# Patient Record
Sex: MALE | Race: WHITE | NOT HISPANIC OR LATINO | Employment: OTHER | URBAN - METROPOLITAN AREA
[De-identification: names, ages, dates, MRNs, and addresses within clinical notes are randomized per-mention and may not be internally consistent; named-entity substitution may affect disease eponyms.]

---

## 2017-02-13 ENCOUNTER — RECORDS - HEALTHEAST (OUTPATIENT)
Dept: GENERAL RADIOLOGY | Facility: CLINIC | Age: 77
End: 2017-02-13

## 2017-02-13 ENCOUNTER — OFFICE VISIT - HEALTHEAST (OUTPATIENT)
Dept: FAMILY MEDICINE | Facility: CLINIC | Age: 77
End: 2017-02-13

## 2017-02-13 DIAGNOSIS — R07.89 OTHER CHEST PAIN: ICD-10-CM

## 2017-02-13 DIAGNOSIS — R07.89 LEFT-SIDED CHEST WALL PAIN: ICD-10-CM

## 2017-02-13 DIAGNOSIS — R07.89 CHEST TIGHTNESS OR PRESSURE: ICD-10-CM

## 2017-02-13 DIAGNOSIS — E87.5 HYPERKALEMIA: ICD-10-CM

## 2017-02-13 DIAGNOSIS — R05.9 COUGH: ICD-10-CM

## 2017-02-13 LAB
ATRIAL RATE - MUSE: 108 BPM
DIASTOLIC BLOOD PRESSURE - MUSE: NORMAL MMHG
INTERPRETATION ECG - MUSE: NORMAL
P AXIS - MUSE: NORMAL DEGREES
PR INTERVAL - MUSE: NORMAL MS
QRS DURATION - MUSE: 142 MS
QT - MUSE: 412 MS
QTC - MUSE: 444 MS
R AXIS - MUSE: 78 DEGREES
SYSTOLIC BLOOD PRESSURE - MUSE: NORMAL MMHG
T AXIS - MUSE: 9 DEGREES
VENTRICULAR RATE- MUSE: 70 BPM

## 2017-02-16 ENCOUNTER — OFFICE VISIT - HEALTHEAST (OUTPATIENT)
Dept: FAMILY MEDICINE | Facility: CLINIC | Age: 77
End: 2017-02-16

## 2017-02-16 ENCOUNTER — COMMUNICATION - HEALTHEAST (OUTPATIENT)
Dept: FAMILY MEDICINE | Facility: CLINIC | Age: 77
End: 2017-02-16

## 2017-02-16 DIAGNOSIS — N18.3 CKD (CHRONIC KIDNEY DISEASE), STAGE 3 (MODERATE): ICD-10-CM

## 2017-02-16 DIAGNOSIS — E87.5 HYPERKALEMIA: ICD-10-CM

## 2017-02-16 DIAGNOSIS — R53.83 FATIGUE: ICD-10-CM

## 2017-02-16 DIAGNOSIS — R05.9 COUGH: ICD-10-CM

## 2017-04-25 ENCOUNTER — COMMUNICATION - HEALTHEAST (OUTPATIENT)
Dept: FAMILY MEDICINE | Facility: CLINIC | Age: 77
End: 2017-04-25

## 2017-04-25 ENCOUNTER — OFFICE VISIT - HEALTHEAST (OUTPATIENT)
Dept: FAMILY MEDICINE | Facility: CLINIC | Age: 77
End: 2017-04-25

## 2017-04-25 DIAGNOSIS — Z86.79 HISTORY OF RHEUMATIC FEVER: ICD-10-CM

## 2017-04-25 DIAGNOSIS — E66.3 OVERWEIGHT (BMI 25.0-29.9): ICD-10-CM

## 2017-04-25 DIAGNOSIS — I10 ESSENTIAL HYPERTENSION WITH GOAL BLOOD PRESSURE LESS THAN 140/90: ICD-10-CM

## 2017-04-25 DIAGNOSIS — E55.9 VITAMIN D DEFICIENCY DISEASE: ICD-10-CM

## 2017-04-25 DIAGNOSIS — I48.19 PERSISTENT ATRIAL FIBRILLATION (H): ICD-10-CM

## 2017-04-25 DIAGNOSIS — I10 HTN (HYPERTENSION): ICD-10-CM

## 2017-04-25 DIAGNOSIS — N18.3 CKD (CHRONIC KIDNEY DISEASE), STAGE 3 (MODERATE): ICD-10-CM

## 2017-04-25 DIAGNOSIS — G47.33 OBSTRUCTIVE SLEEP APNEA: ICD-10-CM

## 2017-04-25 DIAGNOSIS — Z00.01 ENCOUNTER FOR GENERAL ADULT MEDICAL EXAMINATION WITH ABNORMAL FINDINGS: ICD-10-CM

## 2017-04-25 DIAGNOSIS — E87.5 HYPERKALEMIA: ICD-10-CM

## 2017-04-25 DIAGNOSIS — H91.90 HEARING LOSS: ICD-10-CM

## 2017-04-25 LAB
CHOLEST SERPL-MCNC: 184 MG/DL
FASTING STATUS PATIENT QL REPORTED: YES
HDLC SERPL-MCNC: 36 MG/DL
LDLC SERPL CALC-MCNC: 119 MG/DL
TRIGL SERPL-MCNC: 145 MG/DL

## 2017-04-25 ASSESSMENT — MIFFLIN-ST. JEOR: SCORE: 1665.15

## 2017-10-27 ENCOUNTER — OFFICE VISIT - HEALTHEAST (OUTPATIENT)
Dept: FAMILY MEDICINE | Facility: CLINIC | Age: 77
End: 2017-10-27

## 2017-10-27 ENCOUNTER — AMBULATORY - HEALTHEAST (OUTPATIENT)
Dept: FAMILY MEDICINE | Facility: CLINIC | Age: 77
End: 2017-10-27

## 2017-10-27 ENCOUNTER — COMMUNICATION - HEALTHEAST (OUTPATIENT)
Dept: FAMILY MEDICINE | Facility: CLINIC | Age: 77
End: 2017-10-27

## 2017-10-27 DIAGNOSIS — B35.1 ONYCHOMYCOSIS: ICD-10-CM

## 2017-10-27 DIAGNOSIS — N18.30 CHRONIC KIDNEY DISEASE (CKD), STAGE III (MODERATE) (H): ICD-10-CM

## 2017-10-27 DIAGNOSIS — I48.19 PERSISTENT ATRIAL FIBRILLATION (H): ICD-10-CM

## 2017-10-27 DIAGNOSIS — Z23 NEED FOR IMMUNIZATION AGAINST INFLUENZA: ICD-10-CM

## 2017-10-27 DIAGNOSIS — I10 ESSENTIAL HYPERTENSION WITH GOAL BLOOD PRESSURE LESS THAN 140/90: ICD-10-CM

## 2017-10-27 DIAGNOSIS — E55.9 VITAMIN D DEFICIENCY DISEASE: ICD-10-CM

## 2017-10-27 DIAGNOSIS — E87.5 HYPERKALEMIA: ICD-10-CM

## 2017-10-27 ASSESSMENT — MIFFLIN-ST. JEOR: SCORE: 1671.95

## 2017-10-30 ENCOUNTER — COMMUNICATION - HEALTHEAST (OUTPATIENT)
Dept: FAMILY MEDICINE | Facility: CLINIC | Age: 77
End: 2017-10-30

## 2017-11-01 ENCOUNTER — AMBULATORY - HEALTHEAST (OUTPATIENT)
Dept: LAB | Facility: CLINIC | Age: 77
End: 2017-11-01

## 2017-11-01 DIAGNOSIS — E87.5 HYPERKALEMIA: ICD-10-CM

## 2017-11-15 ENCOUNTER — OFFICE VISIT - HEALTHEAST (OUTPATIENT)
Dept: PODIATRY | Age: 77
End: 2017-11-15

## 2017-11-15 DIAGNOSIS — M79.673 PAIN OF FOOT, UNSPECIFIED LATERALITY: ICD-10-CM

## 2017-11-15 DIAGNOSIS — L60.2 ONYCHAUXIS: ICD-10-CM

## 2017-11-15 DIAGNOSIS — N18.30 STAGE 3 CHRONIC KIDNEY DISEASE (H): ICD-10-CM

## 2017-11-15 ASSESSMENT — MIFFLIN-ST. JEOR: SCORE: 1669.68

## 2018-04-06 ENCOUNTER — OFFICE VISIT - HEALTHEAST (OUTPATIENT)
Dept: FAMILY MEDICINE | Facility: CLINIC | Age: 78
End: 2018-04-06

## 2018-04-06 DIAGNOSIS — E55.9 VITAMIN D DEFICIENCY DISEASE: ICD-10-CM

## 2018-04-06 DIAGNOSIS — I48.19 PERSISTENT ATRIAL FIBRILLATION (H): ICD-10-CM

## 2018-04-06 DIAGNOSIS — D75.89 MACROCYTOSIS: ICD-10-CM

## 2018-04-06 DIAGNOSIS — I10 ESSENTIAL HYPERTENSION WITH GOAL BLOOD PRESSURE LESS THAN 140/90: ICD-10-CM

## 2018-04-06 DIAGNOSIS — Z86.79 HISTORY OF RHEUMATIC FEVER: ICD-10-CM

## 2018-04-06 DIAGNOSIS — N18.30 CHRONIC KIDNEY DISEASE (CKD), STAGE III (MODERATE) (H): ICD-10-CM

## 2018-04-06 LAB
ANION GAP SERPL CALCULATED.3IONS-SCNC: 11 MMOL/L (ref 5–18)
BUN SERPL-MCNC: 22 MG/DL (ref 8–28)
CALCIUM SERPL-MCNC: 10.2 MG/DL (ref 8.5–10.5)
CHLORIDE BLD-SCNC: 104 MMOL/L (ref 98–107)
CO2 SERPL-SCNC: 27 MMOL/L (ref 22–31)
CREAT SERPL-MCNC: 1.67 MG/DL (ref 0.7–1.3)
ERYTHROCYTE [DISTWIDTH] IN BLOOD BY AUTOMATED COUNT: 12 % (ref 11–14.5)
FOLATE SERPL-MCNC: 9.9 NG/ML
GFR SERPL CREATININE-BSD FRML MDRD: 40 ML/MIN/1.73M2
GLUCOSE BLD-MCNC: 94 MG/DL (ref 70–125)
HCT VFR BLD AUTO: 42.1 % (ref 40–54)
HGB BLD-MCNC: 14.2 G/DL (ref 14–18)
MCH RBC QN AUTO: 33.6 PG (ref 27–34)
MCHC RBC AUTO-ENTMCNC: 33.6 G/DL (ref 32–36)
MCV RBC AUTO: 100 FL (ref 80–100)
PLATELET # BLD AUTO: 178 THOU/UL (ref 140–440)
PMV BLD AUTO: 7.4 FL (ref 7–10)
POTASSIUM BLD-SCNC: 4.7 MMOL/L (ref 3.5–5)
RBC # BLD AUTO: 4.21 MILL/UL (ref 4.4–6.2)
SODIUM SERPL-SCNC: 142 MMOL/L (ref 136–145)
VIT B12 SERPL-MCNC: 420 PG/ML (ref 213–816)
WBC: 5.6 THOU/UL (ref 4–11)

## 2018-04-08 ENCOUNTER — COMMUNICATION - HEALTHEAST (OUTPATIENT)
Dept: FAMILY MEDICINE | Facility: CLINIC | Age: 78
End: 2018-04-08

## 2018-04-09 LAB — 25(OH)D3 SERPL-MCNC: 46.4 NG/ML (ref 30–80)

## 2018-04-18 ENCOUNTER — COMMUNICATION - HEALTHEAST (OUTPATIENT)
Dept: FAMILY MEDICINE | Facility: CLINIC | Age: 78
End: 2018-04-18

## 2018-04-18 DIAGNOSIS — I10 ESSENTIAL HYPERTENSION WITH GOAL BLOOD PRESSURE LESS THAN 140/90: ICD-10-CM

## 2018-06-14 ENCOUNTER — COMMUNICATION - HEALTHEAST (OUTPATIENT)
Dept: FAMILY MEDICINE | Facility: CLINIC | Age: 78
End: 2018-06-14

## 2018-06-14 DIAGNOSIS — Z86.79 HISTORY OF RHEUMATIC FEVER: ICD-10-CM

## 2018-07-06 ENCOUNTER — OFFICE VISIT - HEALTHEAST (OUTPATIENT)
Dept: FAMILY MEDICINE | Facility: CLINIC | Age: 78
End: 2018-07-06

## 2018-07-06 DIAGNOSIS — R01.1 HEART MURMUR: ICD-10-CM

## 2018-07-06 DIAGNOSIS — I48.19 PERSISTENT ATRIAL FIBRILLATION (H): ICD-10-CM

## 2018-07-06 DIAGNOSIS — Z00.01 ENCOUNTER FOR GENERAL ADULT MEDICAL EXAMINATION WITH ABNORMAL FINDINGS: ICD-10-CM

## 2018-07-06 DIAGNOSIS — I10 ESSENTIAL HYPERTENSION WITH GOAL BLOOD PRESSURE LESS THAN 140/90: ICD-10-CM

## 2018-07-06 DIAGNOSIS — I42.9 CARDIOMYOPATHY (H): ICD-10-CM

## 2018-07-06 DIAGNOSIS — H91.90 HEARING LOSS: ICD-10-CM

## 2018-07-06 DIAGNOSIS — E66.3 OVERWEIGHT (BMI 25.0-29.9): ICD-10-CM

## 2018-07-06 DIAGNOSIS — D75.89 MACROCYTOSIS: ICD-10-CM

## 2018-07-06 DIAGNOSIS — E55.9 VITAMIN D DEFICIENCY DISEASE: ICD-10-CM

## 2018-07-06 DIAGNOSIS — G47.33 OBSTRUCTIVE SLEEP APNEA: ICD-10-CM

## 2018-07-06 DIAGNOSIS — R53.83 FATIGUE: ICD-10-CM

## 2018-07-06 DIAGNOSIS — N18.30 CHRONIC KIDNEY DISEASE (CKD), STAGE III (MODERATE) (H): ICD-10-CM

## 2018-07-06 LAB
ALBUMIN SERPL-MCNC: 4.1 G/DL (ref 3.5–5)
ALP SERPL-CCNC: 95 U/L (ref 45–120)
ALT SERPL W P-5'-P-CCNC: 18 U/L (ref 0–45)
ANION GAP SERPL CALCULATED.3IONS-SCNC: 6 MMOL/L (ref 5–18)
AST SERPL W P-5'-P-CCNC: 18 U/L (ref 0–40)
BILIRUB SERPL-MCNC: 0.8 MG/DL (ref 0–1)
BNP SERPL-MCNC: 205 PG/ML (ref 0–86)
BUN SERPL-MCNC: 23 MG/DL (ref 8–28)
CALCIUM SERPL-MCNC: 10.2 MG/DL (ref 8.5–10.5)
CHLORIDE BLD-SCNC: 109 MMOL/L (ref 98–107)
CHOLEST SERPL-MCNC: 159 MG/DL
CO2 SERPL-SCNC: 29 MMOL/L (ref 22–31)
CREAT SERPL-MCNC: 1.72 MG/DL (ref 0.7–1.3)
ERYTHROCYTE [DISTWIDTH] IN BLOOD BY AUTOMATED COUNT: 13.3 % (ref 11–14.5)
FASTING STATUS PATIENT QL REPORTED: YES
GFR SERPL CREATININE-BSD FRML MDRD: 38 ML/MIN/1.73M2
GLUCOSE BLD-MCNC: 99 MG/DL (ref 70–125)
HCT VFR BLD AUTO: 42.3 % (ref 40–54)
HDLC SERPL-MCNC: 37 MG/DL
HGB BLD-MCNC: 14 G/DL (ref 14–18)
LDLC SERPL CALC-MCNC: 98 MG/DL
MCH RBC QN AUTO: 32.8 PG (ref 27–34)
MCHC RBC AUTO-ENTMCNC: 33.2 G/DL (ref 32–36)
MCV RBC AUTO: 99 FL (ref 80–100)
PLATELET # BLD AUTO: 185 THOU/UL (ref 140–440)
PMV BLD AUTO: 7.6 FL (ref 7–10)
POTASSIUM BLD-SCNC: 6 MMOL/L (ref 3.5–5)
PROT SERPL-MCNC: 7.3 G/DL (ref 6–8)
RBC # BLD AUTO: 4.28 MILL/UL (ref 4.4–6.2)
SODIUM SERPL-SCNC: 144 MMOL/L (ref 136–145)
TRIGL SERPL-MCNC: 119 MG/DL
TSH SERPL DL<=0.005 MIU/L-ACNC: 2.17 UIU/ML (ref 0.3–5)
WBC: 5.5 THOU/UL (ref 4–11)

## 2018-07-06 ASSESSMENT — MIFFLIN-ST. JEOR: SCORE: 1652.11

## 2018-07-09 ENCOUNTER — AMBULATORY - HEALTHEAST (OUTPATIENT)
Dept: FAMILY MEDICINE | Facility: CLINIC | Age: 78
End: 2018-07-09

## 2018-07-09 ENCOUNTER — COMMUNICATION - HEALTHEAST (OUTPATIENT)
Dept: FAMILY MEDICINE | Facility: CLINIC | Age: 78
End: 2018-07-09

## 2018-07-09 DIAGNOSIS — N18.30 CKD (CHRONIC KIDNEY DISEASE) STAGE 3, GFR 30-59 ML/MIN (H): ICD-10-CM

## 2018-07-09 DIAGNOSIS — I42.9 CARDIOMYOPATHY (H): ICD-10-CM

## 2018-07-09 DIAGNOSIS — I13.0 HYPERTENSIVE HEART AND CHRONIC KIDNEY DISEASE WITH HEART FAILURE AND STAGE 1 THROUGH STAGE 4 CHRONIC KIDNEY DISEASE, OR CHRONIC KIDNEY DISEASE (H): ICD-10-CM

## 2018-07-09 DIAGNOSIS — E87.5 HYPERKALEMIA: ICD-10-CM

## 2018-07-11 ENCOUNTER — AMBULATORY - HEALTHEAST (OUTPATIENT)
Dept: LAB | Facility: CLINIC | Age: 78
End: 2018-07-11

## 2018-07-11 DIAGNOSIS — I42.9 CARDIOMYOPATHY (H): ICD-10-CM

## 2018-07-11 DIAGNOSIS — I13.0 HYPERTENSIVE HEART AND CHRONIC KIDNEY DISEASE WITH HEART FAILURE AND STAGE 1 THROUGH STAGE 4 CHRONIC KIDNEY DISEASE, OR CHRONIC KIDNEY DISEASE (H): ICD-10-CM

## 2018-07-11 DIAGNOSIS — E87.5 HYPERKALEMIA: ICD-10-CM

## 2018-07-11 DIAGNOSIS — N18.30 CKD (CHRONIC KIDNEY DISEASE) STAGE 3, GFR 30-59 ML/MIN (H): ICD-10-CM

## 2018-07-11 LAB
ANION GAP SERPL CALCULATED.3IONS-SCNC: 10 MMOL/L (ref 5–18)
BNP SERPL-MCNC: 187 PG/ML (ref 0–86)
BUN SERPL-MCNC: 19 MG/DL (ref 8–28)
CALCIUM SERPL-MCNC: 10.1 MG/DL (ref 8.5–10.5)
CHLORIDE BLD-SCNC: 106 MMOL/L (ref 98–107)
CO2 SERPL-SCNC: 27 MMOL/L (ref 22–31)
CREAT SERPL-MCNC: 1.76 MG/DL (ref 0.7–1.3)
GFR SERPL CREATININE-BSD FRML MDRD: 37 ML/MIN/1.73M2
GLUCOSE BLD-MCNC: 115 MG/DL (ref 70–125)
POTASSIUM BLD-SCNC: 5.4 MMOL/L (ref 3.5–5)
SODIUM SERPL-SCNC: 143 MMOL/L (ref 136–145)

## 2018-07-12 ENCOUNTER — COMMUNICATION - HEALTHEAST (OUTPATIENT)
Dept: FAMILY MEDICINE | Facility: CLINIC | Age: 78
End: 2018-07-12

## 2018-10-20 ENCOUNTER — COMMUNICATION - HEALTHEAST (OUTPATIENT)
Dept: FAMILY MEDICINE | Facility: CLINIC | Age: 78
End: 2018-10-20

## 2018-10-20 DIAGNOSIS — I10 ESSENTIAL HYPERTENSION WITH GOAL BLOOD PRESSURE LESS THAN 140/90: ICD-10-CM

## 2019-01-11 ENCOUNTER — OFFICE VISIT - HEALTHEAST (OUTPATIENT)
Dept: FAMILY MEDICINE | Facility: CLINIC | Age: 79
End: 2019-01-11

## 2019-01-11 DIAGNOSIS — R01.1 HEART MURMUR: ICD-10-CM

## 2019-01-11 DIAGNOSIS — E55.9 VITAMIN D DEFICIENCY DISEASE: ICD-10-CM

## 2019-01-11 DIAGNOSIS — E66.3 OVERWEIGHT (BMI 25.0-29.9): ICD-10-CM

## 2019-01-11 DIAGNOSIS — I48.19 PERSISTENT ATRIAL FIBRILLATION (H): ICD-10-CM

## 2019-01-11 DIAGNOSIS — R04.0 EPISTAXIS: ICD-10-CM

## 2019-01-11 DIAGNOSIS — Z86.79 HISTORY OF RHEUMATIC FEVER: ICD-10-CM

## 2019-01-11 DIAGNOSIS — I10 ESSENTIAL HYPERTENSION WITH GOAL BLOOD PRESSURE LESS THAN 140/90: ICD-10-CM

## 2019-01-11 DIAGNOSIS — N18.30 CHRONIC KIDNEY DISEASE (CKD), STAGE III (MODERATE) (H): ICD-10-CM

## 2019-01-11 DIAGNOSIS — M25.511 ACUTE PAIN OF RIGHT SHOULDER: ICD-10-CM

## 2019-01-11 DIAGNOSIS — E87.5 HYPERKALEMIA: ICD-10-CM

## 2019-01-11 DIAGNOSIS — I42.9 CARDIOMYOPATHY, UNSPECIFIED TYPE (H): ICD-10-CM

## 2019-01-11 LAB
ANION GAP SERPL CALCULATED.3IONS-SCNC: 10 MMOL/L (ref 5–18)
BNP SERPL-MCNC: 194 PG/ML (ref 0–86)
BUN SERPL-MCNC: 25 MG/DL (ref 8–28)
CALCIUM SERPL-MCNC: 10.4 MG/DL (ref 8.5–10.5)
CHLORIDE BLD-SCNC: 108 MMOL/L (ref 98–107)
CO2 SERPL-SCNC: 25 MMOL/L (ref 22–31)
CREAT SERPL-MCNC: 1.81 MG/DL (ref 0.7–1.3)
GFR SERPL CREATININE-BSD FRML MDRD: 36 ML/MIN/1.73M2
GLUCOSE BLD-MCNC: 93 MG/DL (ref 70–125)
POTASSIUM BLD-SCNC: 5.5 MMOL/L (ref 3.5–5)
SODIUM SERPL-SCNC: 143 MMOL/L (ref 136–145)

## 2019-01-14 ENCOUNTER — COMMUNICATION - HEALTHEAST (OUTPATIENT)
Dept: FAMILY MEDICINE | Facility: CLINIC | Age: 79
End: 2019-01-14

## 2019-01-14 LAB — 25(OH)D3 SERPL-MCNC: 42.1 NG/ML (ref 30–80)

## 2019-01-18 ENCOUNTER — HOSPITAL ENCOUNTER (OUTPATIENT)
Dept: CARDIOLOGY | Facility: HOSPITAL | Age: 79
Discharge: HOME | End: 2019-01-18
Attending: FAMILY MEDICINE

## 2019-01-18 ENCOUNTER — COMMUNICATION - HEALTHEAST (OUTPATIENT)
Dept: FAMILY MEDICINE | Facility: CLINIC | Age: 79
End: 2019-01-18

## 2019-01-18 DIAGNOSIS — I42.9 CARDIOMYOPATHY, UNSPECIFIED TYPE (H): ICD-10-CM

## 2019-01-18 DIAGNOSIS — R01.1 HEART MURMUR: ICD-10-CM

## 2019-01-18 LAB
AORTIC ROOT: 3.8 CM
BSA FOR ECHO PROCEDURE: 2.15 M2
CV BLOOD PRESSURE: ABNORMAL MMHG
CV ECHO HEIGHT: 70.8 IN
CV ECHO WEIGHT: 205 LBS
DOP CALC LVOT AREA: 3.46 CM2
DOP CALC LVOT DIAMETER: 2.1 CM
DOP CALC LVOT STROKE VOLUME: 62 CM3
DOP CALC MV VTI: 28.4 CM
DOP CALCLVOT PEAK VEL VTI: 17.9 CM
EJECTION FRACTION: 65 % (ref 55–75)
FRACTIONAL SHORTENING: 34.9 % (ref 28–44)
INTERVENTRICULAR SEPTUM IN END DIASTOLE: 1.4 CM (ref 0.6–1)
IVS/PW RATIO: 0.9
LA AREA 1: 29.2 CM2
LA AREA 2: 26.8 CM2
LEFT ATRIUM LENGTH: 6.88 CM
LEFT ATRIUM SIZE: 4.3 CM
LEFT ATRIUM TO AORTIC ROOT RATIO: 1.13 NO UNITS
LEFT ATRIUM VOLUME INDEX: 45 ML/M2
LEFT ATRIUM VOLUME: 96.7 ML
LEFT VENTRICLE CARDIAC INDEX: 2.1 L/MIN/M2
LEFT VENTRICLE CARDIAC OUTPUT: 4.6 L/MIN
LEFT VENTRICLE DIASTOLIC VOLUME INDEX: 47 CM3/M2 (ref 34–74)
LEFT VENTRICLE DIASTOLIC VOLUME: 101 CM3 (ref 62–150)
LEFT VENTRICLE HEART RATE: 74 BPM
LEFT VENTRICLE MASS INDEX: 120.1 G/M2
LEFT VENTRICLE SYSTOLIC VOLUME INDEX: 16.3 CM3/M2 (ref 11–31)
LEFT VENTRICLE SYSTOLIC VOLUME: 35 CM3 (ref 21–61)
LEFT VENTRICULAR INTERNAL DIMENSION IN DIASTOLE: 4.3 CM (ref 4.2–5.8)
LEFT VENTRICULAR INTERNAL DIMENSION IN SYSTOLE: 2.8 CM (ref 2.5–4)
LEFT VENTRICULAR MASS: 258.1 G
LEFT VENTRICULAR OUTFLOW TRACT MEAN GRADIENT: 1 MMHG
LEFT VENTRICULAR OUTFLOW TRACT MEAN VELOCITY: 54.9 CM/S
LEFT VENTRICULAR POSTERIOR WALL IN END DIASTOLE: 1.6 CM (ref 0.6–1)
LV STROKE VOLUME INDEX: 28.8 ML/M2
MITRAL REGURGITANT VELOCITY TIME INTEGRAL: 160 CM
MITRAL VALVE MEAN INFLOW VELOCITY: 42 CM/S
MITRAL VALVE PEAK VELOCITY: 102 CM/S
MR MEAN GRADIENT: 60 MMHG
MR MEAN VELOCITY: 365 CM/S
MR PEAK GRADIENT: 88.7 MMHG
MV AREA VTI: 2.18 CM2
MV AVERAGE E/E' RATIO: 6.4 CM/S
MV DECELERATION TIME: 165 MS
MV E'TISSUE VEL-LAT: 13.2 CM/S
MV E'TISSUE VEL-MED: 10.9 CM/S
MV LATERAL E/E' RATIO: 5.9
MV MEAN GRADIENT: 1 MMHG
MV MEDIAL E/E' RATIO: 7.1
MV PEAK E VELOCITY: 77.5 CM/S
MV PEAK GRADIENT: 4.2 MMHG
MV VALVE AREA BY CONTINUITY EQUATION: 2.2 CM2
NUC REST DIASTOLIC VOLUME INDEX: 3280 LBS
NUC REST SYSTOLIC VOLUME INDEX: 70.75 IN
PISA MR PEAK VEL: 471 CM/S
PR MAX PG: 2 MMHG
PR PEAK VELOCITY: 73.3 CM/S
TRICUSPID REGURGITATION PEAK PRESSURE GRADIENT: 10.8 MMHG
TRICUSPID VALVE ANULAR PLANE SYSTOLIC EXCURSION: 1.7 CM
TRICUSPID VALVE PEAK REGURGITANT VELOCITY: 164 CM/S

## 2019-01-18 ASSESSMENT — MIFFLIN-ST. JEOR: SCORE: 1643.03

## 2019-07-14 ENCOUNTER — COMMUNICATION - HEALTHEAST (OUTPATIENT)
Dept: FAMILY MEDICINE | Facility: CLINIC | Age: 79
End: 2019-07-14

## 2019-07-14 DIAGNOSIS — I10 ESSENTIAL HYPERTENSION WITH GOAL BLOOD PRESSURE LESS THAN 140/90: ICD-10-CM

## 2019-07-16 ENCOUNTER — COMMUNICATION - HEALTHEAST (OUTPATIENT)
Dept: FAMILY MEDICINE | Facility: CLINIC | Age: 79
End: 2019-07-16

## 2019-07-16 ENCOUNTER — OFFICE VISIT - HEALTHEAST (OUTPATIENT)
Dept: FAMILY MEDICINE | Facility: CLINIC | Age: 79
End: 2019-07-16

## 2019-07-16 DIAGNOSIS — I48.19 PERSISTENT ATRIAL FIBRILLATION (H): ICD-10-CM

## 2019-07-16 DIAGNOSIS — N18.30 CHRONIC KIDNEY DISEASE (CKD), STAGE III (MODERATE) (H): ICD-10-CM

## 2019-07-16 DIAGNOSIS — I13.0 HYPERTENSIVE HEART AND CHRONIC KIDNEY DISEASE WITH HEART FAILURE AND STAGE 1 THROUGH STAGE 4 CHRONIC KIDNEY DISEASE, OR CHRONIC KIDNEY DISEASE (H): ICD-10-CM

## 2019-07-16 DIAGNOSIS — R42 DIZZINESS: ICD-10-CM

## 2019-07-16 DIAGNOSIS — E66.3 OVERWEIGHT (BMI 25.0-29.9): ICD-10-CM

## 2019-07-16 DIAGNOSIS — Z00.01 ENCOUNTER FOR GENERAL ADULT MEDICAL EXAMINATION WITH ABNORMAL FINDINGS: ICD-10-CM

## 2019-07-16 DIAGNOSIS — I10 ESSENTIAL HYPERTENSION WITH GOAL BLOOD PRESSURE LESS THAN 140/90: ICD-10-CM

## 2019-07-16 DIAGNOSIS — I42.9 CARDIOMYOPATHY, UNSPECIFIED TYPE (H): ICD-10-CM

## 2019-07-16 DIAGNOSIS — E87.5 HYPERKALEMIA: ICD-10-CM

## 2019-07-16 LAB
ANION GAP SERPL CALCULATED.3IONS-SCNC: 8 MMOL/L (ref 5–18)
BNP SERPL-MCNC: 288 PG/ML (ref 0–88)
BUN SERPL-MCNC: 27 MG/DL (ref 8–28)
CALCIUM SERPL-MCNC: 10.5 MG/DL (ref 8.5–10.5)
CHLORIDE BLD-SCNC: 107 MMOL/L (ref 98–107)
CHOLEST SERPL-MCNC: 161 MG/DL
CO2 SERPL-SCNC: 27 MMOL/L (ref 22–31)
CREAT SERPL-MCNC: 1.96 MG/DL (ref 0.7–1.3)
ERYTHROCYTE [DISTWIDTH] IN BLOOD BY AUTOMATED COUNT: 12.8 % (ref 11–14.5)
FASTING STATUS PATIENT QL REPORTED: YES
GFR SERPL CREATININE-BSD FRML MDRD: 33 ML/MIN/1.73M2
GLUCOSE BLD-MCNC: 93 MG/DL (ref 70–125)
HCT VFR BLD AUTO: 40.3 % (ref 40–54)
HDLC SERPL-MCNC: 41 MG/DL
HGB BLD-MCNC: 13.4 G/DL (ref 14–18)
LDLC SERPL CALC-MCNC: 92 MG/DL
MCH RBC QN AUTO: 34.4 PG (ref 27–34)
MCHC RBC AUTO-ENTMCNC: 33.1 G/DL (ref 32–36)
MCV RBC AUTO: 104 FL (ref 80–100)
PLATELET # BLD AUTO: 154 THOU/UL (ref 140–440)
PMV BLD AUTO: 8.3 FL (ref 7–10)
POTASSIUM BLD-SCNC: 5.4 MMOL/L (ref 3.5–5)
RBC # BLD AUTO: 3.89 MILL/UL (ref 4.4–6.2)
SODIUM SERPL-SCNC: 142 MMOL/L (ref 136–145)
TRIGL SERPL-MCNC: 138 MG/DL
WBC: 5.9 THOU/UL (ref 4–11)

## 2019-07-16 ASSESSMENT — MIFFLIN-ST. JEOR: SCORE: 1665.71

## 2019-07-19 ENCOUNTER — HOSPITAL ENCOUNTER (OUTPATIENT)
Dept: CARDIOLOGY | Facility: CLINIC | Age: 79
Discharge: HOME | End: 2019-07-19
Attending: FAMILY MEDICINE

## 2019-07-19 DIAGNOSIS — R42 DIZZINESS: ICD-10-CM

## 2019-07-19 DIAGNOSIS — I48.19 PERSISTENT ATRIAL FIBRILLATION (H): ICD-10-CM

## 2019-07-22 ENCOUNTER — AMBULATORY - HEALTHEAST (OUTPATIENT)
Dept: CARDIOLOGY | Facility: CLINIC | Age: 79
End: 2019-07-22

## 2019-07-22 ENCOUNTER — AMBULATORY - HEALTHEAST (OUTPATIENT)
Dept: FAMILY MEDICINE | Facility: CLINIC | Age: 79
End: 2019-07-22

## 2019-07-22 ENCOUNTER — COMMUNICATION - HEALTHEAST (OUTPATIENT)
Dept: CARDIOLOGY | Facility: CLINIC | Age: 79
End: 2019-07-22

## 2019-07-22 ENCOUNTER — COMMUNICATION - HEALTHEAST (OUTPATIENT)
Dept: FAMILY MEDICINE | Facility: CLINIC | Age: 79
End: 2019-07-22

## 2019-07-22 DIAGNOSIS — R94.31 ABNORMAL HOLTER MONITOR FINDING: ICD-10-CM

## 2019-07-23 ENCOUNTER — OFFICE VISIT - HEALTHEAST (OUTPATIENT)
Dept: FAMILY MEDICINE | Facility: CLINIC | Age: 79
End: 2019-07-23

## 2019-07-23 ENCOUNTER — COMMUNICATION - HEALTHEAST (OUTPATIENT)
Dept: FAMILY MEDICINE | Facility: CLINIC | Age: 79
End: 2019-07-23

## 2019-07-23 DIAGNOSIS — R00.1 BRADYCARDIA: ICD-10-CM

## 2019-07-23 DIAGNOSIS — R42 DIZZINESS: ICD-10-CM

## 2019-07-23 DIAGNOSIS — I48.19 PERSISTENT ATRIAL FIBRILLATION (H): ICD-10-CM

## 2019-07-23 DIAGNOSIS — N18.30 CHRONIC KIDNEY DISEASE (CKD), STAGE III (MODERATE) (H): ICD-10-CM

## 2019-07-23 DIAGNOSIS — D75.89 MACROCYTOSIS: ICD-10-CM

## 2019-07-23 DIAGNOSIS — E87.5 HYPERKALEMIA: ICD-10-CM

## 2019-07-23 DIAGNOSIS — I10 ESSENTIAL HYPERTENSION WITH GOAL BLOOD PRESSURE LESS THAN 140/90: ICD-10-CM

## 2019-07-23 LAB
ANION GAP SERPL CALCULATED.3IONS-SCNC: 10 MMOL/L (ref 5–18)
BUN SERPL-MCNC: 28 MG/DL (ref 8–28)
CALCIUM SERPL-MCNC: 10.4 MG/DL (ref 8.5–10.5)
CHLORIDE BLD-SCNC: 109 MMOL/L (ref 98–107)
CO2 SERPL-SCNC: 26 MMOL/L (ref 22–31)
CREAT SERPL-MCNC: 1.87 MG/DL (ref 0.7–1.3)
ERYTHROCYTE [DISTWIDTH] IN BLOOD BY AUTOMATED COUNT: 13.1 % (ref 11–14.5)
FOLATE SERPL-MCNC: 11.6 NG/ML
GFR SERPL CREATININE-BSD FRML MDRD: 35 ML/MIN/1.73M2
GLUCOSE BLD-MCNC: 89 MG/DL (ref 70–125)
HCT VFR BLD AUTO: 41.6 % (ref 40–54)
HGB BLD-MCNC: 14 G/DL (ref 14–18)
MAGNESIUM SERPL-MCNC: 2.2 MG/DL (ref 1.8–2.6)
MCH RBC QN AUTO: 34.6 PG (ref 27–34)
MCHC RBC AUTO-ENTMCNC: 33.6 G/DL (ref 32–36)
MCV RBC AUTO: 103 FL (ref 80–100)
PLATELET # BLD AUTO: 167 THOU/UL (ref 140–440)
PMV BLD AUTO: 8.2 FL (ref 7–10)
POTASSIUM BLD-SCNC: 5.3 MMOL/L (ref 3.5–5)
RBC # BLD AUTO: 4.04 MILL/UL (ref 4.4–6.2)
SODIUM SERPL-SCNC: 145 MMOL/L (ref 136–145)
TSH SERPL DL<=0.005 MIU/L-ACNC: 2.32 UIU/ML (ref 0.3–5)
VIT B12 SERPL-MCNC: 1022 PG/ML (ref 213–816)
WBC: 5.8 THOU/UL (ref 4–11)

## 2019-07-24 LAB
ATRIAL RATE - MUSE: 45 BPM
DIASTOLIC BLOOD PRESSURE - MUSE: NORMAL MMHG
INTERPRETATION ECG - MUSE: NORMAL
P AXIS - MUSE: NORMAL DEGREES
PR INTERVAL - MUSE: NORMAL MS
QRS DURATION - MUSE: 142 MS
QT - MUSE: 476 MS
QTC - MUSE: 402 MS
R AXIS - MUSE: 51 DEGREES
SYSTOLIC BLOOD PRESSURE - MUSE: NORMAL MMHG
T AXIS - MUSE: 8 DEGREES
VENTRICULAR RATE- MUSE: 43 BPM

## 2019-07-26 ENCOUNTER — COMMUNICATION - HEALTHEAST (OUTPATIENT)
Dept: CARDIOLOGY | Facility: CLINIC | Age: 79
End: 2019-07-26

## 2019-07-26 ENCOUNTER — AMBULATORY - HEALTHEAST (OUTPATIENT)
Dept: CARDIOLOGY | Facility: CLINIC | Age: 79
End: 2019-07-26

## 2019-07-26 ENCOUNTER — OFFICE VISIT - HEALTHEAST (OUTPATIENT)
Dept: CARDIOLOGY | Facility: CLINIC | Age: 79
End: 2019-07-26

## 2019-07-26 DIAGNOSIS — I45.10 RBBB (RIGHT BUNDLE BRANCH BLOCK): ICD-10-CM

## 2019-07-26 DIAGNOSIS — I48.21 PERMANENT ATRIAL FIBRILLATION (H): ICD-10-CM

## 2019-07-26 DIAGNOSIS — R00.1 BRADYCARDIA: ICD-10-CM

## 2019-07-26 DIAGNOSIS — I10 ESSENTIAL HYPERTENSION WITH GOAL BLOOD PRESSURE LESS THAN 140/90: ICD-10-CM

## 2019-07-26 ASSESSMENT — MIFFLIN-ST. JEOR: SCORE: 1665.71

## 2019-07-29 ENCOUNTER — AMBULATORY - HEALTHEAST (OUTPATIENT)
Dept: CARDIOLOGY | Facility: CLINIC | Age: 79
End: 2019-07-29

## 2019-07-29 ENCOUNTER — SURGERY - HEALTHEAST (OUTPATIENT)
Dept: CARDIOLOGY | Facility: CLINIC | Age: 79
End: 2019-07-29

## 2019-07-29 DIAGNOSIS — I49.5 SSS (SICK SINUS SYNDROME) (H): ICD-10-CM

## 2019-08-01 ENCOUNTER — SURGERY - HEALTHEAST (OUTPATIENT)
Dept: CARDIOLOGY | Facility: CLINIC | Age: 79
End: 2019-08-01

## 2019-08-01 ENCOUNTER — AMBULATORY - HEALTHEAST (OUTPATIENT)
Dept: CARDIOLOGY | Facility: CLINIC | Age: 79
End: 2019-08-01

## 2019-08-01 ASSESSMENT — MIFFLIN-ST. JEOR: SCORE: 1653.8

## 2019-08-02 ENCOUNTER — AMBULATORY - HEALTHEAST (OUTPATIENT)
Dept: CARDIOLOGY | Facility: CLINIC | Age: 79
End: 2019-08-02

## 2019-08-02 ENCOUNTER — RECORDS - HEALTHEAST (OUTPATIENT)
Dept: ADMINISTRATIVE | Facility: OTHER | Age: 79
End: 2019-08-02

## 2019-08-02 DIAGNOSIS — Z95.0 CARDIAC PACEMAKER IN SITU: ICD-10-CM

## 2019-08-08 ENCOUNTER — AMBULATORY - HEALTHEAST (OUTPATIENT)
Dept: CARDIOLOGY | Facility: CLINIC | Age: 79
End: 2019-08-08

## 2019-08-08 DIAGNOSIS — Z95.0 CARDIAC PACEMAKER IN SITU: ICD-10-CM

## 2019-08-08 LAB
HCC DEVICE COMMENTS: NORMAL
HCC DEVICE IMPLANTING PROVIDER: NORMAL
HCC DEVICE MANUFACTURE: NORMAL
HCC DEVICE MODEL: NORMAL
HCC DEVICE SERIAL NUMBER: NORMAL
HCC DEVICE TYPE: NORMAL

## 2019-08-21 ENCOUNTER — OFFICE VISIT - HEALTHEAST (OUTPATIENT)
Dept: FAMILY MEDICINE | Facility: CLINIC | Age: 79
End: 2019-08-21

## 2019-08-21 DIAGNOSIS — I10 ESSENTIAL HYPERTENSION WITH GOAL BLOOD PRESSURE LESS THAN 140/90: ICD-10-CM

## 2019-08-21 DIAGNOSIS — R42 DIZZINESS: ICD-10-CM

## 2019-08-21 DIAGNOSIS — R00.1 BRADYCARDIA: ICD-10-CM

## 2019-08-21 DIAGNOSIS — I48.19 PERSISTENT ATRIAL FIBRILLATION (H): ICD-10-CM

## 2019-08-21 DIAGNOSIS — R94.31 ABNORMAL HOLTER MONITOR FINDING: ICD-10-CM

## 2019-09-11 ENCOUNTER — AMBULATORY - HEALTHEAST (OUTPATIENT)
Dept: CARDIOLOGY | Facility: CLINIC | Age: 79
End: 2019-09-11

## 2019-09-11 DIAGNOSIS — Z95.0 CARDIAC PACEMAKER IN SITU: ICD-10-CM

## 2019-10-03 ENCOUNTER — OFFICE VISIT - HEALTHEAST (OUTPATIENT)
Dept: FAMILY MEDICINE | Facility: CLINIC | Age: 79
End: 2019-10-03

## 2019-10-03 DIAGNOSIS — R10.11 ABDOMINAL PAIN, RIGHT UPPER QUADRANT: ICD-10-CM

## 2019-10-15 ENCOUNTER — OFFICE VISIT - HEALTHEAST (OUTPATIENT)
Dept: FAMILY MEDICINE | Facility: CLINIC | Age: 79
End: 2019-10-15

## 2019-10-15 DIAGNOSIS — I48.19 PERSISTENT ATRIAL FIBRILLATION (H): ICD-10-CM

## 2019-10-15 DIAGNOSIS — K63.89 EPIPLOIC APPENDAGITIS: ICD-10-CM

## 2019-10-15 DIAGNOSIS — I71.40 ABDOMINAL AORTIC ANEURYSM (AAA) WITHOUT RUPTURE (H): ICD-10-CM

## 2019-10-15 DIAGNOSIS — D53.9 MACROCYTIC ANEMIA: ICD-10-CM

## 2019-10-15 DIAGNOSIS — I72.3 ANEURYSM OF COMMON ILIAC ARTERY (H): ICD-10-CM

## 2019-10-15 DIAGNOSIS — I10 ESSENTIAL HYPERTENSION WITH GOAL BLOOD PRESSURE LESS THAN 140/90: ICD-10-CM

## 2019-10-15 DIAGNOSIS — N18.30 CHRONIC KIDNEY DISEASE (CKD), STAGE III (MODERATE) (H): ICD-10-CM

## 2019-10-15 LAB
ANION GAP SERPL CALCULATED.3IONS-SCNC: 9 MMOL/L (ref 5–18)
BUN SERPL-MCNC: 23 MG/DL (ref 8–28)
CALCIUM SERPL-MCNC: 10.1 MG/DL (ref 8.5–10.5)
CHLORIDE BLD-SCNC: 108 MMOL/L (ref 98–107)
CO2 SERPL-SCNC: 26 MMOL/L (ref 22–31)
CREAT SERPL-MCNC: 1.76 MG/DL (ref 0.7–1.3)
ERYTHROCYTE [DISTWIDTH] IN BLOOD BY AUTOMATED COUNT: 12.7 % (ref 11–14.5)
GFR SERPL CREATININE-BSD FRML MDRD: 37 ML/MIN/1.73M2
GLUCOSE BLD-MCNC: 96 MG/DL (ref 70–125)
HCT VFR BLD AUTO: 38.6 % (ref 40–54)
HGB BLD-MCNC: 13.5 G/DL (ref 14–18)
MCH RBC QN AUTO: 35 PG (ref 27–34)
MCHC RBC AUTO-ENTMCNC: 35 G/DL (ref 32–36)
MCV RBC AUTO: 100 FL (ref 80–100)
PLATELET # BLD AUTO: 228 THOU/UL (ref 140–440)
PMV BLD AUTO: 7.2 FL (ref 7–10)
POTASSIUM BLD-SCNC: 5.7 MMOL/L (ref 3.5–5)
RBC # BLD AUTO: 3.86 MILL/UL (ref 4.4–6.2)
SODIUM SERPL-SCNC: 143 MMOL/L (ref 136–145)
WBC: 6.1 THOU/UL (ref 4–11)

## 2019-10-16 ENCOUNTER — COMMUNICATION - HEALTHEAST (OUTPATIENT)
Dept: FAMILY MEDICINE | Facility: CLINIC | Age: 79
End: 2019-10-16

## 2019-11-01 ENCOUNTER — AMBULATORY - HEALTHEAST (OUTPATIENT)
Dept: CARDIOLOGY | Facility: CLINIC | Age: 79
End: 2019-11-01

## 2019-11-01 DIAGNOSIS — I49.5 SSS (SICK SINUS SYNDROME) (H): ICD-10-CM

## 2019-11-01 DIAGNOSIS — Z95.0 CARDIAC PACEMAKER IN SITU: ICD-10-CM

## 2019-11-01 DIAGNOSIS — I45.10 RBBB: ICD-10-CM

## 2019-11-01 DIAGNOSIS — I48.91 A-FIB (H): ICD-10-CM

## 2019-11-01 ASSESSMENT — MIFFLIN-ST. JEOR: SCORE: 1683.29

## 2019-11-03 ENCOUNTER — AMBULATORY - HEALTHEAST (OUTPATIENT)
Dept: FAMILY MEDICINE | Facility: CLINIC | Age: 79
End: 2019-11-03

## 2019-11-03 DIAGNOSIS — E87.5 HYPERKALEMIA: ICD-10-CM

## 2019-11-08 ENCOUNTER — COMMUNICATION - HEALTHEAST (OUTPATIENT)
Dept: CARDIOLOGY | Facility: CLINIC | Age: 79
End: 2019-11-08

## 2019-11-08 DIAGNOSIS — I47.29 NSVT (NONSUSTAINED VENTRICULAR TACHYCARDIA) (H): ICD-10-CM

## 2019-11-11 ENCOUNTER — AMBULATORY - HEALTHEAST (OUTPATIENT)
Dept: LAB | Facility: CLINIC | Age: 79
End: 2019-11-11

## 2019-11-11 ENCOUNTER — COMMUNICATION - HEALTHEAST (OUTPATIENT)
Dept: FAMILY MEDICINE | Facility: CLINIC | Age: 79
End: 2019-11-11

## 2019-11-11 DIAGNOSIS — I47.29 NSVT (NONSUSTAINED VENTRICULAR TACHYCARDIA) (H): ICD-10-CM

## 2019-11-11 LAB
ANION GAP SERPL CALCULATED.3IONS-SCNC: 8 MMOL/L (ref 5–18)
BUN SERPL-MCNC: 26 MG/DL (ref 8–28)
CALCIUM SERPL-MCNC: 9.7 MG/DL (ref 8.5–10.5)
CHLORIDE BLD-SCNC: 104 MMOL/L (ref 98–107)
CO2 SERPL-SCNC: 30 MMOL/L (ref 22–31)
CREAT SERPL-MCNC: 1.78 MG/DL (ref 0.7–1.3)
GFR SERPL CREATININE-BSD FRML MDRD: 37 ML/MIN/1.73M2
GLUCOSE BLD-MCNC: 102 MG/DL (ref 70–125)
MAGNESIUM SERPL-MCNC: 2 MG/DL (ref 1.8–2.6)
POTASSIUM BLD-SCNC: 4.5 MMOL/L (ref 3.5–5)
SODIUM SERPL-SCNC: 142 MMOL/L (ref 136–145)

## 2019-11-13 ENCOUNTER — OFFICE VISIT - HEALTHEAST (OUTPATIENT)
Dept: FAMILY MEDICINE | Facility: CLINIC | Age: 79
End: 2019-11-13

## 2019-11-13 DIAGNOSIS — N18.30 CKD (CHRONIC KIDNEY DISEASE) STAGE 3, GFR 30-59 ML/MIN (H): ICD-10-CM

## 2019-11-13 DIAGNOSIS — I10 ESSENTIAL HYPERTENSION: ICD-10-CM

## 2019-11-13 DIAGNOSIS — Z95.0 CARDIAC PACEMAKER IN SITU: ICD-10-CM

## 2019-11-13 DIAGNOSIS — I48.21 PERMANENT ATRIAL FIBRILLATION (H): ICD-10-CM

## 2019-11-13 DIAGNOSIS — R42 DIZZINESS: ICD-10-CM

## 2019-11-14 LAB
ATRIAL RATE - MUSE: 71 BPM
DIASTOLIC BLOOD PRESSURE - MUSE: NORMAL
INTERPRETATION ECG - MUSE: NORMAL
P AXIS - MUSE: NORMAL
PR INTERVAL - MUSE: NORMAL
QRS DURATION - MUSE: 162 MS
QT - MUSE: 452 MS
QTC - MUSE: 480 MS
R AXIS - MUSE: 62 DEGREES
SYSTOLIC BLOOD PRESSURE - MUSE: NORMAL
T AXIS - MUSE: 259 DEGREES
VENTRICULAR RATE- MUSE: 68 BPM

## 2019-11-21 ENCOUNTER — COMMUNICATION - HEALTHEAST (OUTPATIENT)
Dept: CARDIOLOGY | Facility: CLINIC | Age: 79
End: 2019-11-21

## 2019-11-27 ENCOUNTER — OFFICE VISIT - HEALTHEAST (OUTPATIENT)
Dept: FAMILY MEDICINE | Facility: CLINIC | Age: 79
End: 2019-11-27

## 2019-11-27 DIAGNOSIS — Z95.0 CARDIAC PACEMAKER IN SITU: ICD-10-CM

## 2019-11-27 DIAGNOSIS — I10 ESSENTIAL HYPERTENSION: ICD-10-CM

## 2019-11-27 DIAGNOSIS — N18.30 CKD (CHRONIC KIDNEY DISEASE) STAGE 3, GFR 30-59 ML/MIN (H): ICD-10-CM

## 2019-11-27 DIAGNOSIS — R42 DIZZINESS: ICD-10-CM

## 2020-01-17 ENCOUNTER — OFFICE VISIT - HEALTHEAST (OUTPATIENT)
Dept: FAMILY MEDICINE | Facility: CLINIC | Age: 80
End: 2020-01-17

## 2020-01-17 DIAGNOSIS — E55.9 VITAMIN D DEFICIENCY DISEASE: ICD-10-CM

## 2020-01-17 DIAGNOSIS — R42 DIZZINESS: ICD-10-CM

## 2020-01-17 DIAGNOSIS — I48.21 PERMANENT ATRIAL FIBRILLATION (H): ICD-10-CM

## 2020-01-17 DIAGNOSIS — I10 ESSENTIAL HYPERTENSION: ICD-10-CM

## 2020-01-17 DIAGNOSIS — Z95.0 CARDIAC PACEMAKER IN SITU: ICD-10-CM

## 2020-01-17 DIAGNOSIS — N18.30 CKD (CHRONIC KIDNEY DISEASE) STAGE 3, GFR 30-59 ML/MIN (H): ICD-10-CM

## 2020-01-17 DIAGNOSIS — D53.9 MACROCYTIC ANEMIA: ICD-10-CM

## 2020-01-17 LAB
ANION GAP SERPL CALCULATED.3IONS-SCNC: 11 MMOL/L (ref 5–18)
BUN SERPL-MCNC: 23 MG/DL (ref 8–28)
CALCIUM SERPL-MCNC: 10.2 MG/DL (ref 8.5–10.5)
CHLORIDE BLD-SCNC: 105 MMOL/L (ref 98–107)
CO2 SERPL-SCNC: 27 MMOL/L (ref 22–31)
CREAT SERPL-MCNC: 1.88 MG/DL (ref 0.7–1.3)
ERYTHROCYTE [DISTWIDTH] IN BLOOD BY AUTOMATED COUNT: 12.5 % (ref 11–14.5)
GFR SERPL CREATININE-BSD FRML MDRD: 35 ML/MIN/1.73M2
GLUCOSE BLD-MCNC: 96 MG/DL (ref 70–125)
HCT VFR BLD AUTO: 43 % (ref 40–54)
HGB BLD-MCNC: 14.2 G/DL (ref 14–18)
MCH RBC QN AUTO: 32.9 PG (ref 27–34)
MCHC RBC AUTO-ENTMCNC: 33.1 G/DL (ref 32–36)
MCV RBC AUTO: 100 FL (ref 80–100)
PLATELET # BLD AUTO: 202 THOU/UL (ref 140–440)
PMV BLD AUTO: 7.4 FL (ref 7–10)
POTASSIUM BLD-SCNC: 5.7 MMOL/L (ref 3.5–5)
RBC # BLD AUTO: 4.31 MILL/UL (ref 4.4–6.2)
SODIUM SERPL-SCNC: 143 MMOL/L (ref 136–145)
WBC: 5.7 THOU/UL (ref 4–11)

## 2020-01-20 ENCOUNTER — COMMUNICATION - HEALTHEAST (OUTPATIENT)
Dept: FAMILY MEDICINE | Facility: CLINIC | Age: 80
End: 2020-01-20

## 2020-01-20 LAB — 25(OH)D3 SERPL-MCNC: 48.8 NG/ML (ref 30–80)

## 2020-02-04 ENCOUNTER — AMBULATORY - HEALTHEAST (OUTPATIENT)
Dept: CARDIOLOGY | Facility: CLINIC | Age: 80
End: 2020-02-04

## 2020-02-04 DIAGNOSIS — I45.10 RBBB (RIGHT BUNDLE BRANCH BLOCK): ICD-10-CM

## 2020-02-04 DIAGNOSIS — I48.91 A-FIB (H): ICD-10-CM

## 2020-02-04 DIAGNOSIS — I49.5 SSS (SICK SINUS SYNDROME) (H): ICD-10-CM

## 2020-02-04 DIAGNOSIS — Z95.0 CARDIAC PACEMAKER IN SITU: ICD-10-CM

## 2020-02-04 DIAGNOSIS — R00.1 BRADYCARDIA: ICD-10-CM

## 2020-02-21 ENCOUNTER — COMMUNICATION - HEALTHEAST (OUTPATIENT)
Dept: FAMILY MEDICINE | Facility: CLINIC | Age: 80
End: 2020-02-21

## 2020-02-21 DIAGNOSIS — Z79.2 PROPHYLACTIC ANTIBIOTIC: ICD-10-CM

## 2020-05-05 ENCOUNTER — AMBULATORY - HEALTHEAST (OUTPATIENT)
Dept: CARDIOLOGY | Facility: CLINIC | Age: 80
End: 2020-05-05

## 2020-05-05 DIAGNOSIS — R00.1 BRADYCARDIA: ICD-10-CM

## 2020-05-05 DIAGNOSIS — Z95.0 CARDIAC PACEMAKER IN SITU: ICD-10-CM

## 2020-06-30 ENCOUNTER — COMMUNICATION - HEALTHEAST (OUTPATIENT)
Dept: FAMILY MEDICINE | Facility: CLINIC | Age: 80
End: 2020-06-30

## 2020-06-30 DIAGNOSIS — I10 ESSENTIAL HYPERTENSION WITH GOAL BLOOD PRESSURE LESS THAN 140/90: ICD-10-CM

## 2020-07-17 ENCOUNTER — OFFICE VISIT - HEALTHEAST (OUTPATIENT)
Dept: FAMILY MEDICINE | Facility: CLINIC | Age: 80
End: 2020-07-17

## 2020-07-17 DIAGNOSIS — E87.5 HYPERKALEMIA: ICD-10-CM

## 2020-07-17 DIAGNOSIS — D53.9 MACROCYTIC ANEMIA: ICD-10-CM

## 2020-07-17 DIAGNOSIS — Z95.0 CARDIAC PACEMAKER IN SITU: ICD-10-CM

## 2020-07-17 DIAGNOSIS — I48.21 PERMANENT ATRIAL FIBRILLATION (H): ICD-10-CM

## 2020-07-17 DIAGNOSIS — Z00.01 ENCOUNTER FOR GENERAL ADULT MEDICAL EXAMINATION WITH ABNORMAL FINDINGS: ICD-10-CM

## 2020-07-17 DIAGNOSIS — E55.9 VITAMIN D DEFICIENCY DISEASE: ICD-10-CM

## 2020-07-17 DIAGNOSIS — N18.30 CKD (CHRONIC KIDNEY DISEASE) STAGE 3, GFR 30-59 ML/MIN (H): ICD-10-CM

## 2020-07-17 DIAGNOSIS — I10 ESSENTIAL HYPERTENSION: ICD-10-CM

## 2020-07-17 LAB
ANION GAP SERPL CALCULATED.3IONS-SCNC: 9 MMOL/L (ref 5–18)
BUN SERPL-MCNC: 21 MG/DL (ref 8–28)
CALCIUM SERPL-MCNC: 9.8 MG/DL (ref 8.5–10.5)
CHLORIDE BLD-SCNC: 104 MMOL/L (ref 98–107)
CHOLEST SERPL-MCNC: 193 MG/DL
CO2 SERPL-SCNC: 28 MMOL/L (ref 22–31)
CREAT SERPL-MCNC: 2 MG/DL (ref 0.7–1.3)
ERYTHROCYTE [DISTWIDTH] IN BLOOD BY AUTOMATED COUNT: 12 % (ref 11–14.5)
FASTING STATUS PATIENT QL REPORTED: YES
GFR SERPL CREATININE-BSD FRML MDRD: 32 ML/MIN/1.73M2
GLUCOSE BLD-MCNC: 93 MG/DL (ref 70–125)
HCT VFR BLD AUTO: 41.8 % (ref 40–54)
HDLC SERPL-MCNC: 40 MG/DL
HGB BLD-MCNC: 14.1 G/DL (ref 14–18)
LDLC SERPL CALC-MCNC: 125 MG/DL
MCH RBC QN AUTO: 33.9 PG (ref 27–34)
MCHC RBC AUTO-ENTMCNC: 33.7 G/DL (ref 32–36)
MCV RBC AUTO: 101 FL (ref 80–100)
PLATELET # BLD AUTO: 214 THOU/UL (ref 140–440)
PMV BLD AUTO: 8.2 FL (ref 7–10)
POTASSIUM BLD-SCNC: 4.7 MMOL/L (ref 3.5–5)
RBC # BLD AUTO: 4.15 MILL/UL (ref 4.4–6.2)
SODIUM SERPL-SCNC: 141 MMOL/L (ref 136–145)
TRIGL SERPL-MCNC: 140 MG/DL
WBC: 6.5 THOU/UL (ref 4–11)

## 2020-07-17 ASSESSMENT — ANXIETY QUESTIONNAIRES
2. NOT BEING ABLE TO STOP OR CONTROL WORRYING: NOT AT ALL
1. FEELING NERVOUS, ANXIOUS, OR ON EDGE: NOT AT ALL

## 2020-07-17 ASSESSMENT — MIFFLIN-ST. JEOR: SCORE: 1657.21

## 2020-07-20 ENCOUNTER — COMMUNICATION - HEALTHEAST (OUTPATIENT)
Dept: FAMILY MEDICINE | Facility: CLINIC | Age: 80
End: 2020-07-20

## 2020-08-06 ENCOUNTER — AMBULATORY - HEALTHEAST (OUTPATIENT)
Dept: CARDIOLOGY | Facility: CLINIC | Age: 80
End: 2020-08-06

## 2020-08-06 DIAGNOSIS — R00.1 BRADYCARDIA: ICD-10-CM

## 2020-08-06 DIAGNOSIS — Z95.0 CARDIAC PACEMAKER IN SITU: ICD-10-CM

## 2020-08-06 DIAGNOSIS — I49.5 SSS (SICK SINUS SYNDROME) (H): ICD-10-CM

## 2020-10-12 ENCOUNTER — OFFICE VISIT - HEALTHEAST (OUTPATIENT)
Dept: FAMILY MEDICINE | Facility: CLINIC | Age: 80
End: 2020-10-12

## 2020-10-12 ENCOUNTER — RECORDS - HEALTHEAST (OUTPATIENT)
Dept: ADMINISTRATIVE | Facility: OTHER | Age: 80
End: 2020-10-12

## 2020-10-12 DIAGNOSIS — R04.0 EPISTAXIS: ICD-10-CM

## 2020-10-21 ENCOUNTER — COMMUNICATION - HEALTHEAST (OUTPATIENT)
Dept: CARDIOLOGY | Facility: CLINIC | Age: 80
End: 2020-10-21

## 2020-10-27 ENCOUNTER — COMMUNICATION - HEALTHEAST (OUTPATIENT)
Dept: CARDIOLOGY | Facility: CLINIC | Age: 80
End: 2020-10-27

## 2020-10-29 ENCOUNTER — OFFICE VISIT - HEALTHEAST (OUTPATIENT)
Dept: CARDIOLOGY | Facility: CLINIC | Age: 80
End: 2020-10-29

## 2020-10-29 DIAGNOSIS — I48.21 PERMANENT ATRIAL FIBRILLATION (H): ICD-10-CM

## 2020-10-29 ASSESSMENT — MIFFLIN-ST. JEOR: SCORE: 1648.14

## 2020-11-06 ENCOUNTER — AMBULATORY - HEALTHEAST (OUTPATIENT)
Dept: CARDIOLOGY | Facility: CLINIC | Age: 80
End: 2020-11-06

## 2020-11-06 DIAGNOSIS — I49.5 SSS (SICK SINUS SYNDROME) (H): ICD-10-CM

## 2020-11-06 DIAGNOSIS — I48.21 PERMANENT ATRIAL FIBRILLATION (H): ICD-10-CM

## 2020-11-06 DIAGNOSIS — R00.1 BRADYCARDIA: ICD-10-CM

## 2020-11-06 DIAGNOSIS — I45.10 RBBB (RIGHT BUNDLE BRANCH BLOCK): ICD-10-CM

## 2020-11-06 DIAGNOSIS — I48.91 A-FIB (H): ICD-10-CM

## 2020-11-06 DIAGNOSIS — Z95.0 CARDIAC PACEMAKER IN SITU: ICD-10-CM

## 2020-11-14 ENCOUNTER — COMMUNICATION - HEALTHEAST (OUTPATIENT)
Dept: FAMILY MEDICINE | Facility: CLINIC | Age: 80
End: 2020-11-14

## 2020-11-14 DIAGNOSIS — I10 ESSENTIAL HYPERTENSION WITH GOAL BLOOD PRESSURE LESS THAN 140/90: ICD-10-CM

## 2020-11-16 RX ORDER — AMLODIPINE BESYLATE 2.5 MG/1
TABLET ORAL
Qty: 90 TABLET | Refills: 3 | Status: SHIPPED | OUTPATIENT
Start: 2020-11-16 | End: 2021-10-12

## 2021-01-12 ENCOUNTER — COMMUNICATION - HEALTHEAST (OUTPATIENT)
Dept: FAMILY MEDICINE | Facility: CLINIC | Age: 81
End: 2021-01-12

## 2021-01-12 DIAGNOSIS — I10 ESSENTIAL HYPERTENSION: ICD-10-CM

## 2021-01-12 DIAGNOSIS — I48.21 PERMANENT ATRIAL FIBRILLATION (H): ICD-10-CM

## 2021-01-12 RX ORDER — METOPROLOL SUCCINATE 50 MG/1
50 TABLET, EXTENDED RELEASE ORAL DAILY
Qty: 90 TABLET | Refills: 1 | Status: SHIPPED | OUTPATIENT
Start: 2021-01-12 | End: 2021-10-12

## 2021-01-19 ENCOUNTER — OFFICE VISIT - HEALTHEAST (OUTPATIENT)
Dept: FAMILY MEDICINE | Facility: CLINIC | Age: 81
End: 2021-01-19

## 2021-01-19 DIAGNOSIS — N18.32 STAGE 3B CHRONIC KIDNEY DISEASE (H): ICD-10-CM

## 2021-01-19 DIAGNOSIS — I10 ESSENTIAL HYPERTENSION: ICD-10-CM

## 2021-01-19 DIAGNOSIS — E55.9 VITAMIN D DEFICIENCY DISEASE: ICD-10-CM

## 2021-01-19 DIAGNOSIS — D75.89 MACROCYTOSIS: ICD-10-CM

## 2021-01-19 DIAGNOSIS — I48.21 PERMANENT ATRIAL FIBRILLATION (H): ICD-10-CM

## 2021-01-19 LAB
ANION GAP SERPL CALCULATED.3IONS-SCNC: 11 MMOL/L (ref 5–18)
BUN SERPL-MCNC: 27 MG/DL (ref 8–28)
CALCIUM SERPL-MCNC: 10.2 MG/DL (ref 8.5–10.5)
CHLORIDE BLD-SCNC: 106 MMOL/L (ref 98–107)
CO2 SERPL-SCNC: 27 MMOL/L (ref 22–31)
CREAT SERPL-MCNC: 2.02 MG/DL (ref 0.7–1.3)
ERYTHROCYTE [DISTWIDTH] IN BLOOD BY AUTOMATED COUNT: 12.9 % (ref 11–14.5)
GFR SERPL CREATININE-BSD FRML MDRD: 32 ML/MIN/1.73M2
GLUCOSE BLD-MCNC: 95 MG/DL (ref 70–125)
HCT VFR BLD AUTO: 42.6 % (ref 40–54)
HGB BLD-MCNC: 14.1 G/DL (ref 14–18)
MCH RBC QN AUTO: 33.2 PG (ref 27–34)
MCHC RBC AUTO-ENTMCNC: 33.1 G/DL (ref 32–36)
MCV RBC AUTO: 100 FL (ref 80–100)
PLATELET # BLD AUTO: 214 THOU/UL (ref 140–440)
PMV BLD AUTO: 7.7 FL (ref 7–10)
POTASSIUM BLD-SCNC: 5 MMOL/L (ref 3.5–5)
RBC # BLD AUTO: 4.25 MILL/UL (ref 4.4–6.2)
SODIUM SERPL-SCNC: 144 MMOL/L (ref 136–145)
WBC: 6 THOU/UL (ref 4–11)

## 2021-01-20 ENCOUNTER — COMMUNICATION - HEALTHEAST (OUTPATIENT)
Dept: FAMILY MEDICINE | Facility: CLINIC | Age: 81
End: 2021-01-20

## 2021-01-20 LAB — 25(OH)D3 SERPL-MCNC: 46.8 NG/ML (ref 30–80)

## 2021-02-08 ENCOUNTER — AMBULATORY - HEALTHEAST (OUTPATIENT)
Dept: CARDIOLOGY | Facility: CLINIC | Age: 81
End: 2021-02-08

## 2021-02-08 DIAGNOSIS — Z95.0 CARDIAC PACEMAKER IN SITU: ICD-10-CM

## 2021-02-08 DIAGNOSIS — I48.21 PERMANENT ATRIAL FIBRILLATION (H): ICD-10-CM

## 2021-02-09 ENCOUNTER — COMMUNICATION - HEALTHEAST (OUTPATIENT)
Dept: CARDIOLOGY | Facility: CLINIC | Age: 81
End: 2021-02-09

## 2021-02-10 ENCOUNTER — AMBULATORY - HEALTHEAST (OUTPATIENT)
Dept: CARDIOLOGY | Facility: CLINIC | Age: 81
End: 2021-02-10

## 2021-05-11 ENCOUNTER — AMBULATORY - HEALTHEAST (OUTPATIENT)
Dept: CARDIOLOGY | Facility: CLINIC | Age: 81
End: 2021-05-11

## 2021-05-11 DIAGNOSIS — Z95.0 CARDIAC PACEMAKER IN SITU: ICD-10-CM

## 2021-05-11 DIAGNOSIS — I48.21 PERMANENT ATRIAL FIBRILLATION (H): ICD-10-CM

## 2021-05-27 VITALS — HEART RATE: 80 BPM | SYSTOLIC BLOOD PRESSURE: 136 MMHG | DIASTOLIC BLOOD PRESSURE: 78 MMHG

## 2021-05-30 VITALS — HEIGHT: 71 IN | WEIGHT: 209 LBS | BODY MASS INDEX: 29.26 KG/M2

## 2021-05-30 VITALS — WEIGHT: 202 LBS | BODY MASS INDEX: 28.17 KG/M2

## 2021-05-30 VITALS — BODY MASS INDEX: 28.45 KG/M2 | WEIGHT: 204 LBS

## 2021-05-30 NOTE — PROGRESS NOTES
Pre-Intra-Post education and instructions as listed below were reviewed with Patient and Spouse via phone  7/30/2019 8:59 AM  Rema Mendieta RN     Unique Flap 1 Name: Myocutaneous Flap

## 2021-05-30 NOTE — PROGRESS NOTES
1940  Home:[phone] (home) Cell:There is no such number on file (mobile).  Emergency Contact: EarlShauna [phone]    +++Important patient information for [facility] staff : None+++    [facility] EP Cath Lab Procedure Order     Device Implant/Revision:  Procedure: New Implant  Device Type: Single lead Pacemaker  Device Company/Device Rep Needed for Procedure: [employer]    Diagnosis:  SSS  Anticipated Case Duration:  Standard  Scheduling Needs/Timeframe:  Spot on Thursday held with GAG  Anesthesia:  None  Research Protocol:  No    [facility] EP Cath Lab Prep   Ordering Provider: Dr Dean  Ordering Date: 7/28/2019  Orders Status: Intial order placed and Order set placed    H&P:  Compled by Dr. Moeller  on 7-26-19 if scheduled within 30 days, pt to schedule with PMD if procedure outside of this timeframe  PCP: Donald Machado MD, [phone]    Pre-op Labs: Ordered AM of procedure    Medical Records Pertinent for Procedure:  Holter 7-19-19, Echo 1-18-19 EF 65% and EKG 7-23-19 Afib @43    Patient Education:    Teach with Patient: Complete in the clinic on 7-26-19, and pt provided written pre-procedural instructions    Risks Reviewed:     Pacemaker Insertion    <1% for each of the following:  infection, bleeding, hematoma, pneumothorax, subclavian vein thrombosis, cardiac perforation, cardiac tamponade, arrhythmias, pectoral or diaphragmatic stimulation, air embolus, pocket erosion, device interactions.    <4% lead dislodgment, <1% lead fracture or generator  malfunction.    <0.5% CVA or MI.    <0.1% death.    If external defibrillation or CV is needed, 25% risk for superficial burn.    For patients on anticoagulation, the risk of bleeding, hematoma and tamponade are increased.      Consent: Will be obtained in [facility] [place] of [place]    Pre-Procedure Instructions that were Reviewed with Patient:  NPO after midnight, Remove all jewelry prior to coming in for procedure, Shower prior to arrival, Notified patient of  time and date of procedure by CV , Transportation arrangements needed s/p procedure, Post-procedure follow up process, Sedation plan/orders and Pre-procedure letter was sent to pt by CV     Pre-Procedure Medication Instructions:  Instructions given to pt regarding anticoagulants: Pt is not on an anticoagulant- N/A  Instructions given to pt regarding antiarrhythmic medication: None; N/A  Instructions for medication, other than anticoagulants/antiarrhythmics listed above, given to pt: to take all morning medications with small sips of water, with the exception of OTC supplements and MVI      No Known Allergies    Current Outpatient Medications:      amLODIPine (NORVASC) 2.5 MG tablet, Take 1 tablet (2.5 mg total) by mouth daily., Disp: 30 tablet, Rfl: 11     aspirin 81 MG EC tablet, Take 81 mg by mouth daily., Disp: , Rfl:      cholecalciferol, vitamin D3, (CHOLECALCIFEROL) 1,000 unit tablet, Take 2,000 Units by mouth daily., Disp: , Rfl:     Documentation Date:7/29/2019 3:18 PM  Rona Sanchez RN
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
## 2021-05-30 NOTE — PROGRESS NOTES
Abnormal Holter monitor  Atrial fibrillation with right bundle branch block  Many episodes of asystole greater than 4 seconds with the longest episode nearly 6 seconds associated with lightheadedness  He is on metoprolol 150 mg daily  Echocardiogram showed normal LV function  Plan  Should stop metoprolol  Reassess for bradycardia need for pacemaker  I tried  calling patient no answer  I tried calling HE clinic; told that it was an emergency; quit phone call after holding for > 5 minutes; [employer]  
(3) no apparent problem

## 2021-05-30 NOTE — PATIENT INSTRUCTIONS - HE
Dereck,    [facility] was a [facility].     Please do not drive, climb ladders, or operate dangerous machinery until you have received your pacemaker.    Please call us if you have any questions or concerns about your heart.      Eduardo Moeller M.D.
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
## 2021-05-30 NOTE — PROGRESS NOTES
Pt seen in clinic today after RAC apt with Dr. Moeller at the request of Dr. Moeller and Dr. Dean.  Discussed pacemaker implant, what to expect and risks of procedure, gave written information for patient to review at home.  Pt states understanding and is agreeable to proceeding.  Will verify device company and type of pacemaker needed with Dr. Dean.  Pt can able to be scheduled for Thursday 8-1-19.

## 2021-05-30 NOTE — TELEPHONE ENCOUNTER
Called and spoke with patient's wife, per patient's consent regarding his Holter monitor findings on 7/19/2019.  Findings indicate episodes of profound bradycardia and asystole.  I have to reach out to cardiologist, Dr. Dean for specific recommendations however was unable to reach today.  I advised patient to decrease metoprolol as stopping it abruptly could result in atrial fibrillation with RVR.  I also placed referral for cardiology rapid access clinic to be seen as soon as possible.  Patient has appointment scheduled with Dr. Machado tomorrow morning to check in on symptoms and the events that he is unable to get into see cardiology before then.  I stressed the importance of being evaluated immediately in the emergency department with any new or worsening symptoms.

## 2021-05-30 NOTE — TELEPHONE ENCOUNTER
Per Dr. Dean, phone call placed to [facility] to discuss holter results and recommendations.   Dr. Machado is out of the office today and spoke with an NP covering his patients today.  Reviewed chart, results and recommendations from Dr. Dean.  Pt to stop Metoprolol 150 mg daily.  Dr. Machado will call Dr. Dean tomorrow to discuss plan moving forward.  Contact information relayed.    Phone call to patient and spoke with the patients wife due to hard of hearing.  Explained holter monitor results, she states that he has intermittent dizzy spells but usually feels fine, he is very active.  He was golfing last week and felt fine and again this morning and had no symptoms.  He works out every day, has no syncope or presyncope.  She understands discontinuing Metoprolol, pt did have a dose today but will stop starting tomorrow.    Explained that Dr. Dean and Dr. Machado will discuss tomorrow and patient will be contacted with a plan moving forward.  She states understanding.

## 2021-05-30 NOTE — TELEPHONE ENCOUNTER
Refill Approved    Rx renewed per Medication Renewal Policy. Medication was last renewed on 10/21/18 .    Catrachita Retana, [facility] Connection Triage/Med Refill 7/14/2019     Requested Prescriptions   Pending Prescriptions Disp Refills     metoprolol succinate (TOPROL-XL) 100 MG 24 hr tablet [Pharmacy Med Name: METOPROLOL SUCC  MG TAB] 90 tablet 2     Sig: TAKE 1 TABLET BY MOUTH EVERY DAY       Beta-Blockers Refill Protocol Passed - 7/14/2019 10:34 AM        Passed - PCP or prescribing provider visit in past 12 months or next 3 months     Last office visit with prescriber/PCP: 1/11/2019 Donald Machado MD OR same dept: 1/11/2019 Donald Machado MD OR same specialty: 1/11/2019 Donald Machado MD  Last physical: 7/6/2018 Last MTM visit: Visit date not found   Next visit within 3 mo: Visit date not found  Next physical within 3 mo: Visit date not found  Prescriber OR PCP: Donald Machado MD  Last diagnosis associated with med order: 1. Essential hypertension with goal blood pressure less than 140/90  - metoprolol succinate (TOPROL-XL) 100 MG 24 hr tablet [Pharmacy Med Name: METOPROLOL SUCC  MG TAB]; TAKE 1 TABLET BY MOUTH EVERY DAY  Dispense: 90 tablet; Refill: 2    If protocol passes may refill for 12 months if within 3 months of last provider visit (or a total of 15 months).             Passed - Blood pressure filed in past 12 months     BP Readings from Last 1 Encounters:   01/18/19 (!) 170/94

## 2021-05-31 VITALS — HEIGHT: 71 IN | WEIGHT: 210.5 LBS | BODY MASS INDEX: 29.47 KG/M2

## 2021-05-31 VITALS — WEIGHT: 210 LBS | HEIGHT: 71 IN | BODY MASS INDEX: 29.4 KG/M2
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
## 2021-05-31 NOTE — PATIENT INSTRUCTIONS - HE
Pacemaker Post-operative Checklist      The Device Registered Nurse (RN) reviewed the pacemaker function.      The Device RN did a wound assessment and wound care teaching.    Please call the Device Nurses with any signs of infection or questions regarding wound healing. Device Nurse Line: [phone], Option #3      The Device RN demonstrated and displayed the specific remote monitoring system for your pacemaker.      The Device RN reviewed the Partnership Agreement Form.    Patient Instructions    Do not lift your left arm above the shoulder height, perform any vigorous arm movements such as swimming, golfing, washing windows, shoveling show, vacuuming or lifting greater than 10-15lbs with the affected arm for 4 weeks from the date of implant.      To reduce the risk of infection, try to avoid any dental procedures for the first 6 weeks after your pacemaker implant. If you have an emergent or urgent dental need during this time, contact the device clinic for a prescription for an antibiotic.      You will receive a device identification (ID) card in the mail from the device  within 6 weeks to replace the temporary ID card you were given in the hospital.      You may travel by any mode of transportation; just show your pacemaker ID card. You may be subject to a hand search or use of a handheld wand, but official should not keep the wand over the implant site for greater than 5-10 seconds.      For any surgery, let your doctor know you have a pacemaker.       Your pacemaker is  MRI safe       Most household appliances, including a microwave, will not interfere with your pacemaker function. If you suspect interference, simply move away from the source. Cell phones do not cause a problem. Please refer to the device booklet from the  or their website under the section on electromagnetic interference (JAZIEL) for further guidelines on things that may interfere with your pacemaker.       Device  Clinic Contact Information  Device Nurses: [phone], Option #3   Device Remote Specialists: [phone], Option #2. For questions about your Remote Transmission or Transmission Schedule  Device Schedulers: [phone], Option #1

## 2021-05-31 NOTE — PROGRESS NOTES
DEVICE CLINIC RN POST-OP NOTE    Reason for visit: 1 week PO pacemaker check and wound assessment     Device: Medtronic [id number] [place] XT SR MRI  Procedure date: 8/1/2019  Implant Diagnosis: Sick Sinus Syndrome, Chronic Atrial Fibrillation, Right Bundle Branch Block  Implanting Physician: Dr. Emeka Dean      Assessment  Subjective: Patient reports feeling well with mild tenderness at pacemaker site  Vitals:   Vitals:    08/08/19 1352   BP: 112/64   Pulse: 68   Resp: 16   Temp: 97.4  F (36.3  C)     Heart Sounds: normal  Lung Sounds: clear to auscultation bilaterally  Incision/device pocket: Steri-strips removed. Area around incision was cleaned with adhesive remover. Incision is clean, dry, and intact. Mid incision is not as approximated as the rest of the incision; new steri strips were repplied with the instruction to remove in 5 days.There is no redness or drainage noted. Incision was wiped with alcohol wipe x1. Mild swelling with moderate ecchymosis noted, hematoma is stable from last week.      Device Findings  Interrogation of device reveals normal sensing and capture thresholds  See the Paceart Report for a full summary of the device information.    Other: Patient's hearing aid amplifer that patient wears around his neck was turned on and did not appear to disrupt pacing or cause noise      Patient Education  Dereck Elliott was accompanied today by [facility] Partnership Agreement for Device Patients and Post-operative Checklist were presented and reviewed with patient at today's appointment.    Signs and symptoms of infection, poor wound healing, and device function were reviewed. Range of motion and weight restrictions for the left side are 4 weeks. He was issued a Vitriflex remote monitor and instructed on its set-up and use for remote monitoring by the Medtronic representative prior to hospital discharge. These instructions were reviewed with the patient at today s visit.        Plan  - 1 month remote check scheduled 9/11/19  - 3 month PO scheduled 11/1/2019

## 2021-05-31 NOTE — PROGRESS NOTES
Patient had pacemaker placed at [facility] on August 1 was outpatient and being sent home when he had a small sized hematoma  Pressure dressing applied  He should be seen in the [place] pacemaker clinic tomorrow for a wound check and removal of the pressure dressing

## 2021-06-01 VITALS — WEIGHT: 207 LBS | HEIGHT: 71 IN | BODY MASS INDEX: 28.98 KG/M2

## 2021-06-01 VITALS — BODY MASS INDEX: 29.15 KG/M2 | WEIGHT: 209 LBS

## 2021-06-02 VITALS — WEIGHT: 205 LBS | HEIGHT: 71 IN | BODY MASS INDEX: 28.7 KG/M2

## 2021-06-02 VITALS — BODY MASS INDEX: 28.79 KG/M2 | WEIGHT: 205 LBS

## 2021-06-02 NOTE — PROGRESS NOTES
[facility] Note                                                                                 Date of Visit: 10/3/2019     Chief Complaint   Dereck Elliott is a(n) 79 y.o. White or  male who presents to [facility], accompanied by his wife, with the following complaint(s):  Right Abdominal Pain (below the ribs, distended abdomen x last night pain scale is 10); Diarrhea; and hx Diverticulitis       Assessment and Plan   1. Abdominal pain, right upper quadrant    Patient with history of perforated diverticula status post sigmoid colectomy, chronic kidney disease stage 3, hypertension, history of chronic atrial fibrillation, sick sinus syndrome status post pacemaker placement 2 months ago, obstructive sleep apnea, and obesity, presenting with 1-day history of right-sided abdominal pain with distention and decreased appetite. Examination is concerning for peritonitis (distention, guarding, and CVA tenderness) though patient is afebrile and hemodynamically stable. Recommended that patient transfer to the ED for further evaluation / treatment in order to facilitate STAT labs, STAT imaging, administration of IV fluids, and administration of analgesics. Patient and his wife were agreeable to this, and patient was transferred by his wife via private vehicle. Case was discussed with Dr. Hood, receiving provider at the [facility] ED.      History of Present Illness   Primary symptom: Abdominal pain  Onset: Yesterday afternoon, developed abruptly while sitting and watching television  Progression: Improved slightly, now worsening again  Location: Right upper quadrant  Quality: Sharp  Radiation: Possibly down the side of the abdomen somewhat  Frequency: Constant  Relieving factors: Sitting still. Also seemed to improve somewhat after passing flatus.   Exacerbating factors: Bending over. Unsure if pain is worse after eating since he has not really eaten since yesterday around lunch time.   Appetite  change: Decreased  Reflux symptoms: No  Nausea: No  Vomiting: No  Diarrhea: Has had 2 brown diarrheal stools today after taking a laxative this morning.   Constipation: Intermittently due to irritable bowel syndrome.   Melena: No  Hematochezia: No  Urinary symptoms: None  Genital symptoms: None  Fevers: No  Chills: No  Additional symptoms: None  History of similar pain: No  History of abdominal surgery: Sigmoid colon resection due to perforated diverticula.   Prior endoscopy: Last colonoscopy approximately 5 years ago (not on file in Epic) with report of polyp removal.   Home treatments utilized: Took a laxative this morning. Took acetaminophen approximately 2 hours ago.   Tobacco user / exposure: Former smoker     Review of Systems   Review of Systems   All other systems reviewed and are negative.       Physical Exam   Vitals:    10/03/19 1622 10/03/19 1626   BP: 141/90 156/82   Patient Position:  Sitting   Pulse:  80   Resp:  17   Temp:  97  F (36.1  C)   TempSrc:  Oral   SpO2:  97%   Weight: 211 lb (95.7 kg) 211 lb (95.7 kg)     Physical Exam  Vitals signs and nursing note reviewed.   Constitutional:       General: He is not in acute distress.     Appearance: He is well-developed and overweight. He is not toxic-appearing.   HENT:      Mouth/Throat:      Mouth: Mucous membranes are moist. No oral lesions.   Eyes:      General: Lids are normal. No scleral icterus.     Conjunctiva/sclera: Conjunctivae normal.   Neck:      Musculoskeletal: Neck supple. No edema or erythema.   Cardiovascular:      Rate and Rhythm: Normal rate and regular rhythm.      Heart sounds: S1 normal and S2 normal. No murmur. No friction rub. No gallop.    Pulmonary:      Effort: Pulmonary effort is normal.      Breath sounds: Normal breath sounds. No stridor. No wheezing, rhonchi or rales.   Abdominal:      General: Bowel sounds are normal. There is distension.      Palpations: There is no hepatomegaly, splenomegaly or mass.      Tenderness:  There is tenderness (most pronounced in the RUQ) in the right upper quadrant, right lower quadrant, epigastric area, periumbilical area and suprapubic area. There is right CVA tenderness and guarding. There is no left CVA tenderness or rebound.   Lymphadenopathy:      Cervical: No cervical adenopathy.   Skin:     General: Skin is warm and dry.      Capillary Refill: Capillary refill takes less than 2 seconds.      Coloration: Skin is not jaundiced or pale.   Neurological:      General: No focal deficit present.      Mental Status: He is alert and oriented to person, place, and time.          Diagnostic Studies   Laboratory:  N/A  Radiology:  N/A  Electrocardiogram:  N/A     Procedure Note   N/A     Pertinent History   The following portions of the patient's history were reviewed and updated as appropriate: allergies, current medications, past family history, past medical history, past social history, past surgical history and problem list.    Patient has Chronic atrial fibrillation; Obstructive sleep apnea; Overweight (BMI 25.0-29.9); Chronic kidney disease (CKD), stage III (moderate) (H); Benign essential hypertension; Hyperkalemia; Permanent atrial fibrillation; RBBB; SSS (sick sinus syndrome) (H); and Cardiac pacemaker in situ on their problem list.    Patient has a past medical history of Cataract, Essential hypertension, History of rheumatic fever (4/25/2017), Onychomycosis (10/27/2017), Permanent atrial fibrillation, and Vitamin D deficiency disease (10/6/2015).    Patient has a past surgical history that includes Colon surgery; Cataract extraction (Left); Total knee arthroplasty (Left); and EP Pacemaker Insertion (N/A, 8/1/2019).    Patient's family history includes Colon cancer in his father; No Medical Problems in his daughter and daughter; Valvular heart disease in his mother.    Patient reports that he quit smoking about 3 years ago. His smoking use included cigars. He has never used smokeless tobacco. He  reports that he does not drink alcohol or use drugs.     Portions of this note have been dictated using voice recognition software. Any grammatical or context distortions are unintentional and inherent to the software.     Saad Mendes MD  [facility]
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
## 2021-06-03 VITALS
SYSTOLIC BLOOD PRESSURE: 120 MMHG | HEART RATE: 84 BPM | BODY MASS INDEX: 29.9 KG/M2 | WEIGHT: 214.4 LBS | DIASTOLIC BLOOD PRESSURE: 68 MMHG

## 2021-06-03 VITALS — BODY MASS INDEX: 29.4 KG/M2 | HEIGHT: 71 IN | WEIGHT: 210 LBS

## 2021-06-03 VITALS
HEART RATE: 83 BPM | OXYGEN SATURATION: 95 % | BODY MASS INDEX: 29.29 KG/M2 | SYSTOLIC BLOOD PRESSURE: 128 MMHG | DIASTOLIC BLOOD PRESSURE: 74 MMHG | WEIGHT: 210 LBS

## 2021-06-03 VITALS — WEIGHT: 209 LBS | BODY MASS INDEX: 29.99 KG/M2

## 2021-06-03 VITALS — BODY MASS INDEX: 29.22 KG/M2 | WEIGHT: 208 LBS

## 2021-06-03 VITALS — BODY MASS INDEX: 29.4 KG/M2 | WEIGHT: 210 LBS | HEIGHT: 71 IN

## 2021-06-03 VITALS
DIASTOLIC BLOOD PRESSURE: 82 MMHG | WEIGHT: 211 LBS | SYSTOLIC BLOOD PRESSURE: 156 MMHG | RESPIRATION RATE: 17 BRPM | HEART RATE: 80 BPM | OXYGEN SATURATION: 97 % | BODY MASS INDEX: 30.28 KG/M2 | TEMPERATURE: 97 F

## 2021-06-03 VITALS
RESPIRATION RATE: 16 BRPM | DIASTOLIC BLOOD PRESSURE: 80 MMHG | WEIGHT: 213 LBS | SYSTOLIC BLOOD PRESSURE: 140 MMHG | HEART RATE: 88 BPM | HEIGHT: 71 IN | BODY MASS INDEX: 29.82 KG/M2

## 2021-06-03 VITALS — BODY MASS INDEX: 30.06 KG/M2 | WEIGHT: 210 LBS | HEIGHT: 70 IN

## 2021-06-03 VITALS — BODY MASS INDEX: 29.99 KG/M2 | WEIGHT: 209 LBS

## 2021-06-03 VITALS — WEIGHT: 208 LBS | BODY MASS INDEX: 29.84 KG/M2

## 2021-06-03 NOTE — PROGRESS NOTES
Assessment/Plan:    1. Dizziness  We discussed differential diagnoses of dizziness including cardiac etiology, electrolyte imbalance, dehydration, vertigo.  He is neurologically intact.  His pacemaker appears to be working.  EKG without significant findings today.  Reviewed recent results of metabolic panel and magnesium from 2 days ago which were normal.  Symptoms appear to be most consistent with vertigo.  Patient prefers not to repeat labs today and I think this is reasonable.  I we discussed the possibility of obtaining a device check sooner than his next scheduled one in February for safe measures.  We discussed conservative management of vertigo symptoms and possible referral to occupational therapy if symptoms persist or worsen.  Patient is comfortable with this plan.  - Electrocardiogram Perform and Read    2. Cardiac pacemaker in situ  Cardiac pacemaker appears to be working today.  Device check 2 weeks ago was normal.  We discussed having an additional device check if patient feels most comfortable with this however, dizziness likely not related to his pacemaker.    3. Permanent atrial fibrillation  History of SHERI fib noted.    5. Essential hypertension  Blood pressure well controlled today.  Continues use of metoprolol and amlodipine.  Tolerating well.    6. CKD (chronic kidney disease) stage 3, GFR 30-59 ml/min (H)  History of CKD stage III.  Reviewed recent metabolic panel results which were stable.      Subjective:    Dereck Elliott is 79-year-old male seen today for evaluation of dizziness.  Patient has history of right bundle branch block, atrial fibrillation, hypertension, CKD, and had a pacemaker placed in August 2019 by Dr. Dean.  He has been followed by Dr. Moeller since then.  Patient describes having dizziness prior to his pacemaker being placed.  He had a Holter monitor indicating cardiac asystole for 6 seconds and therefore decided to have pacemaker placed.  Today, he reports that he  developed dizziness a couple of weeks ago.  This dizziness does not seem to be like the dizziness he experienced before his pacemaker was placed.  He describes a room spinning sensation for roughly 10 seconds when he changes positions, particularly with lying down and turning over.  He had a device check a few weeks ago which indicated normal functioning pacemaker.  He does recall having dizziness at that time.  He denies any associated symptoms with the dizziness.  No nausea.  No sensation of heart palpitations or shortness of breath.  No chest pain.  He continues use of metoprolol, amlodipine.  Denies starting any new medications recently.  Patient does describe an ear ache the other week.  He feels that dizziness could be related to his ears.  He states that discomfort in his ears has resolved at this point.  He denies any popping or other sensation in his ears.  He does wear hearing aids.  No history of vertigo that he is aware of.  Review of systems is as stated in HPI, and the remainder of the 10 system review is otherwise unremarkable.    Past Medical History, Family History, and Social History reviewed.    Past Surgical History:   Procedure Laterality Date     CATARACT EXTRACTION Left      COLON SURGERY       EP PACEMAKER INSERT N/A 8/1/2019    EP Pacemaker Insertion; Emeka Dean MD; [facility];  Service: Cardiology     TOTAL KNEE ARTHROPLASTY Left         Family History   Problem Relation Age of Onset     Valvular heart disease Mother         care at [place]     Colon cancer Father      No Medical Problems Daughter      No Medical Problems Daughter         Past Medical History:   Diagnosis Date     Cataract      Chronic kidney disease (CKD), stage III (moderate) (H)      Essential hypertension      History of rheumatic fever 4/25/2017     Onychomycosis 10/27/2017     Permanent atrial fibrillation      SSS (sick sinus syndrome) (H) 07/29/2019     Vitamin D deficiency disease 10/6/2015         Social History     Tobacco Use     Smoking status: Former Smoker     Packs/day: 0.00     Types: Cigars     Last attempt to quit: 1/1/2016     Years since quitting: 3.8     Smokeless tobacco: Never Used   Substance Use Topics     Alcohol use: Yes     Alcohol/week: 1.0 standard drinks     Types: 1 Shots of liquor per week     Comment: per week     Drug use: No        Current Outpatient Medications   Medication Sig Dispense Refill     acetaminophen (TYLENOL) 500 MG tablet Take 500-1,000 mg by mouth every 6 (six) hours as needed for pain.       amLODIPine (NORVASC) 2.5 MG tablet Take 1 tablet (2.5 mg total) by mouth daily. 90 tablet 3     aspirin 81 MG EC tablet Take 81 mg by mouth daily.       cholecalciferol, vitamin D3, (CHOLECALCIFEROL) 1,000 unit tablet Take 2,000 Units by mouth daily.       cyanocobalamin 100 MCG tablet Take 100 mcg by mouth daily.       cyanocobalamin 1000 MCG tablet Take 1,000 mcg by mouth daily.       lidocaine 4 % patch Place 1 patch on the skin daily. Remove and discard patch with 12 hours or as directed by MD. 5 patch 0     melatonin 3 mg Tab tablet Take 3 mg by mouth at bedtime as needed.       metoprolol succinate (TOPROL-XL) 50 MG 24 hr tablet Take 1 tablet (50 mg total) by mouth daily. 90 tablet 5     No current facility-administered medications for this visit.           Objective:    Vitals:    11/13/19 1453   BP: 120/68   Patient Site: Right Arm   Patient Position: Sitting   Cuff Size: Adult Regular   Pulse: 84   Weight: 214 lb 6.4 oz (97.3 kg)      Body mass index is 29.9 kg/m .      General Appearance:  Alert, cooperative, no distress, appears stated age   HEENT:   Tympanic membranes and ear canals appear normal bilaterally.  HEENT exam otherwise normal.   Lungs:   Clear to auscultation bilaterally, respirations unlabored.  No expiratory wheeze or inspiratory crackles noted.   Heart:  Regular rate and rhythm, S1, S2 normal, no murmur, rub or gallop   Abdomen:   Soft, non-tender,  positive bowel sounds, no masses, no organomegaly   Extremities: Extremities normal.  No cyanosis or edema   Skin: Warm, dry.  Skin color, texture, turgor normal, no rashes or lesions   Neurologic:  Grossly intact without focal deficits noted.         This note has been dictated using voice recognition software. Any grammatical or context distortions are unintentional and inherent to the use of this software.

## 2021-06-03 NOTE — TELEPHONE ENCOUNTER
Left detailed message with order request for labs to be checked asap.  Instructed patient on VM an order will be in his chart for labs and he can present to an outpatient hospital lab without an appointment, he can call his PCP for lab today if possible.     No other phone number or emergency number in patient's chart to contact.  Will re-call number listed today.    Order placed for BMP and Mg.
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
## 2021-06-03 NOTE — TELEPHONE ENCOUNTER
----- Message from Catie Lockett RN sent at 11/8/2019  2:32 PM CST -----  Regarding: FW: Episode of NSVT  Hi Riri,     Would you please take care of this?     Thank you,    Catie  ----- Message -----  From: Mervin Moeller MD  Sent: 11/1/2019   3:34 PM CST  To: Donald Machado MD, Catie Lockett RN  Subject: RE: Episode of NSVT                              Catie,    His K was 5.7 on Oct 15.     Please get him in for a BMP and a Mg level before we commit him to further cardiac imaging studies.    Marcelino and Rigoberto,    Any other thoughts? Were you aware of the high K? I only met this man once in [facility] before his pacer so am not familiar with his current health problems. He does not appear to be on any potassium retaining meds.   Eduardo Peters    ----- Message -----  From: Catie Lockett RN  Sent: 11/1/2019   3:20 PM CDT  To: Mervin Moeller MD  Subject: Episode of NSVT                                  Hi Dr. Moeller,    Mr. Elliott came in today to have his pacemaker interrogated. We found that he had a 15 beat run NSVT. Mr. Elliott admits that he could feel his heart racing during the event. Please let me know if you have any further questions or concerns.     Thank you ,    Catie

## 2021-06-03 NOTE — TELEPHONE ENCOUNTER
"Called and spoke to patient's wife, Shauna. She did get the message about his potasium being elevated and the need for a lab draw.  She called Dr. Machado's clinic and made an appointment for 8 am on Monday 11/11.  Encouraged her to bring him into a hospital outpatient lab and to urgent care over the weekend should he having palpitations, lightheadedness, dizziness or feelings of a \"racing heart\".  Shauna is not worried about the elevated potassium as he has a history of having a high potassium; however, she did agree to bring him in over the weekend should he have any of the symptoms we discussed.   Reviewed high potassium foods to avoid and she is well aware due to his history of kidney disease. She states he has been drinking juice and eating potatoes \"like crazy\". She will make sure he avoids high potassium foods going forward.   Informed her we will call on Monday with lab results. She verbalized understanding and has no questions at this time.    "

## 2021-06-03 NOTE — TELEPHONE ENCOUNTER
New Appointment Needed  What is the reason for the visit:    Same Date/Next Day Appt Request  What is the reason for your visit?:   Dizziness following pacemaker,  patient has an appointment scheduled on 11/27 but would like to know if he can been seen earlier.   Provider Preference: PCP only  How soon do you need to be seen?: asap  Waitlist offered?: No  Okay to leave a detailed message:  Yes

## 2021-06-04 VITALS
OXYGEN SATURATION: 92 % | HEART RATE: 93 BPM | WEIGHT: 209 LBS | SYSTOLIC BLOOD PRESSURE: 132 MMHG | BODY MASS INDEX: 29.26 KG/M2 | HEIGHT: 71 IN | DIASTOLIC BLOOD PRESSURE: 76 MMHG

## 2021-06-04 VITALS
BODY MASS INDEX: 28.87 KG/M2 | OXYGEN SATURATION: 97 % | SYSTOLIC BLOOD PRESSURE: 138 MMHG | WEIGHT: 207 LBS | DIASTOLIC BLOOD PRESSURE: 88 MMHG | HEART RATE: 87 BPM

## 2021-06-04 VITALS
BODY MASS INDEX: 28.95 KG/M2 | WEIGHT: 207.6 LBS | HEART RATE: 81 BPM | SYSTOLIC BLOOD PRESSURE: 136 MMHG | DIASTOLIC BLOOD PRESSURE: 76 MMHG

## 2021-06-05 VITALS
TEMPERATURE: 97.5 F | WEIGHT: 201 LBS | BODY MASS INDEX: 28.43 KG/M2 | HEART RATE: 88 BPM | DIASTOLIC BLOOD PRESSURE: 82 MMHG | SYSTOLIC BLOOD PRESSURE: 144 MMHG

## 2021-06-05 VITALS
HEIGHT: 71 IN | BODY MASS INDEX: 28.98 KG/M2 | OXYGEN SATURATION: 97 % | SYSTOLIC BLOOD PRESSURE: 132 MMHG | DIASTOLIC BLOOD PRESSURE: 80 MMHG | WEIGHT: 207 LBS | HEART RATE: 67 BPM | RESPIRATION RATE: 14 BRPM
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
## 2021-06-06 NOTE — TELEPHONE ENCOUNTER
Medication Request  Medication name: Amoxicillin  Requested Pharmacy: [facility] #[id number]  Reason for request: For dental appointment  When did you use medication last?:  n/a  Patient offered appointment:  n/a  Okay to leave a detailed message: yes  [phone]    FYI: Caller stated that the patient has an dental appointment 2/25/2020 and will need to take this medication prior to having dental work done.
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
## 2021-06-09 NOTE — TELEPHONE ENCOUNTER
Refill Approved    Rx renewed per Medication Renewal Policy. Medication was last renewed on 8/1/2019.    Dora Hayes, [facility] Triage/Med Refill 6/30/2020     Requested Prescriptions   Pending Prescriptions Disp Refills     metoprolol succinate (TOPROL-XL) 100 MG 24 hr tablet [Pharmacy Med Name: METOPROLOL SUCC  MG TAB] 135 tablet 1     Sig: TAKE 1.5 TABLETS (150 MG TOTAL) BY MOUTH DAILY.       Beta-Blockers Refill Protocol Passed - 6/30/2020 12:15 AM        Passed - PCP or prescribing provider visit in past 12 months or next 3 months     Last office visit with prescriber/PCP: 1/17/2020 Donald Machado MD OR same dept: 1/17/2020 Donald Machado MD OR same specialty: 1/17/2020 Donald Machado MD  Last physical: 7/16/2019 Last MTM visit: Visit date not found   Next visit within 3 mo: Visit date not found  Next physical within 3 mo: Visit date not found  Prescriber OR PCP: Donald Machado MD  Last diagnosis associated with med order: 1. Essential hypertension with goal blood pressure less than 140/90  - metoprolol succinate (TOPROL-XL) 100 MG 24 hr tablet [Pharmacy Med Name: METOPROLOL SUCC  MG TAB]; Take 1.5 tablets (150 mg total) by mouth daily.  Dispense: 135 tablet; Refill: 1    If protocol passes may refill for 12 months if within 3 months of last provider visit (or a total of 15 months).             Passed - Blood pressure filed in past 12 months     BP Readings from Last 1 Encounters:   01/17/20 138/88
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
## 2021-06-12 NOTE — TELEPHONE ENCOUNTER
Wellness Screening Tool  Symptom Screening:  Do you have one of the following NEW symptoms:    Fever (subjective or >100.0)?  No    A new cough?  No    Shortness of breath?  No     Chills? No     New loss of taste or smell? No     Generalized body aches? No     New persistent headache? No     New sore throat? No     Nausea, vomiting, or diarrhea?  No    Within the past 2 weeks, have you been exposed to someone with a known positive illness below:    COVID-19 (known or suspected)?  No    Chicken pox?  No    Mealses?  No    Pertussis?  No    Patient notified of visitor policy- They may have one person accompany them to their appointment, but they will need to wear a mask and will be screened upon arrival for symptoms: Yes  Pt informed to wear a mask: Yes  Pt notified if they develop any symptoms listed above, prior to their appointment, they are to call the clinic directly at [phone] for further instructions.  Yes  Patient's appointment status: Patient will be seen in clinic as scheduled on 10/22

## 2021-06-12 NOTE — PROGRESS NOTES
Patient ID: Dereck Elliott is a 80 y.o. male.  /78   Pulse 80     Assessment/Plan:                   Diagnoses and all orders for this visit:    Epistaxis          DISCUSSION  Given his age and comorbidities along with the steady trickle of blood where the source cannot be visualized I think he would benefit from a timely consultation with ENT to have more of an evaluation and could be undertaken here as well as a more definitive approach to packing as our resources here are somewhat limited in this regard.    We will to get him set up to be seen this afternoon by ENT.  They will had read over to the appointment.  Subjective:     HPI    Dereck Elliott is a 80 y.o. male who is here today with his wife concerned regarding nosebleed from the right nostril since this morning.    His medical history includes atrial fibrillation, sick sinus syndrome with a pacemaker in place, chronic kidney disease, obstructive sleep apnea, abdominal aortic aneurysm.    He is on aspirin 81 mg daily.    He has a history of a nosebleed that stopped on its own about 6 months ago but has not had ongoing concerns with nosebleeds.  His wife reports he has been blowing his nose frequently because of congestion.    This morning at 5 AM shortly after shaving or brushing his teeth patient developed bleeding from the right nostril.  It was a trickle.  It stayed persistent until about 11 AM when it seemed to slow down and nearly completely stopped.  It then began to start up again.  It is reported that he went to the [facility] station where he was evaluated and told to come to the clinic for further evaluation.  He walked into the clinic at about noon for evaluation.    He denies feeling dizzy or lightheaded.  He reports blood is only come from the right nostril.  It is occasionally going down the back of his throat.  It is still soaking gauze every few minutes.    Review of Systems  Complete review of systems is obtained.  Other than the  specific considerations noted above complete review of systems is negative.          Objective:   Medications:  Current Outpatient Medications   Medication Sig     acetaminophen (TYLENOL) 500 MG tablet Take 500-1,000 mg by mouth every 6 (six) hours as needed for pain.     amLODIPine (NORVASC) 2.5 MG tablet Take 1 tablet (2.5 mg total) by mouth daily.     aspirin 81 MG EC tablet Take 81 mg by mouth daily.     cholecalciferol, vitamin D3, (CHOLECALCIFEROL) 1,000 unit tablet Take 2,000 Units by mouth daily.     cyanocobalamin 100 MCG tablet Take 100 mcg by mouth daily.     lidocaine 4 % patch Place 1 patch on the skin daily. Remove and discard patch with 12 hours or as directed by MD.     melatonin 3 mg Tab tablet Take 3 mg by mouth at bedtime as needed.     metoprolol succinate (TOPROL-XL) 50 MG 24 hr tablet Take 1 tablet (50 mg total) by mouth daily.       Allergies:  No Known Allergies    Tobacco:   reports that he quit smoking about 4 years ago. His smoking use included cigars. He smoked 0.00 packs per day. He has never used smokeless tobacco.     Physical Exam          /78   Pulse 80       General: Patient noted no signs of distress he is extremely hard of hearing.    There is blood coming from the right nostril.  It is enough to soak gauze.  It is removed there some coagulated blood and it does not immediately drip out but begins to slowly trickle.  Using nasal speculum I am not able to see the source of bleeding which seems to be higher than I can possibly visualize with the nasal speculum and a light.  There is no other obvious abnormality.    On occasion does spit out some coagulated blood.  He has no difficulty breathing.  He is not in distress.      Vaseline gauze was packed into the right nostril.  He was observed for several minutes with this in place as we made an appointment for him to be seen in ENT.  There was only a small amount of blood emerging at the edge of the Vaseline gauze.  He was sent  to his ENT appointment with some additional dry gauze to continue to apply steady pressure on the outside.

## 2021-06-12 NOTE — TELEPHONE ENCOUNTER
Wellness Screening Tool  Symptom Screening:  Do you have one of the following NEW symptoms:    Fever (subjective or >100.0)?  No    A new cough?  No    Shortness of breath?  No     Chills? No     New loss of taste or smell? No     Generalized body aches? No     New persistent headache? No     New sore throat? No     Nausea, vomiting, or diarrhea?  No    Within the past 2 weeks, have you been exposed to someone with a known positive illness below:    COVID-19 (known or suspected)?  No    Chicken pox?  No    Mealses?  No    Pertussis?  No    Patient notified of visitor policy- They may have one person accompany them to their appointment, but they will need to wear a mask and will be screened upon arrival for symptoms: Yes  Pt informed to wear a mask: Yes  Pt notified if they develop any symptoms listed above, prior to their appointment, they are to call the clinic directly at [phone] for further instructions.  Yes  Patient's appointment status: Patient will be seen in clinic as scheduled on 10/29

## 2021-06-12 NOTE — PATIENT INSTRUCTIONS - HE
Dereck ,    It was a pleasure to see you today at [facility].    I recommend that you meet with our Atrial Fibrillation Clinic to discuss the Watchman device to lower your risk of stroke. Otherwise, you should be on warfarin long term rather than aspirin, as aspirin does not effectively lower your risk of stroke from atrial fibrillation.    You will see Dr. Moeller in one year.     Please call us if you have any questions or concerns about your heart.      Eduardo Moeller M.D.  [facility]
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
## 2021-06-13 NOTE — TELEPHONE ENCOUNTER
Refill Approved    Rx renewed per Medication Renewal Policy. Medication was last renewed on 8/21/19.    Nancie Martel, [facility] Triage/Med Refill 11/16/2020     Requested Prescriptions   Pending Prescriptions Disp Refills     amLODIPine (NORVASC) 2.5 MG tablet [Pharmacy Med Name: AMLODIPINE BESYLATE 2.5 MG TAB] 90 tablet 3     Sig: TAKE 1 TABLET BY MOUTH EVERY DAY       Calcium-Channel Blockers Protocol Passed - 11/14/2020  9:15 AM        Passed - PCP or prescribing provider visit in past 12 months or next 3 months     Last office visit with prescriber/PCP: 1/17/2020 Donald Machado MD OR same dept: 10/12/2020 Tico Reynolds MD OR same specialty: 10/12/2020 Tico Reynolds MD  Last physical: 7/17/2020 Last MTM visit: Visit date not found   Next visit within 3 mo: Visit date not found  Next physical within 3 mo: Visit date not found  Prescriber OR PCP: Donald Machado MD  Last diagnosis associated with med order: 1. Essential hypertension with goal blood pressure less than 140/90  - amLODIPine (NORVASC) 2.5 MG tablet [Pharmacy Med Name: AMLODIPINE BESYLATE 2.5 MG TAB]; TAKE 1 TABLET BY MOUTH EVERY DAY  Dispense: 90 tablet; Refill: 3    If protocol passes may refill for 12 months if within 3 months of last provider visit (or a total of 15 months).             Passed - Blood pressure filed in past 12 months     BP Readings from Last 1 Encounters:   10/29/20 132/80
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
## 2021-06-15 PROBLEM — I10 ESSENTIAL HYPERTENSION: Chronic | Status: ACTIVE | Noted: 2017-04-25

## 2021-06-15 NOTE — PROGRESS NOTES
Type: routine pacemaker remote transmission.   Presenting rhythm: ventricular pacing 60 bpm.  Battery/lead status: stable.  Arrhythmias: since 11/6/20; two ventricular high rate episodes, both appear to be NSVT 6-10 bts 180-200bpm.  Comments: normal pacemaker function. Routed to device RN for review. DEL        2 brief possible NSVTs noted (VVIR mode) as copied below. Last EF 65% per Echo 1/18/19. 88% . Takes Toprol XL 50 mg daily. Reviewed with patient/wife on line, denies any lightheadedness or rapid palpitations. Advised to call with any troublesome episodes so we can have him send a repeat transmission for review. Verbalized understanding and has our device clinic RN line.      Will review EGMs with Dr. Dean, Echo >1 yr.   Shala Tanner RN    Brief NSVT < 3 SECONDS  pLAN  CONTINUE ROUTINE SCHEDULED FOLLOW UP  ALREADY ON  BB  HAS NORMAL EF

## 2021-06-15 NOTE — PROGRESS NOTES
Recommendations noted and reviewed with patient's wife. Advised to call with any sudden lightheadedness/near-fainting, otherwise will continue routine monitoring. Wife verbalized understanding. Denies any other questions/concerns.   Shala Tanner RN

## 2021-06-16 PROBLEM — Z95.0 CARDIAC PACEMAKER IN SITU: Chronic | Status: ACTIVE | Noted: 2019-08-08

## 2021-06-16 PROBLEM — I72.3 ANEURYSM OF COMMON ILIAC ARTERY (H): Chronic | Status: ACTIVE | Noted: 2019-10-15

## 2021-06-16 PROBLEM — I45.10 RBBB (RIGHT BUNDLE BRANCH BLOCK): Chronic | Status: ACTIVE | Noted: 2019-07-26

## 2021-06-16 PROBLEM — I48.21 PERMANENT ATRIAL FIBRILLATION (H): Chronic | Status: ACTIVE | Noted: 2017-02-03
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
## 2021-06-17 NOTE — PATIENT INSTRUCTIONS - HE
Patient Instructions by Donald Machado MD at 7/16/2019  7:00 AM     Author: Donald Machado MD Service: -- Author Type: Physician    Filed: 7/16/2019  7:15 AM Encounter Date: 7/16/2019 Status: Signed    : Donald Machado MD (Physician)         Patient Education   Understanding USDA MyPlate  The USDA (US Department of Agriculture) has guidelines to help you make healthy food choices. These are called MyPlate. MyPlate shows the food groups that make up healthy meals using the image of a place setting. Before you eat, think about the healthiest choices for what to put onto your plate or into your cup or bowl. To learn more about building a healthy plate, visit www.choosemyplate.gov.       The Food Groups    Fruits: Any fruit or 100% fruit juice counts as part of the Fruit Group. Fruits may be fresh, canned, frozen, or dried, and may be whole, cut-up, or pureed. Make half your plate fruits and vegetables.    Vegetables: Any vegetable or 100% vegetable juice counts as a member of the Vegetable Group. Vegetables may be fresh, frozen, canned, or dried. They can be served raw or cooked and may be whole, cut-up, or mashed. Make half your plate fruits and vegetables.     Grains: All foods made from grains are part of the Grains Group. These include wheat, rice, oats, cornmeal, and barley such as bread, pasta, oatmeal, cereal, tortillas, and grits. Grains should be no more than a quarter of your plate. At least half of your grains should be whole grains.    Protein: This group includes meat, poultry, seafood, beans and peas, eggs, processed soy products (like tofu), nuts (including nut butters), and seeds. Make protein choices no more than a quarter of your plate. Meat and poultry choices should be lean or low fat.    Dairy: All fluid milk products and foods made from milk that contain calcium, like yogurt and cheese are part of the Dairy Group. (Foods that have little calcium, such as cream, butter, and cream  cheese, are not part of the group.) Most dairy choices should be low-fat or fat-free.    Oils: These are fats that are liquid at room temperature. They include canola, corn, olive, soybean, and sunflower oil. Foods that are mainly oil include mayonnaise, certain salad dressings, and soft margarines. You should have only 5 to 7 teaspoons of oils a day. You probably already get this much from the food you eat.  Use [employer] to Help Build Your Meals  The [employer] can help you plan and track your meals and activity. You can look up individual foods to see or compare their nutritional value. You can get guidelines for what and how much you should eat. You can compare your food choices. And you can assess personal physical activities and see ways you can improve. Go to www.[employer].gov/Cohealocker/.    0888-7314 [employer]. [address] [place] [address]. All rights reserved. This information is not intended as a substitute for professional medical care. Always follow your healthcare professional's instructions.           Patient Education   Signs of Hearing Loss  Hearing loss is a problem shared by many people. In fact, it is one of the most common health conditions, particularly as people age. Most people over age 65 have some hearing loss, and by age 80, almost everyone does. Because hearing loss usually occurs slowly over the years, you may not realize your hearing ability has gotten worse.       Have your hearing checked  Contact your [facility] care provider if you:    Have to strain to hear normal conversation.    Have to watch other peoples faces very carefully to follow what theyre saying.    Need to ask people to repeat what theyve said.    Often misunderstand what people are saying.    Turn the volume of the television or radio up so high that others complain.    Feel that people are mumbling when theyre talking to you.    Find that the effort to hear leaves you feeling tired  and irritated.    Notice, when using the phone, that you hear better with 1 ear than the other.    0982-3451 [employer]. [address] [place] [address], [place] [address]. All rights reserved. This information is not intended as a substitute for professional medical care. Always follow your healthcare professional's instructions.           Advance Directive  Patients advance directive was discussed and I am comfortable with the patients wishes.  Patient Education   Personalized Prevention Plan  You are due for the preventive services outlined below.  Your care team is available to assist you in scheduling these services.  If you have already completed any of these items, please share that information with your care team to update in your medical record.  Health Maintenance   Topic Date Due   ? ZOSTER VACCINES (1 of 2) 03/06/1988   ? INFLUENZA VACCINE RULE BASED (1) 08/01/2019   ? FALL RISK ASSESSMENT  07/16/2020   ? ADVANCE DIRECTIVES DISCUSSED WITH PATIENT  07/06/2023   ? TD 18+ HE  04/22/2026   ? PNEUMOCOCCAL POLYSACCHARIDE VACCINE AGE 65 AND OVER  Completed   ? PNEUMOCOCCAL CONJUGATE VACCINE FOR ADULTS (PCV13 OR PREVNAR)  Completed

## 2021-06-17 NOTE — TELEPHONE ENCOUNTER
Telephone Encounter by Shala Tanner RN at 2/9/2021  3:25 PM     Author: Shala Tanner RN Service: -- Author Type: Registered Nurse    Filed: 2/9/2021  3:39 PM Encounter Date: 2/9/2021 Status: Signed    : Shala Tanner RN (Registered Nurse)       Type: routine pacemaker remote transmission.   Presenting rhythm: ventricular pacing 60 bpm.  Battery/lead status: stable.  Arrhythmias: since 11/6/20; two ventricular high rate episodes, both appear to be NSVT 6-10 bts 180-200bpm.  Comments: normal pacemaker function. Routed to device RN for review. DEL       2 brief possible NSVTs noted (VVIR mode) as copied below. Last EF 65% per Echo 1/18/19. 88% . Takes Toprol XL 50 mg daily. Reviewed with patient/wife on line, denies any lightheadedness or rapid palpitations. Advised to call with any troublesome episodes so we can have him send a repeat transmission for review. Verbalized understanding and has our device clinic RN line.     Will review EGMs with Dr. Dean, Echo >1 yr.   Shala Tanner, RN

## 2021-06-17 NOTE — PATIENT INSTRUCTIONS - HE
Patient Instructions by rBie Ellis CNP at 11/13/2019  2:50 PM     Author: Brie Ellis CNP Service: -- Author Type: Nurse Practitioner    Filed: 11/13/2019  3:50 PM Encounter Date: 11/13/2019 Status: Addendum    : Brie Ellis CNP (Nurse Practitioner)    Related Notes: Original Note by Brie Ellis CNP (Nurse Practitioner) filed at 11/13/2019  3:41 PM       I suspect dizziness is likely related to vertigo.  I will provide information below.  EKG is without significant change.  Your recent labs from 11/11/2019 indicated normal potassium and magnesium level.  Your labs from a month prior to that were all stable.  Pacemaker appears to be working well today.  Given that dizziness was going on prior to your last pacemaker device check, I do not think it is urgent that you get back into have it checked urgently.  Vertigo symptoms are typically managed with conservative measures initially.  Symptoms may improve spontaneously over the next couple of days or weeks.  If you continue to have dizziness and vertigo symptoms, please reach out and we can consider referral to occupational therapy.  Please let us know if you have any new symptoms.    Patient Education     Benign Paroxysmal Positional Vertigo    Benign paroxysmal positional vertigo is a common condition. You feel as if the room is spinning after changing position, moving your head quickly, or even just rolling over in bed.  Vertigo is a false feeling of motion plus disorientation that makes it seem as though the room is spinning. A vertigo attack may cause sudden nausea, vomiting, and heavy sweating. Severe vertigo causes a loss of balance. You may even fall down.  Vertigo is caused by a problem with the inner ear. The inner ear is located behind the middle ear. It is a part of the balance center of the body. It contains small calcium particles within fluid-filled canals (semi-circular canals). These particles can move out of position. This may  happen as a result of aging, head injury, or disease of the inner ear. Once that happens, moving your head in certain ways may cause the particles to stimulate the inner ear. This creates the feeling of vertigo.  An episode of vertigo may last seconds, minutes, or hours. Once you are over the first episode of vertigo, it may never return. Sometimes symptoms return off and on for several weeks or longer.  Home care  Follow these guidelines when caring for yourself at home:    Rest quietly in bed if your symptoms are severe. Change position slowly. There is usually 1 position that will feel best. This might be lying on 1 side or lying on your back with your head slightly raised on pillows. Until you have no symptoms, you are at a higher risk of falling. Let someone help you when you get up. Get rid of home hazards such as loose electrical cords and throw rugs. Dont walk in unfamiliar areas that are not lighted. Use night lights in bathrooms and kitchen areas.    Do not drive or work with dangerous machinery for 1 week after symptoms go away. This is in case symptoms return suddenly.    Take medicine as prescribed to relieve your symptoms. Unless another medicine was prescribed for nausea, vomiting, and vertigo, you may use over-the-counter motion sickness medicine. Examples of this include meclizine and dimenhydrinate.  Follow-up care  Follow up with your healthcare provider, or as directed. Tell your provider about any ringing in your ear or hearing loss.  If you had a CT or MRI scan, a specialist will review it. You will be told of any new findings that may affect your care.  When to seek medical advice  Call your healthcare provider right away if any of these occur:    Vertigo gets worse even after taking prescribed medicine    Repeated vomiting even after taking prescribed medicine    Weakness that gets worse    Fainting    Severe headache or unusual drowsiness or confusion    Weakness of an arm or leg or 1 side  of the face    Trouble walking    Trouble with speech or vision    Seizure    Trouble hearing    Fever of 100.4 F (38 C) or higher, or as directed by your healthcare provider    Fast heart rate    Chest pain   Date Last Reviewed: 11/1/2017 2000-2017 The "[employer]. [address] [place] [address]. All rights reserved. This information is not intended as a substitute for professional medical care. Always follow your healthcare professional's instructions.

## 2021-06-18 NOTE — PATIENT INSTRUCTIONS - HE
Patient Instructions by Donald Machado MD at 7/17/2020  7:00 AM     Author: Donald Machado MD Service: -- Author Type: Physician    Filed: 7/17/2020  7:20 AM Encounter Date: 7/17/2020 Status: Signed    : Donald Machado MD (Physician)         Patient Education   Understanding USDA MyPlate  The USDA (US Department of Agriculture) has guidelines to help you make healthy food choices. These are called MyPlate. MyPlate shows the food groups that make up healthy meals using the image of a place setting. Before you eat, think about the healthiest choices for what to put onto your plate or into your cup or bowl. To learn more about building a healthy plate, visit www.choosemyplate.gov.       The Food Groups    Fruits: Any fruit or 100% fruit juice counts as part of the Fruit Group. Fruits may be fresh, canned, frozen, or dried, and may be whole, cut-up, or pureed. Make half your plate fruits and vegetables.    Vegetables: Any vegetable or 100% vegetable juice counts as a member of the Vegetable Group. Vegetables may be fresh, frozen, canned, or dried. They can be served raw or cooked and may be whole, cut-up, or mashed. Make half your plate fruits and vegetables.     Grains: All foods made from grains are part of the Grains Group. These include wheat, rice, oats, cornmeal, and barley such as bread, pasta, oatmeal, cereal, tortillas, and grits. Grains should be no more than a quarter of your plate. At least half of your grains should be whole grains.    Protein: This group includes meat, poultry, seafood, beans and peas, eggs, processed soy products (like tofu), nuts (including nut butters), and seeds. Make protein choices no more than a quarter of your plate. Meat and poultry choices should be lean or low fat.    Dairy: All fluid milk products and foods made from milk that contain calcium, like yogurt and cheese are part of the Dairy Group. (Foods that have little calcium, such as cream, butter, and cream  cheese, are not part of the group.) Most dairy choices should be low-fat or fat-free.    Oils: These are fats that are liquid at room temperature. They include canola, corn, olive, soybean, and sunflower oil. Foods that are mainly oil include mayonnaise, certain salad dressings, and soft margarines. You should have only 5 to 7 teaspoons of oils a day. You probably already get this much from the food you eat.  Use [employer] to Help Build Your Meals  The [employer] can help you plan and track your meals and activity. You can look up individual foods to see or compare their nutritional value. You can get guidelines for what and how much you should eat. You can compare your food choices. And you can assess personal physical activities and see ways you can improve. Go to www.[employer].gov/Tradegeckocker/.    7081-8576 The [employer]. [address] [place] [address]. All rights reserved. This information is not intended as a substitute for professional medical care. Always follow your healthcare professional's instructions.           Patient Education   Signs of Hearing Loss  Hearing loss is a problem shared by many people. In fact, it is one of the most common health conditions, particularly as people age. Most people over age 65 have some hearing loss, and by age 80, almost everyone does. Because hearing loss usually occurs slowly over the years, you may not realize your hearing ability has gotten worse.       Have your hearing checked  Contact your [facility] care provider if you:    Have to strain to hear normal conversation.    Have to watch other peoples faces very carefully to follow what theyre saying.    Need to ask people to repeat what theyve said.    Often misunderstand what people are saying.    Turn the volume of the television or radio up so high that others complain.    Feel that people are mumbling when theyre talking to you.    Find that the effort to hear leaves you feeling tired  and irritated.    Notice, when using the phone, that you hear better with 1 ear than the other.    4242-6173 The [employer]. [address] [place] [address], [place] [address]. All rights reserved. This information is not intended as a substitute for professional medical care. Always follow your healthcare professional's instructions.           Advance Directive  Patients advance directive was discussed and I am comfortable with the patients wishes.  Patient Education   Personalized Prevention Plan  You are due for the preventive services outlined below.  Your care team is available to assist you in scheduling these services.  If you have already completed any of these items, please share that information with your care team to update in your medical record.  Health Maintenance   Topic Date Due   ? ZOSTER VACCINES (1 of 2) 03/06/1988   ? MEDICARE ANNUAL WELLNESS VISIT  07/16/2020   ? INFLUENZA VACCINE RULE BASED (1) 08/01/2020   ? FALL RISK ASSESSMENT  11/13/2020   ? ADVANCE CARE PLANNING  07/16/2024   ? TD 18+ HE  04/22/2026   ? PNEUMOCOCCAL IMMUNIZATION 65+ HIGH/HIGHEST RISK  Completed   ? HEPATITIS B VACCINES  Aged Out

## 2021-06-18 NOTE — LETTER
"Letter by Donald Machado MD at      Author: Donald Machado MD Service: -- Author Type: --    Filed:  Encounter Date: 1/14/2019 Status: (Other)       Dereck Elliott  [address] [place] [id number]             January 14, 2019         Dear Mr. Elliott,    Below are the results from your recent visit:    Resulted Orders   BNP(B-type Natriuretic Peptide)   Result Value Ref Range     (H) 0 - 86 pg/mL   Basic Metabolic Panel   Result Value Ref Range    Sodium 143 136 - 145 mmol/L    Potassium 5.5 (H) 3.5 - 5.0 mmol/L    Chloride 108 (H) 98 - 107 mmol/L    CO2 25 22 - 31 mmol/L    Anion Gap, Calculation 10 5 - 18 mmol/L    Glucose 93 70 - 125 mg/dL    Calcium 10.4 8.5 - 10.5 mg/dL    BUN 25 8 - 28 mg/dL    Creatinine 1.81 (H) 0.70 - 1.30 mg/dL    GFR MDRD Af Amer 44 (L) >60 mL/min/1.73m2    GFR MDRD Non Af Amer 36 (L) >60 mL/min/1.73m2    Narrative    Fasting Glucose reference range is 70-99 mg/dL per  American Diabetes Association (ADA) guidelines.   Vitamin D, Total (25-Hydroxy)   Result Value Ref Range    Vitamin D, Total (25-Hydroxy) 42.1 30.0 - 80.0 ng/mL    Narrative    Deficiency <10.0 ng/mL  Insufficiency 10.0-29.9 ng/mL  Sufficiency 30.0-80.0 ng/mL  Toxicity (possible) >100.0 ng/mL       Your \"fluid level\" (i.e. BNP) remains mildly elevated.  It appears stable.  We will continue to follow closely.     Your potassium level remains mildly elevated.  Avoid foods high in potassium including tomatoes, potatoes, bananas, watermelon, etc.  Avoid \"salt substitutes\" which are high in potassium.  We will continue to follow closely.      Your kidney function remains reduced.  It appears stable.    Your vitamin D level is normal.  Continue your current management.     Please call with questions or contact us using [employer].    Sincerely,        Electronically signed by Donald Machado MD       "
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
## 2021-06-19 NOTE — LETTER
"Letter by Donald Machado MD at      Author: Donald Machado MD Service: -- Author Type: --    Filed:  Encounter Date: 7/16/2019 Status: (Other)         Dereck Elliott  [address] [place] [id number]             July 16, 2019         Dear Mr. Elliott,    Below are the results from your recent visit:    Resulted Orders   BNP(B-type Natriuretic Peptide)   Result Value Ref Range     (H) 0 - 88 pg/mL   Basic Metabolic Panel   Result Value Ref Range    Sodium 142 136 - 145 mmol/L    Potassium 5.4 (H) 3.5 - 5.0 mmol/L    Chloride 107 98 - 107 mmol/L    CO2 27 22 - 31 mmol/L    Anion Gap, Calculation 8 5 - 18 mmol/L    Glucose 93 70 - 125 mg/dL    Calcium 10.5 8.5 - 10.5 mg/dL    BUN 27 8 - 28 mg/dL    Creatinine 1.96 (H) 0.70 - 1.30 mg/dL    GFR MDRD Af Amer 40 (L) >60 mL/min/1.73m2    GFR MDRD Non Af Amer 33 (L) >60 mL/min/1.73m2    Narrative    Fasting Glucose reference range is 70-99 mg/dL per  American Diabetes Association (ADA) guidelines.   HM2(CBC w/o Differential)   Result Value Ref Range    WBC 5.9 4.0 - 11.0 thou/uL    RBC 3.89 (L) 4.40 - 6.20 mill/uL    Hemoglobin 13.4 (L) 14.0 - 18.0 g/dL    Hematocrit 40.3 40.0 - 54.0 %     (H) 80 - 100 fL    MCH 34.4 (H) 27.0 - 34.0 pg    MCHC 33.1 32.0 - 36.0 g/dL    RDW 12.8 11.0 - 14.5 %    Platelets 154 140 - 440 thou/uL    MPV 8.3 7.0 - 10.0 fL   Lipid Cascade   Result Value Ref Range    Cholesterol 161 <=199 mg/dL    Triglycerides 138 <=149 mg/dL    HDL Cholesterol 41 >=40 mg/dL    LDL Calculated 92 <=129 mg/dL    Patient Fasting > 8hrs? Yes        Your \"fluid level\" (i.e. BNP) remains mildly elevated.  We will continue to follow closely.     Your potassium level was mildly elevated.  No significant concern.  Avoid foods high in potassium including tomatoes, potatoes, bananas, watermelon, etc.  Avoid \"salt substitutes\" which are high in potassium.  We will continue to follow closely.      Your kidney function remains significantly reduced.  It " appears relatively stable.  We will continue to follow closely.    Your complete blood count results show mild anemia.  We will plan to recheck at your follow-up office visit in order to ensure that it resolves.    Your cholesterol results are at goal.     Please call with questions or contact us using [employer].    Sincerely,        Electronically signed by Donald Machado MD

## 2021-06-19 NOTE — LETTER
Letter by Mary Patterson at      Author: Mary Patterson Service: -- Author Type: --    Filed:  Encounter Date: 9/11/2019 Status: (Other)         Dereck Elliott  [address] [facility] [address]      September 11, 2019      Dear Mr. Elliott,    RE: Remote Results    We are writing to you regarding your recent Remote Pacemaker check from home. Your transmission was received successfully. Battery status is satisfactory at this time.     Your results are within normal limits.    Your next device appointment will be a clinic visit on November 1, 2019 at 2:50pm at our  [place] location, [address] [facility].    To schedule or reschedule, please call [phone] and press 1.    NOTE: If you would like to do an extra transmission, please call [phone] and press 3 to speak to a nurse BEFORE transmitting. This ensures that the Device Clinic staff is aware of the reason you are sending a transmission, and can follow-up with you after it has been reviewed.    We will be checking your implanted device from home (remotely) every three months unless otherwise instructed. We will need to see you in the clinic at least once a year. You may need to be seen in the clinic sooner depending on the results of your check.    Please be aware:    The follow-up schedule is like a Physician prescription.    Your remote monitor is paired to your specific implanted device.      Sincerely,    [facility]

## 2021-06-19 NOTE — LETTER
"Letter by Donald Machado MD at      Author: Donald Machado MD Service: -- Author Type: --    Filed:  Encounter Date: 7/23/2019 Status: (Other)         Dereck Elliott  [address] [place] [address]             July 23, 2019         Dear Mr. Elliott,    Below are the results from your recent visit:    Resulted Orders   Magnesium   Result Value Ref Range    Magnesium 2.2 1.8 - 2.6 mg/dL   Basic Metabolic Panel   Result Value Ref Range    Sodium 145 136 - 145 mmol/L    Potassium 5.3 (H) 3.5 - 5.0 mmol/L    Chloride 109 (H) 98 - 107 mmol/L    CO2 26 22 - 31 mmol/L    Anion Gap, Calculation 10 5 - 18 mmol/L    Glucose 89 70 - 125 mg/dL    Calcium 10.4 8.5 - 10.5 mg/dL    BUN 28 8 - 28 mg/dL    Creatinine 1.87 (H) 0.70 - 1.30 mg/dL    GFR MDRD Af Amer 42 (L) >60 mL/min/1.73m2    GFR MDRD Non Af Amer 35 (L) >60 mL/min/1.73m2    Narrative    Fasting Glucose reference range is 70-99 mg/dL per  American Diabetes Association (ADA) guidelines.   HM2(CBC w/o Differential)   Result Value Ref Range    WBC 5.8 4.0 - 11.0 thou/uL    RBC 4.04 (L) 4.40 - 6.20 mill/uL    Hemoglobin 14.0 14.0 - 18.0 g/dL    Hematocrit 41.6 40.0 - 54.0 %     (H) 80 - 100 fL    MCH 34.6 (H) 27.0 - 34.0 pg    MCHC 33.6 32.0 - 36.0 g/dL    RDW 13.1 11.0 - 14.5 %    Platelets 167 140 - 440 thou/uL    MPV 8.2 7.0 - 10.0 fL   Vitamin B12   Result Value Ref Range    Vitamin B-12 1,022 (H) 213 - 816 pg/mL   Folate, Serum   Result Value Ref Range    Folate 11.6 >=3.5 ng/mL   Thyroid Stimulating Hormone (TSH)   Result Value Ref Range    TSH 2.32 0.30 - 5.00 uIU/mL       Your magnesium level is normal.     Your potassium level remains mildly elevated.  No significant concern.  Avoid foods high in potassium including tomatoes, potatoes, bananas, watermelon, etc.  Avoid \"salt substitutes\" which are high in potassium.  We will continue to follow closely.      Your kidney function remains reduced.  It appears stable.  We will continue to follow closely. "     Your complete blood count results were normal.  No evidence for anemia, etc.     Your vitamin B12 level was normal.     Your folate level is normal.    Your thyroid screening test (i.e. TSH) was normal.     Please call with questions or contact us using [employer].    Sincerely,        Electronically signed by Donald Machado MD

## 2021-06-19 NOTE — LETTER
Letter by Rona Sanchez RN at      Author: Rona Sanchez RN Service: -- Author Type: --    Filed:  Encounter Date: 7/26/2019 Status: (Other)        Dear Dereck Elliott,    Important Information for Your Procedure  Your Procedure:    PACEMAKER IMPLANT    WE WILL CALL YOU TO SCHEDULE A TIME    Getting Ready for Your Procedure:    You will need to bring a current list of your medications with you for our admissions process the day of your procedure, please do not bring any of your own medications  with you to the hospital    The hospital cannot store your valuables, so please leave them at home    Please make arrange for transportation home, as you will not be able to drive following your procedure  The Night Prior to Your Procedure:    Please do not have anything to eat or drink after Midnight (12:00am) prior to your procedure    It is important to stay well hydrated, and consume balanced meals the day prior to your procedure  Arriving for Your Procedure:  Please arrive at [facility], located at: [address] [place] [address]    parking is available after 5:00am for your convenience, parking is also available in the parking ramp located off of [address] attached to the hospital  If you park in the ramp located on [facility] [address], take the elevator to level one. Enter the doors to the atrium/lobby area and check in at the main reception desk.   Please check in at the main reception desk in the lobby  You will be assisted to Cardiac Special [facility] on the third floor.  Going Home:    You will go home after the procedure.    The physician and/or nurse will review any restrictions, follow-up requirements, and medication instructions prior to going home from the hospital  Clinic Contact Information:    You can contact our main clinic line at any time with questions, concerns, or if you need further information: [facility] [phone]  If you have further questions in  regards to the scheduling of you procedure, please contact our scheduling team at [phone]  Special Considerations:    Please keep your incision site dry for 3 days after the procedure, you will not be able to shower for 3 days after the procedure.    You will not be able to drive for  3 days after the procedure.    You will have lifting/movement restrictions of your arm on the side of the Pacemaker implant for 4 weeks after the procedure, DO NOT:    Raise your arm above the height of your shoulder    Perform any vigorous arm movements    Swim, golf, wash windows, shovel snow or vacuum    Lift greater than 10-15 pounds    Further details will be given to you at your discharge.      Medication prior to procedure:    Please take your morning medications the day of your procedure with sips of water, except any multivitamins or supplements.    If you have any questions regarding your medication prior to your procedure please contact me at the number listed below.    Sincerely,    Rona Sanchez RN  [phone]

## 2021-06-19 NOTE — LETTER
"Letter by Donald Machado MD at      Author: Donald Machado MD Service: -- Author Type: --    Filed:  Encounter Date: 10/16/2019 Status: Signed         Dereck Elliott  [address] [place] [address]             October 16, 2019         Dear Mr. Elliott,    Below are the results from your recent visit:    Resulted Orders   HM2(CBC w/o Differential)   Result Value Ref Range    WBC 6.1 4.0 - 11.0 thou/uL    RBC 3.86 (L) 4.40 - 6.20 mill/uL    Hemoglobin 13.5 (L) 14.0 - 18.0 g/dL    Hematocrit 38.6 (L) 40.0 - 54.0 %     80 - 100 fL    MCH 35.0 (H) 27.0 - 34.0 pg    MCHC 35.0 32.0 - 36.0 g/dL    RDW 12.7 11.0 - 14.5 %    Platelets 228 140 - 440 thou/uL    MPV 7.2 7.0 - 10.0 fL    Narrative    Verified x 2 due to failed delta check PLTs   Basic Metabolic Panel   Result Value Ref Range    Sodium 143 136 - 145 mmol/L    Potassium 5.7 (H) 3.5 - 5.0 mmol/L    Chloride 108 (H) 98 - 107 mmol/L    CO2 26 22 - 31 mmol/L    Anion Gap, Calculation 9 5 - 18 mmol/L    Glucose 96 70 - 125 mg/dL    Calcium 10.1 8.5 - 10.5 mg/dL    BUN 23 8 - 28 mg/dL    Creatinine 1.76 (H) 0.70 - 1.30 mg/dL    GFR MDRD Af Amer 45 (L) >60 mL/min/1.73m2    GFR MDRD Non Af Amer 37 (L) >60 mL/min/1.73m2    Narrative    Fasting Glucose reference range is 70-99 mg/dL per  American Diabetes Association (ADA) guidelines.       Your complete blood count results show improvement in your low hemoglobin level (anemia).  It is mild.    Your potassium level remains elevated.  Avoid foods high in potassium including tomatoes, potatoes, bananas, watermelon, etc.  Avoid \"salt substitutes\" which are high in potassium.  We will continue to follow closely.      Your kidney function remains reduced.  It appears relatively stable.  Ensure adequate hydration.  We will continue to follow closely as well.     Please call with questions or contact us using [employer].    Sincerely,        Electronically signed by Donald Machado MD       "

## 2021-06-19 NOTE — LETTER
Letter by Riri Delaney RN at      Author: Riri Delaney RN Service: -- Author Type: --    Filed:  Encounter Date: 11/21/2019 Status: Signed         Dereck Elliott  [address] [address]         November 21, 2019       Dear Mr. Elliott,    Below are the results from your recent visit:  Resulted Orders   Basic Metabolic Panel   Result Value Ref Range    Sodium 142 136 - 145 mmol/L    Potassium 4.5 3.5 - 5.0 mmol/L    Chloride 104 98 - 107 mmol/L    CO2 30 22 - 31 mmol/L    Anion Gap, Calculation 8 5 - 18 mmol/L    Glucose 102 70 - 125 mg/dL    Calcium 9.7 8.5 - 10.5 mg/dL    BUN 26 8 - 28 mg/dL    Creatinine 1.78 (H) 0.70 - 1.30 mg/dL    GFR MDRD Af Amer 45 (L) >60 mL/min/1.73m2    GFR MDRD Non Af Amer 37 (L) >60 mL/min/1.73m2    Narrative    Fasting Glucose reference range is 70-99 mg/dL per  American Diabetes Association (ADA) guidelines.   Magnesium   Result Value Ref Range    Magnesium 2.0 1.8 - 2.6 mg/dL      No new orders.  K and Mg are fine. I do not have an explanation for the dizziness that has persisted  since your pacer was implanted by Dr. Dean. Given the lack of other cardiac problems, I suggest that  you see Dr. Machado about this and return to see me or Dr. Dean in July of 2020.   Mervin Moeller MD on 11/11/2019 at 4:29 PM CST    Please call with questions or contact us using [employer].    Sincerely,    Electronically signed by Riri CANNON RN

## 2021-06-20 NOTE — LETTER
Letter by Ester Ridley RN at      Author: Ester Ridley RN Service: -- Author Type: --    Filed:  Encounter Date: 5/5/2020 Status: (Other)         Dereck Elliott  [address] [address]      May 5, 2020      Dear Mr. Elliott,    RE: Remote Results    We are writing to you regarding your recent Remote Pacemaker check from home. Your transmission was received successfully. Battery status is satisfactory at this time.     Your results are within normal limits.    Your next device appointment will be a remote check on 8/6/2020; this will occur automatically.    To schedule or reschedule, please call [phone] and press 1.    NOTE: If you would like to do an extra transmission, please call [phone] and press 3 to speak to a nurse BEFORE transmitting. This ensures that the Device Clinic staff is aware of the reason you are sending a transmission, and can follow-up with you after it has been reviewed.    We will be checking your implanted device from home (remotely) every three months unless otherwise instructed. We will need to see you in the clinic at least once a year. You may need to be seen in the clinic sooner depending on the results of your check.    Please be aware:    The follow-up schedule is like a Physician prescription.    Your remote monitor is paired to your specific implanted device.      Sincerely,    [facility]

## 2021-06-20 NOTE — LETTER
Letter by Peyton Du RN at      Author: Peyton Du RN Service: -- Author Type: --    Filed:  Encounter Date: 8/6/2020 Status: (Other)         Dereck Elliott  [address] [facility] [address]      August 6, 2020      Dear  Earl,    RE: Remote Results    We are writing to you regarding your recent Remote Pacemaker check from home. Your transmission was received successfully. Battery status is satisfactory at this time.     Your results are showing no significant changes.    Your next device appointment will be a remote check on 11/6/2020; this will occur automatically.    To schedule or reschedule, please call [phone] and press 1.    NOTE: If you would like to do an extra transmission, please call [phone] and press 3 to speak to a nurse BEFORE transmitting. This ensures that the Device Clinic staff is aware of the reason you are sending a transmission, and can follow-up with you after it has been reviewed.    We will be checking your implanted device from home (remotely) every three months unless otherwise instructed. We will need to see you in the clinic at least once a year. You may need to be seen in the clinic sooner depending on the results of your check.    Please be aware:    The follow-up schedule is like a Physician prescription.    Your remote monitor is paired to your specific implanted device.      Sincerely,    [facility]

## 2021-06-20 NOTE — LETTER
Letter by Donald Machado MD at      Author: Donald Machado MD Service: -- Author Type: --    Filed:  Encounter Date: 7/20/2020 Status: (Other)         Dereck Elliott  [address] [place] [address]             July 20, 2020         Dear Mr. Elliott,    Below are the results from your recent visit:    Resulted Orders   HM2(CBC w/o Differential)   Result Value Ref Range    WBC 6.5 4.0 - 11.0 thou/uL    RBC 4.15 (L) 4.40 - 6.20 mill/uL    Hemoglobin 14.1 14.0 - 18.0 g/dL    Hematocrit 41.8 40.0 - 54.0 %     (H) 80 - 100 fL    MCH 33.9 27.0 - 34.0 pg    MCHC 33.7 32.0 - 36.0 g/dL    RDW 12.0 11.0 - 14.5 %    Platelets 214 140 - 440 thou/uL    MPV 8.2 7.0 - 10.0 fL   Basic Metabolic Panel   Result Value Ref Range    Sodium 141 136 - 145 mmol/L    Potassium 4.7 3.5 - 5.0 mmol/L    Chloride 104 98 - 107 mmol/L    CO2 28 22 - 31 mmol/L    Anion Gap, Calculation 9 5 - 18 mmol/L    Glucose 93 70 - 125 mg/dL    Calcium 9.8 8.5 - 10.5 mg/dL    BUN 21 8 - 28 mg/dL    Creatinine 2.00 (H) 0.70 - 1.30 mg/dL    GFR MDRD Af Amer 39 (L) >60 mL/min/1.73m2    GFR MDRD Non Af Amer 32 (L) >60 mL/min/1.73m2    Narrative    Fasting Glucose reference range is 70-99 mg/dL per  American Diabetes Association (ADA) guidelines.   Lipid Cascade   Result Value Ref Range    Cholesterol 193 <=199 mg/dL    Triglycerides 140 <=149 mg/dL    HDL Cholesterol 40 >=40 mg/dL    LDL Calculated 125 <=129 mg/dL    Patient Fasting > 8hrs? Yes        Your complete blood count results were fine.  No evidence for anemia, etc.     Your kidney function remains significantly reduced.  We will continue to follow closely.     Your cholesterol results were elevated.  Recommendation to use cholesterol-lowering medication in order to improve.  Please let me know if you change your mind and would be willing to have me send a prescription to your pharmacy.  Ensure regular exercise, healthy diet, and weight loss modifications in order to further improve.  Weight  goal < 200 pounds initially, < 195 pounds ideally.  We will plan to recheck your labs while fasting in the next 6-12 months to ensure desired improvement.       Please call with questions or contact us using [employer].    Sincerely,        Electronically signed by Donald Machado MD

## 2021-06-20 NOTE — LETTER
"Letter by Donald Machado MD at      Author: Donald Machado MD Service: -- Author Type: --    Filed:  Encounter Date: 1/20/2020 Status: Signed         Dereck Elliott  [address] [place] [address]             January 20, 2020         Dear Mr. Elliott,    Below are the results from your recent visit:    Resulted Orders   Vitamin D, Total (25-Hydroxy)   Result Value Ref Range    Vitamin D, Total (25-Hydroxy) 48.8 30.0 - 80.0 ng/mL    Narrative    Deficiency <10.0 ng/mL  Insufficiency 10.0-29.9 ng/mL  Sufficiency 30.0-80.0 ng/mL  Toxicity (possible) >100.0 ng/mL   Basic Metabolic Panel   Result Value Ref Range    Sodium 143 136 - 145 mmol/L    Potassium 5.7 (H) 3.5 - 5.0 mmol/L    Chloride 105 98 - 107 mmol/L    CO2 27 22 - 31 mmol/L    Anion Gap, Calculation 11 5 - 18 mmol/L    Glucose 96 70 - 125 mg/dL    Calcium 10.2 8.5 - 10.5 mg/dL    BUN 23 8 - 28 mg/dL    Creatinine 1.88 (H) 0.70 - 1.30 mg/dL    GFR MDRD Af Amer 42 (L) >60 mL/min/1.73m2    GFR MDRD Non Af Amer 35 (L) >60 mL/min/1.73m2    Narrative    Fasting Glucose reference range is 70-99 mg/dL per  American Diabetes Association (ADA) guidelines.   HM2(CBC w/o Differential)   Result Value Ref Range    WBC 5.7 4.0 - 11.0 thou/uL    RBC 4.31 (L) 4.40 - 6.20 mill/uL    Hemoglobin 14.2 14.0 - 18.0 g/dL    Hematocrit 43.0 40.0 - 54.0 %     80 - 100 fL    MCH 32.9 27.0 - 34.0 pg    MCHC 33.1 32.0 - 36.0 g/dL    RDW 12.5 11.0 - 14.5 %    Platelets 202 140 - 440 thou/uL    MPV 7.4 7.0 - 10.0 fL       Your vitamin D level is normal.  Continue your current management.     Your potassium level remains moderately elevated.  Continue to avoid foods high in potassium including tomatoes, potatoes, bananas, watermelon, etc.  Avoid \"salt substitutes\" which are high in potassium.  We will continue to follow closely.      Your kidney function remains reduced.  It appears stable.  We will continue to follow closely.     Your complete blood count results were fine.  " No evidence for anemia, etc.     Please call with questions or contact us using [employer].    Sincerely,        Electronically signed by Donald Machado MD

## 2021-06-20 NOTE — LETTER
Letter by Catie Lockett RN at      Author: Catie Lockett RN Service: -- Author Type: --    Filed:  Encounter Date: 2/4/2020 Status: (Other)         Dereck Elliott  [address] [facility] [address]      February 4, 2020      Dear Mr. Elliott,    RE: Remote Results    We are writing to you regarding your recent Remote Pacemaker check from home. Your transmission was received successfully. Battery status is satisfactory at this time.     Your results are within normal limits.    Your next device appointment will be a remote check on 5/5/2020; this will occur automatically.    To schedule or reschedule, please call [phone] and press 1.    NOTE: If you would like to do an extra transmission, please call [phone] and press 3 to speak to a nurse BEFORE transmitting. This ensures that the Device Clinic staff is aware of the reason you are sending a transmission, and can follow-up with you after it has been reviewed.    We will be checking your implanted device from home (remotely) every three months unless otherwise instructed. We will need to see you in the clinic at least once a year. You may need to be seen in the clinic sooner depending on the results of your check.    Please be aware:    The follow-up schedule is like a Physician prescription.    Your remote monitor is paired to your specific implanted device.      Sincerely,    [facility]

## 2021-06-21 NOTE — LETTER
Letter by Mary Patterson at      Author: Mary Patterson Service: -- Author Type: --    Filed:  Encounter Date: 5/11/2021 Status: (Other)         Dereck Elliott  [address] [facility] [address]      May 11, 2021      Dear Mr. Elliott,    RE: Remote Results    We are writing to you regarding your recent Remote Pacemaker check from home. Your transmission was received successfully. Battery status is satisfactory at this time.     Your results are being reviewed.  You will be contacted if further information is necessary.     Your next device appointment will be a remote check on August 19, 2021; this will occur automatically.    To schedule or reschedule, please call [phone] and press 1.    NOTE: If you would like to do an extra transmission, please call [phone] and press 3 to speak to a nurse BEFORE transmitting. This ensures that the Device Clinic staff is aware of the reason you are sending a transmission, and can follow-up with you after it has been reviewed.    We will be checking your implanted device from home (remotely) every three months unless otherwise instructed. We will need to see you in the clinic at least once a year. You may need to be seen in the clinic sooner depending on the results of your check.    Please be aware:    The follow-up schedule is like a Physician prescription.    Your remote monitor is paired to your specific implanted device.      Sincerely,    [facility]

## 2021-06-21 NOTE — LETTER
Letter by Mary Patterson at      Author: Mary Patterson Service: -- Author Type: --    Filed:  Encounter Date: 2/8/2021 Status: (Other)         Dereck Elliott  [address] [facility] [address]      February 9, 2021      Dear Mr. Elliott,    RE: Remote Results    We are writing to you regarding your recent Remote Pacemaker check from home. Your transmission was received successfully. Battery status is satisfactory at this time.     Your results are being reviewed.  You will be contacted if further information is necessary.     Your next device appointment will be a remote check on May 11, 2021; this will occur automatically.    To schedule or reschedule, please call [phone] and press 1.    NOTE: If you would like to do an extra transmission, please call [phone] and press 3 to speak to a nurse BEFORE transmitting. This ensures that the Device Clinic staff is aware of the reason you are sending a transmission, and can follow-up with you after it has been reviewed.    We will be checking your implanted device from home (remotely) every three months unless otherwise instructed. We will need to see you in the clinic at least once a year. You may need to be seen in the clinic sooner depending on the results of your check.    Please be aware:    The follow-up schedule is like a Physician prescription.    Your remote monitor is paired to your specific implanted device.      Sincerely,    [facility]

## 2021-06-21 NOTE — LETTER
Letter by Donald Machado MD at      Author: Donald Machado MD Service: -- Author Type: --    Filed:  Encounter Date: 1/20/2021 Status: (Other)         Dereck Elliott  [address] [place] [address]             January 20, 2021         Dear Mr. Elliott,    Below are the results from your recent visit:    Resulted Orders   HM2(CBC w/o Differential)   Result Value Ref Range    WBC 6.0 4.0 - 11.0 thou/uL    RBC 4.25 (L) 4.40 - 6.20 mill/uL    Hemoglobin 14.1 14.0 - 18.0 g/dL    Hematocrit 42.6 40.0 - 54.0 %     80 - 100 fL    MCH 33.2 27.0 - 34.0 pg    MCHC 33.1 32.0 - 36.0 g/dL    RDW 12.9 11.0 - 14.5 %    Platelets 214 140 - 440 thou/uL    MPV 7.7 7.0 - 10.0 fL   Basic Metabolic Panel   Result Value Ref Range    Sodium 144 136 - 145 mmol/L    Potassium 5.0 3.5 - 5.0 mmol/L    Chloride 106 98 - 107 mmol/L    CO2 27 22 - 31 mmol/L    Anion Gap, Calculation 11 5 - 18 mmol/L    Glucose 95 70 - 125 mg/dL    Calcium 10.2 8.5 - 10.5 mg/dL    BUN 27 8 - 28 mg/dL    Creatinine 2.02 (H) 0.70 - 1.30 mg/dL    GFR MDRD Af Amer 39 (L) >60 mL/min/1.73m2    GFR MDRD Non Af Amer 32 (L) >60 mL/min/1.73m2    Narrative    Fasting Glucose reference range is 70-99 mg/dL per  American Diabetes Association (ADA) guidelines.   Vitamin D, Total (25-Hydroxy)   Result Value Ref Range    Vitamin D, Total (25-Hydroxy) 46.8 30.0 - 80.0 ng/mL    Narrative    Deficiency <10.0 ng/mL  Insufficiency 10.0-29.9 ng/mL  Sufficiency 30.0-80.0 ng/mL  Toxicity (possible) >100.0 ng/mL       Your complete blood count results were normal.  No evidence for anemia, etc.     Your kidney function remains significantly reduced.  It appears stable.  We will continue to follow closely.     Your vitamin D level is normal.  Continue your current management.     Please call with questions or contact us using [employer].    Sincerely,        Electronically signed by Donald Machado MD

## 2021-06-23 NOTE — TELEPHONE ENCOUNTER
Called patient and spoke with his wife as she explained that he has difficulty hearing on that phone.   I shared that the message from Dr. Machado. No further questions or concerns from patient or his wife.   Cherelle Long RN

## 2021-06-23 NOTE — TELEPHONE ENCOUNTER
----- Message from Donald Machado MD sent at 1/18/2019  3:58 PM CST -----  Please notify patient that his echocardiogram result appears fine.  Improvement in heart function.  Otherwise, mild abnormalities only.  No significant concern.  We will review in more detail at his follow-up office visit.   “Patient's name, , procedure and correct site were confirmed during the [facility].”
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
## 2021-06-27 NOTE — PROGRESS NOTES
Progress Notes by Mervin Moeller MD at 7/26/2019  3:20 PM     Author: Mervin Moeller MD Service: -- Author Type: Physician    Filed: 7/26/2019  4:05 PM Encounter Date: 7/26/2019 Status: Signed    : Mervin Moeller MD (Physician)           Click to link to [facility] Consultation Note    Thank you, Dr. Donald Machado, for advising Dereck Elliott to meet with me in consultation [facility] to evaluate and treat bradycardia.     Assessment:    1. Bradycardia     2. RBBB (right bundle branch block)     3. Permanent atrial fibrillation (H)     4. Essential hypertension with goal blood pressure less than 140/90  amLODIPine (NORVASC) 2.5 MG tablet       Plan:    1. We will arrange implantation of a permanent cardiac pacemaker as soon as possible; Dr. Dean's nurse clinician is meeting with the patient and his wife at this time to finalize those arrangements.  I advised him not to drive a motor vehicle, climb ladders, or operate dangerous machinery until he has received a pacemaker implant.  2. I agree with the advice of Sumaya Dean and Carter to hold metoprolol; we will start amlodipine 2.5 mg orally daily in the interim to moderate his hypertension.  3. His CHADS2 VASC score is 3 for age and hypertension and he would merit switching from aspirin to warfarin or a direct oral anticoagulant after the pacemaker has been implanted.    An After Visit Summary was printed and given to the patient.    Current History:    I met with Dereck and his wife, Shauna, in urgent consultation this afternoon as requested by Dr. Donald Machado.  Dereck has had unexplained dizzy spells for about a month.  He wore a Holter monitor last week and was found to have episodes of cardiac asystole of up to 6 seconds; during 1 of these episodes he had his typical dizziness or lightheadedness.  His metoprolol was stopped after that and  now he is experiencing some nocturnal tachypalpitations.  He has not had syncope, chest discomfort, unusual shortness of breath, orthopnea, or lower extremity edema.    Dereck goes to the gym approximately 5 days a week and does light weightlifting and rides a stationary bicycle for up to 40 minutes.  He enjoys playing 9 holes of golf.  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    Patient Active Problem List   Diagnosis   ? Obstructive sleep apnea   ? Overweight (BMI 25.0-29.9)   ? Chronic kidney disease (CKD), stage III (moderate) (H)   ? Essential hypertension with goal blood pressure less than 140/90   ? Hyperkalemia   ? Permanent atrial fibrillation (H)   ? RBBB (right bundle branch block)       Past Medical History:  Past Medical History:   Diagnosis Date   ? Cataract    ? Essential hypertension    ? History of rheumatic fever 4/25/2017   ? Onychomycosis 10/27/2017   ? Permanent atrial fibrillation (H)    ? Vitamin D deficiency disease 10/6/2015       Past Surgical History:  Past Surgical History:   Procedure Laterality Date   ? CATARACT EXTRACTION Left    ? COLON SURGERY     ? TOTAL KNEE ARTHROPLASTY Left        Family History:  Family History   Problem Relation Age of Onset   ? Valvular heart disease Mother         care at [place]   ? Colon cancer Father    ? No Medical Problems Daughter    ? No Medical Problems Daughter        Social History:   reports that he quit smoking about 3 years ago. His smoking use included cigars. He has never used smokeless tobacco. He reports that he does not drink alcohol or use drugs.    Medications:  Outpatient Encounter Medications as of 7/26/2019   Medication Sig Dispense Refill   ? aspirin 81 MG EC tablet Take 81 mg by mouth daily.     ? cholecalciferol, vitamin D3, (CHOLECALCIFEROL) 1,000 unit tablet Take 2,000 Units by mouth daily.     ? amLODIPine (NORVASC) 2.5 MG tablet Take 1 tablet (2.5 mg total) by mouth daily. 30 tablet 11   ?  "[DISCONTINUED] amoxicillin (AMOXIL) 500 MG capsule take 4 capsules (2 grams) 60 minutes prior to dental procedure 4 capsule 5   ? [DISCONTINUED] guaiFENesin ER (MUCINEX) 600 mg 12 hr tablet Take 1,200 mg by mouth 2 (two) times a day. Pt takes a 1/2 tab once a day     ? [DISCONTINUED] metoprolol succinate (TOPROL-XL) 100 MG 24 hr tablet Take 100 mg by mouth daily. Pt taking 1 1/2 tabs       No facility-administered encounter medications on file as of 7/26/2019.        Allergies:  Patient has no known allergies.    Review of Systems:     General: WNL     Ears/Nose/Throat: Hearing Loss  Lungs: WNL  Heart: Shortness of Breath with activity, Irregular Heartbeat  Stomach: WNL  Bladder: WNL  Muscle/Joints: Joint Pain  Skin: WNL  Nervous System: Dizziness  Mental Health: WNL     Blood: Easy Bruising    Objective:    Wt Readings from Last 5 Encounters:   07/26/19 210 lb (95.3 kg)   07/23/19 208 lb (94.3 kg)   07/16/19 210 lb (95.3 kg)   01/18/19 205 lb (93 kg)   01/11/19 205 lb (93 kg)      5' 10.75\" (1.797 m)  Body mass index is 29.5 kg/m .  BP (!) 200/100 (Patient Site: Left Arm, Patient Position: Sitting, Cuff Size: Adult Regular)   Pulse 60   Resp 18   Ht 5' 10.75\" (1.797 m)   Wt 210 lb (95.3 kg)   BMI 29.50 kg/m       Physical Exam:    General Appearance: Alert and not in distress   HEENT: No scleral icterus; the tongue is midline and the mucous membranes are pink and moist   Neck: No cervical bruits, adenopathy, or thyromegaly; jugular venous pressure is less than 5 cm   Chest: The spine is straight and the chest is symmetric   Lungs: Respirations are unlabored; the lungs are clear to auscultation   Cardiovasular: Auscultation reveals and irregular rhythm with normal first and second heart sounds and a grade 2/6 holosystolic murmur at the apex; the carotid, radial, femoral, and posterior tibial pulses are intact   Abdomen: No organomegaly, masses, bruits, or tenderness; bowel sounds are present   Extremities: No " cyanosis, clubbing or edema   Skin: No xanthelasma   Neurologic: Mood and affect are appropriate       Cardiac testing:    EKG: Atrial fibrillation with a slow ventricular response of 43 bpm and right bundle branch block per my personal review.    HOLTER MONITOR     IMPRESSION:  1. A 24-hour Holter monitor was applied 07/19/2019 with date of interpretation  07/22/2019.  2. Patient is in atrial fibrillation with right bundle branch block.  3. Episodes of profound bradycardia are seen.  The average heart rate is 53 beats  per minute and we see pauses of nearly 6 seconds at 3:35 a.m. but during waking  hours, there are multiple pauses in excess of 4 to 5 seconds.     At 5:29 a.m. he had about a 6 second pause and felt lightheaded.  No other symptoms  were noted.     DISCUSSION:  1. Infrequent noncomplex ventricular ectopy.  2. Episodes of profound bradycardia and asystole.  The patient will be informed and  medications will be reviewed and the patient will be assessed for need for  pacemaker.     JAYME CARBAJAL MD    Echocardiogram:   Results for orders placed during the hospital encounter of 01/18/19   Echo Complete [ECH10] 01/18/2019    Narrative   Left ventricle ejection fraction is normal. The calculated left   ventricular ejection fraction is 65%.    Normal left ventricular size.    Left atrial volume is moderately increased.    Normal right ventricular size and systolic function.    Estimated central venous pressure equal to 13 mmHg.    The aortic valve is sclerotic without reduced excursion.    Mild to moderate mitral regurgitation.    No previous study for comparison.           Imaging:    No results found.    Lab Review:    Lab Results   Component Value Date     07/23/2019    K 5.3 (H) 07/23/2019     (H) 07/23/2019    CO2 26 07/23/2019    BUN 28 07/23/2019    CREATININE 1.87 (H) 07/23/2019    CALCIUM 10.4 07/23/2019     Lab Results   Component Value Date    WBC 5.8 07/23/2019    HGB 14.0  07/23/2019    HCT 41.6 07/23/2019     (H) 07/23/2019     07/23/2019     Lab Results   Component Value Date    CHOL 161 07/16/2019    TRIG 138 07/16/2019    HDL 41 07/16/2019     LDL Calculated (mg/dL)   Date Value   07/16/2019 92   07/06/2018 98   04/25/2017 119     BNP (pg/mL)   Date Value   07/16/2019 288 (H)   01/11/2019 194 (H)   07/11/2018 187 (H)           Much or all of the text in this note was generated through the use of the Dragon Dictate voice-to-text software. Errors in spelling or words which seem out of context are unintentional. Sound alike errors, in particular, may have escaped editing.

## 2021-06-27 NOTE — PROGRESS NOTES
Progress Notes by Emeka Dean MD at 7/26/2019  4:35 PM     Author: Emeka Dean MD Service: -- Author Type: Physician    Filed: 7/29/2019  3:20 PM Encounter Date: 7/26/2019 Status: Signed    : Emeka Dean MD (Physician)       Rona Sanchez, Emeka Daugherty MD Dr. Granrud,   PEDRO pt with Moeller today...   Can you let me know which device company you want and which kind of pacemaker?   Pt can go Thursday 8-1-19.   Thank you!!   Rona     Schedule for single chamber PM  [employer]   No His

## 2021-06-27 NOTE — PROGRESS NOTES
Progress Notes by Peyton Du RN at 8/2/2019 12:50 PM     Author: Peyton Du RN Service: -- Author Type: Registered Nurse    Filed: 8/2/2019  1:05 PM Encounter Date: 8/2/2019 Status: Signed    : Peyton Du RN (Registered Nurse)       Pt comes into clinic today for a wound check on a small hematoma post device implant. Wife removed the pressure dressing this am. The incision looks good, with mild swelling over the device site. There is no active bleeding noted. Discussed what to monitor for, wound care, and follow up plan. Advised when to contact the clinic. Pt and wife verbalized understanding.             Peyton Du RN BSN PHN  Device Nurse   [facility]

## 2021-06-29 NOTE — PROGRESS NOTES
Progress Notes by Mervin Moeller MD at 10/29/2020  4:30 PM     Author: Mervin Moeller MD Service: -- Author Type: Physician    Filed: 10/29/2020  5:20 PM Encounter Date: 10/29/2020 Status: Signed    : Mervin Moeller MD (Physician)             Assessment:    1. Permanent atrial fibrillation (H)         Plan:    1. We will arrange consultation in the atrial fibrillation clinic to discuss the left atrial appendage occlusion device.  In the meantime, I recommend that he discontinue aspirin and go on warfarin under the supervision of his personal physician so as to lower his risk of cardioembolism and stroke.    An After Visit Summary was printed and given to the patient.    Subjective:    Dereck Elliott returned for a planned follow up visit.    Today is the first time that Danielle's has met with me since I saw him in urgent consultation in July 2019 at the request of Dr. Machado which time I arranged for urgent implantation of a permanent cardiac pacemaker.  Since that time Dereck has been monitored closely in the device clinic.  Based on my review of his device checks today, he has permanent atrial fibrillation and apparently this diagnosis goes back to at least 2017.  In fact, Dr. Dean recommended last year that he go on an anticoagulant to lower his risk of stroke once his pocket hematoma resolved but Dereck was never was put on an anticoagulant; he is still taking 81 mg of aspirin daily.    Dereck does not recall any advice about taking anticoagulation.  He states he would like to be on as few medications as possible.  He has chronic kidney disease and his most recent creatinine of 2.0 would make it more problematic for him to be on a direct oral anticoagulant.  Therefore, I recommended that he consider being evaluated for the watchman left atrial appendage occlusion device and he agreed to meet with our atrial fibrillation team to learn more about this.    He states he feels well and is active  without undue shortness of breath, chest discomfort, syncope, orthopnea, or lower extremity edema.    Past Medical History:    Patient Active Problem List   Diagnosis   ? Obstructive sleep apnea   ? Overweight (BMI 25.0-29.9)   ? CKD (chronic kidney disease) stage 3, GFR 30-59 ml/min (H)   ? Essential hypertension   ? Permanent atrial fibrillation (H)   ? RBBB (right bundle branch block)   ? Cardiac pacemaker in situ   ? Abdominal aortic aneurysm (AAA) without rupture (H)   ? Aneurysm of common iliac artery (H)       Past Medical History:   Diagnosis Date   ? Cataract    ? Chronic kidney disease (CKD), stage III (moderate)    ? Essential hypertension    ? History of rheumatic fever 04/25/2017   ? Onychomycosis 10/27/2017   ? Permanent atrial fibrillation (H) 02/03/2017   ? SSS (sick sinus syndrome) (H) 07/29/2019   ? Vitamin D deficiency disease 10/06/2015       Past Surgical History:   Procedure Laterality Date   ? CATARACT EXTRACTION Left    ? COLON SURGERY     ? EP PACEMAKER INSERT N/A 8/1/2019    EP Pacemaker Insertion; Emeka Dean MD; [facility];  Service: Cardiology   ? TOTAL KNEE ARTHROPLASTY Left         reports that he quit smoking about 4 years ago. His smoking use included cigars. He smoked 0.00 packs per day. He has never used smokeless tobacco. He reports current alcohol use of about 1.0 standard drinks of alcohol per week. He reports that he does not use drugs.    Family History   Problem Relation Age of Onset   ? Valvular heart disease Mother         care at [place]   ? Colon cancer Father    ? No Medical Problems Daughter    ? No Medical Problems Daughter        Outpatient Encounter Medications as of 10/29/2020   Medication Sig Dispense Refill   ? acetaminophen (TYLENOL) 500 MG tablet Take 500-1,000 mg by mouth every 6 (six) hours as needed for pain.     ? amLODIPine (NORVASC) 2.5 MG tablet Take 1 tablet (2.5 mg total) by mouth daily. 90 tablet 3   ? aspirin 81 MG EC tablet Take 81 mg  "by mouth daily.     ? cholecalciferol, vitamin D3, (CHOLECALCIFEROL) 1,000 unit tablet Take 2,000 Units by mouth daily.     ? cyanocobalamin 100 MCG tablet Take 100 mcg by mouth daily.     ? lidocaine 4 % patch Place 1 patch on the skin daily. Remove and discard patch with 12 hours or as directed by MD. 5 patch 0   ? melatonin 3 mg Tab tablet Take 3 mg by mouth at bedtime as needed.     ? metoprolol succinate (TOPROL-XL) 50 MG 24 hr tablet Take 1 tablet (50 mg total) by mouth daily. 90 tablet 3     No facility-administered encounter medications on file as of 10/29/2020.        Review of Systems:     General: WNL  Eyes: WNL  Ears/Nose/Throat: Nosebleeds(Due to a bone spur in nasal passages.)  Lungs: Shortness of Breath  Heart: Irregular Heartbeat  Stomach: WNL  Bladder: WNL  Muscle/Joints: WNL  Skin: WNL  Nervous System: WNL  Mental Health: WNL     Blood: Easy Bruising    Objective:     /80 (Patient Site: Right Arm, Patient Position: Sitting, Cuff Size: Adult Regular)   Pulse 67   Resp 14   Ht 5' 10.5\" (1.791 m)   Wt 207 lb (93.9 kg) Comment: With shoes.  SpO2 97%   BMI 29.28 kg/m    Wt Readings from Last 5 Encounters:   10/29/20 207 lb (93.9 kg)   07/17/20 209 lb (94.8 kg)   01/17/20 207 lb (93.9 kg)   11/27/19 207 lb 9.6 oz (94.2 kg)   11/13/19 214 lb 6.4 oz (97.3 kg)        Physical Exam:    GENERAL APPEARANCE: alert, no apparent distress  EYES: no scleral icterus  NECK: no carotid bruits or adenopathy, jugular venous pressure is less than 5 cm  CHEST: symmetric, the lungs are clear to auscultation  CARDIOVASCULAR: regular rhythm without murmur or gallop  EXTREMITIES: no cyanosis, clubbing or edema  SKIN: no xanthelasma  NEUROLOGIC: normal gait and coordination  PSYCHIATRIC: mood and affect are normal    Cardiac Testing:    Echocardiogram:   Results for orders placed during the hospital encounter of 01/18/19   Echo Complete [ECH10] 01/18/2019    Narrative   Left ventricle ejection fraction is normal. " The calculated left   ventricular ejection fraction is 65%.    Normal left ventricular size.    Left atrial volume is moderately increased.    Normal right ventricular size and systolic function.    Estimated central venous pressure equal to 13 mmHg.    The aortic valve is sclerotic without reduced excursion.    Mild to moderate mitral regurgitation.    No previous study for comparison.          Imaging:    No results found.    Lab Results:    Lab Results   Component Value Date    CREATININE 2.00 (H) 07/17/2020    BUN 21 07/17/2020     07/17/2020    K 4.7 07/17/2020     07/17/2020    CO2 28 07/17/2020     Lab Results   Component Value Date    CHOL 193 07/17/2020    TRIG 140 07/17/2020    HDL 40 07/17/2020     BNP (pg/mL)   Date Value   07/16/2019 288 (H)   01/11/2019 194 (H)   07/11/2018 187 (H)     Creatinine (mg/dL)   Date Value   07/17/2020 2.00 (H)   01/17/2020 1.88 (H)   11/11/2019 1.78 (H)   10/15/2019 1.76 (H)     Magnesium (mg/dL)   Date Value   11/11/2019 2.0   07/23/2019 2.2   04/22/2016 2.6     LDL Calculated (mg/dL)   Date Value   07/17/2020 125   07/16/2019 92   07/06/2018 98     Lab Results   Component Value Date    WBC 6.5 07/17/2020    HGB 14.1 07/17/2020    HCT 41.8 07/17/2020     (H) 07/17/2020     07/17/2020           Much or all of the text in this note was generated through the use of the Dragon Dictate voice-to-text software. Errors in spelling or words which seem out of context are unintentional. Sound alike errors, in particular, may have escaped editing.

## 2021-07-03 NOTE — ADDENDUM NOTE
Addendum Note by Andres Jimenez MD at 2/14/2017  6:03 PM     Author: Andres Jimenez MD Service: -- Author Type: Physician    Filed: 2/14/2017  6:03 PM Encounter Date: 2/13/2017 Status: Signed    : Andres Jimenez MD (Physician)    Addended by: ANDRES JIMENEZ on: 2/14/2017 06:03 PM        Modules accepted: Orders

## 2021-07-14 PROBLEM — N18.30 STAGE 3 CHRONIC KIDNEY DISEASE (H): Status: RESOLVED | Noted: 2017-04-25 | Resolved: 2019-07-26

## 2021-07-14 PROBLEM — I49.5 SSS (SICK SINUS SYNDROME) (H): Status: RESOLVED | Noted: 2019-07-29 | Resolved: 2020-10-29

## 2021-08-19 ENCOUNTER — ANCILLARY PROCEDURE (OUTPATIENT)
Dept: CARDIOLOGY | Facility: CLINIC | Age: 81
End: 2021-08-19
Attending: INTERNAL MEDICINE
Payer: COMMERCIAL

## 2021-08-19 DIAGNOSIS — Z95.0 CARDIAC PACEMAKER IN SITU: ICD-10-CM

## 2021-08-20 LAB
MDC_IDC_EPISODE_DTM: NORMAL
MDC_IDC_EPISODE_DURATION: 1 S
MDC_IDC_EPISODE_ID: 13
MDC_IDC_EPISODE_TYPE: NORMAL
MDC_IDC_LEAD_IMPLANT_DT: NORMAL
MDC_IDC_LEAD_LOCATION: NORMAL
MDC_IDC_LEAD_LOCATION_DETAIL_1: NORMAL
MDC_IDC_LEAD_MFG: NORMAL
MDC_IDC_LEAD_MODEL: NORMAL
MDC_IDC_LEAD_POLARITY_TYPE: NORMAL
MDC_IDC_LEAD_SERIAL: NORMAL
MDC_IDC_LEAD_SPECIAL_FUNCTION: NORMAL
MDC_IDC_MSMT_BATTERY_DTM: NORMAL
MDC_IDC_MSMT_BATTERY_REMAINING_LONGEVITY: 152 MO
MDC_IDC_MSMT_BATTERY_RRT_TRIGGER: 2.62
MDC_IDC_MSMT_BATTERY_STATUS: NORMAL
MDC_IDC_MSMT_BATTERY_VOLTAGE: 3.02 V
MDC_IDC_MSMT_LEADCHNL_RV_IMPEDANCE_VALUE: 399 OHM
MDC_IDC_MSMT_LEADCHNL_RV_IMPEDANCE_VALUE: 494 OHM
MDC_IDC_MSMT_LEADCHNL_RV_PACING_THRESHOLD_AMPLITUDE: 0.5 V
MDC_IDC_MSMT_LEADCHNL_RV_PACING_THRESHOLD_PULSEWIDTH: 0.4 MS
MDC_IDC_MSMT_LEADCHNL_RV_SENSING_INTR_AMPL: 12.88 MV
MDC_IDC_MSMT_LEADCHNL_RV_SENSING_INTR_AMPL: 12.88 MV
MDC_IDC_PG_IMPLANT_DTM: NORMAL
MDC_IDC_PG_MFG: NORMAL
MDC_IDC_PG_MODEL: NORMAL
MDC_IDC_PG_SERIAL: NORMAL
MDC_IDC_PG_TYPE: NORMAL
MDC_IDC_SESS_CLINIC_NAME: NORMAL
MDC_IDC_SESS_DTM: NORMAL
MDC_IDC_SESS_TYPE: NORMAL
MDC_IDC_SET_BRADY_HYSTRATE: NORMAL
MDC_IDC_SET_BRADY_LOWRATE: 60 {BEATS}/MIN
MDC_IDC_SET_BRADY_MAX_SENSOR_RATE: 130 {BEATS}/MIN
MDC_IDC_SET_BRADY_MODE: NORMAL
MDC_IDC_SET_LEADCHNL_RV_PACING_AMPLITUDE: 1.5 V
MDC_IDC_SET_LEADCHNL_RV_PACING_ANODE_ELECTRODE_1: NORMAL
MDC_IDC_SET_LEADCHNL_RV_PACING_ANODE_LOCATION_1: NORMAL
MDC_IDC_SET_LEADCHNL_RV_PACING_CAPTURE_MODE: NORMAL
MDC_IDC_SET_LEADCHNL_RV_PACING_CATHODE_ELECTRODE_1: NORMAL
MDC_IDC_SET_LEADCHNL_RV_PACING_CATHODE_LOCATION_1: NORMAL
MDC_IDC_SET_LEADCHNL_RV_PACING_POLARITY: NORMAL
MDC_IDC_SET_LEADCHNL_RV_PACING_PULSEWIDTH: 0.4 MS
MDC_IDC_SET_LEADCHNL_RV_SENSING_ANODE_ELECTRODE_1: NORMAL
MDC_IDC_SET_LEADCHNL_RV_SENSING_ANODE_LOCATION_1: NORMAL
MDC_IDC_SET_LEADCHNL_RV_SENSING_CATHODE_ELECTRODE_1: NORMAL
MDC_IDC_SET_LEADCHNL_RV_SENSING_CATHODE_LOCATION_1: NORMAL
MDC_IDC_SET_LEADCHNL_RV_SENSING_POLARITY: NORMAL
MDC_IDC_SET_LEADCHNL_RV_SENSING_SENSITIVITY: 0.9 MV
MDC_IDC_SET_ZONE_DETECTION_INTERVAL: 360 MS
MDC_IDC_SET_ZONE_TYPE: NORMAL
MDC_IDC_STAT_BRADY_DTM_END: NORMAL
MDC_IDC_STAT_BRADY_DTM_START: NORMAL
MDC_IDC_STAT_BRADY_RV_PERCENT_PACED: 92.48 %
MDC_IDC_STAT_EPISODE_RECENT_COUNT: 0
MDC_IDC_STAT_EPISODE_RECENT_COUNT: 0
MDC_IDC_STAT_EPISODE_RECENT_COUNT: 1
MDC_IDC_STAT_EPISODE_RECENT_COUNT_DTM_END: NORMAL
MDC_IDC_STAT_EPISODE_RECENT_COUNT_DTM_START: NORMAL
MDC_IDC_STAT_EPISODE_TOTAL_COUNT: 0
MDC_IDC_STAT_EPISODE_TOTAL_COUNT: 0
MDC_IDC_STAT_EPISODE_TOTAL_COUNT: 13
MDC_IDC_STAT_EPISODE_TOTAL_COUNT_DTM_END: NORMAL
MDC_IDC_STAT_EPISODE_TOTAL_COUNT_DTM_START: NORMAL
MDC_IDC_STAT_EPISODE_TYPE: NORMAL

## 2021-08-24 ENCOUNTER — OFFICE VISIT (OUTPATIENT)
Dept: FAMILY MEDICINE | Facility: CLINIC | Age: 81
End: 2021-08-24
Payer: COMMERCIAL

## 2021-08-24 VITALS
DIASTOLIC BLOOD PRESSURE: 86 MMHG | WEIGHT: 197 LBS | HEIGHT: 71 IN | OXYGEN SATURATION: 98 % | HEART RATE: 86 BPM | BODY MASS INDEX: 27.58 KG/M2 | SYSTOLIC BLOOD PRESSURE: 142 MMHG

## 2021-08-24 DIAGNOSIS — Z00.01 ENCOUNTER FOR GENERAL ADULT MEDICAL EXAMINATION WITH ABNORMAL FINDINGS: Primary | ICD-10-CM

## 2021-08-24 DIAGNOSIS — H90.3 BILATERAL SENSORINEURAL HEARING LOSS: ICD-10-CM

## 2021-08-24 DIAGNOSIS — I71.40 ABDOMINAL AORTIC ANEURYSM (AAA) WITHOUT RUPTURE (H): ICD-10-CM

## 2021-08-24 DIAGNOSIS — I48.21 PERMANENT ATRIAL FIBRILLATION (H): ICD-10-CM

## 2021-08-24 DIAGNOSIS — Z95.0 CARDIAC PACEMAKER IN SITU: ICD-10-CM

## 2021-08-24 DIAGNOSIS — I10 ESSENTIAL HYPERTENSION: ICD-10-CM

## 2021-08-24 DIAGNOSIS — N18.32 STAGE 3B CHRONIC KIDNEY DISEASE (H): ICD-10-CM

## 2021-08-24 DIAGNOSIS — E55.9 VITAMIN D DEFICIENCY DISEASE: ICD-10-CM

## 2021-08-24 LAB
ANION GAP SERPL CALCULATED.3IONS-SCNC: 12 MMOL/L (ref 5–18)
BUN SERPL-MCNC: 24 MG/DL (ref 8–28)
CALCIUM SERPL-MCNC: 10.3 MG/DL (ref 8.5–10.5)
CHLORIDE BLD-SCNC: 105 MMOL/L (ref 98–107)
CHOLEST SERPL-MCNC: 166 MG/DL
CO2 SERPL-SCNC: 26 MMOL/L (ref 22–31)
CREAT SERPL-MCNC: 1.92 MG/DL (ref 0.7–1.3)
ERYTHROCYTE [DISTWIDTH] IN BLOOD BY AUTOMATED COUNT: 13.1 % (ref 10–15)
FASTING STATUS PATIENT QL REPORTED: YES
GFR SERPL CREATININE-BSD FRML MDRD: 31 ML/MIN/1.73M2
GLUCOSE BLD-MCNC: 93 MG/DL (ref 70–125)
HCT VFR BLD AUTO: 39.6 % (ref 40–53)
HDLC SERPL-MCNC: 38 MG/DL
HGB BLD-MCNC: 13 G/DL (ref 13.3–17.7)
LDLC SERPL CALC-MCNC: 104 MG/DL
MCH RBC QN AUTO: 33.6 PG (ref 26.5–33)
MCHC RBC AUTO-ENTMCNC: 32.8 G/DL (ref 31.5–36.5)
MCV RBC AUTO: 102 FL (ref 78–100)
PLATELET # BLD AUTO: 192 10E3/UL (ref 150–450)
POTASSIUM BLD-SCNC: 4.8 MMOL/L (ref 3.5–5)
RBC # BLD AUTO: 3.87 10E6/UL (ref 4.4–5.9)
SODIUM SERPL-SCNC: 143 MMOL/L (ref 136–145)
TRIGL SERPL-MCNC: 121 MG/DL
WBC # BLD AUTO: 6.5 10E3/UL (ref 4–11)

## 2021-08-24 PROCEDURE — 85027 COMPLETE CBC AUTOMATED: CPT | Performed by: FAMILY MEDICINE

## 2021-08-24 PROCEDURE — 80048 BASIC METABOLIC PNL TOTAL CA: CPT | Performed by: FAMILY MEDICINE

## 2021-08-24 PROCEDURE — 99214 OFFICE O/P EST MOD 30 MIN: CPT | Mod: 25 | Performed by: FAMILY MEDICINE

## 2021-08-24 PROCEDURE — 80061 LIPID PANEL: CPT | Performed by: FAMILY MEDICINE

## 2021-08-24 PROCEDURE — 36415 COLL VENOUS BLD VENIPUNCTURE: CPT | Performed by: FAMILY MEDICINE

## 2021-08-24 PROCEDURE — 99397 PER PM REEVAL EST PAT 65+ YR: CPT | Performed by: FAMILY MEDICINE

## 2021-08-24 ASSESSMENT — ACTIVITIES OF DAILY LIVING (ADL): CURRENT_FUNCTION: NO ASSISTANCE NEEDED

## 2021-08-24 ASSESSMENT — MIFFLIN-ST. JEOR: SCORE: 1602.78
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
## 2021-08-24 NOTE — PATIENT INSTRUCTIONS
Patient Education   Personalized Prevention Plan  You are due for the preventive services outlined below.  Your care team is available to assist you in scheduling these services.  If you have already completed any of these items, please share that information with your care team to update in your medical record.  Health Maintenance Due   Topic Date Due     ANNUAL REVIEW OF  ORDERS  Never done     Zoster (Shingles) Vaccine (1 of 2) Never done     FALL RISK ASSESSMENT  07/17/2021       Signs of Hearing Loss      Hearing much better with one ear can be a sign of hearing loss.   Hearing loss is a problem shared by many people. In fact, it is one of the most common health problems, particularly as people age. Most people age 65 and older have some hearing loss. By age 80, almost everyone does. Hearing loss often occurs slowly over the years. So you may not realize your hearing has gotten worse.  Have your hearing checked  Call your healthcare provider if you:    Have to strain to hear normal conversation    Have to watch other people s faces very carefully to follow what they re saying    Need to ask people to repeat what they ve said    Often misunderstand what people are saying    Turn the volume of the television or radio up so high that others complain    Feel that people are mumbling when they re talking to you    Find that the effort to hear leaves you feeling tired and irritated    Notice, when using the phone, that you hear better with one ear than the other  [employer] last reviewed this educational content on 1/1/2020 2000-2021 [employer]. All rights reserved. This information is not intended as a substitute for professional medical care. Always follow your healthcare professional's instructions.

## 2021-08-24 NOTE — LETTER
August 24, 2021      Dereck Elliott  [id number] [address]        Dear ,    We are writing to inform you of your test results.    Your complete blood count results show mild anemia.  It remains stable.  Likely associated with decreased kidney function as well.    Your kidney function remains reduced.  It appears stable.  We will continue to follow closely.     Your cholesterol results remain mildly abnormal.  Ensure regular exercise, healthy diet, and weight loss modifications in order to further improve.  Weight goal < 195 pounds initially, < 190 pounds ideally.  We will plan to recheck your labs while fasting in the next 6-12 months to ensure desired improvement.       Resulted Orders   CBC with platelets   Result Value Ref Range    WBC Count 6.5 4.0 - 11.0 10e3/uL    RBC Count 3.87 (L) 4.40 - 5.90 10e6/uL    Hemoglobin 13.0 (L) 13.3 - 17.7 g/dL    Hematocrit 39.6 (L) 40.0 - 53.0 %     (H) 78 - 100 fL    MCH 33.6 (H) 26.5 - 33.0 pg    MCHC 32.8 31.5 - 36.5 g/dL    RDW 13.1 10.0 - 15.0 %    Platelet Count 192 150 - 450 10e3/uL   Basic metabolic panel   Result Value Ref Range    Sodium 143 136 - 145 mmol/L    Potassium 4.8 3.5 - 5.0 mmol/L    Chloride 105 98 - 107 mmol/L    Carbon Dioxide (CO2) 26 22 - 31 mmol/L    Anion Gap 12 5 - 18 mmol/L    Urea Nitrogen 24 8 - 28 mg/dL    Creatinine 1.92 (H) 0.70 - 1.30 mg/dL    Calcium 10.3 8.5 - 10.5 mg/dL    Glucose 93 70 - 125 mg/dL    GFR Estimate 31 (L) >60 mL/min/1.73m2      Comment:      As of July 11, 2021, eGFR is calculated by the CKD-EPI creatinine equation, without race adjustment. eGFR can be influenced by muscle mass, exercise, and diet. The reported eGFR is an estimation only and is only applicable if the renal function is stable.   Lipid panel reflex to direct LDL Fasting   Result Value Ref Range    Cholesterol 166 <=199 mg/dL    Triglycerides 121 <=149 mg/dL    Direct Measure HDL 38 (L) >=40 mg/dL      Comment:      HDL  Cholesterol Reference Range:     0-2 years:   No reference ranges established for patients under 2 years old  at [facility] for lipid analytes.    2-8 years:  Greater than 45 mg/dL     18 years and older:   Female: Greater than or equal to 50 mg/dL   Male:   Greater than or equal to 40 mg/dL    LDL Cholesterol Calculated 104 <=129 mg/dL    Patient Fasting > 8hrs? Yes        If you have any questions or concerns, please call the clinic at the number listed above.       Sincerely,      Donald Machado MD

## 2021-08-27 ENCOUNTER — TELEPHONE (OUTPATIENT)
Dept: FAMILY MEDICINE | Facility: CLINIC | Age: 81
End: 2021-08-27

## 2021-08-27 ENCOUNTER — HOSPITAL ENCOUNTER (OUTPATIENT)
Dept: ULTRASOUND IMAGING | Facility: HOSPITAL | Age: 81
Discharge: HOME | End: 2021-08-27
Attending: FAMILY MEDICINE | Admitting: FAMILY MEDICINE
Payer: COMMERCIAL

## 2021-08-27 DIAGNOSIS — I71.40 ABDOMINAL AORTIC ANEURYSM (AAA) WITHOUT RUPTURE (H): ICD-10-CM

## 2021-08-27 PROCEDURE — 76775 US EXAM ABDO BACK WALL LIM: CPT

## 2021-08-27 NOTE — TELEPHONE ENCOUNTER
Reason for Call: [facility] would like to talk to providers. About patients test today    Detailed comments: please call back asap.    Phone Number Patient can be reached at: Other phone number:  [phone]    Best Time: asap    Can we leave a detailed message on this number? YES    Call taken on 8/27/2021 at 8:21 AM by Summer Murray

## 2021-09-28 ENCOUNTER — TELEPHONE (OUTPATIENT)
Dept: CARDIOLOGY | Facility: CLINIC | Age: 81
End: 2021-09-28

## 2021-09-28 ENCOUNTER — OFFICE VISIT (OUTPATIENT)
Dept: CARDIOLOGY | Facility: CLINIC | Age: 81
End: 2021-09-28
Payer: COMMERCIAL

## 2021-09-28 VITALS
WEIGHT: 201 LBS | SYSTOLIC BLOOD PRESSURE: 146 MMHG | BODY MASS INDEX: 28.14 KG/M2 | HEIGHT: 71 IN | HEART RATE: 83 BPM | RESPIRATION RATE: 16 BRPM | DIASTOLIC BLOOD PRESSURE: 94 MMHG

## 2021-09-28 DIAGNOSIS — I10 BENIGN ESSENTIAL HYPERTENSION: ICD-10-CM

## 2021-09-28 DIAGNOSIS — I49.5 SSS (SICK SINUS SYNDROME) (H): ICD-10-CM

## 2021-09-28 DIAGNOSIS — N18.32 STAGE 3B CHRONIC KIDNEY DISEASE (H): ICD-10-CM

## 2021-09-28 DIAGNOSIS — I47.29 NSVT (NONSUSTAINED VENTRICULAR TACHYCARDIA) (H): ICD-10-CM

## 2021-09-28 DIAGNOSIS — I71.40 AAA (ABDOMINAL AORTIC ANEURYSM) WITHOUT RUPTURE (H): ICD-10-CM

## 2021-09-28 DIAGNOSIS — I48.21 PERMANENT ATRIAL FIBRILLATION (H): Primary | ICD-10-CM

## 2021-09-28 DIAGNOSIS — Z95.0 CARDIAC PACEMAKER IN SITU: ICD-10-CM

## 2021-09-28 PROCEDURE — 99214 OFFICE O/P EST MOD 30 MIN: CPT | Performed by: INTERNAL MEDICINE

## 2021-09-28 RX ORDER — LOSARTAN POTASSIUM 25 MG/1
25 TABLET ORAL DAILY
Qty: 30 TABLET | Refills: 11 | Status: SHIPPED | OUTPATIENT
Start: 2021-09-28 | End: 2021-10-20

## 2021-09-28 RX ORDER — AMOXICILLIN 500 MG/1
4 CAPSULE ORAL
Status: ON HOLD | COMMUNITY
Start: 2021-02-10 | End: 2023-02-17

## 2021-09-28 ASSESSMENT — MIFFLIN-ST. JEOR: SCORE: 1628.86

## 2021-09-28 NOTE — PATIENT INSTRUCTIONS
It was a pleasure to meet with you today.      Below is a summary of your visit.   1. Start taking losartan 25 mg once daily  2. Continue other medications without changes  3. I have placed a referral to the [facility] team to discuss left atrial appendage closure. This will reduce your risk of stroke without needing long-term blood thinners.  4. I have placed a referral to vascular surgery to discuss treatment for your abdominal aortic aneurysm.  5. Follow up with me in 1 year or sooner if needed.     Please do not hesitate to call the [facility] clinic with any questions or concerns at [phone]. You can also reach my nurse, Maday, during normal business hours at [phone].    Sincerely,

## 2021-09-28 NOTE — PROGRESS NOTES
Thank you, Donald Shaffer, for asking the [facility] team to see Mr. Dereck Elliott to evaluate Follow Up        Assessment/Recommendations   Assessment:    1. Permanent atrial fibrillation - asymptomatic. He is not on anticoagulation at this time and we discussed risks of stroke in the setting of afib. Eliquis was too expensive and he wasn't confident he would be able to maintain INR within a steady range. We discussed Watchman DOREEN closure and he is interested in a referral.  2. Sick sinus syndrome s/p placement of a single chamber Medtronic MRI compatible pacemaker. Functioning normally.  3. NSVT - 6 beats noted incidentally on device interrogation - no symptoms of angina at this time.  4. Hypertension - not optimally controlled.  5. Chronic kidney disease, stage IIIb  6. AAA - measuring 5.7 x 5.1 cm on most recent assessment, significantly increased from 2 years ago.    Plan:  1. Start losartan 25 mg daily for AAA and hypertension. He should have his creatinine and potassium checked when he sees Dr. Parikh in 2 weeks.  2. Referred to vascular surgery for his AAA >5 cm  3. Referral placed to consider watchman DOREEN closure  4. Follow up with me in 1 year or sooner if needed.    Dereck Elliott Has documented nonvalvular atrial fibrillation (NVAF) and is currently on oral anticoagulant therapy aspirin alone   [id number] -VASc = 4 (age x2, hypertension, vascular disease)   HAS-BLED risk score: 2 (age, bleeding disposition  Indication(s) to consider non-oral anticoagulation therapy: unable to afford DOAC and unable to maintain INR in therapeutic range. Has not been on full OAC as a result. He also has a prior severe injury to his RLE which causes shuffling of his gait and risk of falls.  Pt has no contra-indication to come off oral anti-coagulant therapy if LAAC device is successfully placed.     I have personally reviewed the patients chart and discussed the following with the patient/family;  1) The choices available for reducing stroke risk from atrial fibrillation, 2) Treatment options available including respective risk/benefits, and 3) What factors are most important for the patient in making their decision.  The ACC shared decision making tool https://www.cardiosmart.org/SDM/Decision-Aids/Find-Decision-Aids/Atrial-Fibrillation  was used to guide this conversation.   The patient was counseled that their decision could be made at this time or in the future if more time was needed to consider their decision.       The patient is an appropriate candidate to proceed with left atrial appendage screening and implant.                History of Present Illness   Mr. Dereck Elliott is a 81 year old male who presents for follow-up.    Mr. Elliott has a longstanding history of atrial fibrillation and was noted in July 2019 to have severe bradycardia with frequent pauses.  He was referred for urgent pacemaker placement which occurred on 8/1/2019. He has done well from the standpoint of his afib and pacemaker, however he is not on full anticoagulation due to cost of DOACs and inability to maintain therapeutic INR with warfarin.    Mr. Elliott currently feels well and does not have any acute complaints.  He exercises 5 days a week at the gym with upper body exercises.  He does not walk very much due to prior injury to his right lower extremity that limits his ability to ambulate.  He recently had an abdominal aortic ultrasound that revealed progression of an abdominal aortic aneurysm which now measures greater than 5.5 cm in maximal diameter.      Other than noted above, Mr. Elliott denies any chest pain/pressure/tightness, shortness of breath at rest or with exertion, light headedness/dizziness, pre-syncope, syncope, lower extremity swelling, palpitations, paroxysmal nocturnal dyspnea (PND), or orthopnea.     Cardiac Problems and Cardiac Diagnostics     Most Recent Cardiac testing:  ECG dated 11/13/2019 (personaly  "reviewed and interpreted): atrial fibrillation with ventricular pacing.    ECHO (report reviewed):   TTE 1/18/19    Left ventricle ejection fraction is normal. The calculated left ventricular ejection fraction is 65%.    Normal left ventricular size.    Left atrial volume is moderately increased.    Normal right ventricular size and systolic function.    Estimated central venous pressure equal to 13 mmHg.    The aortic valve is sclerotic without reduced excursion.    Mild to moderate mitral regurgitation.    No previous study for comparison.           Medications  Allergies   Current Outpatient Medications   Medication Sig Dispense Refill     acetaminophen (TYLENOL) 500 MG tablet [ACETAMINOPHEN (TYLENOL) 500 MG TABLET] Take 500-1,000 mg by mouth every 6 (six) hours as needed for pain.       amLODIPine (NORVASC) 2.5 MG tablet [AMLODIPINE (NORVASC) 2.5 MG TABLET] TAKE 1 TABLET BY MOUTH EVERY DAY 90 tablet 3     amoxicillin (AMOXIL) 500 MG capsule Take 4 capsules by mouth once as needed       aspirin 81 MG EC tablet [ASPIRIN 81 MG EC TABLET] Take 81 mg by mouth daily.       cholecalciferol, vitamin D3, (CHOLECALCIFEROL) 1,000 unit tablet [CHOLECALCIFEROL, VITAMIN D3, (CHOLECALCIFEROL) 1,000 UNIT TABLET] Take 2,000 Units by mouth daily.       cyanocobalamin 100 MCG tablet [CYANOCOBALAMIN 100 MCG TABLET] Take 100 mcg by mouth daily.       melatonin 3 mg Tab tablet [MELATONIN 3 MG TAB TABLET] Take 3 mg by mouth at bedtime as needed.       metoprolol succinate (TOPROL-XL) 50 MG 24 hr tablet [METOPROLOL SUCCINATE (TOPROL-XL) 50 MG 24 HR TABLET] Take 1 tablet (50 mg total) by mouth daily. 90 tablet 1      No Known Allergies     Physical Examination Review of Systems   Vitals: BP (!) 146/94 (BP Location: Left arm, Patient Position: Sitting, Cuff Size: Adult Regular)   Pulse 83   Resp 16   Ht 1.803 m (5' 11\")   Wt 91.2 kg (201 lb)   BMI 28.03 kg/m    BMI= Body mass index is 28.03 kg/m .  Wt Readings from Last 3 " Encounters:   09/28/21 91.2 kg (201 lb)   08/24/21 89.4 kg (197 lb)   01/19/21 91.2 kg (201 lb)       General Appearance:   Pleasant male, appears stated age. no acute distress, normal body habitus   ENT/Mouth: membranes moist, no apparent gingival bleeding.      EYES:  no scleral icterus, normal conjunctivae   Neck: no carotid bruits. supple   Respiratory:   lungs are clear to auscultation, no rales or wheezing, equal chest wall expansion    Cardiovascular:   Regular rhythm, normal rate. Normal first and second heart sounds with no murmurs, rubs, or gallops; Jugular venous pressure normal, no edema bilaterally    Abdomen/GI:  Soft, non-tender   Extremities: no cyanosis or clubbing   Skin: no xanthelasma, warm.    Heme/lymph/ Immunology No apparent bleeding noted.   Neurologic: Alert and oriented. no tremors   Psychiatric: Pleasant, calm, appropriate affect.         Please refer above for cardiac ROS details.       Past History   Past Medical History:   Past Medical History:   Diagnosis Date     Chronic kidney disease (CKD), stage III (moderate)      Essential hypertension      History of rheumatic fever 04/25/2017     Onychomycosis 10/27/2017     Permanent atrial fibrillation (H) 02/03/2017     SSS (sick sinus syndrome) (H) 07/29/2019     Unspecified cataract      Vitamin D deficiency disease 10/06/2015        Past Surgical History:   Past Surgical History:   Procedure Laterality Date     CATARACT EXTRACTION Left      COLON SURGERY       EP PACEMAKER INSERT N/A 8/1/2019    EP Pacemaker Insertion; Emeka Dean MD; [facility];  Service: Cardiology     TOTAL KNEE ARTHROPLASTY Left         Family History:   Family History   Problem Relation Age of Onset     Valvular heart disease Mother         care at [place]     Colon Cancer Father      No Known Problems Daughter      No Known Problems Daughter         Social History:   Social History     Socioeconomic History     Marital status:      Spouse  name: Not on file     Number of children: 2     Years of education: Not on file     Highest education level: Not on file   Occupational History     Not on file   Tobacco Use     Smoking status: Former Smoker     Packs/day: 0.00     Types: Cigars, Cigars     Quit date: 2016     Years since quittin.7     Smokeless tobacco: Never Used   Substance and Sexual Activity     Alcohol use: Yes     Alcohol/week: 1.0 standard drinks     Comment: Alcoholic Drinks/day: per week     Drug use: No     Sexual activity: Not on file   Other Topics Concern     Not on file   Social History Narrative     Not on file     Social Determinants of Health     Financial Resource Strain:      Difficulty of Paying Living Expenses:    Food Insecurity:      Worried About Running Out of Food in the Last Year:      Ran Out of Food in the Last Year:    Transportation Needs:      Lack of Transportation (Medical):      Lack of Transportation (Non-Medical):    Physical Activity:      Days of Exercise per Week:      Minutes of Exercise per Session:    Stress:      Feeling of Stress :    Social Connections:      Frequency of Communication with Friends and Family:      Frequency of Social Gatherings with Friends and Family:      Attends [employer] Services:      Active Member of Clubs or Organizations:      Attends Club or Organization Meetings:      Marital Status:    Intimate Partner Violence:      Fear of Current or Ex-Partner:      Emotionally Abused:      Physically Abused:      Sexually Abused:             Lab Results    Chemistry/lipid CBC Cardiac Enzymes/BNP/TSH/INR   Lab Results   Component Value Date    CHOL 166 2021    HDL 38 (L) 2021    TRIG 121 2021    BUN 24 2021     2021    CO2 26 2021    Lab Results   Component Value Date    WBC 6.5 2021    HGB 13.0 (L) 2021    HCT 39.6 (L) 2021     (H) 2021     2021    Lab Results   Component Value Date      (H) 07/16/2019    TSH 2.32 07/23/2019

## 2021-09-28 NOTE — LETTER
9/28/2021    Donald Machado MD  [address]  [facility] [address]    RE: Dereck Elliott       Dear Colleague,    I had the pleasure of seeing Dereck Elliott in the [facility].        Thank you, Donald Shaffer, for asking the [facility] Care team to see Mr. Dereck Elliott to evaluate Follow Up        Assessment/Recommendations   Assessment:    1. Permanent atrial fibrillation - asymptomatic. He is not on anticoagulation at this time and we discussed risks of stroke in the setting of afib. Eliquis was too expensive and he wasn't confident he would be able to maintain INR within a steady range. We discussed Watchman DOREEN closure and he is interested in a referral.  2. Sick sinus syndrome s/p placement of a single chamber Medtronic MRI compatible pacemaker. Functioning normally.  3. NSVT - 6 beats noted incidentally on device interrogation - no symptoms of angina at this time.  4. Hypertension - not optimally controlled.  5. Chronic kidney disease, stage IIIb  6. AAA - measuring 5.7 x 5.1 cm on most recent assessment, significantly increased from 2 years ago.    Plan:  1. Start losartan 25 mg daily for AAA and hypertension. He should have his creatinine and potassium checked when he sees Dr. Parikh in 2 weeks.  2. Referred to vascular surgery for his AAA >5 cm  3. Referral placed to consider watchman DOREEN closure  4. Follow up with me in 1 year or sooner if needed.    Dereck Elliott Has documented nonvalvular atrial fibrillation (NVAF) and is currently on oral anticoagulant therapy aspirin alone   LIG4CT3 -VASc = 4 (age x2, hypertension, vascular disease)   HAS-BLED risk score: 2 (age, bleeding disposition  Indication(s) to consider non-oral anticoagulation therapy: unable to afford DOAC and unable to maintain INR in therapeutic range. Has not been on full OAC as a result. He also has a prior severe injury to his RLE which causes  shuffling of his gait and risk of falls.  Pt has no contra-indication to come off oral anti-coagulant therapy if LAAC device is successfully placed.     I have personally reviewed the patients chart and discussed the following with the patient/family; 1) The choices available for reducing stroke risk from atrial fibrillation, 2) Treatment options available including respective risk/benefits, and 3) What factors are most important for the patient in making their decision.  The ACC shared decision making tool https://www.cardiosmart.org/SDM/Decision-Aids/Find-Decision-Aids/Atrial-Fibrillation  was used to guide this conversation.   The patient was counseled that their decision could be made at this time or in the future if more time was needed to consider their decision.       The patient is an appropriate candidate to proceed with left atrial appendage screening and implant.                History of Present Illness   Mr. Dereck Elliott is a 81 year old male who presents for follow-up.    Mr. Elliott has a longstanding history of atrial fibrillation and was noted in July 2019 to have severe bradycardia with frequent pauses.  He was referred for urgent pacemaker placement which occurred on 8/1/2019. He has done well from the standpoint of his afib and pacemaker, however he is not on full anticoagulation due to cost of DOACs and inability to maintain therapeutic INR with warfarin.    Mr. Elliott currently feels well and does not have any acute complaints.  He exercises 5 days a week at the gym with upper body exercises.  He does not walk very much due to prior injury to his right lower extremity that limits his ability to ambulate.  He recently had an abdominal aortic ultrasound that revealed progression of an abdominal aortic aneurysm which now measures greater than 5.5 cm in maximal diameter.      Other than noted above, Mr. Elliott denies any chest pain/pressure/tightness, shortness of breath at rest or with exertion,  light headedness/dizziness, pre-syncope, syncope, lower extremity swelling, palpitations, paroxysmal nocturnal dyspnea (PND), or orthopnea.     Cardiac Problems and Cardiac Diagnostics     Most Recent Cardiac testing:  ECG dated 11/13/2019 (personaly reviewed and interpreted): atrial fibrillation with ventricular pacing.    ECHO (report reviewed):   TTE 1/18/19    Left ventricle ejection fraction is normal. The calculated left ventricular ejection fraction is 65%.    Normal left ventricular size.    Left atrial volume is moderately increased.    Normal right ventricular size and systolic function.    Estimated central venous pressure equal to 13 mmHg.    The aortic valve is sclerotic without reduced excursion.    Mild to moderate mitral regurgitation.    No previous study for comparison.           Medications  Allergies   Current Outpatient Medications   Medication Sig Dispense Refill     acetaminophen (TYLENOL) 500 MG tablet [ACETAMINOPHEN (TYLENOL) 500 MG TABLET] Take 500-1,000 mg by mouth every 6 (six) hours as needed for pain.       amLODIPine (NORVASC) 2.5 MG tablet [AMLODIPINE (NORVASC) 2.5 MG TABLET] TAKE 1 TABLET BY MOUTH EVERY DAY 90 tablet 3     amoxicillin (AMOXIL) 500 MG capsule Take 4 capsules by mouth once as needed       aspirin 81 MG EC tablet [ASPIRIN 81 MG EC TABLET] Take 81 mg by mouth daily.       cholecalciferol, vitamin D3, (CHOLECALCIFEROL) 1,000 unit tablet [CHOLECALCIFEROL, VITAMIN D3, (CHOLECALCIFEROL) 1,000 UNIT TABLET] Take 2,000 Units by mouth daily.       cyanocobalamin 100 MCG tablet [CYANOCOBALAMIN 100 MCG TABLET] Take 100 mcg by mouth daily.       melatonin 3 mg Tab tablet [MELATONIN 3 MG TAB TABLET] Take 3 mg by mouth at bedtime as needed.       metoprolol succinate (TOPROL-XL) 50 MG 24 hr tablet [METOPROLOL SUCCINATE (TOPROL-XL) 50 MG 24 HR TABLET] Take 1 tablet (50 mg total) by mouth daily. 90 tablet 1      No Known Allergies     Physical Examination Review of Systems   Vitals: BP  "(!) 146/94 (BP Location: Left arm, Patient Position: Sitting, Cuff Size: Adult Regular)   Pulse 83   Resp 16   Ht 1.803 m (5' 11\")   Wt 91.2 kg (201 lb)   BMI 28.03 kg/m    BMI= Body mass index is 28.03 kg/m .  Wt Readings from Last 3 Encounters:   09/28/21 91.2 kg (201 lb)   08/24/21 89.4 kg (197 lb)   01/19/21 91.2 kg (201 lb)       General Appearance:   Pleasant male, appears stated age. no acute distress, normal body habitus   ENT/Mouth: membranes moist, no apparent gingival bleeding.      EYES:  no scleral icterus, normal conjunctivae   Neck: no carotid bruits. supple   Respiratory:   lungs are clear to auscultation, no rales or wheezing, equal chest wall expansion    Cardiovascular:   Regular rhythm, normal rate. Normal first and second heart sounds with no murmurs, rubs, or gallops; Jugular venous pressure normal, no edema bilaterally    Abdomen/GI:  Soft, non-tender   Extremities: no cyanosis or clubbing   Skin: no xanthelasma, warm.    Heme/lymph/ Immunology No apparent bleeding noted.   Neurologic: Alert and oriented. no tremors   Psychiatric: Pleasant, calm, appropriate affect.         Please refer above for cardiac ROS details.       Past History   Past Medical History:   Past Medical History:   Diagnosis Date     Chronic kidney disease (CKD), stage III (moderate)      Essential hypertension      History of rheumatic fever 04/25/2017     Onychomycosis 10/27/2017     Permanent atrial fibrillation (H) 02/03/2017     SSS (sick sinus syndrome) (H) 07/29/2019     Unspecified cataract      Vitamin D deficiency disease 10/06/2015        Past Surgical History:   Past Surgical History:   Procedure Laterality Date     CATARACT EXTRACTION Left      COLON SURGERY       EP PACEMAKER INSERT N/A 8/1/2019    EP Pacemaker Insertion; Emeka Dean MD; [facility] Cath Lab;  Service: Cardiology     TOTAL KNEE ARTHROPLASTY Left         Family History:   Family History   Problem Relation Age of Onset     Valvular " heart disease Mother         care at [place]     Colon Cancer Father      No Known Problems Daughter      No Known Problems Daughter         Social History:   Social History     Socioeconomic History     Marital status:      Spouse name: Not on file     Number of children: 2     Years of education: Not on file     Highest education level: Not on file   Occupational History     Not on file   Tobacco Use     Smoking status: Former Smoker     Packs/day: 0.00     Types: Cigars, Cigars     Quit date: 2016     Years since quittin.7     Smokeless tobacco: Never Used   Substance and Sexual Activity     Alcohol use: Yes     Alcohol/week: 1.0 standard drinks     Comment: Alcoholic Drinks/day: per week     Drug use: No     Sexual activity: Not on file   Other Topics Concern     Not on file   Social History Narrative     Not on file     Social Determinants of Health     Financial Resource Strain:      Difficulty of Paying Living Expenses:    Food Insecurity:      Worried About Running Out of Food in the Last Year:      Ran Out of Food in the Last Year:    Transportation Needs:      Lack of Transportation (Medical):      Lack of Transportation (Non-Medical):    Physical Activity:      Days of Exercise per Week:      Minutes of Exercise per Session:    Stress:      Feeling of Stress :    Social Connections:      Frequency of Communication with Friends and Family:      Frequency of Social Gatherings with Friends and Family:      Attends [employer] Services:      Active Member of Clubs or Organizations:      Attends Club or Organization Meetings:      Marital Status:    Intimate Partner Violence:      Fear of Current or Ex-Partner:      Emotionally Abused:      Physically Abused:      Sexually Abused:             Lab Results    Chemistry/lipid CBC Cardiac Enzymes/BNP/TSH/INR   Lab Results   Component Value Date    CHOL 166 2021    HDL 38 (L) 2021    TRIG 121 2021    BUN 24 2021      08/24/2021    CO2 26 08/24/2021    Lab Results   Component Value Date    WBC 6.5 08/24/2021    HGB 13.0 (L) 08/24/2021    HCT 39.6 (L) 08/24/2021     (H) 08/24/2021     08/24/2021    Lab Results   Component Value Date     (H) 07/16/2019    TSH 2.32 07/23/2019                Thank you for allowing me to participate in the care of your patient.      Sincerely,     Johny Traore MD     [facility]  cc:   No referring provider defined for this encounter.

## 2021-09-28 NOTE — TELEPHONE ENCOUNTER
Martina,    Please call and schedule pt for LAAC clinic consult with Christel.    Thanks,  Lynnette

## 2021-09-29 DIAGNOSIS — I71.40 AAA (ABDOMINAL AORTIC ANEURYSM) (H): Primary | ICD-10-CM

## 2021-10-07 ENCOUNTER — TELEPHONE (OUTPATIENT)
Dept: VASCULAR SURGERY | Facility: CLINIC | Age: 81
End: 2021-10-07

## 2021-10-07 DIAGNOSIS — I71.40 AAA (ABDOMINAL AORTIC ANEURYSM) (H): Primary | ICD-10-CM

## 2021-10-07 NOTE — TELEPHONE ENCOUNTER
----- Message from Mee Spears RN sent at 10/7/2021 10:09 AM CDT -----  Regarding: RE: CTA ORDERS  Done.   ----- Message -----  From: Mary Brooke  Sent: 10/7/2021   8:10 AM CDT  To: Vascular Surgery Communication Pool  Subject: RE: CTA ORDERS                                   Pls enter orders   ----- Message -----  From: Courtney Gray RN  Sent: 10/5/2021  11:04 PM CDT  To: Mary Brooke  Subject: RE: CTA ORDERS                                   Do CTa without contrast.  ----- Message -----  From: Mary Brooke  Sent: 10/5/2021  10:28 AM CDT  To: Courtney Gray RN  Subject: RE: CTA ORDERS                                   Any follow up on this pt?   ----- Message -----  From: Courtney Gray RN  Sent: 9/29/2021   2:32 PM CDT  To: Mary Brooke  Subject: RE: CTA ORDERS                                   I need to have falguni let me know what he wants - I see CR high.  ----- Message -----  From: Mary Brooke  Sent: 9/29/2021   2:21 PM CDT  To: Vascular Surgery Communication Pool  Subject: CTA ORDERS                                       Pt scheduled w/ ET for AAA consult; please enter orders for CTA abd/pelvis. Thank you!

## 2021-10-08 ENCOUNTER — HOSPITAL ENCOUNTER (OUTPATIENT)
Dept: CT IMAGING | Facility: CLINIC | Age: 81
Discharge: HOME | End: 2021-10-08
Attending: SURGERY | Admitting: SURGERY
Payer: COMMERCIAL

## 2021-10-08 DIAGNOSIS — I71.40 AAA (ABDOMINAL AORTIC ANEURYSM) (H): ICD-10-CM

## 2021-10-08 PROCEDURE — 74176 CT ABD & PELVIS W/O CONTRAST: CPT

## 2021-10-12 ENCOUNTER — OFFICE VISIT (OUTPATIENT)
Dept: FAMILY MEDICINE | Facility: CLINIC | Age: 81
End: 2021-10-12
Payer: COMMERCIAL

## 2021-10-12 VITALS
SYSTOLIC BLOOD PRESSURE: 138 MMHG | OXYGEN SATURATION: 96 % | HEART RATE: 82 BPM | WEIGHT: 199 LBS | DIASTOLIC BLOOD PRESSURE: 80 MMHG | BODY MASS INDEX: 27.75 KG/M2

## 2021-10-12 DIAGNOSIS — D53.9 MACROCYTIC ANEMIA: ICD-10-CM

## 2021-10-12 DIAGNOSIS — I10 ESSENTIAL HYPERTENSION: ICD-10-CM

## 2021-10-12 DIAGNOSIS — I71.40 ABDOMINAL AORTIC ANEURYSM (AAA) WITHOUT RUPTURE (H): Primary | ICD-10-CM

## 2021-10-12 DIAGNOSIS — I48.21 PERMANENT ATRIAL FIBRILLATION (H): ICD-10-CM

## 2021-10-12 PROCEDURE — 99214 OFFICE O/P EST MOD 30 MIN: CPT | Performed by: FAMILY MEDICINE

## 2021-10-12 RX ORDER — METOPROLOL SUCCINATE 50 MG/1
50 TABLET, EXTENDED RELEASE ORAL DAILY
Qty: 90 TABLET | Refills: 3 | Status: SHIPPED | OUTPATIENT
Start: 2021-10-12 | End: 2022-03-09

## 2021-10-12 RX ORDER — AMLODIPINE BESYLATE 2.5 MG/1
TABLET ORAL
Qty: 90 TABLET | Refills: 3 | Status: SHIPPED | OUTPATIENT
Start: 2021-10-12 | End: 2022-02-01
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
## 2021-10-20 DIAGNOSIS — I71.40 AAA (ABDOMINAL AORTIC ANEURYSM) WITHOUT RUPTURE (H): ICD-10-CM

## 2021-10-20 DIAGNOSIS — I10 BENIGN ESSENTIAL HYPERTENSION: ICD-10-CM

## 2021-10-20 DIAGNOSIS — N18.32 STAGE 3B CHRONIC KIDNEY DISEASE (H): ICD-10-CM

## 2021-10-20 RX ORDER — LOSARTAN POTASSIUM 25 MG/1
25 TABLET ORAL DAILY
Qty: 90 TABLET | Refills: 1 | Status: SHIPPED | OUTPATIENT
Start: 2021-10-20 | End: 2022-02-01

## 2021-10-25 ENCOUNTER — OFFICE VISIT (OUTPATIENT)
Dept: VASCULAR SURGERY | Facility: CLINIC | Age: 81
End: 2021-10-25
Attending: INTERNAL MEDICINE
Payer: COMMERCIAL

## 2021-10-25 ENCOUNTER — TELEPHONE (OUTPATIENT)
Dept: ADMINISTRATIVE | Facility: CLINIC | Age: 81
End: 2021-10-25

## 2021-10-25 VITALS — DIASTOLIC BLOOD PRESSURE: 84 MMHG | SYSTOLIC BLOOD PRESSURE: 136 MMHG | TEMPERATURE: 98 F | HEART RATE: 80 BPM

## 2021-10-25 DIAGNOSIS — Z13.88 SCREENING EXAMINATION FOR LEAD POISONING: ICD-10-CM

## 2021-10-25 DIAGNOSIS — I72.4 ANEURYSM OF ARTERY OF LOWER EXTREMITY (H): ICD-10-CM

## 2021-10-25 DIAGNOSIS — I71.40 AAA (ABDOMINAL AORTIC ANEURYSM) WITHOUT RUPTURE (H): ICD-10-CM

## 2021-10-25 DIAGNOSIS — I72.4 POPLITEAL ANEURYSM (H): Primary | ICD-10-CM

## 2021-10-25 PROCEDURE — G0463 HOSPITAL OUTPT CLINIC VISIT: HCPCS

## 2021-10-25 PROCEDURE — 99204 OFFICE O/P NEW MOD 45 MIN: CPT | Performed by: SURGERY

## 2021-10-25 NOTE — TELEPHONE ENCOUNTER
Patient referred to [facility].  Unable to reach patient to schedule.  I left detailed messages with my contact information if he would like to schedule.  Lm 10/13 & 10/18 & 10/25

## 2021-10-25 NOTE — TELEPHONE ENCOUNTER
From: Martina Orellana  Sent: 10/25/2021  12:08 PM CDT  To: Lynnette Murguia RN    Patients spouse returned call. They are choosing not to pursue Watchman.

## 2021-10-25 NOTE — NURSING NOTE
[facility]        Patient is here for a consult to discuss Abdominal aortic aneurysm (AAA).    Pt is currently taking Aspirin.    /84   Pulse 80   Temp 98  F (36.7  C)     The provider has been notified that the patient has no concerns.     Questions patient would like addressed today are:  Wife asking how the stent stays in place.    Refills are needed: N/A    Has homecare services and agency name:  Ana María Carter RN

## 2021-10-25 NOTE — PROGRESS NOTES
VASCULAR SURGERY CLINIC CONSULTATION    VASCULAR SURGEON: Dr. Granger   LOCATION: [facility]       Dereck Elliott  Medical Record #:  [id number]  YOB: 1940  Age:  81 year old     Date of Service: 10/25/2021     PRIMARY CARE PROVIDER: Donald Machado      Reason for visit:  AAA    IMPRESSION: 81 year old male with PMH of CKD III, HTN, h/o ARF, sick sinu syndrome who was seen today in consultation for infrarenal AAA. CT non shows infrarenal AAA amenable to endovascular repair (size 5.7 cm ). No symptoms. Exam does not reveal popliteal aneurysm     RECOMMENDATION:      - CTA chest abdomen pelvis to assess for thoracic aortic aneurysms, amount of thrombus in AAA  - Obtain bilateral lower extremity duplex   - Followup after these imaging tests done   - Discussed danger signs of rupture     HPI:  Dereck Elliott is a 81 year old male with PMH of CKD III, HTN, h/o ARF, sick sinu syndrome who was seen today in consultation for infrarenal AAA.  Denies any history of peripheral aneurysms. No h/o claudication or rest pain    PHH:    Past Medical History:   Diagnosis Date     Chronic kidney disease (CKD), stage III (moderate) (H)      Essential hypertension      History of rheumatic fever 04/25/2017     Onychomycosis 10/27/2017     Permanent atrial fibrillation (H) 02/03/2017     SSS (sick sinus syndrome) (H) 07/29/2019     Unspecified cataract      Vitamin D deficiency disease 10/06/2015         Past Surgical History:   Procedure Laterality Date     CATARACT EXTRACTION Left      COLON SURGERY       EP PACEMAKER INSERT N/A 8/1/2019    EP Pacemaker Insertion; Emeka Dean MD; [facility] Cath Lab;  Service: Cardiology     TOTAL KNEE ARTHROPLASTY Left         ALLERGIES:   No Known Allergies     MEDS:    Current Outpatient Medications   Medication     acetaminophen (TYLENOL) 500 MG tablet     amLODIPine (NORVASC) 2.5 MG tablet     amoxicillin (AMOXIL) 500 MG capsule     aspirin 81 MG EC  tablet     cholecalciferol, vitamin D3, (CHOLECALCIFEROL) 1,000 unit tablet     cyanocobalamin 100 MCG tablet     losartan (COZAAR) 25 MG tablet     melatonin 3 mg Tab tablet     metoprolol succinate ER (TOPROL-XL) 50 MG 24 hr tablet     No current facility-administered medications for this visit.        SOCIAL HABITS:    History   Smoking Status     Former Smoker     Packs/day: 0.00     Types: Cigars, Cigars     Quit date: 1/1/2016   Smokeless Tobacco     Never Used        Alcohol Use:      Frequency of Alcohol Consumption:      Average Number of Drinks:      Frequency of Binge Drinking:         History   Drug Use No        FAMILY HISTORY:    Family History   Problem Relation Age of Onset     Valvular heart disease Mother         care at [place]     Colon Cancer Father      No Known Problems Daughter      No Known Problems Daughter        REVIEW OF SYSTEMS:    A 12 point ROS was reviewed and except for what is listed in the HPI above, all others are negative    PE:  There were no vitals taken for this visit.   Wt Readings from Last 1 Encounters:   10/12/21 90.3 kg (199 lb)     There is no height or weight on file to calculate BMI.    EXAM:  GENERAL: This is a well-developed 81 year old male who appears his stated age  EYES: Grossly normal.  MOUTH: Buccal mucosa normal   CARDIAC:  NS1 S2, No Murmur  CHEST/LUNG:  Clear lung fields bilaterally   GASTROINTESINAL (ABDOMEN): Soft, non-tender, B/S present, no pulsatile mass  MUSCULOSKELETAL: Grossly normal and both lower extremities are intact.  HEME/LYMPH: No lymphedema  NEUROLOGIC: Focally intact, Alert and oriented x 3.   PSYCH: appropriate affect  INTEGUMENT: No open lesions or ulcers  Pulse Exam:   Palpable femoral and popliteal pulses b/l, non palpabl pedal pulses, evidence of varicose veins    DIAGNOSTIC STUDIES:     Images:  No results found.    I personally reviewed the images and my interpretation is  Infrarenal AAA    Neck length is adequate   Neck diameter < 32  mm   Adequate iliac diameter  Adequate access vessels.    LABS:      Sodium   Date Value Ref Range Status   08/24/2021 143 136 - 145 mmol/L Final   01/19/2021 144 136 - 145 mmol/L Final   07/17/2020 141 136 - 145 mmol/L Final     Urea Nitrogen   Date Value Ref Range Status   08/24/2021 24 8 - 28 mg/dL Final   01/19/2021 27 8 - 28 mg/dL Final   07/17/2020 21 8 - 28 mg/dL Final     Hemoglobin   Date Value Ref Range Status   08/24/2021 13.0 (L) 13.3 - 17.7 g/dL Final   01/19/2021 14.1 14.0 - 18.0 g/dL Final   07/17/2020 14.1 14.0 - 18.0 g/dL Final     Platelet Count   Date Value Ref Range Status   08/24/2021 192 150 - 450 10e3/uL Final   01/19/2021 214 140 - 440 thou/uL Final   07/17/2020 214 140 - 440 thou/uL Final     BNP   Date Value Ref Range Status   07/16/2019 288 (H) 0 - 88 pg/mL Final   01/11/2019 194 (H) 0 - 86 pg/mL Final   07/11/2018 187 (H) 0 - 86 pg/mL Final       30 minutes  spent on the day of encounter doing chart review, history and exam, documentation, and further activities as noted.     Ravindra Jang MD, MD  [facility] V[facility] Surgery

## 2021-10-25 NOTE — PATIENT INSTRUCTIONS
Patient Education     Computed Tomography Angiography (CTA)  Computed tomography angiography (CTA) is an imaging test. It uses X-rays and computer technology to make detailed pictures of your arteries. Before the test, an X-ray dye (contrast medium) is injected into your vein. The dye makes it easier to see your blood vessels on the X-ray. Pictures are then taken with the CT scanner. A computer turns the images into 2-D and 3-D pictures.      CTA can make 3D images, such as the carotid arteries shown here.   Why CTA is done  CTA may be used to:    Check arteries in your belly, neck, lungs, pelvis, kidneys, or brain.    Look for a ballooning of the blood vessel wall (aneurysm) or a tear (dissection).    Check if a tube (stent) used to keep an artery open is working well.    Find damage to your arteries due to injuries.    Collect details on blood vessels that supply blood to tumors.  Getting ready for your test  Tell your healthcare provider if you:     Have diabetes    Have kidney disease    Are allergic to X-ray dye or other medicines    Are pregnant or think you may be pregnant    Are taking any medicines, herbs, or supplements, including prescription medicines, illegal drugs, and over-the-counter medicines such as aspirin or ibuprofen    Follow any directions you are given for not eating or drinking before the CTA. Follow any other instructions from your healthcare provider.   During your test    You will be asked to remove any hair clips, jewelry, false teeth, or other metal items that could show up on the X-ray.    You will lie down on the scanning table. An IV line will be put in a vein in your arm or hand.    The scanning table will be properly placed. The part of your body being checked will be inside the doughnut-shaped CT scanner.    One image may be taken first to be sure you are in the proper position for the test.    The IV will be hooked up to an automatic injection machine. This controls how often and  how fast the X-ray dye is injected. The injection may continue during part of the exam.    The dye will be put into your vein through the IV line. You may feel warmth through your body when the dye is injected.    You can t move while the X-rays are being taken. Pillows and foam pads may be used to help you stay still. You will be told to hold your breath for 10 to 25 seconds at a time.    A single scan may take several minutes. You may need more than one scan.    After your test    Drink plenty of fluids to help flush the X-ray dye from your body.    You may eat as soon as you want to.    Possible risks  All procedures have some risks. A CTA has some possible risks. These include:     Problems due to the X-ray dye, such as an allergic reaction or kidney damage    Skin damage from leaking X-ray dye near where the IV was put in  [employer] last reviewed this educational content on 4/1/2020 2000-2021 [employer]. All rights reserved. This information is not intended as a substitute for professional medical care. Always follow your healthcare professional's instructions.    Lower Extremity Arterial Ultrasound    Description  Ultrasound examinations are painless and easy for the patient. The vascular laboratory will contain a bed and just two or three pieces of equipment. You will be asked to remove pants or shorts and gowns will be provided. It usually takes about 30 minutes.  The technician will tuck a towel under your underpants in the groin. The gel is water-soluble and will not stain your skin or clothes.  Ultrasound gel, usually warmed for your comfort, will be placed on the inner side of your legs.    Through the gel, the technician will apply to your legs a small hand-held device that emits sound waves.  When the test is completed, the technician will remove excess gel from your legs.    Risks  There are typically no side effects or complications associated with a lower extremity arterial duplex  ultrasound.  How to Prepare  Eat and take medications as usual.  There is no preparation required for a lower extremity arterial duplex ultrasound.  What Can I Expect After the Test?  The technician will send the ultrasound images to your vascular surgeon for evaluation. Typically, a report is available in 2-3 days. If anything critical is found, it is standard practice to notify the vascular surgeon immediately.  Reference: https://vascular.org/patient-resources/vascular-tests    Patient Education     Surgery for Abdominal Aortic Aneurysm  During surgery for abdominal aortic aneurysm (AAA), the weakened aortic wall is replaced with a hollow man made tube (graft).  Reaching the aneurysm  The aorta can be reached through open surgery . Or a less invasive endovascular procedure may be done. Your surgeon will choose the best approach for you.  Open surgery  An incision is made in your abdomen. Once inside, your surgeon gently moves aside your organs to reach the damaged section of the aorta.  Endovascular procedure  Near your groin, the surgeon makes 2 small cuts (incisions). Then he or she threads a thin, flexible tube (catheter) into the artery at the incision. The surgeon places a graft inside the catheter and guides it toward the damaged part of the aorta.  Placing the graft    The goal is to safely route blood past the aneurysm.  During open surgery  Here is what to expect:    The aneurysm is opened and cleaned of any blood clots.    The graft is sewn to the aorta.    The wall of the aorta is wrapped around the graft to protect it. The wall is then sewn up.    The incision site is closed with stitches or staples.  During an endovascular procedure  Here is what to expect:    Watching the catheter on a video monitor, the surgeon places a catheter in the best position. This position is confirmed by a dye study (angiogram).     The surgeon guides the graft through the catheter and expands it so blood can flow through  it.    The blood is now re-routed through the graft and does not fill the aneurysm anymore.    The graft is attached inside the artery. It's held in place with metal springs (stents), hooks, or pins.    The catheter is removed. The incision sites are closed with stitches or staples.  Risks and possible complications  Here are potential problems to be aware of:     Infection    Blood clots in legs    Bleeding    Kidney failure    Respiratory failure    Injury to the colon s blood supply    Erectile dysfunction    Spinal cord injury    Heart attack, stroke, or death  [employer] last reviewed this educational content on 12/1/2019 2000-2021 [employer]. All rights reserved. This information is not intended as a substitute for professional medical care. Always follow your healthcare professional's instructions.

## 2021-10-28 ENCOUNTER — HOSPITAL ENCOUNTER (OUTPATIENT)
Dept: CT IMAGING | Facility: HOSPITAL | Age: 81
Discharge: HOME | End: 2021-10-28
Attending: SURGERY | Admitting: SURGERY
Payer: COMMERCIAL

## 2021-10-28 DIAGNOSIS — I71.40 AAA (ABDOMINAL AORTIC ANEURYSM) WITHOUT RUPTURE (H): ICD-10-CM

## 2021-10-28 LAB
CREAT BLD-MCNC: 2 MG/DL (ref 0.7–1.3)
GFR SERPL CREATININE-BSD FRML MDRD: 30 ML/MIN/1.73M2

## 2021-10-28 PROCEDURE — 71275 CT ANGIOGRAPHY CHEST: CPT

## 2021-10-28 PROCEDURE — 82565 ASSAY OF CREATININE: CPT

## 2021-10-28 PROCEDURE — 250N000011 HC RX IP 250 OP 636: Performed by: SURGERY

## 2021-10-28 RX ORDER — IOPAMIDOL 755 MG/ML
75 INJECTION, SOLUTION INTRAVASCULAR ONCE
Status: COMPLETED | OUTPATIENT
Start: 2021-10-28 | End: 2021-10-28

## 2021-10-28 RX ADMIN — IOPAMIDOL 75 ML: 755 INJECTION, SOLUTION INTRAVENOUS at 19:33

## 2021-11-01 ENCOUNTER — OFFICE VISIT (OUTPATIENT)
Dept: VASCULAR SURGERY | Facility: CLINIC | Age: 81
End: 2021-11-01
Attending: SURGERY
Payer: COMMERCIAL

## 2021-11-01 ENCOUNTER — ANCILLARY PROCEDURE (OUTPATIENT)
Dept: VASCULAR ULTRASOUND | Facility: CLINIC | Age: 81
End: 2021-11-01
Attending: SURGERY
Payer: COMMERCIAL

## 2021-11-01 VITALS — TEMPERATURE: 98.4 F | SYSTOLIC BLOOD PRESSURE: 122 MMHG | DIASTOLIC BLOOD PRESSURE: 68 MMHG | HEART RATE: 68 BPM

## 2021-11-01 DIAGNOSIS — I72.4 ANEURYSM OF ARTERY OF LOWER EXTREMITY (H): ICD-10-CM

## 2021-11-01 DIAGNOSIS — I71.40 AAA (ABDOMINAL AORTIC ANEURYSM) WITHOUT RUPTURE (H): ICD-10-CM

## 2021-11-01 DIAGNOSIS — I71.40 AAA (ABDOMINAL AORTIC ANEURYSM) WITHOUT RUPTURE (H): Primary | ICD-10-CM

## 2021-11-01 PROCEDURE — 93925 LOWER EXTREMITY STUDY: CPT | Mod: 26 | Performed by: SURGERY

## 2021-11-01 PROCEDURE — 99214 OFFICE O/P EST MOD 30 MIN: CPT | Performed by: SURGERY

## 2021-11-01 PROCEDURE — 93925 LOWER EXTREMITY STUDY: CPT

## 2021-11-01 NOTE — NURSING NOTE
[facility]        Patient is here for a 1 week follow up  to discuss Abdominal aortic aneurysm (AAA).    Pt is currently taking Aspirin.    /68   Pulse 68   Temp 98.4  F (36.9  C) (Oral)       The provider has been notified that the patient has no concerns.     Questions patient would like addressed today are: Asking if stent is a possibility for patient.    Refills are needed: No    Has homecare services and agency name:  Ana María Carter RN

## 2021-11-01 NOTE — PATIENT INSTRUCTIONS
Patient Education     Understanding Endovascular Repair of an Abdominal Aortic Aneurysm (AAA)  Endovascular repair is a type of treatment for an abdominal aortic aneurysm (AAA). An AAA is a bulge in the wall of the large artery below your heart. The large artery is called the aorta. The bulge is caused by a weak section in the artery wall. The bulge is at risk of tearing. During the procedure, the weak section of the aorta is treated to prevent it from tearing.  What is the abdominal aorta?  Your arteries are the blood vessels that bring oxygen-rich blood and nutrients to the tissues of your body. The aorta is a large artery that leads from your heart down through the inside of your chest and belly (abdomen). The section that goes through the abdomen is called the abdominal aorta.  The walls of your aorta are normally thick enough to withstand the force of the blood pressure from the heart. But some health problems can damage the walls of the aorta. This can cause a balloon-like bulge in the wall of the aorta called an aneurysm. In some cases, an aneurysm can start to split or even burst. This can often cause death. An aneurysm may also start to split along the inside of the aorta wall. This is known as aortic dissection. It can also often cause death.  Many factors can damage the walls of your aorta and cause an aortic aneurysm, such as:    High blood pressure    Smoking    Atherosclerosis    Injury    Certain infections, such as untreated syphilis    Certain genetic conditions, such as Marfan syndrome  Why endovascular repair of AAA is done  You may need this procedure if your AAA is bigger than 2 inches (5 cm) or getting larger. You may also need this procedure if your AAA is at risk of splitting or bursting. Endovascular repair uses a much smaller incision than open surgery. Because of this, it has a lower risk for complications. It may help older adults who have a higher risk for complications. It can also help  you recover faster.  How endovascular repair of AAA is done  A vascular surgeon or interventional radiologist and a team of specialized healthcare providers will do the surgery. Your healthcare provider will make a small cut into an artery in your groin. He or she will then put a thin, flexible tube (catheter) into the artery. He or she will gently guide the tube all the way to the aneurysm. The healthcare provider will send a stent graft along the catheter to the aneurysm. The stent graft is a tube made of a thin metal mesh (the stent), covered with a thin polyester fabric (the graft). When the stent graft reaches the aorta, the healthcare provider will open it up and fasten it in place. The stent graft will stay in place, so that blood flows through it. It protects that part of the aorta, and keeps the aneurysm from bursting.  Risks of endovascular repair of AAA  Every procedure has some risks. The risks of this procedure include:    Heavy bleeding at the insertion site    Blood that still flows through the aneurysm bulge    A graft that moves, bends, or comes loose    Infection    Heart attack    Reaction to anesthesia    Reduced blood flow to the legs, intestines, or kidneys    Pressure in the abdomen that can damage organs (abdominal compartment syndrome)    The need to change to open surgery during the procedure  Your risks may vary depending on your age, your overall health, the size of the aneurysm, and where it's located. Talk with your healthcare provider to learn which risks apply to you. Tell him or her about any questions or concerns you have.  Adebayo last reviewed this educational content on 12/1/2019 2000-2021 [employer]. All rights reserved. This information is not intended as a substitute for professional medical care. Always follow your healthcare professional's instructions.

## 2021-11-01 NOTE — PROGRESS NOTES
VASCULAR SURGERY PROGRESS NOTE    VASCULAR SURGEON: Melia Granger MD, RPVI     LOCATION:  [facility]     Dereck Elliott   Medical Record #:  [id number]  YOB: 1940  Age:  81 year old     Date of Service: 11/1/2021    PRIMARY CARE PROVIDER: Donald Machado      Reason for visit: Follow-up to review images    IMPRESSION: 81-year-old male with known history of infrarenal abdominal aortic aneurysm, now measured 6 cm.  There is also ascending aortic aneurysm, 4 cm.  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  Left iliac artery is ectatic.  Patient is asymptomatic.  No evidence of popliteal artery aneurysms.  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  The age of this dissection is unknown: Patient is asymptomatic.  The length of healthy aortic neck is measured approximately 6 to 7 mm.  I still think that patient is amendable for endovascular repair but would like to discuss this case on vascular surgery conference with my colleagues.  I will also contact patient's cardiologist for risk assessment.  Patient will be seen by his cardiologist towards the end of this months for pacemaker evaluation.    RECOMMENDATION/RISKS: Most likely will proceed with endovascular aneurysm repair however I would like to discuss this option with my colleagues given the fact of focal dissection presence in the aortic neck.  We will also proceed with preoperative evaluation.  HPI:  Dereck Elliott is a 81 year old male who was seen today to discuss CT results.  REVIEW OF SYSTEMS:    A 12 point ROS was reviewed and is negative .     PHH:    Past Medical History:   Diagnosis Date     Chronic kidney disease (CKD), stage III (moderate) (H)      Essential hypertension      History of rheumatic fever 04/25/2017     Onychomycosis 10/27/2017     Permanent atrial fibrillation (H) 02/03/2017     SSS (sick sinus syndrome) (H) 07/29/2019     Unspecified  cataract      Vitamin D deficiency disease 10/06/2015          Past Surgical History:   Procedure Laterality Date     CATARACT EXTRACTION Left      COLON SURGERY       EP PACEMAKER INSERT N/A 8/1/2019    EP Pacemaker Insertion; Emeka Dean MD; [facility];  Service: Cardiology     TOTAL KNEE ARTHROPLASTY Left        ALLERGIES:  Patient has no known allergies.    MEDS:    Current Outpatient Medications:      acetaminophen (TYLENOL) 500 MG tablet, [ACETAMINOPHEN (TYLENOL) 500 MG TABLET] Take 500-1,000 mg by mouth every 6 (six) hours as needed for pain., Disp: , Rfl:      amLODIPine (NORVASC) 2.5 MG tablet, [AMLODIPINE (NORVASC) 2.5 MG TABLET] TAKE 1 TABLET BY MOUTH EVERY DAY, Disp: 90 tablet, Rfl: 3     amoxicillin (AMOXIL) 500 MG capsule, Take 4 capsules by mouth once as needed, Disp: , Rfl:      aspirin 81 MG EC tablet, [ASPIRIN 81 MG EC TABLET] Take 81 mg by mouth daily., Disp: , Rfl:      cholecalciferol, vitamin D3, (CHOLECALCIFEROL) 1,000 unit tablet, [CHOLECALCIFEROL, VITAMIN D3, (CHOLECALCIFEROL) 1,000 UNIT TABLET] Take 2,000 Units by mouth daily., Disp: , Rfl:      cyanocobalamin 100 MCG tablet, [CYANOCOBALAMIN 100 MCG TABLET] Take 100 mcg by mouth daily., Disp: , Rfl:      losartan (COZAAR) 25 MG tablet, Take 1 tablet (25 mg) by mouth daily, Disp: 90 tablet, Rfl: 1     melatonin 3 mg Tab tablet, [MELATONIN 3 MG TAB TABLET] Take 3 mg by mouth at bedtime as needed., Disp: , Rfl:      metoprolol succinate ER (TOPROL-XL) 50 MG 24 hr tablet, Take 1 tablet (50 mg) by mouth daily, Disp: 90 tablet, Rfl: 3    SOCIAL HABITS:    History   Smoking Status     Former Smoker     Packs/day: 0.00     Types: Cigars, Cigars     Quit date: 1/1/2016   Smokeless Tobacco     Never Used     Social History    Substance and Sexual Activity      Alcohol use: Yes        Alcohol/week: 1.0 standard drinks        Comment: Alcoholic Drinks/day: per week      History   Drug Use No       FAMILY HISTORY:    Family History    Problem Relation Age of Onset     Valvular heart disease Mother         care at [facility]     Colon Cancer Father      No Known Problems Daughter      No Known Problems Daughter        PE:  /68   Pulse 68   Temp 98.4  F (36.9  C) (Oral)   Wt Readings from Last 1 Encounters:   10/12/21 90.3 kg (199 lb)     There is no height or weight on file to calculate BMI.    EXAM:  GENERAL: This is a well-developed 81 year old male who appears his stated age  EYES: Grossly normal.  MOUTH: Buccal mucosa normal   CARDIAC: Normal   CHEST/LUNG: Clear to auscultation bilaterally  GASTROINTESINAL soft nontender nondistended  MUSCULOSKELETAL: Grossly normal and both lower extremities are intact.  HEME/LYMPH: No lymphedema  NEUROLOGIC: Focally intact, Alert and oriented x 3.   PSYCH: appropriate affect  INTEGUMENT: No open lesions or ulcers            DIAGNOSTIC STUDIES:     Images:  CTA Chest Abdomen Pelvis w Contrast    Result Date: 10/28/2021  EXAM: CTA CHEST ABDOMEN PELVIS W CONTRAST LOCATION: [facility] DATE/TIME: 10/28/2021 6:55 PM INDICATION: Thoracic aortic aneurysm (TAA) suspected COMPARISON: None. TECHNIQUE: CT angiogram chest abdomen pelvis during arterial phase of injection of IV contrast. 2D and 3D MIP reconstructions were performed by the CT technologist. Dose reduction techniques were used. CONTRAST: isovue 370 75ml FINDINGS: CT ANGIOGRAM CHEST, ABDOMEN, AND PELVIS: Thoracic aorta: The thoracic aorta at this level of the sinuses of Valsalva has a maximum diameter of approximately 3.8 cm. The ascending thoracic aorta at the level of the main pulmonary artery has a maximum diameter of approximately 4.0 cm. The maximum  diameter at the level of the mid arch is approximately 3.3 cm. Maximum diameter of the distal arch is approximately 3.4 cm. Maximum diameters of the proximal, mid, and distal descending thoracic aorta are approximately 3.3, 2.8, and 2.4 cm respectively. There is scattered  irregular soft plaque at the level of the thoracic aortic arch extending into the descending thoracic aorta. There are multiple small ulcerations in the distal aortic arch. The great vessels arising from the thoracic aortic arch are patent without significant stenoses. Abdominal aorta: There is scattered irregular ulcerated soft plaque in the abdominal aorta. There is a fusiform aneurysm in the mid distal infrarenal abdominal aorta. This has a maximum anterior-posterior and transverse diameter of approximately 6.0 x 5.3 cm. The diameter of the neck of the aneurysm just below the takeoff of the lowest renal artery is approximately 21-22 mm. The diameter of the neck 10 mm below the takeoff of the lowest renal artery is approximately 22 to 23 mm. The diameter 15 mm below the takeoff of the lowest renal artery is approximately 24 mm. A penetrating ulcer in the  neck of the aneurysm contributes to a focal dissection along the anterior aspects of the aorta. There is a moderate stenosis of the proximal celiac trunk due to extrinsic compression by the median arcuate ligament. The superior mesenteric artery and inferior mesenteric arteries are patent without significant stenoses. There is a significant stenosis at the origin of the left renal artery. Ulcerating soft plaque at the origin of the right renal artery contributes to a mild to moderate stenosis. 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 This extends to the origins of the right internal and external iliac arteries. The left internal iliac artery measures approximately 1.5 cm in diameter. The external iliac arteries are patent without significant stenoses. The right and left external iliac arteries measure approximately 8.5 and 9 mm respectively. The mid distal right internal iliac artery is ectatic and has a diameter of approximately 13 mm. The proximal femoral arteries are patent without significant stenoses. LUNGS AND PLEURA: Mild areas of scarring  and/or atelectasis in the lungs. MEDIASTINUM/AXILLAE: No pathologic mediastinal adenopathy. CORONARY ARTERY CALCIFICATION: The heart is enlarged in size. Mild coronary artery calcifications. HEPATOBILIARY: Unremarkable PANCREAS: Unremarkable SPLEEN: Unremarkable ADRENAL GLANDS: Unremarkable KIDNEYS/BLADDER: The left kidney is atrophic relative to the right. BOWEL: There is a surgical anastomosis in the sigmoid colon at its junction with the ascending colon. There are scattered colonic diverticuli. The small bowel appears grossly unremarkable. The stomach and proximal duodenum are distended. The esophagus is  mildly dilated. LYMPH NODES: No enlarged abdominal or pelvic lymph nodes. PELVIC ORGANS: Unremarkable.     IMPRESSION: 1.  4.0 cm ascending thoracic aortic aneurysm. 2.   6.0 cm infrarenal abdominal aortic aneurysm. There is a focal dissection in the neck of this aneurysm. 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 These aneurysms would be amenable to endovascular repair. 3.  Significant stenosis at the origin of the left renal artery. The left kidney is atrophic relative to the right. 4.  Colonic diverticulosis.     US Lower Extremity Arterial Duplex Bilateral    Result Date: 11/1/2021  Arterial Duplex Ultrasound (Date: 11/01/21) Lower Extremity Artery Evaluation Indication: SURVEILLANCE BILATERAL POPLITEAL ARTERIES FOR ANEURYSMS. HX: ABDOMINAL AORTIC ANEURYSM  Previous: NONE. CTA ABD/ PELVIC 10/28/2021, US AORTA 8/27/2021 History: Previous Smoker Technique: Duplex imaging is performed utilizing gray-scale, two-dimensional images, and color-flow imaging. Doppler waveform analysis and spectral Doppler imaging is also performed. LOWER EXTREMITY ARTERIAL DUPLEX EXAM WITH WAVEFORMS Right Leg:(cm/s) Location: AP x Trans (cm)  Velocity (cm/sec)  Proximal Popliteal Artery    0.92 x 0.98  48 Mid Popliteal Artery  1.11 x 1.25   27 Distal Popliteal Artery   0.95 x 0.79  44 Waveforms: T=Triphasic, M=Monophasic,  B=Biphasic Left Leg:(cm/s) Location: AP x Trans (cm) Velocity (cm/sec) Proximal Popliteal Artery    0.88 x 0.94  55 Mid Popliteal Artery    1.02 x 1.17  47 Distal Popliteal Artery   1.02 x 0.91  52 Waveforms: T=Triphasic, M=Monophasic, B=Biphasic  Impression: Right Lower Extremity: Mildly ectatic midportion of the popliteal artery. No intramural thrombus present. Mild calcifications noted. Left Lower Extremity: Similar finding the left popliteal artery. Mildly ectatic midportion of the popliteal artery with mild calcification. No intramural thrombus present. Reference: Category Normal 1-19% 20-49% 50-99% Occluded PSV <160 cm/sec without spectral broadening <160 cm/sec with spectral broadening Increased Increased Absent Flow Ratio N/A N/A < 2.0 >2.0 N/A Post-Stenotic Turbulence No No No Yes N/A       I personally reviewed the images and my interpretation is 6 mm in the AAA with focal dissection in the aortic neck    LABS:      Sodium   Date Value Ref Range Status   08/24/2021 143 136 - 145 mmol/L Final   01/19/2021 144 136 - 145 mmol/L Final   07/17/2020 141 136 - 145 mmol/L Final     Urea Nitrogen   Date Value Ref Range Status   08/24/2021 24 8 - 28 mg/dL Final   01/19/2021 27 8 - 28 mg/dL Final   07/17/2020 21 8 - 28 mg/dL Final     Hemoglobin   Date Value Ref Range Status   08/24/2021 13.0 (L) 13.3 - 17.7 g/dL Final   01/19/2021 14.1 14.0 - 18.0 g/dL Final   07/17/2020 14.1 14.0 - 18.0 g/dL Final     Platelet Count   Date Value Ref Range Status   08/24/2021 192 150 - 450 10e3/uL Final   01/19/2021 214 140 - 440 thou/uL Final   07/17/2020 214 140 - 440 thou/uL Final     BNP   Date Value Ref Range Status   07/16/2019 288 (H) 0 - 88 pg/mL Final   01/11/2019 194 (H) 0 - 86 pg/mL Final   07/11/2018 187 (H) 0 - 86 pg/mL Final       Total time spent 45minutes face to face with patient with more than 50% time spent in counseling and coordination of care.    Melia Granger MD, MD, [facility]  VASCULAR SURGERY

## 2021-11-02 RX ORDER — CEFAZOLIN SODIUM 2 G/100ML
2 INJECTION, SOLUTION INTRAVENOUS SEE ADMIN INSTRUCTIONS
Status: CANCELLED | OUTPATIENT
Start: 2021-12-29

## 2021-11-02 RX ORDER — CEFAZOLIN SODIUM 2 G/100ML
2 INJECTION, SOLUTION INTRAVENOUS
Status: CANCELLED | OUTPATIENT
Start: 2021-12-29

## 2021-11-05 ENCOUNTER — TELEPHONE (OUTPATIENT)
Dept: CARDIOLOGY | Facility: CLINIC | Age: 81
End: 2021-11-05
Payer: COMMERCIAL

## 2021-11-05 DIAGNOSIS — Z01.810 PRE-OPERATIVE CARDIOVASCULAR EXAMINATION, HIGH RISK SURGERY: Primary | ICD-10-CM

## 2021-11-05 DIAGNOSIS — I71.40 AAA (ABDOMINAL AORTIC ANEURYSM) WITHOUT RUPTURE (H): ICD-10-CM

## 2021-11-05 DIAGNOSIS — Z91.89 CARDIOVASCULAR RISK FACTOR: ICD-10-CM

## 2021-11-05 DIAGNOSIS — Z95.0 CARDIAC PACEMAKER IN SITU: ICD-10-CM

## 2021-11-05 DIAGNOSIS — I47.29 NSVT (NONSUSTAINED VENTRICULAR TACHYCARDIA) (H): ICD-10-CM

## 2021-11-05 DIAGNOSIS — I48.21 PERMANENT ATRIAL FIBRILLATION (H): ICD-10-CM

## 2021-11-05 NOTE — TELEPHONE ENCOUNTER
----- Message from Johny Traore MD sent at 11/4/2021  2:33 PM CDT -----  Mark,    A lexiscan nuclear stress test or CT coronary angiogram with FFR would be better than echo to risk stratify prior to a vascular surgery. If this is low risk, then he would not need additional cardiac evaluation prior to surgery. I would do this, not because of the pacemaker, but rather the co-incidence of peripheral and coronary vascular disease.    Thank you,  Johny Nassar -   Could we arrange for a CT coronary angiogram with FFR  to assess for coronary disease prior to vascular surgery?    Thank you  ----- Message -----  From: Melia Granger MD  Sent: 11/1/2021  11:34 AM CDT  To: MD Dr Eugenie Ferrer,    This patient will benefit from endovascular aneurysm repair even though there is a concern of focal aortic dissection in the neck.  I will discuss the CT scan with my colleagues.  Regardless, I was wondering if you could cleared this patient for aneurysm repair.  Given history of pacemaker would you recommend an echo versus stress test?    Best regards,    Mark

## 2021-11-08 NOTE — TELEPHONE ENCOUNTER
PC and discussion with wife Amaya, patient is hard of hearing. (consent to communicate on file). Review of recommendation from ORAL. Discussion of proposed test, no further questions at this time. Verbal discussion and reason for test discussed with patient whom is present. Pt agreeable to further testing. Order placed and advised wife a  will call to arrange. Will do best to have done in a timely fashion to obtain surgical/procedural risk. Staff message sent to schedulers. TISH,Rn

## 2021-11-16 ENCOUNTER — HOSPITAL ENCOUNTER (OUTPATIENT)
Dept: CT IMAGING | Facility: CLINIC | Age: 81
Discharge: HOME | End: 2021-11-16
Attending: INTERNAL MEDICINE | Admitting: INTERNAL MEDICINE
Payer: COMMERCIAL

## 2021-11-16 VITALS — DIASTOLIC BLOOD PRESSURE: 71 MMHG | SYSTOLIC BLOOD PRESSURE: 122 MMHG

## 2021-11-16 DIAGNOSIS — Z01.810 PRE-OPERATIVE CARDIOVASCULAR EXAMINATION, HIGH RISK SURGERY: ICD-10-CM

## 2021-11-16 DIAGNOSIS — I71.40 AAA (ABDOMINAL AORTIC ANEURYSM) WITHOUT RUPTURE (H): ICD-10-CM

## 2021-11-16 DIAGNOSIS — I48.21 PERMANENT ATRIAL FIBRILLATION (H): ICD-10-CM

## 2021-11-16 LAB
BSA FOR ECHO PROCEDURE: 2.1 M2
CCTA ASCENDING AORTA: 3.9
CCTA SINUS: 4.1
CREAT BLD-MCNC: 2 MG/DL (ref 0.7–1.3)
GFR SERPL CREATININE-BSD FRML MDRD: 30 ML/MIN/1.73M2

## 2021-11-16 PROCEDURE — 250N000013 HC RX MED GY IP 250 OP 250 PS 637: Performed by: INTERNAL MEDICINE

## 2021-11-16 PROCEDURE — 75574 CT ANGIO HRT W/3D IMAGE: CPT | Mod: 26 | Performed by: GENERAL ACUTE CARE HOSPITAL

## 2021-11-16 PROCEDURE — 250N000011 HC RX IP 250 OP 636: Performed by: INTERNAL MEDICINE

## 2021-11-16 PROCEDURE — 75574 CT ANGIO HRT W/3D IMAGE: CPT

## 2021-11-16 PROCEDURE — 82565 ASSAY OF CREATININE: CPT

## 2021-11-16 RX ORDER — IOPAMIDOL 755 MG/ML
100 INJECTION, SOLUTION INTRAVASCULAR ONCE
Status: COMPLETED | OUTPATIENT
Start: 2021-11-16 | End: 2021-11-16

## 2021-11-16 RX ORDER — LIDOCAINE 40 MG/G
CREAM TOPICAL
Status: DISCONTINUED | OUTPATIENT
Start: 2021-11-16 | End: 2021-11-17 | Stop reason: HOSPADM

## 2021-11-16 RX ORDER — NITROGLYCERIN 0.4 MG/1
0.4 TABLET SUBLINGUAL ONCE
Status: COMPLETED | OUTPATIENT
Start: 2021-11-16 | End: 2021-11-16

## 2021-11-16 RX ORDER — DILTIAZEM HYDROCHLORIDE 5 MG/ML
10 INJECTION INTRAVENOUS
Status: DISCONTINUED | OUTPATIENT
Start: 2021-11-16 | End: 2021-11-17 | Stop reason: HOSPADM

## 2021-11-16 RX ORDER — METOPROLOL TARTRATE 1 MG/ML
5-20 INJECTION, SOLUTION INTRAVENOUS
Status: DISCONTINUED | OUTPATIENT
Start: 2021-11-16 | End: 2021-11-17 | Stop reason: HOSPADM

## 2021-11-16 RX ORDER — DILTIAZEM HYDROCHLORIDE 5 MG/ML
5-25 INJECTION INTRAVENOUS
Status: DISCONTINUED | OUTPATIENT
Start: 2021-11-16 | End: 2021-11-17 | Stop reason: HOSPADM

## 2021-11-16 RX ADMIN — NITROGLYCERIN 0.4 MG: 0.4 TABLET SUBLINGUAL at 07:00

## 2021-11-16 RX ADMIN — IOPAMIDOL 75 ML: 755 INJECTION, SOLUTION INTRAVENOUS at 07:26

## 2021-11-17 ENCOUNTER — TELEPHONE (OUTPATIENT)
Dept: VASCULAR SURGERY | Facility: CLINIC | Age: 81
End: 2021-11-17
Payer: COMMERCIAL

## 2021-11-17 NOTE — TELEPHONE ENCOUNTER
----- Message from Courtney Gray RN sent at 11/17/2021  1:54 PM CST -----  Regarding: RE: Hold on scheduling  Please follow up with pt if wants to proceed in December (due to surgery capacity) and when he would like to do it.       Notes from Cardiology angiogram.     Mild to moderate coronary disease is identified without severe obstruction.   Okay to proceed with vascular surgery from cardiac standpoint.   Follow up as previously planned.      ----- Message -----  From: Courtney Gray RN  Sent: 11/9/2021   4:42 PM CST  To: Vascular Surgery Communication Pool  Subject: RE: Hold on scheduling                           Cards is doing a coronary angio 11/16  ----- Message -----  From: Courtney Gray RN  Sent: 11/2/2021   2:21 PM CST  To: Courtney Gray RN, #  Subject: RE: Hold on scheduling                           Pt seeing card on the 18th.  ----- Message -----  From: Courtney Gray RN  Sent: 11/1/2021  11:08 AM CDT  To: Vascular Surgery Communication Pool  Subject: RE: Hold on scheduling                           Once hear from Cardiology with a plan will need  1. OR order  2. IR order  3. Update [employer] on Date  4. OK from IR on times.   ----- Message -----  From: Anna Cody RN  Sent: 11/1/2021  10:08 AM CDT  To: Vascular Surgery Communication Pool  Subject: Hold on scheduling                               Patient will be scheduled for EVAR beginning of December. Message sent to image resource and [employer] rep. Patient has a pacemaker and scheduled to see cardiology. Dr. Granger sent a msg to pt's cardiologist for clearance to see if a stress test or echo should be done.

## 2021-11-17 NOTE — TELEPHONE ENCOUNTER
Spoke with patient's wife due to patient having difficulties hearing on the phone. Patient and wife are aware that surgery has the potential to be canceled but [facility]. Told will call back with more specifics on surgery dates and go over instructions.

## 2021-11-18 ENCOUNTER — ANCILLARY PROCEDURE (OUTPATIENT)
Dept: CARDIOLOGY | Facility: CLINIC | Age: 81
End: 2021-11-18
Attending: INTERNAL MEDICINE
Payer: COMMERCIAL

## 2021-11-18 DIAGNOSIS — Z95.0 CARDIAC PACEMAKER: ICD-10-CM

## 2021-11-18 DIAGNOSIS — I48.20 CHRONIC ATRIAL FIBRILLATION (H): ICD-10-CM

## 2021-11-18 DIAGNOSIS — I49.5 SICK SINUS SYNDROME (H): Primary | ICD-10-CM

## 2021-11-18 LAB
MDC_IDC_LEAD_IMPLANT_DT: NORMAL
MDC_IDC_LEAD_LOCATION: NORMAL
MDC_IDC_LEAD_LOCATION_DETAIL_1: NORMAL
MDC_IDC_LEAD_MFG: NORMAL
MDC_IDC_LEAD_MODEL: NORMAL
MDC_IDC_LEAD_POLARITY_TYPE: NORMAL
MDC_IDC_LEAD_SERIAL: NORMAL
MDC_IDC_LEAD_SPECIAL_FUNCTION: NORMAL
MDC_IDC_MSMT_BATTERY_DTM: NORMAL
MDC_IDC_MSMT_BATTERY_REMAINING_LONGEVITY: 150 MO
MDC_IDC_MSMT_BATTERY_RRT_TRIGGER: 2.62
MDC_IDC_MSMT_BATTERY_STATUS: NORMAL
MDC_IDC_MSMT_BATTERY_VOLTAGE: 3.02 V
MDC_IDC_MSMT_LEADCHNL_RV_IMPEDANCE_VALUE: 437 OHM
MDC_IDC_MSMT_LEADCHNL_RV_IMPEDANCE_VALUE: 513 OHM
MDC_IDC_MSMT_LEADCHNL_RV_PACING_THRESHOLD_AMPLITUDE: 0.5 V
MDC_IDC_MSMT_LEADCHNL_RV_PACING_THRESHOLD_AMPLITUDE: 0.5 V
MDC_IDC_MSMT_LEADCHNL_RV_PACING_THRESHOLD_PULSEWIDTH: 0.4 MS
MDC_IDC_MSMT_LEADCHNL_RV_PACING_THRESHOLD_PULSEWIDTH: 0.4 MS
MDC_IDC_MSMT_LEADCHNL_RV_SENSING_INTR_AMPL: 10.12 MV
MDC_IDC_MSMT_LEADCHNL_RV_SENSING_INTR_AMPL: 10.38 MV
MDC_IDC_PG_IMPLANT_DTM: NORMAL
MDC_IDC_PG_MFG: NORMAL
MDC_IDC_PG_MODEL: NORMAL
MDC_IDC_PG_SERIAL: NORMAL
MDC_IDC_PG_TYPE: NORMAL
MDC_IDC_SESS_CLINIC_NAME: NORMAL
MDC_IDC_SESS_DTM: NORMAL
MDC_IDC_SESS_TYPE: NORMAL
MDC_IDC_SET_BRADY_HYSTRATE: NORMAL
MDC_IDC_SET_BRADY_LOWRATE: 60 {BEATS}/MIN
MDC_IDC_SET_BRADY_MAX_SENSOR_RATE: 130 {BEATS}/MIN
MDC_IDC_SET_BRADY_MODE: NORMAL
MDC_IDC_SET_LEADCHNL_RV_PACING_AMPLITUDE: NORMAL
MDC_IDC_SET_LEADCHNL_RV_PACING_ANODE_ELECTRODE_1: NORMAL
MDC_IDC_SET_LEADCHNL_RV_PACING_ANODE_LOCATION_1: NORMAL
MDC_IDC_SET_LEADCHNL_RV_PACING_CAPTURE_MODE: NORMAL
MDC_IDC_SET_LEADCHNL_RV_PACING_CATHODE_ELECTRODE_1: NORMAL
MDC_IDC_SET_LEADCHNL_RV_PACING_CATHODE_LOCATION_1: NORMAL
MDC_IDC_SET_LEADCHNL_RV_PACING_POLARITY: NORMAL
MDC_IDC_SET_LEADCHNL_RV_PACING_PULSEWIDTH: 0.4 MS
MDC_IDC_SET_LEADCHNL_RV_SENSING_ANODE_ELECTRODE_1: NORMAL
MDC_IDC_SET_LEADCHNL_RV_SENSING_ANODE_LOCATION_1: NORMAL
MDC_IDC_SET_LEADCHNL_RV_SENSING_CATHODE_ELECTRODE_1: NORMAL
MDC_IDC_SET_LEADCHNL_RV_SENSING_CATHODE_LOCATION_1: NORMAL
MDC_IDC_SET_LEADCHNL_RV_SENSING_POLARITY: NORMAL
MDC_IDC_SET_LEADCHNL_RV_SENSING_SENSITIVITY: 0.9 MV
MDC_IDC_SET_ZONE_DETECTION_INTERVAL: 360 MS
MDC_IDC_SET_ZONE_TYPE: NORMAL
MDC_IDC_STAT_BRADY_DTM_END: NORMAL
MDC_IDC_STAT_BRADY_DTM_START: NORMAL
MDC_IDC_STAT_BRADY_RV_PERCENT_PACED: 85.9 %
MDC_IDC_STAT_EPISODE_RECENT_COUNT: 0
MDC_IDC_STAT_EPISODE_RECENT_COUNT: 12
MDC_IDC_STAT_EPISODE_RECENT_COUNT_DTM_END: NORMAL
MDC_IDC_STAT_EPISODE_RECENT_COUNT_DTM_END: NORMAL
MDC_IDC_STAT_EPISODE_RECENT_COUNT_DTM_START: NORMAL
MDC_IDC_STAT_EPISODE_RECENT_COUNT_DTM_START: NORMAL
MDC_IDC_STAT_EPISODE_TOTAL_COUNT: 0
MDC_IDC_STAT_EPISODE_TOTAL_COUNT: 13
MDC_IDC_STAT_EPISODE_TOTAL_COUNT_DTM_END: NORMAL
MDC_IDC_STAT_EPISODE_TOTAL_COUNT_DTM_END: NORMAL
MDC_IDC_STAT_EPISODE_TOTAL_COUNT_DTM_START: NORMAL
MDC_IDC_STAT_EPISODE_TOTAL_COUNT_DTM_START: NORMAL
MDC_IDC_STAT_EPISODE_TYPE: NORMAL

## 2021-11-18 PROCEDURE — 93279 PRGRMG DEV EVAL PM/LDLS PM: CPT | Performed by: INTERNAL MEDICINE

## 2021-11-24 ENCOUNTER — DOCUMENTATION ONLY (OUTPATIENT)
Dept: VASCULAR SURGERY | Facility: CLINIC | Age: 81
End: 2021-11-24
Payer: COMMERCIAL

## 2021-11-24 DIAGNOSIS — I71.40 AAA (ABDOMINAL AORTIC ANEURYSM) WITHOUT RUPTURE (H): Primary | ICD-10-CM

## 2021-11-24 NOTE — PROGRESS NOTES
Message left for pt to call back to confirm this date will work for him.    Will send message to [employer] once pt confirms.     Surgery Scheduled      Surgery/Procedure: EVAR    Special Equipment: [employer]    Location: [facility]    Date: 12/31    Time: 9 am    Surgeon: Dr. Granger    OR Confirmed/ :  Yes with Jud on 11/24  Message sent to VALENTINO Gonzalez to confirm date and time    Orders In:  Yes    Entered on [employer] Calendar:  Yes    Post Op: 1/31    Covid Scheduled: 12/28 at 10    Reps Contacted: [employer]    Blood Thinners Addressed: N/A

## 2021-12-02 DIAGNOSIS — I71.40 AAA (ABDOMINAL AORTIC ANEURYSM) WITHOUT RUPTURE (H): Primary | ICD-10-CM

## 2021-12-03 DIAGNOSIS — Z11.59 ENCOUNTER FOR SCREENING FOR OTHER VIRAL DISEASES: ICD-10-CM

## 2021-12-21 ENCOUNTER — OFFICE VISIT (OUTPATIENT)
Dept: FAMILY MEDICINE | Facility: CLINIC | Age: 81
End: 2021-12-21
Payer: COMMERCIAL

## 2021-12-21 VITALS
BODY MASS INDEX: 28.03 KG/M2 | SYSTOLIC BLOOD PRESSURE: 144 MMHG | DIASTOLIC BLOOD PRESSURE: 84 MMHG | WEIGHT: 201 LBS | HEART RATE: 87 BPM | OXYGEN SATURATION: 96 %

## 2021-12-21 DIAGNOSIS — I10 ESSENTIAL HYPERTENSION: ICD-10-CM

## 2021-12-21 DIAGNOSIS — E55.9 VITAMIN D DEFICIENCY DISEASE: ICD-10-CM

## 2021-12-21 DIAGNOSIS — R79.1 ABNORMAL COAGULATION PROFILE: ICD-10-CM

## 2021-12-21 DIAGNOSIS — H90.3 BILATERAL SENSORINEURAL HEARING LOSS: ICD-10-CM

## 2021-12-21 DIAGNOSIS — D53.9 MACROCYTIC ANEMIA: ICD-10-CM

## 2021-12-21 DIAGNOSIS — Z01.818 PREOPERATIVE EXAMINATION: Primary | ICD-10-CM

## 2021-12-21 DIAGNOSIS — I48.21 PERMANENT ATRIAL FIBRILLATION (H): ICD-10-CM

## 2021-12-21 DIAGNOSIS — I71.40 ABDOMINAL AORTIC ANEURYSM (AAA) WITHOUT RUPTURE (H): ICD-10-CM

## 2021-12-21 DIAGNOSIS — Z95.0 CARDIAC PACEMAKER IN SITU: ICD-10-CM

## 2021-12-21 DIAGNOSIS — N18.32 STAGE 3B CHRONIC KIDNEY DISEASE (H): ICD-10-CM

## 2021-12-21 LAB
ABO/RH(D): NORMAL
ANTIBODY SCREEN: NEGATIVE
APTT PPP: 32 SECONDS (ref 22–38)
ERYTHROCYTE [DISTWIDTH] IN BLOOD BY AUTOMATED COUNT: 12.9 % (ref 10–15)
HCT VFR BLD AUTO: 39 % (ref 40–53)
HGB BLD-MCNC: 12.9 G/DL (ref 13.3–17.7)
INR PPP: 0.97 (ref 0.85–1.15)
MCH RBC QN AUTO: 33.8 PG (ref 26.5–33)
MCHC RBC AUTO-ENTMCNC: 33.1 G/DL (ref 31.5–36.5)
MCV RBC AUTO: 102 FL (ref 78–100)
PLATELET # BLD AUTO: 158 10E3/UL (ref 150–450)
RBC # BLD AUTO: 3.82 10E6/UL (ref 4.4–5.9)
SPECIMEN EXPIRATION DATE: NORMAL
WBC # BLD AUTO: 6.8 10E3/UL (ref 4–11)

## 2021-12-21 PROCEDURE — 86850 RBC ANTIBODY SCREEN: CPT | Performed by: FAMILY MEDICINE

## 2021-12-21 PROCEDURE — 86901 BLOOD TYPING SEROLOGIC RH(D): CPT | Performed by: FAMILY MEDICINE

## 2021-12-21 PROCEDURE — 85610 PROTHROMBIN TIME: CPT | Performed by: FAMILY MEDICINE

## 2021-12-21 PROCEDURE — 36415 COLL VENOUS BLD VENIPUNCTURE: CPT | Performed by: FAMILY MEDICINE

## 2021-12-21 PROCEDURE — 93005 ELECTROCARDIOGRAM TRACING: CPT | Performed by: FAMILY MEDICINE

## 2021-12-21 PROCEDURE — 99214 OFFICE O/P EST MOD 30 MIN: CPT | Performed by: FAMILY MEDICINE

## 2021-12-21 PROCEDURE — 80048 BASIC METABOLIC PNL TOTAL CA: CPT | Performed by: FAMILY MEDICINE

## 2021-12-21 PROCEDURE — 93010 ELECTROCARDIOGRAM REPORT: CPT | Performed by: INTERNAL MEDICINE

## 2021-12-21 PROCEDURE — 85027 COMPLETE CBC AUTOMATED: CPT | Performed by: FAMILY MEDICINE

## 2021-12-21 PROCEDURE — 82306 VITAMIN D 25 HYDROXY: CPT | Performed by: FAMILY MEDICINE

## 2021-12-21 PROCEDURE — 86900 BLOOD TYPING SEROLOGIC ABO: CPT | Performed by: FAMILY MEDICINE

## 2021-12-21 PROCEDURE — 85730 THROMBOPLASTIN TIME PARTIAL: CPT | Performed by: FAMILY MEDICINE
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
## 2021-12-21 NOTE — LETTER
December 22, 2021      Dereck Elliott  [address] [address]        Dear ,    We are writing to inform you of your test results.    Your vitamin D level is normal.  Continue your current management.     Your complete blood count results show stable mild anemia likely due to reduced kidney function as noted below.    Your kidney function remains reduced.  It appears stable.  Ensure adequate hydration.  We will continue to follow closely.     Resulted Orders   Vitamin D Deficiency   Result Value Ref Range    Vitamin D, Total (25-Hydroxy) 57 30 - 80 ug/L    Narrative    Deficiency <10.0 ug/L  Insufficiency 10.0-29.9 ug/L  Sufficiency 30.0-80.0 ug/L  Toxicity (possible) >100.0 ug/L    INR   Result Value Ref Range    INR 0.97 0.85 - 1.15   Partial thromboplastin time   Result Value Ref Range    aPTT 32 22 - 38 Seconds   CBC with platelets   Result Value Ref Range    WBC Count 6.8 4.0 - 11.0 10e3/uL    RBC Count 3.82 (L) 4.40 - 5.90 10e6/uL    Hemoglobin 12.9 (L) 13.3 - 17.7 g/dL    Hematocrit 39.0 (L) 40.0 - 53.0 %     (H) 78 - 100 fL    MCH 33.8 (H) 26.5 - 33.0 pg    MCHC 33.1 31.5 - 36.5 g/dL    RDW 12.9 10.0 - 15.0 %    Platelet Count 158 150 - 450 10e3/uL   Basic metabolic panel   Result Value Ref Range    Sodium 141 136 - 145 mmol/L    Potassium 4.9 3.5 - 5.0 mmol/L    Chloride 104 98 - 107 mmol/L    Carbon Dioxide (CO2) 27 22 - 31 mmol/L    Anion Gap 10 5 - 18 mmol/L    Urea Nitrogen 23 8 - 28 mg/dL    Creatinine 1.98 (H) 0.70 - 1.30 mg/dL    Calcium 10.0 8.5 - 10.5 mg/dL    Glucose 99 70 - 125 mg/dL    GFR Estimate 33 (L) >60 mL/min/1.73m2      Comment:      Effective December 21, 2021 eGFRcr in adults is calculated using the 2021 CKD-EPI creatinine equation which includes age and gender (Luis Fernando lopez al., NEJM, DOI: 10.1056/LSCSba5572210)   Adult Type and Screen   Result Value Ref Range    ABO/RH(D) A POS     Antibody Screen Negative Negative    SPECIMEN EXPIRATION DATE [id number]         If you have any questions or concerns, please call the clinic at the number listed above.       Sincerely,      Donald Machado MD

## 2021-12-21 NOTE — PATIENT INSTRUCTIONS
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
## 2021-12-22 DIAGNOSIS — Z11.59 ENCOUNTER FOR SCREENING FOR OTHER VIRAL DISEASES: ICD-10-CM

## 2021-12-22 LAB
ANION GAP SERPL CALCULATED.3IONS-SCNC: 10 MMOL/L (ref 5–18)
ATRIAL RATE - MUSE: 67 BPM
BUN SERPL-MCNC: 23 MG/DL (ref 8–28)
CALCIUM SERPL-MCNC: 10 MG/DL (ref 8.5–10.5)
CHLORIDE BLD-SCNC: 104 MMOL/L (ref 98–107)
CO2 SERPL-SCNC: 27 MMOL/L (ref 22–31)
CREAT SERPL-MCNC: 1.98 MG/DL (ref 0.7–1.3)
DEPRECATED CALCIDIOL+CALCIFEROL SERPL-MC: 57 UG/L (ref 30–80)
DIASTOLIC BLOOD PRESSURE - MUSE: NORMAL MMHG
GFR SERPL CREATININE-BSD FRML MDRD: 33 ML/MIN/1.73M2
GLUCOSE BLD-MCNC: 99 MG/DL (ref 70–125)
INTERPRETATION ECG - MUSE: NORMAL
P AXIS - MUSE: NORMAL DEGREES
POTASSIUM BLD-SCNC: 4.9 MMOL/L (ref 3.5–5)
PR INTERVAL - MUSE: NORMAL MS
QRS DURATION - MUSE: 160 MS
QT - MUSE: 456 MS
QTC - MUSE: 474 MS
R AXIS - MUSE: 53 DEGREES
SODIUM SERPL-SCNC: 141 MMOL/L (ref 136–145)
SYSTOLIC BLOOD PRESSURE - MUSE: NORMAL MMHG
T AXIS - MUSE: 230 DEGREES
VENTRICULAR RATE- MUSE: 65 BPM

## 2021-12-27 ENCOUNTER — LAB (OUTPATIENT)
Dept: LAB | Facility: CLINIC | Age: 81
End: 2021-12-27
Attending: SURGERY
Payer: COMMERCIAL

## 2021-12-27 DIAGNOSIS — Z11.59 ENCOUNTER FOR SCREENING FOR OTHER VIRAL DISEASES: ICD-10-CM

## 2021-12-27 LAB — SARS-COV-2 RNA RESP QL NAA+PROBE: NEGATIVE

## 2021-12-27 PROCEDURE — U0005 INFEC AGEN DETEC AMPLI PROBE: HCPCS

## 2021-12-27 PROCEDURE — U0003 INFECTIOUS AGENT DETECTION BY NUCLEIC ACID (DNA OR RNA); SEVERE ACUTE RESPIRATORY SYNDROME CORONAVIRUS 2 (SARS-COV-2) (CORONAVIRUS DISEASE [COVID-19]), AMPLIFIED PROBE TECHNIQUE, MAKING USE OF HIGH THROUGHPUT TECHNOLOGIES AS DESCRIBED BY CMS-2020-01-R: HCPCS

## 2021-12-28 ENCOUNTER — ANESTHESIA EVENT (OUTPATIENT)
Dept: SURGERY | Facility: HOSPITAL | Age: 81
DRG: 269 | End: 2021-12-28
Payer: COMMERCIAL

## 2021-12-29 ENCOUNTER — HOSPITAL ENCOUNTER (INPATIENT)
Facility: HOSPITAL | Age: 81
LOS: 1 days | Discharge: HOME | DRG: 269 | End: 2021-12-30
Attending: SURGERY | Admitting: SURGERY
Payer: COMMERCIAL

## 2021-12-29 ENCOUNTER — APPOINTMENT (OUTPATIENT)
Dept: RADIOLOGY | Facility: HOSPITAL | Age: 81
DRG: 269 | End: 2021-12-29
Attending: SURGERY
Payer: COMMERCIAL

## 2021-12-29 ENCOUNTER — DOCUMENTATION ONLY (OUTPATIENT)
Dept: OTHER | Facility: CLINIC | Age: 81
End: 2021-12-29

## 2021-12-29 ENCOUNTER — HOSPITAL ENCOUNTER (OUTPATIENT)
Dept: INTERVENTIONAL RADIOLOGY/VASCULAR | Facility: HOSPITAL | Age: 81
DRG: 269 | End: 2021-12-29
Attending: SURGERY | Admitting: SURGERY
Payer: COMMERCIAL

## 2021-12-29 ENCOUNTER — ANESTHESIA (OUTPATIENT)
Dept: SURGERY | Facility: HOSPITAL | Age: 81
DRG: 269 | End: 2021-12-29
Payer: COMMERCIAL

## 2021-12-29 DIAGNOSIS — I71.40 AAA (ABDOMINAL AORTIC ANEURYSM) WITHOUT RUPTURE (H): ICD-10-CM

## 2021-12-29 LAB
ABO/RH(D): NORMAL
ANION GAP SERPL CALCULATED.3IONS-SCNC: 9 MMOL/L (ref 5–18)
ANTIBODY SCREEN: NEGATIVE
APTT PPP: 27 SECONDS (ref 22–38)
BLD PROD TYP BPU: NORMAL
BLD PROD TYP BPU: NORMAL
BLOOD COMPONENT TYPE: NORMAL
BLOOD COMPONENT TYPE: NORMAL
BUN SERPL-MCNC: 26 MG/DL (ref 8–28)
CALCIUM SERPL-MCNC: 9.7 MG/DL (ref 8.5–10.5)
CHLORIDE BLD-SCNC: 106 MMOL/L (ref 98–107)
CO2 SERPL-SCNC: 25 MMOL/L (ref 22–31)
CODING SYSTEM: NORMAL
CODING SYSTEM: NORMAL
CREAT SERPL-MCNC: 1.92 MG/DL (ref 0.7–1.3)
CROSSMATCH: NORMAL
CROSSMATCH: NORMAL
ERYTHROCYTE [DISTWIDTH] IN BLOOD BY AUTOMATED COUNT: 13.2 % (ref 10–15)
GFR SERPL CREATININE-BSD FRML MDRD: 34 ML/MIN/1.73M2
GLUCOSE BLD-MCNC: 95 MG/DL (ref 70–125)
HCT VFR BLD AUTO: 38.1 % (ref 40–53)
HGB BLD-MCNC: 12.4 G/DL (ref 13.3–17.7)
INR PPP: 1.08 (ref 0.85–1.15)
MCH RBC QN AUTO: 33.5 PG (ref 26.5–33)
MCHC RBC AUTO-ENTMCNC: 32.5 G/DL (ref 31.5–36.5)
MCV RBC AUTO: 103 FL (ref 78–100)
PLATELET # BLD AUTO: 158 10E3/UL (ref 150–450)
PLATELET # BLD AUTO: 177 10E3/UL (ref 150–450)
POTASSIUM BLD-SCNC: 4.8 MMOL/L (ref 3.5–5)
RBC # BLD AUTO: 3.7 10E6/UL (ref 4.4–5.9)
SODIUM SERPL-SCNC: 140 MMOL/L (ref 136–145)
SPECIMEN EXPIRATION DATE: NORMAL
SPECIMEN EXPIRATION DATE: NORMAL
UNIT ABO/RH: NORMAL
UNIT ABO/RH: NORMAL
UNIT NUMBER: NORMAL
UNIT NUMBER: NORMAL
UNIT STATUS: NORMAL
UNIT STATUS: NORMAL
UNIT TYPE ISBT: 6200
UNIT TYPE ISBT: 6200
WBC # BLD AUTO: 7.6 10E3/UL (ref 4–11)

## 2021-12-29 PROCEDURE — 360N000084 HC SURGERY LEVEL 4 W/ FLUORO, PER MIN: Performed by: SURGERY

## 2021-12-29 PROCEDURE — 85610 PROTHROMBIN TIME: CPT | Performed by: SURGERY

## 2021-12-29 PROCEDURE — 86850 RBC ANTIBODY SCREEN: CPT | Performed by: NURSE ANESTHETIST, CERTIFIED REGISTERED

## 2021-12-29 PROCEDURE — 36415 COLL VENOUS BLD VENIPUNCTURE: CPT | Performed by: SURGERY

## 2021-12-29 PROCEDURE — 999N000083 HC STATISTIC IR STAFF TIME IN THE OR

## 2021-12-29 PROCEDURE — 86923 COMPATIBILITY TEST ELECTRIC: CPT | Performed by: NURSE ANESTHETIST, CERTIFIED REGISTERED

## 2021-12-29 PROCEDURE — 85730 THROMBOPLASTIN TIME PARTIAL: CPT | Performed by: SURGERY

## 2021-12-29 PROCEDURE — 250N000011 HC RX IP 250 OP 636: Performed by: SURGERY

## 2021-12-29 PROCEDURE — C1725 CATH, TRANSLUMIN NON-LASER: HCPCS | Performed by: SURGERY

## 2021-12-29 PROCEDURE — 250N000011 HC RX IP 250 OP 636: Performed by: NURSE ANESTHETIST, CERTIFIED REGISTERED

## 2021-12-29 PROCEDURE — 278N000051 HC OR IMPLANT GENERAL: Performed by: SURGERY

## 2021-12-29 PROCEDURE — 258N000003 HC RX IP 258 OP 636: Performed by: NURSE ANESTHETIST, CERTIFIED REGISTERED

## 2021-12-29 PROCEDURE — 34713 PERQ ACCESS & CLSR FEM ART: CPT | Mod: 59 | Performed by: SURGERY

## 2021-12-29 PROCEDURE — 120N000001 HC R&B MED SURG/OB

## 2021-12-29 PROCEDURE — 258N000003 HC RX IP 258 OP 636: Performed by: SURGERY

## 2021-12-29 PROCEDURE — 250N000009 HC RX 250: Performed by: NURSE ANESTHETIST, CERTIFIED REGISTERED

## 2021-12-29 PROCEDURE — 250N000013 HC RX MED GY IP 250 OP 250 PS 637: Performed by: INTERNAL MEDICINE

## 2021-12-29 PROCEDURE — 85027 COMPLETE CBC AUTOMATED: CPT | Performed by: SURGERY

## 2021-12-29 PROCEDURE — 250N000025 HC SEVOFLURANE, PER MIN: Performed by: SURGERY

## 2021-12-29 PROCEDURE — 85049 AUTOMATED PLATELET COUNT: CPT | Performed by: SURGERY

## 2021-12-29 PROCEDURE — 710N000010 HC RECOVERY PHASE 1, LEVEL 2, PER MIN: Performed by: SURGERY

## 2021-12-29 PROCEDURE — 99221 1ST HOSP IP/OBS SF/LOW 40: CPT | Performed by: INTERNAL MEDICINE

## 2021-12-29 PROCEDURE — 999N000141 HC STATISTIC PRE-PROCEDURE NURSING ASSESSMENT: Performed by: SURGERY

## 2021-12-29 PROCEDURE — 272N000001 HC OR GENERAL SUPPLY STERILE: Performed by: SURGERY

## 2021-12-29 PROCEDURE — 250N000011 HC RX IP 250 OP 636: Performed by: ANESTHESIOLOGY

## 2021-12-29 PROCEDURE — 370N000017 HC ANESTHESIA TECHNICAL FEE, PER MIN: Performed by: SURGERY

## 2021-12-29 PROCEDURE — C1769 GUIDE WIRE: HCPCS | Performed by: SURGERY

## 2021-12-29 PROCEDURE — 250N000013 HC RX MED GY IP 250 OP 250 PS 637: Performed by: SURGERY

## 2021-12-29 PROCEDURE — C1760 CLOSURE DEV, VASC: HCPCS | Performed by: SURGERY

## 2021-12-29 PROCEDURE — C1887 CATHETER, GUIDING: HCPCS | Performed by: SURGERY

## 2021-12-29 PROCEDURE — 255N000002 HC RX 255 OP 636: Performed by: SURGERY

## 2021-12-29 PROCEDURE — 04V03DZ RESTRICTION OF ABDOMINAL AORTA WITH INTRALUMINAL DEVICE, PERCUTANEOUS APPROACH: ICD-10-PCS | Performed by: SURGERY

## 2021-12-29 PROCEDURE — 258N000003 HC RX IP 258 OP 636: Performed by: ANESTHESIOLOGY

## 2021-12-29 PROCEDURE — 250N000013 HC RX MED GY IP 250 OP 250 PS 637: Performed by: ANESTHESIOLOGY

## 2021-12-29 PROCEDURE — C1894 INTRO/SHEATH, NON-LASER: HCPCS | Performed by: SURGERY

## 2021-12-29 PROCEDURE — 80048 BASIC METABOLIC PNL TOTAL CA: CPT | Performed by: SURGERY

## 2021-12-29 PROCEDURE — 999N000182 XR SURGERY CARM FLUORO GREATER THAN 5 MIN

## 2021-12-29 PROCEDURE — 250N000009 HC RX 250: Performed by: ANESTHESIOLOGY

## 2021-12-29 PROCEDURE — 86901 BLOOD TYPING SEROLOGIC RH(D): CPT | Performed by: SURGERY

## 2021-12-29 PROCEDURE — 34705 EVAC RPR A-BIILIAC NDGFT: CPT | Performed by: SURGERY

## 2021-12-29 DEVICE — STENT GRAFT ETLW1620C124E ENDUR II LIMB
Type: IMPLANTABLE DEVICE | Site: ILIAC/FEMORALS | Status: FUNCTIONAL
Brand: ENDURANT® II

## 2021-12-29 RX ORDER — HYDROMORPHONE HCL IN WATER/PF 6 MG/30 ML
0.4 PATIENT CONTROLLED ANALGESIA SYRINGE INTRAVENOUS EVERY 5 MIN PRN
Status: DISCONTINUED | OUTPATIENT
Start: 2021-12-29 | End: 2021-12-29 | Stop reason: HOSPADM

## 2021-12-29 RX ORDER — METOPROLOL SUCCINATE 25 MG/1
50 TABLET, EXTENDED RELEASE ORAL DAILY
Status: DISCONTINUED | OUTPATIENT
Start: 2021-12-30 | End: 2021-12-30 | Stop reason: HOSPADM

## 2021-12-29 RX ORDER — CEFAZOLIN SODIUM 2 G/100ML
2 INJECTION, SOLUTION INTRAVENOUS EVERY 8 HOURS
Status: COMPLETED | OUTPATIENT
Start: 2021-12-29 | End: 2021-12-30

## 2021-12-29 RX ORDER — HEPARIN SODIUM 1000 [USP'U]/ML
INJECTION, SOLUTION INTRAVENOUS; SUBCUTANEOUS PRN
Status: DISCONTINUED | OUTPATIENT
Start: 2021-12-29 | End: 2021-12-29

## 2021-12-29 RX ORDER — SODIUM CHLORIDE, SODIUM LACTATE, POTASSIUM CHLORIDE, CALCIUM CHLORIDE 600; 310; 30; 20 MG/100ML; MG/100ML; MG/100ML; MG/100ML
INJECTION, SOLUTION INTRAVENOUS CONTINUOUS
Status: DISCONTINUED | OUTPATIENT
Start: 2021-12-29 | End: 2021-12-29 | Stop reason: HOSPADM

## 2021-12-29 RX ORDER — DEXMEDETOMIDINE HYDROCHLORIDE 4 UG/ML
INJECTION, SOLUTION INTRAVENOUS
Status: DISCONTINUED
Start: 2021-12-29 | End: 2021-12-29 | Stop reason: HOSPADM

## 2021-12-29 RX ORDER — ACETAMINOPHEN 325 MG/1
650 TABLET ORAL EVERY 4 HOURS PRN
Status: DISCONTINUED | OUTPATIENT
Start: 2022-01-01 | End: 2021-12-30 | Stop reason: HOSPADM

## 2021-12-29 RX ORDER — CEFAZOLIN SODIUM 2 G/100ML
2 INJECTION, SOLUTION INTRAVENOUS SEE ADMIN INSTRUCTIONS
Status: DISCONTINUED | OUTPATIENT
Start: 2021-12-29 | End: 2021-12-29 | Stop reason: HOSPADM

## 2021-12-29 RX ORDER — METOPROLOL SUCCINATE 25 MG/1
50 TABLET, EXTENDED RELEASE ORAL DAILY
Status: DISCONTINUED | OUTPATIENT
Start: 2021-12-30 | End: 2021-12-29

## 2021-12-29 RX ORDER — PROTAMINE SULFATE 10 MG/ML
INJECTION, SOLUTION INTRAVENOUS PRN
Status: DISCONTINUED | OUTPATIENT
Start: 2021-12-29 | End: 2021-12-29

## 2021-12-29 RX ORDER — UBIDECARENONE 75 MG
100 CAPSULE ORAL DAILY
Status: DISCONTINUED | OUTPATIENT
Start: 2021-12-29 | End: 2021-12-30 | Stop reason: HOSPADM

## 2021-12-29 RX ORDER — LIDOCAINE HYDROCHLORIDE 10 MG/ML
INJECTION, SOLUTION INFILTRATION; PERINEURAL
Status: COMPLETED | OUTPATIENT
Start: 2021-12-29 | End: 2021-12-29

## 2021-12-29 RX ORDER — DEXAMETHASONE SODIUM PHOSPHATE 10 MG/ML
INJECTION, SOLUTION INTRAMUSCULAR; INTRAVENOUS PRN
Status: DISCONTINUED | OUTPATIENT
Start: 2021-12-29 | End: 2021-12-29

## 2021-12-29 RX ORDER — NALOXONE HYDROCHLORIDE 0.4 MG/ML
0.2 INJECTION, SOLUTION INTRAMUSCULAR; INTRAVENOUS; SUBCUTANEOUS
Status: DISCONTINUED | OUTPATIENT
Start: 2021-12-29 | End: 2021-12-29

## 2021-12-29 RX ORDER — NALOXONE HYDROCHLORIDE 0.4 MG/ML
0.4 INJECTION, SOLUTION INTRAMUSCULAR; INTRAVENOUS; SUBCUTANEOUS
Status: DISCONTINUED | OUTPATIENT
Start: 2021-12-29 | End: 2021-12-30 | Stop reason: HOSPADM

## 2021-12-29 RX ORDER — DEXMEDETOMIDINE HYDROCHLORIDE 4 UG/ML
.1-1.2 INJECTION, SOLUTION INTRAVENOUS CONTINUOUS
Status: DISCONTINUED | OUTPATIENT
Start: 2021-12-29 | End: 2021-12-29

## 2021-12-29 RX ORDER — LORAZEPAM 2 MG/ML
.5-1 INJECTION INTRAMUSCULAR
Status: DISCONTINUED | OUTPATIENT
Start: 2021-12-29 | End: 2021-12-29 | Stop reason: HOSPADM

## 2021-12-29 RX ORDER — AMLODIPINE BESYLATE 2.5 MG/1
2.5 TABLET ORAL DAILY
Status: DISCONTINUED | OUTPATIENT
Start: 2021-12-29 | End: 2021-12-30 | Stop reason: HOSPADM

## 2021-12-29 RX ORDER — FENTANYL CITRATE 50 UG/ML
INJECTION, SOLUTION INTRAMUSCULAR; INTRAVENOUS PRN
Status: DISCONTINUED | OUTPATIENT
Start: 2021-12-29 | End: 2021-12-29

## 2021-12-29 RX ORDER — SODIUM CHLORIDE 9 MG/ML
INJECTION, SOLUTION INTRAVENOUS CONTINUOUS PRN
Status: DISCONTINUED | OUTPATIENT
Start: 2021-12-29 | End: 2021-12-29

## 2021-12-29 RX ORDER — FENTANYL CITRATE 50 UG/ML
25 INJECTION, SOLUTION INTRAMUSCULAR; INTRAVENOUS EVERY 5 MIN PRN
Status: DISCONTINUED | OUTPATIENT
Start: 2021-12-29 | End: 2021-12-29 | Stop reason: HOSPADM

## 2021-12-29 RX ORDER — HEPARIN SODIUM 5000 [USP'U]/.5ML
5000 INJECTION, SOLUTION INTRAVENOUS; SUBCUTANEOUS EVERY 8 HOURS
Status: DISCONTINUED | OUTPATIENT
Start: 2021-12-30 | End: 2021-12-30 | Stop reason: HOSPADM

## 2021-12-29 RX ORDER — ACETAMINOPHEN 325 MG/1
975 TABLET ORAL ONCE
Status: COMPLETED | OUTPATIENT
Start: 2021-12-29 | End: 2021-12-29

## 2021-12-29 RX ORDER — ONDANSETRON 4 MG/1
4 TABLET, ORALLY DISINTEGRATING ORAL EVERY 30 MIN PRN
Status: DISCONTINUED | OUTPATIENT
Start: 2021-12-29 | End: 2021-12-29 | Stop reason: HOSPADM

## 2021-12-29 RX ORDER — PHENYLEPHRINE HCL IN 0.9% NACL 50MG/250ML
.1-6 PLASTIC BAG, INJECTION (ML) INTRAVENOUS CONTINUOUS
Status: DISCONTINUED | OUTPATIENT
Start: 2021-12-29 | End: 2021-12-29

## 2021-12-29 RX ORDER — HYDRALAZINE HYDROCHLORIDE 20 MG/ML
5 INJECTION INTRAMUSCULAR; INTRAVENOUS EVERY 6 HOURS PRN
Status: DISCONTINUED | OUTPATIENT
Start: 2021-12-29 | End: 2021-12-30 | Stop reason: HOSPADM

## 2021-12-29 RX ORDER — NALOXONE HYDROCHLORIDE 0.4 MG/ML
0.4 INJECTION, SOLUTION INTRAMUSCULAR; INTRAVENOUS; SUBCUTANEOUS
Status: DISCONTINUED | OUTPATIENT
Start: 2021-12-29 | End: 2021-12-29

## 2021-12-29 RX ORDER — FENTANYL CITRATE 50 UG/ML
50 INJECTION, SOLUTION INTRAMUSCULAR; INTRAVENOUS
Status: DISCONTINUED | OUTPATIENT
Start: 2021-12-29 | End: 2021-12-29 | Stop reason: HOSPADM

## 2021-12-29 RX ORDER — MAGNESIUM HYDROXIDE/ALUMINUM HYDROXICE/SIMETHICONE 120; 1200; 1200 MG/30ML; MG/30ML; MG/30ML
30 SUSPENSION ORAL EVERY 4 HOURS PRN
Status: DISCONTINUED | OUTPATIENT
Start: 2021-12-29 | End: 2021-12-30 | Stop reason: HOSPADM

## 2021-12-29 RX ORDER — AMLODIPINE BESYLATE 2.5 MG/1
2.5 TABLET ORAL DAILY
Status: DISCONTINUED | OUTPATIENT
Start: 2021-12-30 | End: 2021-12-29

## 2021-12-29 RX ORDER — ONDANSETRON 2 MG/ML
4 INJECTION INTRAMUSCULAR; INTRAVENOUS EVERY 6 HOURS PRN
Status: DISCONTINUED | OUTPATIENT
Start: 2021-12-29 | End: 2021-12-30 | Stop reason: HOSPADM

## 2021-12-29 RX ORDER — ACETAMINOPHEN 325 MG/1
975 TABLET ORAL EVERY 8 HOURS
Status: DISCONTINUED | OUTPATIENT
Start: 2021-12-29 | End: 2021-12-30 | Stop reason: HOSPADM

## 2021-12-29 RX ORDER — SUCCINYLCHOLINE/SOD CL,ISO/PF 100 MG/5ML
SYRINGE (ML) INTRAVENOUS PRN
Status: DISCONTINUED | OUTPATIENT
Start: 2021-12-29 | End: 2021-12-29

## 2021-12-29 RX ORDER — BISACODYL 10 MG
10 SUPPOSITORY, RECTAL RECTAL DAILY PRN
Status: DISCONTINUED | OUTPATIENT
Start: 2021-12-29 | End: 2021-12-30 | Stop reason: HOSPADM

## 2021-12-29 RX ORDER — LOSARTAN POTASSIUM 25 MG/1
25 TABLET ORAL DAILY
Status: DISCONTINUED | OUTPATIENT
Start: 2021-12-29 | End: 2021-12-30 | Stop reason: HOSPADM

## 2021-12-29 RX ORDER — MAGNESIUM SULFATE 4 G/50ML
4 INJECTION INTRAVENOUS ONCE
Status: DISCONTINUED | OUTPATIENT
Start: 2021-12-29 | End: 2021-12-29

## 2021-12-29 RX ORDER — GABAPENTIN 100 MG/1
100 CAPSULE ORAL AT BEDTIME
Status: DISCONTINUED | OUTPATIENT
Start: 2021-12-29 | End: 2021-12-30 | Stop reason: HOSPADM

## 2021-12-29 RX ORDER — OXYCODONE HYDROCHLORIDE 5 MG/1
5 TABLET ORAL EVERY 4 HOURS PRN
Status: DISCONTINUED | OUTPATIENT
Start: 2021-12-29 | End: 2021-12-29 | Stop reason: HOSPADM

## 2021-12-29 RX ORDER — KETAMINE HYDROCHLORIDE 50 MG/ML
INJECTION, SOLUTION INTRAMUSCULAR; INTRAVENOUS PRN
Status: DISCONTINUED | OUTPATIENT
Start: 2021-12-29 | End: 2021-12-29

## 2021-12-29 RX ORDER — HALOPERIDOL 5 MG/ML
1 INJECTION INTRAMUSCULAR
Status: DISCONTINUED | OUTPATIENT
Start: 2021-12-29 | End: 2021-12-29 | Stop reason: HOSPADM

## 2021-12-29 RX ORDER — LOSARTAN POTASSIUM 25 MG/1
25 TABLET ORAL DAILY
Status: DISCONTINUED | OUTPATIENT
Start: 2021-12-30 | End: 2021-12-29

## 2021-12-29 RX ORDER — SODIUM CHLORIDE, SODIUM LACTATE, POTASSIUM CHLORIDE, CALCIUM CHLORIDE 600; 310; 30; 20 MG/100ML; MG/100ML; MG/100ML; MG/100ML
INJECTION, SOLUTION INTRAVENOUS CONTINUOUS
Status: DISCONTINUED | OUTPATIENT
Start: 2021-12-29 | End: 2021-12-30 | Stop reason: HOSPADM

## 2021-12-29 RX ORDER — LIDOCAINE HYDROCHLORIDE 20 MG/ML
INJECTION, SOLUTION INFILTRATION; PERINEURAL PRN
Status: DISCONTINUED | OUTPATIENT
Start: 2021-12-29 | End: 2021-12-29

## 2021-12-29 RX ORDER — VITAMIN B COMPLEX
2000 TABLET ORAL DAILY
Status: DISCONTINUED | OUTPATIENT
Start: 2021-12-29 | End: 2021-12-30 | Stop reason: HOSPADM

## 2021-12-29 RX ORDER — CEFAZOLIN SODIUM 2 G/100ML
2 INJECTION, SOLUTION INTRAVENOUS
Status: COMPLETED | OUTPATIENT
Start: 2021-12-29 | End: 2021-12-29

## 2021-12-29 RX ORDER — ONDANSETRON 4 MG/1
4 TABLET, ORALLY DISINTEGRATING ORAL EVERY 6 HOURS PRN
Status: DISCONTINUED | OUTPATIENT
Start: 2021-12-29 | End: 2021-12-30 | Stop reason: HOSPADM

## 2021-12-29 RX ORDER — NALOXONE HYDROCHLORIDE 0.4 MG/ML
0.2 INJECTION, SOLUTION INTRAMUSCULAR; INTRAVENOUS; SUBCUTANEOUS
Status: DISCONTINUED | OUTPATIENT
Start: 2021-12-29 | End: 2021-12-30 | Stop reason: HOSPADM

## 2021-12-29 RX ORDER — LIDOCAINE 40 MG/G
CREAM TOPICAL
Status: DISCONTINUED | OUTPATIENT
Start: 2021-12-29 | End: 2021-12-29 | Stop reason: HOSPADM

## 2021-12-29 RX ORDER — HEPARIN SODIUM 200 [USP'U]/100ML
INJECTION, SOLUTION INTRAVENOUS PRN
Status: DISCONTINUED | OUTPATIENT
Start: 2021-12-29 | End: 2021-12-29 | Stop reason: HOSPADM

## 2021-12-29 RX ORDER — PROPOFOL 10 MG/ML
INJECTION, EMULSION INTRAVENOUS PRN
Status: DISCONTINUED | OUTPATIENT
Start: 2021-12-29 | End: 2021-12-29

## 2021-12-29 RX ORDER — POLYETHYLENE GLYCOL 3350 17 G/17G
17 POWDER, FOR SOLUTION ORAL DAILY
Status: DISCONTINUED | OUTPATIENT
Start: 2021-12-30 | End: 2021-12-30 | Stop reason: HOSPADM

## 2021-12-29 RX ORDER — AMOXICILLIN 250 MG
1 CAPSULE ORAL 2 TIMES DAILY
Status: DISCONTINUED | OUTPATIENT
Start: 2021-12-29 | End: 2021-12-30 | Stop reason: HOSPADM

## 2021-12-29 RX ORDER — LIDOCAINE 40 MG/G
CREAM TOPICAL
Status: DISCONTINUED | OUTPATIENT
Start: 2021-12-29 | End: 2021-12-30 | Stop reason: HOSPADM

## 2021-12-29 RX ORDER — LIDOCAINE 40 MG/G
CREAM TOPICAL
Status: DISCONTINUED | OUTPATIENT
Start: 2021-12-29 | End: 2021-12-29

## 2021-12-29 RX ORDER — ASPIRIN 81 MG/1
81 TABLET ORAL DAILY
Status: DISCONTINUED | OUTPATIENT
Start: 2021-12-30 | End: 2021-12-30 | Stop reason: HOSPADM

## 2021-12-29 RX ORDER — ONDANSETRON 2 MG/ML
INJECTION INTRAMUSCULAR; INTRAVENOUS PRN
Status: DISCONTINUED | OUTPATIENT
Start: 2021-12-29 | End: 2021-12-29

## 2021-12-29 RX ORDER — PROCHLORPERAZINE MALEATE 5 MG
5 TABLET ORAL EVERY 6 HOURS PRN
Status: DISCONTINUED | OUTPATIENT
Start: 2021-12-29 | End: 2021-12-30 | Stop reason: HOSPADM

## 2021-12-29 RX ORDER — ONDANSETRON 2 MG/ML
4 INJECTION INTRAMUSCULAR; INTRAVENOUS EVERY 30 MIN PRN
Status: DISCONTINUED | OUTPATIENT
Start: 2021-12-29 | End: 2021-12-29 | Stop reason: HOSPADM

## 2021-12-29 RX ADMIN — ROCURONIUM BROMIDE 50 MG: 50 INJECTION, SOLUTION INTRAVENOUS at 12:23

## 2021-12-29 RX ADMIN — LOSARTAN POTASSIUM 25 MG: 25 TABLET, FILM COATED ORAL at 22:10

## 2021-12-29 RX ADMIN — FENTANYL CITRATE 50 MCG: 50 INJECTION, SOLUTION INTRAMUSCULAR; INTRAVENOUS at 11:36

## 2021-12-29 RX ADMIN — PROPOFOL 150 MG: 10 INJECTION, EMULSION INTRAVENOUS at 11:36

## 2021-12-29 RX ADMIN — ACETAMINOPHEN 975 MG: 325 TABLET ORAL at 19:40

## 2021-12-29 RX ADMIN — DEXAMETHASONE SODIUM PHOSPHATE 10 MG: 10 INJECTION, SOLUTION INTRAMUSCULAR; INTRAVENOUS at 12:10

## 2021-12-29 RX ADMIN — HEPARIN SODIUM 2000 UNITS: 1000 INJECTION, SOLUTION INTRAVENOUS; SUBCUTANEOUS at 13:36

## 2021-12-29 RX ADMIN — SODIUM CHLORIDE, POTASSIUM CHLORIDE, SODIUM LACTATE AND CALCIUM CHLORIDE: 600; 310; 30; 20 INJECTION, SOLUTION INTRAVENOUS at 09:30

## 2021-12-29 RX ADMIN — DOCUSATE SODIUM 50 MG AND SENNOSIDES 8.6 MG 1 TABLET: 8.6; 5 TABLET, FILM COATED ORAL at 22:10

## 2021-12-29 RX ADMIN — SODIUM CHLORIDE, POTASSIUM CHLORIDE, SODIUM LACTATE AND CALCIUM CHLORIDE: 600; 310; 30; 20 INJECTION, SOLUTION INTRAVENOUS at 13:29

## 2021-12-29 RX ADMIN — PHENYLEPHRINE HYDROCHLORIDE 0.4 MCG/KG/MIN: 10 INJECTION INTRAVENOUS at 11:43

## 2021-12-29 RX ADMIN — ONDANSETRON 4 MG: 2 INJECTION INTRAMUSCULAR; INTRAVENOUS at 14:10

## 2021-12-29 RX ADMIN — LIDOCAINE HYDROCHLORIDE 60 MG: 20 INJECTION, SOLUTION INFILTRATION; PERINEURAL at 11:36

## 2021-12-29 RX ADMIN — HEPARIN SODIUM 10000 UNITS: 1000 INJECTION, SOLUTION INTRAVENOUS; SUBCUTANEOUS at 12:50

## 2021-12-29 RX ADMIN — PROPOFOL 50 MG: 10 INJECTION, EMULSION INTRAVENOUS at 11:51

## 2021-12-29 RX ADMIN — PROTAMINE SULFATE 50 MG: 10 INJECTION, SOLUTION INTRAVENOUS at 14:10

## 2021-12-29 RX ADMIN — IOHEXOL 40 ML: 350 INJECTION, SOLUTION INTRAVENOUS at 15:34

## 2021-12-29 RX ADMIN — ROCURONIUM BROMIDE 10 MG: 50 INJECTION, SOLUTION INTRAVENOUS at 13:15

## 2021-12-29 RX ADMIN — LIDOCAINE HYDROCHLORIDE 2 ML: 10 INJECTION, SOLUTION INFILTRATION; PERINEURAL at 10:07

## 2021-12-29 RX ADMIN — ROCURONIUM BROMIDE 20 MG: 50 INJECTION, SOLUTION INTRAVENOUS at 13:04

## 2021-12-29 RX ADMIN — SODIUM CHLORIDE: 9 INJECTION, SOLUTION INTRAVENOUS at 11:45

## 2021-12-29 RX ADMIN — KETAMINE HYDROCHLORIDE 25 MG: 50 INJECTION, SOLUTION INTRAMUSCULAR; INTRAVENOUS at 11:36

## 2021-12-29 RX ADMIN — Medication 2000 UNITS: at 22:10

## 2021-12-29 RX ADMIN — CEFAZOLIN SODIUM 2 G: 2 INJECTION, SOLUTION INTRAVENOUS at 22:04

## 2021-12-29 RX ADMIN — Medication 100 MCG: at 22:10

## 2021-12-29 RX ADMIN — SODIUM CHLORIDE, POTASSIUM CHLORIDE, SODIUM LACTATE AND CALCIUM CHLORIDE: 600; 310; 30; 20 INJECTION, SOLUTION INTRAVENOUS at 18:37

## 2021-12-29 RX ADMIN — PHENYLEPHRINE HYDROCHLORIDE 100 MCG: 10 INJECTION INTRAVENOUS at 11:43

## 2021-12-29 RX ADMIN — CEFAZOLIN SODIUM 2 G: 2 INJECTION, SOLUTION INTRAVENOUS at 12:10

## 2021-12-29 RX ADMIN — FENTANYL CITRATE 50 MCG: 50 INJECTION INTRAMUSCULAR; INTRAVENOUS at 10:05

## 2021-12-29 RX ADMIN — Medication 80 MG: at 11:36

## 2021-12-29 RX ADMIN — ROCURONIUM BROMIDE 10 MG: 50 INJECTION, SOLUTION INTRAVENOUS at 12:45

## 2021-12-29 RX ADMIN — MIDAZOLAM HYDROCHLORIDE 1 MG: 1 INJECTION, SOLUTION INTRAMUSCULAR; INTRAVENOUS at 10:05

## 2021-12-29 RX ADMIN — AMLODIPINE BESYLATE 2.5 MG: 2.5 TABLET ORAL at 22:10

## 2021-12-29 RX ADMIN — ACETAMINOPHEN 975 MG: 325 TABLET ORAL at 09:32

## 2021-12-29 RX ADMIN — GABAPENTIN 100 MG: 100 CAPSULE ORAL at 22:09

## 2021-12-29 RX ADMIN — DEXMEDETOMIDINE HYDROCHLORIDE 0.3 MCG/KG/HR: 4 INJECTION, SOLUTION INTRAVENOUS at 12:13

## 2021-12-29 RX ADMIN — SUGAMMADEX 200 MG: 100 INJECTION, SOLUTION INTRAVENOUS at 14:21

## 2021-12-29 ASSESSMENT — ACTIVITIES OF DAILY LIVING (ADL)
ADLS_ACUITY_SCORE: 5
ADLS_ACUITY_SCORE: 6
ADLS_ACUITY_SCORE: 5
ADLS_ACUITY_SCORE: 6
ADLS_ACUITY_SCORE: 6
ADLS_ACUITY_SCORE: 5

## 2021-12-29 ASSESSMENT — MIFFLIN-ST. JEOR: SCORE: 1622.06

## 2021-12-29 NOTE — INTERVAL H&P NOTE
I have reviewed the surgical (or preoperative) H&P that is linked to this encounter, and examined the patient. There are no significant changes  Will proceed with EVAR. Risks including MI, stroke, paraplegia, groin bleeding, and death were discussed, and patient agreed to proceed

## 2021-12-29 NOTE — ANESTHESIA PROCEDURE NOTES
Airway       Patient location during procedure: OR       Procedure Start/Stop Times: 12/29/2021 11:39 AM  Staff -        Anesthesiologist:  Parag Arce MD       CRNA: Anabell Ghosh APRN CRNA       Performed By: CRNA  Consent for Airway        Urgency: elective  Indications and Patient Condition       Indications for airway management: joselyn-procedural       Induction type:intravenous       Mask difficulty assessment: 1 - vent by mask    Final Airway Details       Final airway type: endotracheal airway       Successful airway: ETT - single  Endotracheal Airway Details        ETT size (mm): 8.0       Cuffed: yes       Successful intubation technique: direct laryngoscopy       DL Blade Type: [place] 2       Grade View of Cords: 1       Adjucts: stylet and tooth guard       Position: Right       Measured from: gums/teeth       Secured at (cm): 23       Bite block used: None    Post intubation assessment        Placement verified by: capnometry, equal breath sounds and chest rise        Number of attempts at approach: 1       Secured with: silk tape       Ease of procedure: easy       Dentition: Intact and Unchanged

## 2021-12-29 NOTE — ANESTHESIA POSTPROCEDURE EVALUATION
Patient: Derekc Elliott    Procedure: Procedure(s):  ENDOVASCULAR REPAIR OF ABDOMINAL AORTIC ANEURYSM  WITH ENDOGRAFT       Diagnosis:AAA (abdominal aortic aneurysm) without rupture (H) [I71.4]  Diagnosis Additional Information: No value filed.    Anesthesia Type:  General    Note:  Disposition: Inpatient   Postop Pain Control: Uneventful            Sign Out: Well controlled pain   PONV: No   Neuro/Psych: Uneventful            Sign Out: Acceptable/Baseline neuro status   Airway/Respiratory: Uneventful            Sign Out: Acceptable/Baseline resp. status   CV/Hemodynamics: Uneventful            Sign Out: Acceptable CV status; No obvious hypovolemia; No obvious fluid overload   Other NRE: NONE   DID A NON-ROUTINE EVENT OCCUR?            Last vitals:  Vitals Value Taken Time   /74 12/29/21 1531   Temp 37  C (98.6  F) 12/29/21 1440   Pulse 60 12/29/21 1536   Resp 13 12/29/21 1536   SpO2 96 % 12/29/21 1536   Vitals shown include unvalidated device data.    Electronically Signed By: Huy Maciel MD  December 29, 2021  3:38 PM

## 2021-12-29 NOTE — ANESTHESIA PREPROCEDURE EVALUATION
Anesthesia Pre-Procedure Evaluation    Patient: Dereck Elliott   MRN: [id number] : 1940        Preoperative Diagnosis: AAA (abdominal aortic aneurysm) without rupture (H) [I71.4]    Procedure : Procedure(s):  REPAIR, ABDOMINAL AORTIC ANEURYSM, WITH INTRAOPERATIVE BLOOD SALVAGE          Past Medical History:   Diagnosis Date     Chronic kidney disease (CKD), stage III (moderate) (H)      Essential hypertension      History of rheumatic fever 2017     Onychomycosis 10/27/2017     Permanent atrial fibrillation (H) 2017     SSS (sick sinus syndrome) (H) 2019     Unspecified cataract      Vitamin D deficiency disease 10/06/2015      Past Surgical History:   Procedure Laterality Date     CATARACT EXTRACTION Left      COLON SURGERY       EP PACEMAKER INSERT N/A 2019    EP Pacemaker Insertion; Emeka Dean MD; [facility] [place] [facility];  Service: Cardiology     TOTAL KNEE ARTHROPLASTY Left       No Known Allergies   Social History     Tobacco Use     Smoking status: Former Smoker     Packs/day: 0.00     Types: Cigars     Quit date: 2016     Years since quittin.9     Smokeless tobacco: Never Used   Substance Use Topics     Alcohol use: Yes     Alcohol/week: 1.0 standard drink     Comment: Alcoholic Drinks/day: per week 2      Wt Readings from Last 1 Encounters:   21 91.2 kg (201 lb)        Anesthesia Evaluation   Pt has had prior anesthetic. Type: Regional.    History of anesthetic complications  - PONV.      ROS/MED HX  ENT/Pulmonary:     (+) sleep apnea,     Neurologic:       Cardiovascular:     (+) hypertension-Peripheral Vascular Disease----Irregular Heartbeat/Palpitations, valvular problems/murmurs type: MR Previous cardiac testing     METS/Exercise Tolerance:     Hematologic:    (-) history of blood clots, anemia and history of blood transfusion   Musculoskeletal:       GI/Hepatic:       Renal/Genitourinary:     (+) renal disease,     Endo:     (+) Obesity,      Psychiatric/Substance Use:       Infectious Disease:       Malignancy:       Other:            Physical Exam    Airway  airway exam normal      Mallampati: II   TM distance: > 3 FB   Neck ROM: full     Respiratory Devices and Support         Dental       (+) upper dentures      Cardiovascular          Rhythm and rate: regular and normal   (+) murmur       Pulmonary   pulmonary exam normal                OUTSIDE LABS:  CBC:   Lab Results   Component Value Date    WBC 6.8 12/21/2021    WBC 6.5 08/24/2021    HGB 12.9 (L) 12/21/2021    HGB 13.0 (L) 08/24/2021    HCT 39.0 (L) 12/21/2021    HCT 39.6 (L) 08/24/2021     12/21/2021     08/24/2021     BMP:   Lab Results   Component Value Date     12/21/2021     08/24/2021    POTASSIUM 4.9 12/21/2021    POTASSIUM 4.8 08/24/2021    CHLORIDE 104 12/21/2021    CHLORIDE 105 08/24/2021    CO2 27 12/21/2021    CO2 26 08/24/2021    BUN 23 12/21/2021    BUN 24 08/24/2021    CR 1.98 (H) 12/21/2021    CR 2.0 (H) 11/16/2021    GLC 99 12/21/2021    GLC 93 08/24/2021     COAGS:   Lab Results   Component Value Date    PTT 32 12/21/2021    INR 0.97 12/21/2021     POC: No results found for: BGM, HCG, HCGS  HEPATIC:   Lab Results   Component Value Date    ALBUMIN 3.6 10/04/2019    PROTTOTAL 6.7 10/04/2019    ALT 12 10/04/2019    AST 15 10/04/2019    ALKPHOS 66 10/04/2019    BILITOTAL 0.6 10/04/2019     OTHER:   Lab Results   Component Value Date    LACT 0.8 10/03/2019    JAYANT 10.0 12/21/2021    MAG 2.0 11/11/2019    LIPASE 37 10/03/2019    TSH 2.32 07/23/2019       Anesthesia Plan    ASA Status:  3      Anesthesia Type: General.     - Airway: ETT   Induction: Intravenous, Propofol.   Maintenance: Balanced.   Techniques and Equipment:     - Lines/Monitors: 2nd IV, Arterial Line     - Blood: Blood in Room     Consents    Anesthesia Plan(s) and associated risks, benefits, and realistic alternatives discussed. Questions answered and patient/representative(s) expressed  understanding.    - Discussed:     - Discussed with:  Patient      - Extended Intubation/Ventilatory Support Discussed: No.      - Patient is DNR/DNI Status: No    Use of blood products discussed: Yes.     - Discussed with: Patient.     - Consented: consented to blood products            Reason for refusal: other.     Postoperative Care    Pain management: IV analgesics, Oral pain medications, Postop Epidural, Multi-modal analgesia.   PONV prophylaxis: Ondansetron (or other 5HT-3), Dexamethasone or Solumedrol     Comments:    Other Comments: Decadron, Zofran.  Precedex infusion.  Tylenol.  Ketamine (0.5 mg/kg).  Yaima, 2 PIV, consider CVC.  PE, Ephedrine, Nicardipine.            Parag Acre MD

## 2021-12-29 NOTE — ANESTHESIA PROCEDURE NOTES
Arterial Line Procedure Note    Pre-Procedure   Staff -        Anesthesiologist:  Parag Arce MD       Performed By: anesthesiologist       Location: OR       Procedure Start/Stop Times: 12/29/2021 10:07 AM and 12/29/2021 10:16 AM       Pre-Anesthestic Checklist: patient identified, IV checked, risks and benefits discussed, informed consent, monitors and equipment checked and pre-op evaluation  Timeout:       Correct Patient: Yes        Correct Procedure: Yes        Correct Site: Yes        Correct Position: Yes   Procedure   Procedure: arterial line       Laterality: left       Insertion Site: radial.  Sterile Prep        Standard elements of sterile barrier followed       Skin prep: Chloraprep  Insertion/Injection        Technique: Seldinger Technique        Catheter Type/Size: 20 G, 12 cm  Narrative         Secured by: suture       Tegaderm and Biopatch dressing used.       Complications: None apparent,      Arterial waveform: Yes

## 2021-12-29 NOTE — OP NOTE
VASCULAR SURGERY OPERATIVE REPORT        LOCATION:    [facility]    Dereck Elliott   Medical Record #:  [id number]  YOB: 1940  Age:  81 year old     Date of Service: December 29, 2021    PRIMARY CARE PROVIDER: Donald Machado    Preoperative diagnosis    Asymptomatic infrarenal abdominal aortic aneurysm    Postoperative diagnosis    Asymptomatic infrarenal abdominal aortic aneurysm      Surgeon: Melia Granger MD, RPVI     Assistant: Cherry Britt MD, vascular surgery fellow                         Name of the procedure    1. Bilateral femoral artery access with ultrasound  2. Advancement of the catheter into the aorta  3. Aortogram and pelvic angiogram  4. Supervision and interpretation of radiologic findings  5. Endovascular repair of infrarenal abdominal risk aneurysm with a bifurcated Endurant II prosthesis with bilateral iliac extension  6. Bilateral arteriotomy closure with ProGlide closure devices  7. Deployment of 4 Pro-glide closure device in order to facilitate placement of 12 Palestinian sheath on the right and 16 Palestinian sheath on the left    Anesthesia:    General    Indication for procedure:    80-year-old male who is presented with asymptomatic infrarenal abdominal aortic aneurysm, 6 m in size.  Patient was found to have a focal dissection in the aortic neck but after discussion with my partners we decided to proceed with endovascular pair.  Patient is indicated for open repair due to overall health as well as hostile abdomen.  Risk and benefits of this procedure including MI, stroke, groin complication, paraplegia, and death were explained, and patient agreed to proceed.    Description of procedure:    The patient was placed in the supine position on the operating table. The procedure was performed under general endotracheal anesthesia. A Ortiz catheter and arterial line were placed for monitoring. The abdomen and both groins were prepped and draped in the standard sterile  fashion.  Prophylactic intravenous antibiotics were given prior to skin incision.  Using ultrasound were able to access bilateral common femoral arteries.  Micropuncture sheath was inserted.  Glide advantage wire was advanced into the aorta.  5 Iranian sheath was placed on the left side first.  Then it was placed on the right.  Then we proceeded with the placement of tube ProGlide sutures per side.  8 Iranian sheath was placed on each side afterwards.  Then we proceeded with a placement of Omni flush marking pen catheter into the aorta and positioned just below bilateral renal arteries.  Aortogram was performed.  Single renal arteries were noted on both sides . The SMA was patent. The aneurysm starts approximately 20mm below the lowest renal artery which is the left, and ends at the bifurcation. The length of the aorta from the lower renal artery to the right common iliac artery bifurcation was measured.  Then replaced two long Lunderquist wires via glide catheters.  Then we proceeded with a placement of main body device through the left groin.  We started deploying the main body based on marks of the renal arteries.  Once the gate was deployed we opened the suprarenal fixation.  Then using glide advantage wire and glide catheter were able to cannulate the gate.  The wire was advanced.  Omni Flush catheter was advanced over the wire.  Multiple rotation of the Omni Flush catheter with endograft confirmed intragraft placement of the wire.  Then the wire was advanced more proximally.  Then we performed an angiogram via the sheath on the right side it did not find a location of the right hypogastric artery.  Then, we proceeded with the placement of an iliac extension on the right side.  It was deployed without difficulties.  12 Iranian sheath was placed after the delivery mechanism was removed over the wire.  Then we proceeded with completion of deployment of the main body.  Then the delivery mechanism was removed over the  wire after reduction of the tip of it.  16 Mexican sheath was placed.  Then in a similar fashion we were able to deploy contralateral iliac extension limb.  Then we proceeded with balloon angioplasty of bilateral iliac extensions.  We deliberately did not perform balloon angioplasty of the main body due to concern of dissection of the aortic neck.  Then the Omni Flush catheter was advanced over the wire.  Aortogram was performed showing excellent placement of the graft without type Ia/B endoleak.  There is a slow type II endoleak via mostly lumbar branches.  At that point we proceeded with a closure using ProGlide sutures.  There were deployed in a staged fashion.  Hemostasis was achieved.  50 mg of protamine was given.  Patient was awoken from anesthesia and transferred to PACU in stable condition.    Estimated blood loss: 50 cc    Specimens: none     Complications: None    FLUOROSCOPIC TIME: 25 minutes  RADIATION DOSE: 542 mGy  CONTRAST: 40 cc    VQI ENDOVASCULAR AAA REPAIR    Unfit for Open AAA Repair?:   No reason unfit:  Overall health and hostile abdomen  Maximum AAA Diameter 6 cm  Right Iliac Aneurysm:  Ectasia is present on the right side  Left Iliac Aneurysm:  No  Aortic Neck Length:  25 mm   Aortic Neck Diameter:   26 mm  Aorta-Neck Length:  25 mm  Neck-AAA Angle:  20 degrees     Procedure Information     Right-sided access:  Percutaneous  Right-sided iliac adjunct: none     Left-sided access:    Percutaneous  Left-sided iliac adjunct:  none     Aortic main device: Aortic extension     Devices  ______________________________________________________________     Main Aortic Device ESB [id number]  Device Diameter:  32 mm  Device Length:  103 mm  ______________________________________________________________     Number of Proximal Aortic Extensions:  0  ______________________________________________________________     Number of RIGHT Iliac Devices:   YULIANA LW  [id number]  ______________________________________________________________     Number of LEFT Iliac Devices:   ET LW [id number]  ______________________________________________________________     Intraoperative Complications/Endoleak:      Endoleak at completion?:   Yes, type II  Renal artery coverage?:  not covered  Iliac artery injury?: none  Iliac/Femoral Thrombectomy?: None  Distal Embolectomy?: no  Conversion to open repair?:  No  Other complications?: No    Melia Granger MD, MD, [facility]  VASCULAR SURGERY

## 2021-12-29 NOTE — BRIEF OP NOTE
[facility]    Brief Operative Note    Pre-operative diagnosis: AAA (abdominal aortic aneurysm) without rupture (H) [I71.4]  Post-operative diagnosis Same as pre-operative diagnosis    Procedure: Procedure(s):  ENDOVASCULAR REPAIR OF ABDOMINAL AORTIC ANEURYSM  WITH ENDOGRAFT  Surgeon: Surgeon(s) and Role:     * Melia Granger MD - Primary     * Cherry Britt MD - Fellow - Assisting  Anesthesia: General   Estimated Blood Loss: 40 mL from 12/29/2021 11:29 AM to 12/29/2021  2:32 PM      Drains: None  Specimens: * No specimens in log *  Findings:   type 2 endoleak on completion angiogram.  Complications: None.  Implants:   Implant Name Type Inv. Item Serial No.  Lot No. LRB No. Used Action   ENDURANT II STENT GRAFT  Stent Graft  [id number] [employer] NA N/A 1 Implanted   ENDURANT II STENT GRAFT SYSTEM Stent Graft  [id number] [employer] NA Right 1 Implanted   ENDURANT II STENT GRAFT SYSTEM ETL Stent Graft  [id number] [employer] NA Left 1 Implanted

## 2021-12-29 NOTE — PHARMACY-ADMISSION MEDICATION HISTORY
Pharmacy Note - Admission Medication History   ______________________________________________________________________    Prior To Admission (PTA) med list completed and updated in EMR.       PTA Med List   Medication Sig Last Dose     acetaminophen (TYLENOL) 500 MG tablet Take 500-1,000 mg by mouth every 6 hours as needed for pain  12/27/2021     amLODIPine (NORVASC) 2.5 MG tablet [AMLODIPINE (NORVASC) 2.5 MG TABLET] TAKE 1 TABLET BY MOUTH EVERY DAY (Patient taking differently: Take 2.5 mg by mouth daily ) 12/29/2021 at am     amoxicillin (AMOXIL) 500 MG capsule Take 4 capsules by mouth once as needed (prior to dental appointment)  Unknown at Unknown time     aspirin 81 MG EC tablet [ASPIRIN 81 MG EC TABLET] Take 81 mg by mouth daily. 12/29/2021 at am     cholecalciferol, vitamin D3, (CHOLECALCIFEROL) 1,000 unit tablet [CHOLECALCIFEROL, VITAMIN D3, (CHOLECALCIFEROL) 1,000 UNIT TABLET] Take 2,000 Units by mouth daily. 12/28/2021 at Unknown time     cyanocobalamin 100 MCG tablet [CYANOCOBALAMIN 100 MCG TABLET] Take 100 mcg by mouth daily. 12/28/2021 at am     losartan (COZAAR) 25 MG tablet Take 1 tablet (25 mg) by mouth daily 12/29/2021 at am     melatonin 3 mg Tab tablet [MELATONIN 3 MG TAB TABLET] Take 3 mg by mouth at bedtime as needed. Unknown at Unknown time     metoprolol succinate ER (TOPROL-XL) 50 MG 24 hr tablet Take 1 tablet (50 mg) by mouth daily 12/29/2021 at am       Information source(s): Family member, Clinic records and CareEverywhere/SureScripts    Method of interview communication: in-person    Patient was asked about OTC/herbal products specifically.  PTA med list reflects this.    Based on the pharmacist's assessment, the PTA med list information appears reliable    Allergies were reviewed, assessed, and updated with the patient.      Patient does not use any multi-dose medications prior to admission.     Thank you for the opportunity to participate in the care of this patient.      Yvonne  LINA Villaseñor     12/29/2021     9:22 AM

## 2021-12-29 NOTE — ANESTHESIA CARE TRANSFER NOTE
Patient: Dereck Elliott    Procedure: Procedure(s):  ENDOVASCULAR REPAIR OF ABDOMINAL AORTIC ANEURYSM  WITH ENDOGRAFT       Diagnosis: AAA (abdominal aortic aneurysm) without rupture (H) [I71.4]  Diagnosis Additional Information: No value filed.    Anesthesia Type:   General     Note:    Oropharynx: oropharynx clear of all foreign objects and spontaneously breathing  Level of Consciousness: awake and drowsy  Oxygen Supplementation: face mask  Level of Supplemental Oxygen (L/min / FiO2): 5  Independent Airway: airway patency satisfactory and stable  Dentition: dentition unchanged  Vital Signs Stable: post-procedure vital signs reviewed and stable  Report to RN Given: handoff report given  Patient transferred to: PACU    Handoff Report: Identifed the Patient, Identified the Reponsible Provider, Reviewed the pertinent medical history, Discussed the surgical course, Reviewed Intra-OP anesthesia mangement and issues during anesthesia, Set expectations for post-procedure period and Allowed opportunity for questions and acknowledgement of understanding      Vitals:  Vitals Value Taken Time   /60 12/29/21 1440   Temp 37  C (98.6  F) 12/29/21 1440   Pulse 60 12/29/21 1449   Resp 25 12/29/21 1449   SpO2 100 % 12/29/21 1449   Vitals shown include unvalidated device data.    Electronically Signed By: GUERRERO Weaver CRNA  December 29, 2021  2:50 PM

## 2021-12-30 VITALS
TEMPERATURE: 97.7 F | RESPIRATION RATE: 18 BRPM | OXYGEN SATURATION: 94 % | SYSTOLIC BLOOD PRESSURE: 142 MMHG | WEIGHT: 199.5 LBS | HEART RATE: 62 BPM | HEIGHT: 71 IN | BODY MASS INDEX: 27.93 KG/M2 | DIASTOLIC BLOOD PRESSURE: 67 MMHG

## 2021-12-30 LAB
ANION GAP SERPL CALCULATED.3IONS-SCNC: 9 MMOL/L (ref 5–18)
BASOPHILS # BLD AUTO: 0 10E3/UL (ref 0–0.2)
BASOPHILS NFR BLD AUTO: 0 %
BUN SERPL-MCNC: 30 MG/DL (ref 8–28)
CALCIUM SERPL-MCNC: 8.8 MG/DL (ref 8.5–10.5)
CHLORIDE BLD-SCNC: 105 MMOL/L (ref 98–107)
CO2 SERPL-SCNC: 24 MMOL/L (ref 22–31)
CREAT SERPL-MCNC: 2.06 MG/DL (ref 0.7–1.3)
EOSINOPHIL # BLD AUTO: 0 10E3/UL (ref 0–0.7)
EOSINOPHIL NFR BLD AUTO: 0 %
ERYTHROCYTE [DISTWIDTH] IN BLOOD BY AUTOMATED COUNT: 13.1 % (ref 10–15)
GFR SERPL CREATININE-BSD FRML MDRD: 31 ML/MIN/1.73M2
GLUCOSE BLD-MCNC: 132 MG/DL (ref 70–125)
GLUCOSE BLDC GLUCOMTR-MCNC: 104 MG/DL (ref 70–99)
GLUCOSE BLDC GLUCOMTR-MCNC: 105 MG/DL (ref 70–99)
HCT VFR BLD AUTO: 34.7 % (ref 40–53)
HGB BLD-MCNC: 11.3 G/DL (ref 13.3–17.7)
IMM GRANULOCYTES # BLD: 0.1 10E3/UL
IMM GRANULOCYTES NFR BLD: 1 %
LYMPHOCYTES # BLD AUTO: 0.6 10E3/UL (ref 0.8–5.3)
LYMPHOCYTES NFR BLD AUTO: 6 %
MCH RBC QN AUTO: 33.7 PG (ref 26.5–33)
MCHC RBC AUTO-ENTMCNC: 32.6 G/DL (ref 31.5–36.5)
MCV RBC AUTO: 104 FL (ref 78–100)
MONOCYTES # BLD AUTO: 0.9 10E3/UL (ref 0–1.3)
MONOCYTES NFR BLD AUTO: 9 %
NEUTROPHILS # BLD AUTO: 9 10E3/UL (ref 1.6–8.3)
NEUTROPHILS NFR BLD AUTO: 84 %
NRBC # BLD AUTO: 0 10E3/UL
NRBC BLD AUTO-RTO: 0 /100
PLATELET # BLD AUTO: 149 10E3/UL (ref 150–450)
POTASSIUM BLD-SCNC: 4.8 MMOL/L (ref 3.5–5)
RBC # BLD AUTO: 3.35 10E6/UL (ref 4.4–5.9)
SODIUM SERPL-SCNC: 138 MMOL/L (ref 136–145)
WBC # BLD AUTO: 10.6 10E3/UL (ref 4–11)

## 2021-12-30 PROCEDURE — 250N000013 HC RX MED GY IP 250 OP 250 PS 637: Performed by: SURGERY

## 2021-12-30 PROCEDURE — 250N000011 HC RX IP 250 OP 636: Performed by: INTERNAL MEDICINE

## 2021-12-30 PROCEDURE — 250N000013 HC RX MED GY IP 250 OP 250 PS 637: Performed by: INTERNAL MEDICINE

## 2021-12-30 PROCEDURE — 85025 COMPLETE CBC W/AUTO DIFF WBC: CPT | Performed by: SURGERY

## 2021-12-30 PROCEDURE — 36415 COLL VENOUS BLD VENIPUNCTURE: CPT | Performed by: SURGERY

## 2021-12-30 PROCEDURE — 99231 SBSQ HOSP IP/OBS SF/LOW 25: CPT | Performed by: HOSPITALIST

## 2021-12-30 PROCEDURE — 250N000011 HC RX IP 250 OP 636: Performed by: SURGERY

## 2021-12-30 PROCEDURE — 258N000003 HC RX IP 258 OP 636: Performed by: SURGERY

## 2021-12-30 PROCEDURE — 80048 BASIC METABOLIC PNL TOTAL CA: CPT | Performed by: SURGERY

## 2021-12-30 RX ADMIN — ACETAMINOPHEN 975 MG: 325 TABLET ORAL at 11:47

## 2021-12-30 RX ADMIN — HEPARIN SODIUM 5000 UNITS: 10000 INJECTION, SOLUTION INTRAVENOUS; SUBCUTANEOUS at 08:35

## 2021-12-30 RX ADMIN — DOCUSATE SODIUM 50 MG AND SENNOSIDES 8.6 MG 1 TABLET: 8.6; 5 TABLET, FILM COATED ORAL at 08:37

## 2021-12-30 RX ADMIN — SODIUM CHLORIDE, POTASSIUM CHLORIDE, SODIUM LACTATE AND CALCIUM CHLORIDE: 600; 310; 30; 20 INJECTION, SOLUTION INTRAVENOUS at 03:30

## 2021-12-30 RX ADMIN — Medication 2000 UNITS: at 08:37

## 2021-12-30 RX ADMIN — CEFAZOLIN SODIUM 2 G: 2 INJECTION, SOLUTION INTRAVENOUS at 05:23

## 2021-12-30 RX ADMIN — METOPROLOL SUCCINATE 50 MG: 25 TABLET, EXTENDED RELEASE ORAL at 08:56

## 2021-12-30 RX ADMIN — ASPIRIN 81 MG: 81 TABLET, COATED ORAL at 08:41

## 2021-12-30 RX ADMIN — HYDRALAZINE HYDROCHLORIDE 5 MG: 20 INJECTION INTRAMUSCULAR; INTRAVENOUS at 00:33

## 2021-12-30 RX ADMIN — ACETAMINOPHEN 975 MG: 325 TABLET ORAL at 03:17

## 2021-12-30 RX ADMIN — LOSARTAN POTASSIUM 25 MG: 25 TABLET, FILM COATED ORAL at 08:39

## 2021-12-30 RX ADMIN — AMLODIPINE BESYLATE 2.5 MG: 2.5 TABLET ORAL at 08:37

## 2021-12-30 RX ADMIN — Medication 100 MCG: at 08:37

## 2021-12-30 ASSESSMENT — ACTIVITIES OF DAILY LIVING (ADL)
ADLS_ACUITY_SCORE: 5
ADLS_ACUITY_SCORE: 7
ADLS_ACUITY_SCORE: 5
ADLS_ACUITY_SCORE: 5
ADLS_ACUITY_SCORE: 7
ADLS_ACUITY_SCORE: 7
ADLS_ACUITY_SCORE: 5
ADLS_ACUITY_SCORE: 7
ADLS_ACUITY_SCORE: 5
ADLS_ACUITY_SCORE: 7
ADLS_ACUITY_SCORE: 7
ADLS_ACUITY_SCORE: 5
ADLS_ACUITY_SCORE: 5

## 2021-12-30 NOTE — PROVIDER NOTIFICATION
Call placed to  who ordered Metoprolol 50mg ER to be given now at 0000. Per Pt, he had taken Metoprolol in the AM of 12/29. Per , do not given Metoprolol at this time, give PRN Hydralazine.

## 2021-12-30 NOTE — DISCHARGE SUMMARY
Vascular Surgery Service Discharge Summary:     NAME: Dereck Elliott   MRN: [id number]   : 1940   PCP: Donald Machado    DATE OF ADMISSION: 2021       PRE/POSTOPERATIVE DIAGNOSES:    Abdominal aortic aneurysm    PROCEDURES PERFORMED, 2021:   Procedure/Surgery Information   Procedure: Procedure(s):  ENDOVASCULAR REPAIR OF ABDOMINAL AORTIC ANEURYSM  WITH ENDOGRAFT   Surgeon(s): Surgeon(s) and Role:     * Melia Granger MD - Primary     * Cherry Britt MD - Fellow - Assisting   Specimens: * No specimens in log *           INTRAOPERATIVE FINDINGS: late type 2 endoleak    POSTOPERATIVE COMPLICATIONS: None     DATE OF DISCHARGE: 2021    ADMISSION HPI (from Dr. Granger's clinic note 2021):   81-year-old male with known history of infrarenal abdominal aortic aneurysm, now measured 6 cm.  There is also ascending aortic aneurysm, 4 cm.  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  Left iliac artery is ectatic.  Patient is asymptomatic.  No evidence of popliteal artery aneurysms.  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  The age of this dissection is unknown: Patient is asymptomatic.  The length of healthy aortic neck is measured approximately 6 to 7 mm.  I still think that patient is amendable for endovascular repair but would like to discuss this case on vascular surgery conference with my colleagues.  I will also contact patient's cardiologist for risk assessment.  Patient will be seen by his cardiologist towards the end of this months for pacemaker evaluation.    HOSPITAL COURSE: Dereck Elliott is a 81 year old male who was admitted on 2021 and underwent and EVAR for a 6cm AAA. He tolerated the procedure well and postoperatively was transferred to the general post-surgical unit. The remainder of his course was uncomplicated. Prior to discharge, his pain was controlled well without narcotics. He was able  to perform ADLs and ambulate independently without difficulty, and had return of bowel and bladder function. On 12/30/2021, he was discharged to home in stable condition.    Vascular Surgery Core Measures:   Continue Aspirin    Physical Exam:  Constitutional: healthy, alert, no acute distress and cooperative   Cardiovascular: pulses regular  Respiratory: breathing unlabored without secondary muscle use  Psychiatric: mentation appears normal and affect normal/bright  Neck: no asymmetry  GI/Abdomen: abdomen soft, non-tender. No palpable masses  MSK: able to move all extremities without weakness or ataxia  Extremities: no open lesions, extremities warm and well perfused, bilateral groins with some bruising  Hematology: no bruising on visible skin    PAST MEDICAL HISTORY:  Past Medical History:   Diagnosis Date     Chronic kidney disease (CKD), stage III (moderate) (H)      Essential hypertension      Hard of hearing      History of rheumatic fever 04/25/2017     Onychomycosis 10/27/2017     Permanent atrial fibrillation (H) 02/03/2017     SSS (sick sinus syndrome) (H) 07/29/2019     Unspecified cataract      Vitamin D deficiency disease 10/06/2015       PAST SURGICAL HISTORY:  Past Surgical History:   Procedure Laterality Date     AS FUSION OF WRIST JOINT Right      CATARACT EXTRACTION Left      COLON SURGERY       EP PACEMAKER INSERT N/A 8/1/2019    EP Pacemaker Insertion; Emeka Dean MD; [facility];  Service: Cardiology     ROTATOR CUFF REPAIR RT/LT Left      TOTAL KNEE ARTHROPLASTY Left          Labs:  Recent Labs   Lab Test 12/30/21  0612 12/29/21  1910 12/29/21  0914   WBC 10.6  --  7.6   RBC 3.35*  --  3.70*   HGB 11.3*  --  12.4*   HCT 34.7*  --  38.1*   *  --  103*   MCH 33.7*  --  33.5*   MCHC 32.6  --  32.5   * 158 177     Recent Labs   Lab Test 12/30/21  0612 12/29/21  0914 12/21/21  1259   POTASSIUM 4.8 4.8 4.9   CHLORIDE 105 106 104   BUN 30* 26 23       DISCHARGE  INSTRUCTIONS:  Discharge Orders      Reason for your hospital stay    Recovery after endovascular abdominal aortic aneurysm repair     Follow-up and recommended labs and tests     Follow up with Dr. Granger within 1 month with a preclinic CTA     Activity    Your activity upon discharge: activity as tolerated and no lifting, driving, or strenuous exercise for 2 weeks     Diet    Follow this diet upon discharge: Orders Placed This Encounter      Regular Diet Adult     Discharge Medications   Current Discharge Medication List      CONTINUE these medications which have NOT CHANGED    Details   acetaminophen (TYLENOL) 500 MG tablet Take 500-1,000 mg by mouth every 6 hours as needed for pain       amLODIPine (NORVASC) 2.5 MG tablet [AMLODIPINE (NORVASC) 2.5 MG TABLET] TAKE 1 TABLET BY MOUTH EVERY DAY  Qty: 90 tablet, Refills: 3    Associated Diagnoses: Essential hypertension      amoxicillin (AMOXIL) 500 MG capsule Take 4 capsules by mouth once as needed (prior to dental appointment)       aspirin 81 MG EC tablet [ASPIRIN 81 MG EC TABLET] Take 81 mg by mouth daily.      cholecalciferol, vitamin D3, (CHOLECALCIFEROL) 1,000 unit tablet [CHOLECALCIFEROL, VITAMIN D3, (CHOLECALCIFEROL) 1,000 UNIT TABLET] Take 2,000 Units by mouth daily.      cyanocobalamin 100 MCG tablet [CYANOCOBALAMIN 100 MCG TABLET] Take 100 mcg by mouth daily.      losartan (COZAAR) 25 MG tablet Take 1 tablet (25 mg) by mouth daily  Qty: 90 tablet, Refills: 1    Associated Diagnoses: AAA (abdominal aortic aneurysm) without rupture (H); Benign essential hypertension; Stage 3b chronic kidney disease (H)      melatonin 3 mg Tab tablet [MELATONIN 3 MG TAB TABLET] Take 3 mg by mouth at bedtime as needed.      metoprolol succinate ER (TOPROL-XL) 50 MG 24 hr tablet Take 1 tablet (50 mg) by mouth daily  Qty: 90 tablet, Refills: 3    Associated Diagnoses: Permanent atrial fibrillation (H); Essential hypertension           Allergies   No Known  Allergies      Cherry Britt MD  12/30/21  11:55 AM

## 2021-12-30 NOTE — PROGRESS NOTES
.  Hospitalist Progress Note    Assessment/Plan  Active Problems:    Abdominal aortic aneurysm (AAA) (H)      81-year-old male with history of hypertension and hard of hearing was admitted for elective repair of abdominal aortic aneurysm.  [facility] consulted for management of his hypertension.    AAA  -S/p endovascular repair of abdominal aortic aneurysm with endograft.  -Postop management per vascular surgery.    Hypertension-blood pressure currently slightly elevated  -Resumed PTA amlodipine, losartan and metoprolol.  -Hydralazine as needed ordered.    DVT ppx:Heparin        Barriers to Discharge:  Post-op monitoring    Anticipated discharge date/Disposition: Likely today  , defer to primary team    Subjective  Patient is new to me. Chart reviewed and events noted.  Patient seen and examined.   Is hard of hearing but denies any pain or SOB.       Objective    Vital signs in last 24 hours  Temp:  [97.5  F (36.4  C)-98.6  F (37  C)] 97.7  F (36.5  C)  Pulse:  [59-67] 61  Resp:  [9-27] 22  BP: (115-170)/(60-82) 150/72  MAP:  [78 mmHg-101 mmHg] 94 mmHg  Arterial Line BP: (102-148)/(60-97) 145/68  SpO2:  [92 %-100 %] 93 % [unfilled] O2 Device: None (Room air)    Weight:   [unfilled] Weight change:     Intake/Output last 3 shifts  I/O last 3 completed shifts:  In: 3351.42 [P.O.:240; I.V.:3111.42]  Out: 940 [Urine:900; Blood:40]  Body mass index is 27.82 kg/m .    Physical Exam    General Appearance:    Alert, cooperative, no distress, appears stated age   Lungs:     CTAB   Cardiovascular:    RRR   Abdomen:     Soft, non-tender. Some bruising R groin.   Neurologic:   Grossly normal     Pertinent Labs   Lab Results: personally reviewed.   Recent Labs   Lab 12/30/21  0612 12/29/21  0914    140   CO2 24 25   BUN 30* 26     Recent Labs   Lab 12/30/21  0612 12/29/21  1910 12/29/21  0914   WBC 10.6  --  7.6   HGB 11.3*  --  12.4*   HCT 34.7*  --  38.1*   * 158 177     No results for input(s): CKTOTAL, TROPONINI in  the last 168 hours.    Invalid input(s): TROPONINT, CKMBINDEX  Invalid input(s): POCGLUFGR    Medications  Drug and lactation database from the [place] [employer]:  http://toxnet.nlm.nih.gov/cgi-bin/sis/htmlgen?LACT      Pertinent Radiology   Radiology Results:  Personally reviewed impressions    Reviewed labs in last 24 hours.    Advanced Care Planning:  Discharge Planning discussed with patient  Total time with this patient is 25 min with greater than 50% of time spent in coordination of care.  Care discussed and coordinated with patient, RN, SW/CM.    This Note is created using dragon voice recognition software.  Errors in spelling or words which seems out of context are unintentional.  Sounds alike errors may have escaped editing.    Shanique Roy MD  Hospitalist

## 2021-12-30 NOTE — CONSULTS
"Internal Medicine Consultation   Dereck Elliott,  1940, MRN [id number]    [facility]  AAA (abdominal aortic aneurysm) without rupture (H) [I71.4]  Abdominal aortic aneurysm (AAA) (H) [I71.4]    PCP: Donald Machado, Tel [phone]   Code status:  Full Code       Extended Emergency Contact Information  Primary Emergency Contact: Shauna Elliott  Address: [address] [place] [address] [place]  Home Phone: [phone]  Relation: Spouse  Secondary Emergency Contact: Guero Forte  Address: [address] [place] [address] [place]  Home Phone: [phone]  Mobile Phone: [phone]  Relation: Stepchild    Requesting Provider: Dr. Britt     Assessment and Plan   82 yo m was admitted for     AAA  ENDOVASCULAR REPAIR OF ABDOMINAL AORTIC ANEURYSM  WITH ENDOGRAFT  -vascular surgeon is following    HTN, uncontrolled  -on Norvasc, Cozaar and metoprolol  -add hydralazine prn    Hard hearing    Active Problems:    Abdominal aortic aneurysm (AAA) (H)      DVT Prophylaxis: defer to surgeon     Chief Complaint: Postop medical management for htn     HPI:    Dereck Elliott is a 81 year old old male who was admitted by vascular surgeon for AAA. He underwent \"ENDOVASCULAR REPAIR OF ABDOMINAL AORTIC ANEURYSM  WITH ENDOGRAFT\" without complications.    Patient has h/o HTN and on medications. But his BP has been \"a little high\" all the time. Now his bp is 162/81. Denies cp/sob, denies headache.          Medical History  Past Medical History:   Diagnosis Date     Chronic kidney disease (CKD), stage III (moderate) (H)      Essential hypertension      Hard of hearing      History of rheumatic fever 2017     Onychomycosis 10/27/2017     Permanent atrial fibrillation (H) 2017     SSS (sick sinus syndrome) (H) 2019     Unspecified cataract      Vitamin D deficiency disease 10/06/2015        Surgical History  He  has a past surgical history that includes Colon surgery; " Cataract Extraction (Left); Total Knee Arthroplasty (Left); Ep Pacemaker Insert (N/A, 8/1/2019); rotator cuff repair rt/lt (Left); and FUSION OF WRIST JOINT (Right).       Social History  Reviewed, and he  reports that he quit smoking about 5 years ago. His smoking use included cigars. He smoked 0.00 packs per day. He has never used smokeless tobacco. He reports current alcohol use of about 1.0 standard drink of alcohol per week. He reports that he does not use drugs.       Allergies  No Known Allergies Family History  Reviewed, and family history includes Colon Cancer in his father; No Known Problems in his daughter and daughter; Valvular heart disease in his mother.          Prior to Admission Medications   Medications Prior to Admission   Medication Sig Dispense Refill Last Dose     acetaminophen (TYLENOL) 500 MG tablet Take 500-1,000 mg by mouth every 6 hours as needed for pain    12/27/2021     amLODIPine (NORVASC) 2.5 MG tablet [AMLODIPINE (NORVASC) 2.5 MG TABLET] TAKE 1 TABLET BY MOUTH EVERY DAY (Patient taking differently: Take 2.5 mg by mouth daily ) 90 tablet 3 12/29/2021 at am     amoxicillin (AMOXIL) 500 MG capsule Take 4 capsules by mouth once as needed (prior to dental appointment)    Unknown at Unknown time     aspirin 81 MG EC tablet [ASPIRIN 81 MG EC TABLET] Take 81 mg by mouth daily.   12/29/2021 at am     cholecalciferol, vitamin D3, (CHOLECALCIFEROL) 1,000 unit tablet [CHOLECALCIFEROL, VITAMIN D3, (CHOLECALCIFEROL) 1,000 UNIT TABLET] Take 2,000 Units by mouth daily.   12/28/2021 at Unknown time     cyanocobalamin 100 MCG tablet [CYANOCOBALAMIN 100 MCG TABLET] Take 100 mcg by mouth daily.   12/28/2021 at am     losartan (COZAAR) 25 MG tablet Take 1 tablet (25 mg) by mouth daily 90 tablet 1 12/29/2021 at am     melatonin 3 mg Tab tablet [MELATONIN 3 MG TAB TABLET] Take 3 mg by mouth at bedtime as needed.   Unknown at Unknown time     metoprolol succinate ER (TOPROL-XL) 50 MG 24 hr tablet Take 1  "tablet (50 mg) by mouth daily 90 tablet 3 12/29/2021 at am          Review of Systems:  A 12 point comprehensive review of systems was negative except as noted.    ROS Physical Exam:  Temp:  [97.8  F (36.6  C)-98.6  F (37  C)] 97.9  F (36.6  C)  Pulse:  [59-79] 63  Resp:  [9-27] 21  BP: (115-165)/(60-82) 146/80  MAP:  [78 mmHg-101 mmHg] 94 mmHg  Arterial Line BP: (102-148)/(60-97) 145/68  SpO2:  [92 %-100 %] 100 %    BP (!) 146/80 (BP Location: Right arm)   Pulse 63   Temp 97.9  F (36.6  C) (Oral)   Resp 21   Ht 1.803 m (5' 11\")   Wt 90.5 kg (199 lb 8 oz)   SpO2 100%   BMI 27.82 kg/m      Physical Exam  General.  Awake alert oriented not in acute distress. Hard hearing  HEENT.  Pupils equal round react to light, anicteric, EOM intact.  Neck supple no JVD.  CVS regular rhythm no murmur gallops.  Lungs.  Clear to auscultation bilateral no wheezing or rales.  Abdomen.  Soft nontender bowel sounds present. S/p surgery.   Extremities.  No edema no calf tenderness.  Neurological.  Awake and alert. No focal deficit.  Skin no rash. No pallor.  Psych. Normal mood.        Pertinent Labs  Lab Results: personally reviewed.       [facility]  Nahum Novak MD  "
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
## 2021-12-30 NOTE — PLAN OF CARE
Patient alert and oriented. Very hard of hearing, good at reading lips. Reports no pain. Bilateral groin surgical sites are covered with liquid bandage and have no complications. O2 reduced to 0.5L and sat is 95%. Laid flat until 2045. Then  used urinal with 150ml of output. Patient tolerating solid food. Able to shift position independently. IV infusing.

## 2021-12-30 NOTE — PLAN OF CARE
Problem: Bleeding (Aortic Aneurysm/Dissection Repair)  Goal: Absence of Bleeding  12/30/2021 0629 by Huy Perrin RN  Outcome: Improving  12/30/2021 0502 by Huy Perrin RN  Outcome: Improving     Problem: Hemodynamic Instability (Aortic Aneurysm/Dissection Repair)  Goal: Vital Signs Remain in Desired Range  12/30/2021 0629 by Huy Perrin RN  Outcome: Improving  12/30/2021 0502 by Huy Perrin RN  Outcome: Improving     Problem: Pain (Aortic Aneurysm/Dissection Repair)  Goal: Acceptable Pain Control  12/30/2021 0629 by Huy Perrin RN  Outcome: Improving  12/30/2021 0502 by Huy Perrin RN  Outcome: Improving     Pt denied and offered no c/o pain this shift. Bilateral Femoral insertion sites remained RYLIE, Liquid bandage present. Slight bruising noted around both site but WDL/ Pulses palpated in BLE, absent of numbness/tinging. Received a PRN dose of hydralazine at beginning of shift for HTN, moderately effective. Started off on 1ltrs O2 NC, now on RA w/ SpO2 at 97%. Ambulated to restroom without difficulty where Pt had a BM ad urinated. Continues on TELE, V-paced 1st degree AV block. Pt is [place], wears [employer].

## 2022-01-14 ENCOUNTER — HOSPITAL ENCOUNTER (EMERGENCY)
Facility: CLINIC | Age: 82
Discharge: ANOTHER HOSPITAL | End: 2022-01-14
Attending: FAMILY MEDICINE | Admitting: FAMILY MEDICINE
Payer: COMMERCIAL

## 2022-01-14 ENCOUNTER — HOSPITAL ENCOUNTER (EMERGENCY)
Facility: CLINIC | Age: 82
End: 2022-01-14
Payer: COMMERCIAL

## 2022-01-14 ENCOUNTER — APPOINTMENT (OUTPATIENT)
Dept: CT IMAGING | Facility: CLINIC | Age: 82
End: 2022-01-14
Attending: FAMILY MEDICINE
Payer: COMMERCIAL

## 2022-01-14 ENCOUNTER — ANCILLARY PROCEDURE (OUTPATIENT)
Dept: ULTRASOUND IMAGING | Facility: CLINIC | Age: 82
End: 2022-01-14
Attending: FAMILY MEDICINE
Payer: COMMERCIAL

## 2022-01-14 VITALS
BODY MASS INDEX: 27.86 KG/M2 | HEIGHT: 71 IN | DIASTOLIC BLOOD PRESSURE: 71 MMHG | OXYGEN SATURATION: 93 % | SYSTOLIC BLOOD PRESSURE: 115 MMHG | HEART RATE: 62 BPM | TEMPERATURE: 97.9 F | WEIGHT: 199 LBS | RESPIRATION RATE: 18 BRPM

## 2022-01-14 DIAGNOSIS — S36.899A TRAUMATIC RETROPERITONEAL HEMORRHAGE, INITIAL ENCOUNTER: ICD-10-CM

## 2022-01-14 DIAGNOSIS — I71.019 DISSECTION OF THORACIC AORTA (H): ICD-10-CM

## 2022-01-14 LAB
ALBUMIN SERPL-MCNC: 4 G/DL (ref 3.5–5)
ALP SERPL-CCNC: 104 U/L (ref 45–120)
ALT SERPL W P-5'-P-CCNC: 17 U/L (ref 0–45)
ANION GAP SERPL CALCULATED.3IONS-SCNC: 12 MMOL/L (ref 5–18)
APTT PPP: 25 SECONDS (ref 22–38)
AST SERPL W P-5'-P-CCNC: 31 U/L (ref 0–40)
ATRIAL RATE - MUSE: 192 BPM
BASOPHILS # BLD AUTO: 0 10E3/UL (ref 0–0.2)
BASOPHILS NFR BLD AUTO: 0 %
BILIRUB SERPL-MCNC: 0.7 MG/DL (ref 0–1)
BUN SERPL-MCNC: 39 MG/DL (ref 8–28)
CALCIUM SERPL-MCNC: 9.7 MG/DL (ref 8.5–10.5)
CHLORIDE BLD-SCNC: 107 MMOL/L (ref 98–107)
CO2 SERPL-SCNC: 22 MMOL/L (ref 22–31)
CREAT SERPL-MCNC: 3.21 MG/DL (ref 0.7–1.3)
DIASTOLIC BLOOD PRESSURE - MUSE: NORMAL MMHG
EOSINOPHIL # BLD AUTO: 0.3 10E3/UL (ref 0–0.7)
EOSINOPHIL NFR BLD AUTO: 2 %
ERYTHROCYTE [DISTWIDTH] IN BLOOD BY AUTOMATED COUNT: 13.1 % (ref 10–15)
ETHANOL SERPL-MCNC: <10 MG/DL
GFR SERPL CREATININE-BSD FRML MDRD: 18 ML/MIN/1.73M2
GLUCOSE BLD-MCNC: 148 MG/DL (ref 70–125)
HCT VFR BLD AUTO: 36.3 % (ref 40–53)
HGB BLD-MCNC: 11.6 G/DL (ref 13.3–17.7)
HOLD SPECIMEN: NORMAL
IMM GRANULOCYTES # BLD: 0.1 10E3/UL
IMM GRANULOCYTES NFR BLD: 1 %
INR PPP: 1.1 (ref 0.85–1.15)
INTERPRETATION ECG - MUSE: NORMAL
LYMPHOCYTES # BLD AUTO: 1 10E3/UL (ref 0.8–5.3)
LYMPHOCYTES NFR BLD AUTO: 6 %
MAGNESIUM SERPL-MCNC: 2.3 MG/DL (ref 1.8–2.6)
MCH RBC QN AUTO: 33 PG (ref 26.5–33)
MCHC RBC AUTO-ENTMCNC: 32 G/DL (ref 31.5–36.5)
MCV RBC AUTO: 103 FL (ref 78–100)
MONOCYTES # BLD AUTO: 1.1 10E3/UL (ref 0–1.3)
MONOCYTES NFR BLD AUTO: 7 %
NEUTROPHILS # BLD AUTO: 13.2 10E3/UL (ref 1.6–8.3)
NEUTROPHILS NFR BLD AUTO: 84 %
NRBC # BLD AUTO: 0 10E3/UL
NRBC BLD AUTO-RTO: 0 /100
P AXIS - MUSE: NORMAL DEGREES
PLATELET # BLD AUTO: 254 10E3/UL (ref 150–450)
POTASSIUM BLD-SCNC: 4.6 MMOL/L (ref 3.5–5)
PR INTERVAL - MUSE: NORMAL MS
PROT SERPL-MCNC: 7.9 G/DL (ref 6–8)
QRS DURATION - MUSE: 142 MS
QT - MUSE: 412 MS
QTC - MUSE: 577 MS
R AXIS - MUSE: 52 DEGREES
RBC # BLD AUTO: 3.52 10E6/UL (ref 4.4–5.9)
SARS-COV-2 RNA RESP QL NAA+PROBE: NEGATIVE
SODIUM SERPL-SCNC: 141 MMOL/L (ref 136–145)
SYSTOLIC BLOOD PRESSURE - MUSE: NORMAL MMHG
T AXIS - MUSE: 57 DEGREES
VENTRICULAR RATE- MUSE: 118 BPM
WBC # BLD AUTO: 15.6 10E3/UL (ref 4–11)

## 2022-01-14 PROCEDURE — 258N000003 HC RX IP 258 OP 636: Performed by: FAMILY MEDICINE

## 2022-01-14 PROCEDURE — 76705 ECHO EXAM OF ABDOMEN: CPT

## 2022-01-14 PROCEDURE — 99291 CRITICAL CARE FIRST HOUR: CPT | Mod: 25

## 2022-01-14 PROCEDURE — 36415 COLL VENOUS BLD VENIPUNCTURE: CPT | Performed by: FAMILY MEDICINE

## 2022-01-14 PROCEDURE — 250N000011 HC RX IP 250 OP 636: Performed by: FAMILY MEDICINE

## 2022-01-14 PROCEDURE — 87635 SARS-COV-2 COVID-19 AMP PRB: CPT | Performed by: FAMILY MEDICINE

## 2022-01-14 PROCEDURE — 74177 CT ABD & PELVIS W/CONTRAST: CPT

## 2022-01-14 PROCEDURE — C9803 HOPD COVID-19 SPEC COLLECT: HCPCS

## 2022-01-14 PROCEDURE — 72125 CT NECK SPINE W/O DYE: CPT

## 2022-01-14 PROCEDURE — 96375 TX/PRO/DX INJ NEW DRUG ADDON: CPT

## 2022-01-14 PROCEDURE — 93005 ELECTROCARDIOGRAM TRACING: CPT | Performed by: FAMILY MEDICINE

## 2022-01-14 PROCEDURE — 250N000009 HC RX 250: Performed by: FAMILY MEDICINE

## 2022-01-14 PROCEDURE — 82077 ASSAY SPEC XCP UR&BREATH IA: CPT | Performed by: FAMILY MEDICINE

## 2022-01-14 PROCEDURE — 96365 THER/PROPH/DIAG IV INF INIT: CPT

## 2022-01-14 PROCEDURE — 85025 COMPLETE CBC W/AUTO DIFF WBC: CPT | Performed by: FAMILY MEDICINE

## 2022-01-14 PROCEDURE — 99292 CRITICAL CARE ADDL 30 MIN: CPT

## 2022-01-14 PROCEDURE — 85610 PROTHROMBIN TIME: CPT | Performed by: FAMILY MEDICINE

## 2022-01-14 PROCEDURE — 83735 ASSAY OF MAGNESIUM: CPT | Performed by: FAMILY MEDICINE

## 2022-01-14 PROCEDURE — 96376 TX/PRO/DX INJ SAME DRUG ADON: CPT

## 2022-01-14 PROCEDURE — 70450 CT HEAD/BRAIN W/O DYE: CPT

## 2022-01-14 PROCEDURE — 80053 COMPREHEN METABOLIC PANEL: CPT | Performed by: FAMILY MEDICINE

## 2022-01-14 PROCEDURE — 85730 THROMBOPLASTIN TIME PARTIAL: CPT | Performed by: FAMILY MEDICINE

## 2022-01-14 RX ORDER — ONDANSETRON 2 MG/ML
4 INJECTION INTRAMUSCULAR; INTRAVENOUS ONCE
Status: COMPLETED | OUTPATIENT
Start: 2022-01-14 | End: 2022-01-14

## 2022-01-14 RX ORDER — IOPAMIDOL 755 MG/ML
100 INJECTION, SOLUTION INTRAVASCULAR ONCE
Status: COMPLETED | OUTPATIENT
Start: 2022-01-14 | End: 2022-01-14

## 2022-01-14 RX ORDER — FENTANYL CITRATE 50 UG/ML
25 INJECTION, SOLUTION INTRAMUSCULAR; INTRAVENOUS ONCE
Status: COMPLETED | OUTPATIENT
Start: 2022-01-14 | End: 2022-01-14

## 2022-01-14 RX ORDER — DILTIAZEM HYDROCHLORIDE 5 MG/ML
25 INJECTION INTRAVENOUS ONCE
Status: DISCONTINUED | OUTPATIENT
Start: 2022-01-14 | End: 2022-01-14

## 2022-01-14 RX ORDER — LIDOCAINE 40 MG/G
CREAM TOPICAL
Status: DISCONTINUED | OUTPATIENT
Start: 2022-01-14 | End: 2022-01-14 | Stop reason: HOSPADM

## 2022-01-14 RX ORDER — ESMOLOL HYDROCHLORIDE 20 MG/ML
50-200 INJECTION, SOLUTION INTRAVENOUS CONTINUOUS
Status: DISCONTINUED | OUTPATIENT
Start: 2022-01-14 | End: 2022-01-14 | Stop reason: HOSPADM

## 2022-01-14 RX ADMIN — ONDANSETRON 4 MG: 2 INJECTION INTRAMUSCULAR; INTRAVENOUS at 11:01

## 2022-01-14 RX ADMIN — FENTANYL CITRATE 25 MCG: 50 INJECTION, SOLUTION INTRAMUSCULAR; INTRAVENOUS at 10:47

## 2022-01-14 RX ADMIN — ESMOLOL HYDROCHLORIDE IN SODIUM CHLORIDE 50 MCG/KG/MIN: 20 INJECTION INTRAVENOUS at 10:32

## 2022-01-14 RX ADMIN — FENTANYL CITRATE 25 MCG: 50 INJECTION, SOLUTION INTRAMUSCULAR; INTRAVENOUS at 10:06

## 2022-01-14 RX ADMIN — IOPAMIDOL 100 ML: 755 INJECTION, SOLUTION INTRAVENOUS at 09:50

## 2022-01-14 RX ADMIN — SODIUM CHLORIDE 1000 ML: 9 INJECTION, SOLUTION INTRAVENOUS at 10:06

## 2022-01-14 ASSESSMENT — ENCOUNTER SYMPTOMS
MYALGIAS: 1
BRUISES/BLEEDS EASILY: 1
ARTHRALGIAS: 1
ABDOMINAL PAIN: 1

## 2022-01-14 ASSESSMENT — MIFFLIN-ST. JEOR: SCORE: 1619.79
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
## 2022-01-17 ENCOUNTER — TELEPHONE (OUTPATIENT)
Dept: VASCULAR SURGERY | Facility: CLINIC | Age: 82
End: 2022-01-17
Payer: COMMERCIAL

## 2022-01-17 DIAGNOSIS — I71.40 ABDOMINAL AORTIC ANEURYSM (AAA) WITHOUT RUPTURE (H): ICD-10-CM

## 2022-01-17 DIAGNOSIS — I71.40 AAA (ABDOMINAL AORTIC ANEURYSM) WITHOUT RUPTURE (H): Primary | ICD-10-CM

## 2022-01-17 DIAGNOSIS — I71.019 DISSECTION OF THORACIC AORTA (H): ICD-10-CM
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
## 2022-01-21 ENCOUNTER — VIRTUAL VISIT (OUTPATIENT)
Dept: FAMILY MEDICINE | Facility: CLINIC | Age: 82
End: 2022-01-21
Payer: COMMERCIAL

## 2022-01-21 DIAGNOSIS — Z09 HOSPITAL DISCHARGE FOLLOW-UP: Primary | ICD-10-CM

## 2022-01-21 DIAGNOSIS — I71.40 ABDOMINAL AORTIC ANEURYSM (AAA) WITHOUT RUPTURE (H): ICD-10-CM

## 2022-01-21 DIAGNOSIS — N18.32 STAGE 3B CHRONIC KIDNEY DISEASE (H): ICD-10-CM

## 2022-01-21 DIAGNOSIS — I48.21 PERMANENT ATRIAL FIBRILLATION (H): ICD-10-CM

## 2022-01-21 DIAGNOSIS — S22.31XD CLOSED FRACTURE OF ONE RIB OF RIGHT SIDE WITH ROUTINE HEALING, SUBSEQUENT ENCOUNTER: ICD-10-CM

## 2022-01-21 DIAGNOSIS — I10 ESSENTIAL HYPERTENSION: ICD-10-CM

## 2022-01-21 PROCEDURE — 99442 PR PHYSICIAN TELEPHONE EVALUATION 11-20 MIN: CPT | Mod: 95 | Performed by: NURSE PRACTITIONER

## 2022-01-21 RX ORDER — OXYCODONE HYDROCHLORIDE 5 MG/1
TABLET ORAL
COMMUNITY
Start: 2022-01-20 | End: 2022-02-21
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
## 2022-01-25 ENCOUNTER — TELEPHONE (OUTPATIENT)
Dept: FAMILY MEDICINE | Facility: CLINIC | Age: 82
End: 2022-01-25
Payer: COMMERCIAL
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
## 2022-01-25 NOTE — TELEPHONE ENCOUNTER
Reason for Call: Request for an order or referral: nursing PT & OT    Order or referral being requested: Nursing PT & OT orders - [facility] received the order for admission but now needs orders for continuing treatment from Dereck' PCP.     Date needed: as soon as possible, admission date was 1/23/22    Has the patient been seen by the PCP for this problem? Not Applicable    Additional comments: Nursing one time week for four weeks, PT/OT evaluation & treatment - due to traumatic dissection of the thoracic aorta    Phone number Patient can be reached at:  Other phone number: Contact Mi Watts at [facility]: [phone]    Best Time: any time    Can we leave a detailed message on this number?  YES    Call taken on 1/25/2022 at 12:13 PM by Cherise Rutledge

## 2022-01-26 ENCOUNTER — TELEPHONE (OUTPATIENT)
Dept: FAMILY MEDICINE | Facility: CLINIC | Age: 82
End: 2022-01-26
Payer: COMMERCIAL

## 2022-01-26 NOTE — TELEPHONE ENCOUNTER
Reason for Call:  Home Health Care    Orders are needed for this patient.     OT:   1x/week for 1 week   2x/week for 5 weeks   1x/week for 2 weeks  Plan is to work on ADLs, IADLs, home exercise, strengthening, endurance and DME training    Pt Provider: Carter    Phone Number Homecare Nurse can be reached at: [place] [phone]    Can we leave a detailed message on this number? YES    Call taken on 1/26/2022 at 9:58 AM by Rachel Torres

## 2022-01-26 NOTE — TELEPHONE ENCOUNTER
Can you see if patient's heel pain is any better?  Does he need to be seen for possible x-ray, etc.?

## 2022-01-26 NOTE — TELEPHONE ENCOUNTER
Reason for Call:  Home Health Care     Orders are needed for this patient.      PT:   2x/week for 6 weeks    1x/week for 2 weeks     Pt Provider: Carter     Phone Number Homecare Nurse can be reached at: UBALDO Morrison [phone]     Can we leave a detailed message on this number? YES     Call taken on 1/26/2022 at 9:58 AM by Rachel Torres

## 2022-01-26 NOTE — TELEPHONE ENCOUNTER
Pt states the pain is doing better and only a 1.  The other symptoms are getting better every day.  Pt does not feel that he needs to be seen.
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
## 2022-01-28 NOTE — TELEPHONE ENCOUNTER
Patient's wife is requesting a call back to discuss patient's condition. She states that patient had another stent placed while he was in the hospital, he is home now with  services. Patient was scheduled to follow up with Dr. Granger on Monday along with a CT, but wife cancelled as she states patient cannot stand long enough to complete imaging.   [phone]

## 2022-01-28 NOTE — TELEPHONE ENCOUNTER
Spoke with Dereck's wife Shauna and scheduled follow up for 2/21/22. She  wanted to hold off on scheduling the CTA d/t his CKD. Dereck is scheduled for kidney function test 2/1/22. She would like a call back in about a week to find out the results before scheduling.

## 2022-02-01 ENCOUNTER — OFFICE VISIT (OUTPATIENT)
Dept: FAMILY MEDICINE | Facility: CLINIC | Age: 82
End: 2022-02-01
Payer: COMMERCIAL

## 2022-02-01 DIAGNOSIS — I10 ESSENTIAL HYPERTENSION: ICD-10-CM

## 2022-02-01 DIAGNOSIS — I71.019 DISSECTION OF THORACIC AORTA (H): ICD-10-CM

## 2022-02-01 DIAGNOSIS — N18.32 ANEMIA DUE TO STAGE 3B CHRONIC KIDNEY DISEASE (H): Primary | ICD-10-CM

## 2022-02-01 DIAGNOSIS — N18.32 STAGE 3B CHRONIC KIDNEY DISEASE (H): ICD-10-CM

## 2022-02-01 DIAGNOSIS — S22.31XD CLOSED FRACTURE OF ONE RIB OF RIGHT SIDE WITH ROUTINE HEALING, SUBSEQUENT ENCOUNTER: ICD-10-CM

## 2022-02-01 DIAGNOSIS — D63.1 ANEMIA DUE TO STAGE 3B CHRONIC KIDNEY DISEASE (H): Primary | ICD-10-CM

## 2022-02-01 DIAGNOSIS — M79.671 PAIN OF RIGHT HEEL: ICD-10-CM

## 2022-02-01 LAB
ANION GAP SERPL CALCULATED.3IONS-SCNC: 12 MMOL/L (ref 5–18)
BUN SERPL-MCNC: 32 MG/DL (ref 8–28)
CALCIUM SERPL-MCNC: 9.6 MG/DL (ref 8.5–10.5)
CHLORIDE BLD-SCNC: 108 MMOL/L (ref 98–107)
CO2 SERPL-SCNC: 21 MMOL/L (ref 22–31)
CREAT SERPL-MCNC: 1.99 MG/DL (ref 0.7–1.3)
ERYTHROCYTE [DISTWIDTH] IN BLOOD BY AUTOMATED COUNT: 14.6 % (ref 10–15)
GFR SERPL CREATININE-BSD FRML MDRD: 33 ML/MIN/1.73M2
GLUCOSE BLD-MCNC: 101 MG/DL (ref 70–125)
HCT VFR BLD AUTO: 28.8 % (ref 40–53)
HGB BLD-MCNC: 9.1 G/DL (ref 13.3–17.7)
MCH RBC QN AUTO: 32.6 PG (ref 26.5–33)
MCHC RBC AUTO-ENTMCNC: 31.6 G/DL (ref 31.5–36.5)
MCV RBC AUTO: 103 FL (ref 78–100)
PLATELET # BLD AUTO: 394 10E3/UL (ref 150–450)
POTASSIUM BLD-SCNC: 4.4 MMOL/L (ref 3.5–5)
RBC # BLD AUTO: 2.79 10E6/UL (ref 4.4–5.9)
SODIUM SERPL-SCNC: 141 MMOL/L (ref 136–145)
WBC # BLD AUTO: 6.3 10E3/UL (ref 4–11)

## 2022-02-01 PROCEDURE — 85027 COMPLETE CBC AUTOMATED: CPT | Performed by: FAMILY MEDICINE

## 2022-02-01 PROCEDURE — 99214 OFFICE O/P EST MOD 30 MIN: CPT | Performed by: FAMILY MEDICINE

## 2022-02-01 PROCEDURE — 80048 BASIC METABOLIC PNL TOTAL CA: CPT | Performed by: FAMILY MEDICINE

## 2022-02-01 PROCEDURE — 36415 COLL VENOUS BLD VENIPUNCTURE: CPT | Performed by: FAMILY MEDICINE

## 2022-02-01 RX ORDER — AMLODIPINE BESYLATE 5 MG/1
TABLET ORAL
Qty: 90 TABLET | Refills: 3 | Status: SHIPPED | OUTPATIENT
Start: 2022-02-01 | End: 2023-04-27

## 2022-02-01 NOTE — LETTER
February 4, 2022      Dereck Elliott  [id number] [id number]        Dear ,    We are writing to inform you of your test results.    Your kidney function and electrolytes are improving and back to your baseline. We will continue to monitor this.    Resulted Orders   Basic metabolic panel  (Ca, Cl, CO2, Creat, Gluc, K, Na, BUN)   Result Value Ref Range    Sodium 141 136 - 145 mmol/L    Potassium 4.4 3.5 - 5.0 mmol/L    Chloride 108 (H) 98 - 107 mmol/L    Carbon Dioxide (CO2) 21 (L) 22 - 31 mmol/L    Anion Gap 12 5 - 18 mmol/L    Urea Nitrogen 32 (H) 8 - 28 mg/dL    Creatinine 1.99 (H) 0.70 - 1.30 mg/dL    Calcium 9.6 8.5 - 10.5 mg/dL    Glucose 101 70 - 125 mg/dL    GFR Estimate 33 (L) >60 mL/min/1.73m2      Comment:      Effective December 21, 2021 eGFRcr in adults is calculated using the 2021 CKD-EPI creatinine equation which includes age and gender ( et al., NEJM, DOI: 10.1056/JXTZze7338684)       If you have any questions or concerns, please call the clinic at the number listed above.       Sincerely,      GUERRERO Goodwin CNP
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
## 2022-02-07 NOTE — TELEPHONE ENCOUNTER
Spoke with Shauna and told her that the order for th CTA is changed to include the chest and with extra hydration. She is understanding of the plan. They do have a post op appointment with Dr. Arreguin but do not have imaging before. I called imaging to change the order to already schedule study.

## 2022-02-18 ENCOUNTER — HOSPITAL ENCOUNTER (OUTPATIENT)
Dept: CT IMAGING | Facility: HOSPITAL | Age: 82
Discharge: HOME | End: 2022-02-18
Attending: SURGERY | Admitting: SURGERY
Payer: COMMERCIAL

## 2022-02-18 DIAGNOSIS — I71.40 ABDOMINAL AORTIC ANEURYSM (AAA) WITHOUT RUPTURE (H): ICD-10-CM

## 2022-02-18 DIAGNOSIS — I71.019 DISSECTION OF THORACIC AORTA (H): ICD-10-CM

## 2022-02-18 PROCEDURE — 74174 CTA ABD&PLVS W/CONTRAST: CPT

## 2022-02-18 PROCEDURE — 250N000011 HC RX IP 250 OP 636: Performed by: SURGERY

## 2022-02-18 PROCEDURE — 71275 CT ANGIOGRAPHY CHEST: CPT

## 2022-02-18 RX ORDER — IOPAMIDOL 755 MG/ML
75 INJECTION, SOLUTION INTRAVASCULAR ONCE
Status: COMPLETED | OUTPATIENT
Start: 2022-02-18 | End: 2022-02-18

## 2022-02-18 RX ADMIN — IOPAMIDOL 75 ML: 755 INJECTION, SOLUTION INTRAVENOUS at 11:05

## 2022-02-21 ENCOUNTER — OFFICE VISIT (OUTPATIENT)
Dept: VASCULAR SURGERY | Facility: CLINIC | Age: 82
End: 2022-02-21
Attending: SURGERY
Payer: COMMERCIAL

## 2022-02-21 VITALS — DIASTOLIC BLOOD PRESSURE: 82 MMHG | TEMPERATURE: 98 F | SYSTOLIC BLOOD PRESSURE: 140 MMHG | HEART RATE: 84 BPM

## 2022-02-21 DIAGNOSIS — I71.40 AAA (ABDOMINAL AORTIC ANEURYSM) WITHOUT RUPTURE (H): ICD-10-CM

## 2022-02-21 PROBLEM — S22.31XA CLOSED FRACTURE OF ONE RIB OF RIGHT SIDE: Status: ACTIVE | Noted: 2022-01-19

## 2022-02-21 PROCEDURE — 99024 POSTOP FOLLOW-UP VISIT: CPT | Performed by: SURGERY

## 2022-02-21 PROCEDURE — G0463 HOSPITAL OUTPT CLINIC VISIT: HCPCS

## 2022-02-21 RX ORDER — AMLODIPINE BESYLATE 2.5 MG/1
2.5 TABLET ORAL DAILY
COMMUNITY
Start: 2022-02-07 | End: 2022-02-21 | Stop reason: DRUGHIGH

## 2022-02-21 NOTE — PATIENT INSTRUCTIONS
PLEASE HAVE YOUR KIDNEY LABS CHECKED WITHIN 30 DAYS PRIOR TO CTA TEST    Computed Tomography Angiography (CTA)    Computed tomography angiography (CTA) is an imaging test. It uses X-rays and computer technology to make detailed pictures of your arteries. Before the test, an X-ray dye (contrast medium) is injected into your vein. The dye makes it easier to see your blood vessels on the X-ray. Pictures are then taken with the CT scanner. A computer turns the images into 2-D and 3-D pictures.      Computed tomography angiogram of carotid arteries.  CTA can make 3D images, such as the carotid arteries shown here.    Why CTA is done  CTA may be used to:      Check arteries in your belly, neck, lungs, pelvis, kidneys, or brain.    Look for a ballooning of the blood vessel wall (aneurysm) or a tear (dissection).    Check if a tube (stent) used to keep an artery open is working well.    Find damage to your arteries due to injuries.    Collect details on blood vessels that supply blood to tumors.    Getting ready for your test  Tell your healthcare provider if you:      Have diabetes    Have kidney disease    Are allergic to X-ray dye or other medicines    Are pregnant or think you may be pregnant    Are taking any medicines, herbs, or supplements, including prescription medicines, illegal drugs, and over-the-counter medicines such as aspirin or ibuprofen      Follow any directions you are given for not eating or drinking before the CTA. Follow any other instructions from your healthcare provider.      During your test    You will be asked to remove any hair clips, jewelry, false teeth, or other metal items that could show up on the X-ray.    You will lie down on the scanning table. An IV line will be put in a vein in your arm or hand.    The scanning table will be properly placed. The part of your body being checked will be inside the doughnut-shaped CT scanner.    One image may be taken first to be sure you are in the  proper position for the test.    The IV will be hooked up to an automatic injection machine. This controls how often and how fast the X-ray dye is injected. The injection may continue during part of the exam.    The dye will be put into your vein through the IV line. You may feel warmth through your body when the dye is injected.    You can t move while the X-rays are being taken. Pillows and foam pads may be used to help you stay still. You will be told to hold your breath for 10 to 25 seconds at a time.    A single scan may take several minutes. You may need more than one scan.    After your test    Drink plenty of fluids to help flush the X-ray dye from your body.    You may eat as soon as you want to.    Possible risks  All procedures have some risks. A CTA has some possible risks. These include:    Problems due to the X-ray dye, such as an allergic reaction or kidney damage    Skin damage from leaking X-ray dye near where the IV was put in      7868-0238 [employer]. [address] [place] [address]. All rights reserved. This information is not intended as a substitute for professional medical care. Always follow your healthcare professional's instructions.          We want to hear from you!  In the next few weeks, you should receive a call or email to complete a survey about your visit at [facility]. Please help us improve your appointment experience by letting us know how we did today. We strive to make your experience good and value any ways in which we could do better.      We value your input and suggestions.    Thank you for choosing the [facility]!
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
## 2022-02-21 NOTE — NURSING NOTE
[facility]        Patient is here for a 1 mo follow up  to discuss traumatic aortic rupture    Pt is currently taking Aspirin.    BP (!) 140/82   Pulse 84   Temp 98  F (36.7  C)     The provider has been notified that the patient has no concerns.     Questions patient would like addressed today are: N/A.    Refills are needed: No    Has homecare services and agency name:  Yes: [facility]       Hilda Mcguire

## 2022-03-02 ENCOUNTER — DOCUMENTATION ONLY (OUTPATIENT)
Dept: CARDIOLOGY | Facility: CLINIC | Age: 82
End: 2022-03-02

## 2022-03-02 ENCOUNTER — ANCILLARY PROCEDURE (OUTPATIENT)
Dept: CARDIOLOGY | Facility: CLINIC | Age: 82
End: 2022-03-02
Attending: INTERNAL MEDICINE
Payer: COMMERCIAL

## 2022-03-02 DIAGNOSIS — I49.5 SICK SINUS SYNDROME (H): ICD-10-CM

## 2022-03-02 DIAGNOSIS — Z95.0 PACEMAKER: ICD-10-CM

## 2022-03-02 PROCEDURE — 93294 REM INTERROG EVL PM/LDLS PM: CPT | Performed by: INTERNAL MEDICINE

## 2022-03-02 PROCEDURE — 93296 REM INTERROG EVL PM/IDS: CPT | Performed by: INTERNAL MEDICINE

## 2022-03-02 NOTE — PROGRESS NOTES
Type: routine remote pacemaker transmission.  Presenting rhythm: ventricular pacing 65 bpm.  Battery/lead status: stable.  Arrhythmias: since 11/18/21; three ventricular high rate episodes, EGMs suggest NSVT 14-17 bts 175-230 bpm.  Anticoagulant: No OAC per Dr Traore. Takes ASA.  Comments: normal pacemaker function. Routed to device RN for review. EAlexander Branchec, Device Specialist      Transmission reviewed, 2 NSVTs noted, longest ~8 seconds back on 11/29/21. Most recent 14 beats on 1/5/22, none since. Last EF 65% per Echo 2019, no Echo in last year. Did have Coronary CTA in 11/2021. Per Med list takes Toprol XL 50 mg daily.   88%.     Will review with NSVTs with Dr. Eugenie Tanner, BENY

## 2022-03-07 ENCOUNTER — TELEPHONE (OUTPATIENT)
Dept: FAMILY MEDICINE | Facility: CLINIC | Age: 82
End: 2022-03-07
Payer: COMMERCIAL

## 2022-03-07 DIAGNOSIS — G31.84 MILD COGNITIVE IMPAIRMENT: Primary | ICD-10-CM

## 2022-03-07 NOTE — TELEPHONE ENCOUNTER
Reason for Call:  Montse, OT with home care, is calling to report she performed the SLUMS test on patient, he scored 24 out of 30. She is recommending an OT outside referral for a driving assessment.    Detailed comments: no call back needed at this time, just wants order placed for driving assessment    Phone Number Patient can be reached at: Other phone number:  [phone]    Best Time: any    Can we leave a detailed message on this number? YES    Call taken on 3/7/2022 at 10:27 AM by Summer Murray

## 2022-03-09 ENCOUNTER — OFFICE VISIT (OUTPATIENT)
Dept: FAMILY MEDICINE | Facility: CLINIC | Age: 82
End: 2022-03-09
Payer: COMMERCIAL

## 2022-03-09 VITALS
DIASTOLIC BLOOD PRESSURE: 90 MMHG | HEART RATE: 105 BPM | BODY MASS INDEX: 25.1 KG/M2 | SYSTOLIC BLOOD PRESSURE: 160 MMHG | WEIGHT: 180 LBS | OXYGEN SATURATION: 86 %

## 2022-03-09 DIAGNOSIS — I10 ESSENTIAL HYPERTENSION: ICD-10-CM

## 2022-03-09 DIAGNOSIS — D63.1 ANEMIA DUE TO STAGE 3B CHRONIC KIDNEY DISEASE (H): Primary | ICD-10-CM

## 2022-03-09 DIAGNOSIS — I48.21 PERMANENT ATRIAL FIBRILLATION (H): ICD-10-CM

## 2022-03-09 DIAGNOSIS — N18.32 ANEMIA DUE TO STAGE 3B CHRONIC KIDNEY DISEASE (H): Primary | ICD-10-CM

## 2022-03-09 LAB
ERYTHROCYTE [DISTWIDTH] IN BLOOD BY AUTOMATED COUNT: 14.7 % (ref 10–15)
HCT VFR BLD AUTO: 37.2 % (ref 40–53)
HGB BLD-MCNC: 11.7 G/DL (ref 13.3–17.7)
HOLD SPECIMEN: NORMAL
MCH RBC QN AUTO: 32.3 PG (ref 26.5–33)
MCHC RBC AUTO-ENTMCNC: 31.5 G/DL (ref 31.5–36.5)
MCV RBC AUTO: 103 FL (ref 78–100)
PLATELET # BLD AUTO: 208 10E3/UL (ref 150–450)
RBC # BLD AUTO: 3.62 10E6/UL (ref 4.4–5.9)
WBC # BLD AUTO: 8.4 10E3/UL (ref 4–11)

## 2022-03-09 PROCEDURE — 36415 COLL VENOUS BLD VENIPUNCTURE: CPT | Performed by: FAMILY MEDICINE

## 2022-03-09 PROCEDURE — 85027 COMPLETE CBC AUTOMATED: CPT | Performed by: FAMILY MEDICINE

## 2022-03-09 PROCEDURE — 99214 OFFICE O/P EST MOD 30 MIN: CPT | Performed by: FAMILY MEDICINE

## 2022-03-09 RX ORDER — METOPROLOL SUCCINATE 100 MG/1
100 TABLET, EXTENDED RELEASE ORAL DAILY
Qty: 90 TABLET | Refills: 3 | Status: SHIPPED | OUTPATIENT
Start: 2022-03-09 | End: 2023-04-27
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
## 2022-03-11 ENCOUNTER — TELEPHONE (OUTPATIENT)
Dept: FAMILY MEDICINE | Facility: CLINIC | Age: 82
End: 2022-03-11
Payer: COMMERCIAL

## 2022-03-11 NOTE — TELEPHONE ENCOUNTER
Reason for Call: Request for an order or referral: Verbal order    Order or referral being requested: Verbal order to discharge from OT home health care.     Date needed: as soon as possible    Has the patient been seen by the PCP for this problem? NO    Additional comments: Montse @ OT home care called requesting for verbal order to discharge patient from OT home health care. Montse can be reach at [phone], ok's to leave detail message.    Please advise.    Phone number Patient can be reached at:  Home number on file [phone] (home)    Best Time:  Business hour    Can we leave a detailed message on this number?  YES    Call taken on 3/11/2022 at 4:48 PM by Yudith Jacinto    
21-Sep-2021 21:46

## 2022-03-18 LAB
MDC_IDC_EPISODE_DTM: NORMAL
MDC_IDC_EPISODE_DURATION: 0 S
MDC_IDC_EPISODE_DURATION: 1 S
MDC_IDC_EPISODE_DURATION: 3 S
MDC_IDC_EPISODE_ID: 14
MDC_IDC_EPISODE_ID: 15
MDC_IDC_EPISODE_ID: 16
MDC_IDC_EPISODE_TYPE: NORMAL
MDC_IDC_LEAD_IMPLANT_DT: NORMAL
MDC_IDC_LEAD_LOCATION: NORMAL
MDC_IDC_LEAD_LOCATION_DETAIL_1: NORMAL
MDC_IDC_LEAD_MFG: NORMAL
MDC_IDC_LEAD_MODEL: NORMAL
MDC_IDC_LEAD_POLARITY_TYPE: NORMAL
MDC_IDC_LEAD_SERIAL: NORMAL
MDC_IDC_LEAD_SPECIAL_FUNCTION: NORMAL
MDC_IDC_MSMT_BATTERY_DTM: NORMAL
MDC_IDC_MSMT_BATTERY_REMAINING_LONGEVITY: 145 MO
MDC_IDC_MSMT_BATTERY_RRT_TRIGGER: 2.62
MDC_IDC_MSMT_BATTERY_STATUS: NORMAL
MDC_IDC_MSMT_BATTERY_VOLTAGE: 3.02 V
MDC_IDC_MSMT_LEADCHNL_RV_IMPEDANCE_VALUE: 380 OHM
MDC_IDC_MSMT_LEADCHNL_RV_IMPEDANCE_VALUE: 494 OHM
MDC_IDC_MSMT_LEADCHNL_RV_PACING_THRESHOLD_AMPLITUDE: 0.5 V
MDC_IDC_MSMT_LEADCHNL_RV_PACING_THRESHOLD_PULSEWIDTH: 0.4 MS
MDC_IDC_MSMT_LEADCHNL_RV_SENSING_INTR_AMPL: 7 MV
MDC_IDC_MSMT_LEADCHNL_RV_SENSING_INTR_AMPL: 7 MV
MDC_IDC_PG_IMPLANT_DTM: NORMAL
MDC_IDC_PG_MFG: NORMAL
MDC_IDC_PG_MODEL: NORMAL
MDC_IDC_PG_SERIAL: NORMAL
MDC_IDC_PG_TYPE: NORMAL
MDC_IDC_SESS_CLINIC_NAME: NORMAL
MDC_IDC_SESS_DTM: NORMAL
MDC_IDC_SESS_TYPE: NORMAL
MDC_IDC_SET_BRADY_HYSTRATE: NORMAL
MDC_IDC_SET_BRADY_LOWRATE: 60 {BEATS}/MIN
MDC_IDC_SET_BRADY_MAX_SENSOR_RATE: 130 {BEATS}/MIN
MDC_IDC_SET_BRADY_MODE: NORMAL
MDC_IDC_SET_LEADCHNL_RV_PACING_AMPLITUDE: 1.5 V
MDC_IDC_SET_LEADCHNL_RV_PACING_ANODE_ELECTRODE_1: NORMAL
MDC_IDC_SET_LEADCHNL_RV_PACING_ANODE_LOCATION_1: NORMAL
MDC_IDC_SET_LEADCHNL_RV_PACING_CAPTURE_MODE: NORMAL
MDC_IDC_SET_LEADCHNL_RV_PACING_CATHODE_ELECTRODE_1: NORMAL
MDC_IDC_SET_LEADCHNL_RV_PACING_CATHODE_LOCATION_1: NORMAL
MDC_IDC_SET_LEADCHNL_RV_PACING_POLARITY: NORMAL
MDC_IDC_SET_LEADCHNL_RV_PACING_PULSEWIDTH: 0.4 MS
MDC_IDC_SET_LEADCHNL_RV_SENSING_ANODE_ELECTRODE_1: NORMAL
MDC_IDC_SET_LEADCHNL_RV_SENSING_ANODE_LOCATION_1: NORMAL
MDC_IDC_SET_LEADCHNL_RV_SENSING_CATHODE_ELECTRODE_1: NORMAL
MDC_IDC_SET_LEADCHNL_RV_SENSING_CATHODE_LOCATION_1: NORMAL
MDC_IDC_SET_LEADCHNL_RV_SENSING_POLARITY: NORMAL
MDC_IDC_SET_LEADCHNL_RV_SENSING_SENSITIVITY: 0.9 MV
MDC_IDC_SET_ZONE_DETECTION_INTERVAL: 360 MS
MDC_IDC_SET_ZONE_TYPE: NORMAL
MDC_IDC_STAT_BRADY_DTM_END: NORMAL
MDC_IDC_STAT_BRADY_DTM_START: NORMAL
MDC_IDC_STAT_BRADY_RV_PERCENT_PACED: 88.22 %
MDC_IDC_STAT_EPISODE_RECENT_COUNT: 0
MDC_IDC_STAT_EPISODE_RECENT_COUNT: 0
MDC_IDC_STAT_EPISODE_RECENT_COUNT: 3
MDC_IDC_STAT_EPISODE_RECENT_COUNT_DTM_END: NORMAL
MDC_IDC_STAT_EPISODE_RECENT_COUNT_DTM_START: NORMAL
MDC_IDC_STAT_EPISODE_TOTAL_COUNT: 0
MDC_IDC_STAT_EPISODE_TOTAL_COUNT: 0
MDC_IDC_STAT_EPISODE_TOTAL_COUNT: 16
MDC_IDC_STAT_EPISODE_TOTAL_COUNT_DTM_END: NORMAL
MDC_IDC_STAT_EPISODE_TOTAL_COUNT_DTM_START: NORMAL
MDC_IDC_STAT_EPISODE_TYPE: NORMAL

## 2022-06-13 ENCOUNTER — ANCILLARY PROCEDURE (OUTPATIENT)
Dept: CARDIOLOGY | Facility: CLINIC | Age: 82
End: 2022-06-13
Attending: INTERNAL MEDICINE
Payer: COMMERCIAL

## 2022-06-13 DIAGNOSIS — I49.5 SICK SINUS SYNDROME (H): ICD-10-CM

## 2022-06-13 DIAGNOSIS — Z95.0 PACEMAKER: ICD-10-CM

## 2022-06-13 LAB
MDC_IDC_LEAD_IMPLANT_DT: NORMAL
MDC_IDC_LEAD_LOCATION: NORMAL
MDC_IDC_LEAD_LOCATION_DETAIL_1: NORMAL
MDC_IDC_LEAD_MFG: NORMAL
MDC_IDC_LEAD_MODEL: NORMAL
MDC_IDC_LEAD_POLARITY_TYPE: NORMAL
MDC_IDC_LEAD_SERIAL: NORMAL
MDC_IDC_LEAD_SPECIAL_FUNCTION: NORMAL
MDC_IDC_MSMT_BATTERY_DTM: NORMAL
MDC_IDC_MSMT_BATTERY_REMAINING_LONGEVITY: 142 MO
MDC_IDC_MSMT_BATTERY_RRT_TRIGGER: 2.62
MDC_IDC_MSMT_BATTERY_STATUS: NORMAL
MDC_IDC_MSMT_BATTERY_VOLTAGE: 3.01 V
MDC_IDC_MSMT_LEADCHNL_RV_IMPEDANCE_VALUE: 380 OHM
MDC_IDC_MSMT_LEADCHNL_RV_IMPEDANCE_VALUE: 494 OHM
MDC_IDC_MSMT_LEADCHNL_RV_PACING_THRESHOLD_AMPLITUDE: 0.5 V
MDC_IDC_MSMT_LEADCHNL_RV_PACING_THRESHOLD_PULSEWIDTH: 0.4 MS
MDC_IDC_MSMT_LEADCHNL_RV_SENSING_INTR_AMPL: 10 MV
MDC_IDC_MSMT_LEADCHNL_RV_SENSING_INTR_AMPL: 10 MV
MDC_IDC_PG_IMPLANT_DTM: NORMAL
MDC_IDC_PG_MFG: NORMAL
MDC_IDC_PG_MODEL: NORMAL
MDC_IDC_PG_SERIAL: NORMAL
MDC_IDC_PG_TYPE: NORMAL
MDC_IDC_SESS_CLINIC_NAME: NORMAL
MDC_IDC_SESS_DTM: NORMAL
MDC_IDC_SESS_TYPE: NORMAL
MDC_IDC_SET_BRADY_HYSTRATE: NORMAL
MDC_IDC_SET_BRADY_LOWRATE: 60 {BEATS}/MIN
MDC_IDC_SET_BRADY_MAX_SENSOR_RATE: 130 {BEATS}/MIN
MDC_IDC_SET_BRADY_MODE: NORMAL
MDC_IDC_SET_LEADCHNL_RV_PACING_AMPLITUDE: 1.5 V
MDC_IDC_SET_LEADCHNL_RV_PACING_ANODE_ELECTRODE_1: NORMAL
MDC_IDC_SET_LEADCHNL_RV_PACING_ANODE_LOCATION_1: NORMAL
MDC_IDC_SET_LEADCHNL_RV_PACING_CAPTURE_MODE: NORMAL
MDC_IDC_SET_LEADCHNL_RV_PACING_CATHODE_ELECTRODE_1: NORMAL
MDC_IDC_SET_LEADCHNL_RV_PACING_CATHODE_LOCATION_1: NORMAL
MDC_IDC_SET_LEADCHNL_RV_PACING_POLARITY: NORMAL
MDC_IDC_SET_LEADCHNL_RV_PACING_PULSEWIDTH: 0.4 MS
MDC_IDC_SET_LEADCHNL_RV_SENSING_ANODE_ELECTRODE_1: NORMAL
MDC_IDC_SET_LEADCHNL_RV_SENSING_ANODE_LOCATION_1: NORMAL
MDC_IDC_SET_LEADCHNL_RV_SENSING_CATHODE_ELECTRODE_1: NORMAL
MDC_IDC_SET_LEADCHNL_RV_SENSING_CATHODE_LOCATION_1: NORMAL
MDC_IDC_SET_LEADCHNL_RV_SENSING_POLARITY: NORMAL
MDC_IDC_SET_LEADCHNL_RV_SENSING_SENSITIVITY: 0.9 MV
MDC_IDC_SET_ZONE_DETECTION_INTERVAL: 360 MS
MDC_IDC_SET_ZONE_TYPE: NORMAL
MDC_IDC_STAT_BRADY_DTM_END: NORMAL
MDC_IDC_STAT_BRADY_DTM_START: NORMAL
MDC_IDC_STAT_BRADY_RV_PERCENT_PACED: 86.14 %
MDC_IDC_STAT_EPISODE_RECENT_COUNT: 0
MDC_IDC_STAT_EPISODE_RECENT_COUNT_DTM_END: NORMAL
MDC_IDC_STAT_EPISODE_RECENT_COUNT_DTM_START: NORMAL
MDC_IDC_STAT_EPISODE_TOTAL_COUNT: 0
MDC_IDC_STAT_EPISODE_TOTAL_COUNT: 0
MDC_IDC_STAT_EPISODE_TOTAL_COUNT: 16
MDC_IDC_STAT_EPISODE_TOTAL_COUNT_DTM_END: NORMAL
MDC_IDC_STAT_EPISODE_TOTAL_COUNT_DTM_START: NORMAL
MDC_IDC_STAT_EPISODE_TYPE: NORMAL

## 2022-06-13 PROCEDURE — 93294 REM INTERROG EVL PM/LDLS PM: CPT | Performed by: INTERNAL MEDICINE

## 2022-06-13 PROCEDURE — 93296 REM INTERROG EVL PM/IDS: CPT | Performed by: INTERNAL MEDICINE

## 2022-07-05 ENCOUNTER — OFFICE VISIT (OUTPATIENT)
Dept: FAMILY MEDICINE | Facility: CLINIC | Age: 82
End: 2022-07-05
Payer: COMMERCIAL

## 2022-07-05 VITALS
BODY MASS INDEX: 26.33 KG/M2 | HEART RATE: 62 BPM | WEIGHT: 188.8 LBS | OXYGEN SATURATION: 96 % | TEMPERATURE: 97.4 F | SYSTOLIC BLOOD PRESSURE: 146 MMHG | DIASTOLIC BLOOD PRESSURE: 74 MMHG

## 2022-07-05 DIAGNOSIS — H90.3 BILATERAL SENSORINEURAL HEARING LOSS: ICD-10-CM

## 2022-07-05 DIAGNOSIS — N18.32 STAGE 3B CHRONIC KIDNEY DISEASE (H): ICD-10-CM

## 2022-07-05 DIAGNOSIS — N18.32 ANEMIA DUE TO STAGE 3B CHRONIC KIDNEY DISEASE (H): ICD-10-CM

## 2022-07-05 DIAGNOSIS — E78.5 DYSLIPIDEMIA: ICD-10-CM

## 2022-07-05 DIAGNOSIS — D53.9 MACROCYTIC ANEMIA: ICD-10-CM

## 2022-07-05 DIAGNOSIS — I10 ESSENTIAL HYPERTENSION: Primary | ICD-10-CM

## 2022-07-05 DIAGNOSIS — I48.21 PERMANENT ATRIAL FIBRILLATION (H): ICD-10-CM

## 2022-07-05 DIAGNOSIS — D63.1 ANEMIA DUE TO STAGE 3B CHRONIC KIDNEY DISEASE (H): ICD-10-CM

## 2022-07-05 DIAGNOSIS — Z23 HIGH PRIORITY FOR 2019-NCOV VACCINE: ICD-10-CM

## 2022-07-05 LAB
CREAT UR-MCNC: 87.8 MG/DL
MICROALBUMIN UR-MCNC: 256 MG/L
MICROALBUMIN/CREAT UR: 291.57 MG/G CR (ref 0–17)

## 2022-07-05 PROCEDURE — 91305 COVID-19,PF,PFIZER (12+ YRS): CPT | Performed by: FAMILY MEDICINE

## 2022-07-05 PROCEDURE — 0054A COVID-19,PF,PFIZER (12+ YRS): CPT | Performed by: FAMILY MEDICINE

## 2022-07-05 PROCEDURE — 99214 OFFICE O/P EST MOD 30 MIN: CPT | Mod: 25 | Performed by: FAMILY MEDICINE

## 2022-07-05 PROCEDURE — 82043 UR ALBUMIN QUANTITATIVE: CPT | Performed by: FAMILY MEDICINE

## 2022-07-05 ASSESSMENT — PAIN SCALES - GENERAL: PAINLEVEL: SEVERE PAIN (6)
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
## 2022-08-11 ENCOUNTER — TELEPHONE (OUTPATIENT)
Dept: VASCULAR SURGERY | Facility: CLINIC | Age: 82
End: 2022-08-11

## 2022-08-11 DIAGNOSIS — R22.42 LOCALIZED SWELLING, MASS AND LUMP, LEFT LOWER LIMB: ICD-10-CM

## 2022-08-11 DIAGNOSIS — R09.89 OTHER SPECIFIED SYMPTOMS AND SIGNS INVOLVING THE CIRCULATORY AND RESPIRATORY SYSTEMS: ICD-10-CM

## 2022-08-11 DIAGNOSIS — I71.40 AAA (ABDOMINAL AORTIC ANEURYSM) WITHOUT RUPTURE (H): Primary | ICD-10-CM

## 2022-08-11 NOTE — TELEPHONE ENCOUNTER
Patient's spouse notes a lump at the surgical site that is burning and bothersome.  She states it is palpable but not easy to be seen.  She is going to attempt to take a picuture and email to [email or web address].  She would like a call back to discuss and advise if the pt should be seen in clinic.      Shauna [phone]

## 2022-08-12 ENCOUNTER — ANCILLARY PROCEDURE (OUTPATIENT)
Dept: VASCULAR ULTRASOUND | Facility: CLINIC | Age: 82
End: 2022-08-12
Attending: SURGERY
Payer: COMMERCIAL

## 2022-08-12 DIAGNOSIS — R09.89 OTHER SPECIFIED SYMPTOMS AND SIGNS INVOLVING THE CIRCULATORY AND RESPIRATORY SYSTEMS: ICD-10-CM

## 2022-08-12 DIAGNOSIS — I71.40 AAA (ABDOMINAL AORTIC ANEURYSM) WITHOUT RUPTURE (H): ICD-10-CM

## 2022-08-12 DIAGNOSIS — R22.42 LOCALIZED SWELLING, MASS AND LUMP, LEFT LOWER LIMB: ICD-10-CM

## 2022-08-12 PROCEDURE — 93926 LOWER EXTREMITY STUDY: CPT

## 2022-08-12 PROCEDURE — 93926 LOWER EXTREMITY STUDY: CPT | Mod: 26 | Performed by: SURGERY

## 2022-08-12 PROCEDURE — 93971 EXTREMITY STUDY: CPT | Mod: 26 | Performed by: SURGERY

## 2022-08-12 NOTE — TELEPHONE ENCOUNTER
Spoke to patient's wife. Updated her that the ultrasound has not been read yet by the provider, but usually if the ultrasound showed something concerning the doctor would have been notified.  Confirmed with Josef in ultrasound that he did not see anything concerning and ok to wait for it to be read.  Let patient's wife know we will reach out to her early next week after the imaging has been read.

## 2022-08-16 NOTE — TELEPHONE ENCOUNTER
"LMTCB.  Nothing concerning on ultrasound.  Per report the lump is a \"Left groin organized hematoma.\"  "

## 2022-08-16 NOTE — TELEPHONE ENCOUNTER
Spoke to patient's wife and let her no nothing concerning on ultrasound and results indicated a hematoma.  She states the area has been improving.  No other concerns at this time.

## 2022-09-27 ENCOUNTER — ANCILLARY PROCEDURE (OUTPATIENT)
Dept: CARDIOLOGY | Facility: CLINIC | Age: 82
End: 2022-09-27
Payer: COMMERCIAL

## 2022-09-27 DIAGNOSIS — I49.5 SICK SINUS SYNDROME (H): ICD-10-CM

## 2022-09-27 DIAGNOSIS — Z95.0 PACEMAKER: ICD-10-CM

## 2022-09-27 PROCEDURE — 93294 REM INTERROG EVL PM/LDLS PM: CPT | Performed by: INTERNAL MEDICINE

## 2022-09-27 PROCEDURE — 93296 REM INTERROG EVL PM/IDS: CPT | Performed by: INTERNAL MEDICINE

## 2022-10-28 ENCOUNTER — OFFICE VISIT (OUTPATIENT)
Dept: FAMILY MEDICINE | Facility: CLINIC | Age: 82
End: 2022-10-28
Attending: FAMILY MEDICINE
Payer: COMMERCIAL

## 2022-10-28 VITALS
WEIGHT: 185 LBS | OXYGEN SATURATION: 94 % | BODY MASS INDEX: 26.48 KG/M2 | SYSTOLIC BLOOD PRESSURE: 134 MMHG | DIASTOLIC BLOOD PRESSURE: 76 MMHG | RESPIRATION RATE: 18 BRPM | HEIGHT: 70 IN | HEART RATE: 82 BPM

## 2022-10-28 DIAGNOSIS — Z23 HIGH PRIORITY FOR 2019-NCOV VACCINE: ICD-10-CM

## 2022-10-28 DIAGNOSIS — I10 ESSENTIAL HYPERTENSION: ICD-10-CM

## 2022-10-28 DIAGNOSIS — K40.90 LEFT INGUINAL HERNIA: ICD-10-CM

## 2022-10-28 DIAGNOSIS — G89.29 CHRONIC MIDLINE LOW BACK PAIN WITHOUT SCIATICA: ICD-10-CM

## 2022-10-28 DIAGNOSIS — N18.32 ANEMIA DUE TO STAGE 3B CHRONIC KIDNEY DISEASE (H): ICD-10-CM

## 2022-10-28 DIAGNOSIS — D63.1 ANEMIA DUE TO STAGE 3B CHRONIC KIDNEY DISEASE (H): ICD-10-CM

## 2022-10-28 DIAGNOSIS — N18.32 STAGE 3B CHRONIC KIDNEY DISEASE (H): ICD-10-CM

## 2022-10-28 DIAGNOSIS — M54.50 CHRONIC MIDLINE LOW BACK PAIN WITHOUT SCIATICA: ICD-10-CM

## 2022-10-28 DIAGNOSIS — Z00.00 ENCOUNTER FOR MEDICARE ANNUAL WELLNESS EXAM: Primary | ICD-10-CM

## 2022-10-28 DIAGNOSIS — I48.21 PERMANENT ATRIAL FIBRILLATION (H): ICD-10-CM

## 2022-10-28 DIAGNOSIS — E78.5 DYSLIPIDEMIA: ICD-10-CM

## 2022-10-28 DIAGNOSIS — D53.9 MACROCYTIC ANEMIA: ICD-10-CM

## 2022-10-28 DIAGNOSIS — Z23 ENCOUNTER FOR IMMUNIZATION: ICD-10-CM

## 2022-10-28 DIAGNOSIS — E55.9 VITAMIN D DEFICIENCY DISEASE: ICD-10-CM

## 2022-10-28 DIAGNOSIS — Z95.0 CARDIAC PACEMAKER IN SITU: ICD-10-CM

## 2022-10-28 DIAGNOSIS — H90.3 BILATERAL SENSORINEURAL HEARING LOSS: ICD-10-CM

## 2022-10-28 PROCEDURE — 91312 COVID-19,PF,PFIZER BOOSTER BIVALENT: CPT | Performed by: FAMILY MEDICINE

## 2022-10-28 PROCEDURE — 90662 IIV NO PRSV INCREASED AG IM: CPT | Performed by: FAMILY MEDICINE

## 2022-10-28 PROCEDURE — G0008 ADMIN INFLUENZA VIRUS VAC: HCPCS | Performed by: FAMILY MEDICINE

## 2022-10-28 PROCEDURE — 99214 OFFICE O/P EST MOD 30 MIN: CPT | Mod: 25 | Performed by: FAMILY MEDICINE

## 2022-10-28 PROCEDURE — G0439 PPPS, SUBSEQ VISIT: HCPCS | Performed by: FAMILY MEDICINE

## 2022-10-28 PROCEDURE — 0124A COVID-19,PF,PFIZER BOOSTER BIVALENT: CPT | Performed by: FAMILY MEDICINE

## 2022-10-28 ASSESSMENT — ENCOUNTER SYMPTOMS: ARTHRALGIAS: 1

## 2022-10-28 ASSESSMENT — ACTIVITIES OF DAILY LIVING (ADL)
CURRENT_FUNCTION: SHOPPING REQUIRES ASSISTANCE
CURRENT_FUNCTION: TELEPHONE REQUIRES ASSISTANCE

## 2022-10-28 ASSESSMENT — PAIN SCALES - GENERAL: PAINLEVEL: MODERATE PAIN (5)

## 2022-10-28 NOTE — PATIENT INSTRUCTIONS
Patient Education   Personalized Prevention Plan  You are due for the preventive services outlined below.  Your care team is available to assist you in scheduling these services.  If you have already completed any of these items, please share that information with your care team to update in your medical record.  Health Maintenance Due   Topic Date Due     Hepatitis B Vaccine (1 of 3 - 3-dose series) Never done     Zoster (Shingles) Vaccine (1 of 2) Never done     Cholesterol Lab  08/24/2022     COVID-19 Vaccine (5 - Booster for Pfizer series) 08/30/2022     Flu Vaccine (1) 09/01/2022       Preventing Falls at Home  A person can fall for many reasons. Older adults may fall because reaction time slows down as we age. Your muscles and joints may get stiff, weak, or less flexible because of illness, medicines, or a physical condition.   Other health problems that make falls more likely include:     Arthritis    Dizziness or lightheadedness when you stand up (orthostatic hypotension)    History of a stroke    Dizziness    Anemia    Certain medicines taken for mental illness or to control blood pressure.    Problems with balance or gait    Bladder or urinary problems    History of falling    Changes in vision (vision impairment)    Changes in thinking skills and memory (cognitive impairment)  Injuries from a fall can include serious injuries such as broken bones, dislocated joints, internal bleeding and cuts. Injuries like these can limit your independence.   Prevention tips  To help prevent falls and fall-related injuries, follow the tips below.    Floors  To make floors safer:     Put nonskid pads under area rugs.    Remove small rugs.    Replace worn floor coverings.    Tack carpets firmly to each step on carpeted stairs. Put nonskid strips on the edges of uncarpeted stairs.    Keep floors and stairs free of clutter and cords.    Arrange furniture so there are clear pathways.    Clean up any spills right  away.  Bathrooms    To make bathrooms safer:     Install grab bars in the tub or shower.    Apply nonskid strips or put a nonskid rubber mat in the tub or shower.    Sit on a bath chair to bathe.    Use bathmats with nonskid backing.  Lighting  To improve visibility in your home:      Keep a flashlight in each room. Or put a lamp next to the bed within easy reach.    Put nightlights in the bedrooms, hallways, kitchen, and bathrooms.    Make sure all stairways have good lighting.    Take your time when going up and down stairs.    Put handrails on both sides of stairs and in walkways for more support. To prevent injury to your wrist or arm, don t use handrails to pull yourself up.    Install grab bars to pull yourself up.    Move or rearrange items that you use often. This will make them easier to find or reach.    Look at your home to find any safety hazards. Especially look at doorways, walkways, and the driveway. Remove or repair any safety problems that you find.  Other changes to make    Look around to find any safety hazards. Look closely at doorways, walkways, and the driveway. Remove or repair any safety problems that you find.    Wear shoes that fit well.    Take your time when going up and down stairs.    Put handrails on both sides of stairs and in walkways for more support. To prevent injury to your wrist or arm, don t use handrails to pull yourself up.    Install grab bars wherever needed to pull yourself up.    Arrange items that you use often. This will make them easier to find or reach.    [employer] last reviewed this educational content on 3/1/2020    4810-0429 [employer]. All rights reserved. This information is not intended as a substitute for professional medical care. Always follow your healthcare professional's instructions.
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
## 2022-11-10 ENCOUNTER — TELEPHONE (OUTPATIENT)
Dept: FAMILY MEDICINE | Facility: CLINIC | Age: 82
End: 2022-11-10

## 2022-11-10 NOTE — TELEPHONE ENCOUNTER
Patient was wondering if a referral was send to get a MRI and he would also like his lab results mail to him. Thank you

## 2022-11-10 NOTE — TELEPHONE ENCOUNTER
A referral was issues for the spine doctor.  Pt should have an appt with them to determined appropriate treatment .

## 2022-11-29 LAB
MDC_IDC_LEAD_IMPLANT_DT: NORMAL
MDC_IDC_LEAD_LOCATION: NORMAL
MDC_IDC_LEAD_LOCATION_DETAIL_1: NORMAL
MDC_IDC_LEAD_MFG: NORMAL
MDC_IDC_LEAD_MODEL: NORMAL
MDC_IDC_LEAD_POLARITY_TYPE: NORMAL
MDC_IDC_LEAD_SERIAL: NORMAL
MDC_IDC_LEAD_SPECIAL_FUNCTION: NORMAL
MDC_IDC_MSMT_BATTERY_DTM: NORMAL
MDC_IDC_MSMT_BATTERY_REMAINING_LONGEVITY: 138 MO
MDC_IDC_MSMT_BATTERY_RRT_TRIGGER: 2.62
MDC_IDC_MSMT_BATTERY_STATUS: NORMAL
MDC_IDC_MSMT_BATTERY_VOLTAGE: 3.01 V
MDC_IDC_MSMT_LEADCHNL_RV_IMPEDANCE_VALUE: 380 OHM
MDC_IDC_MSMT_LEADCHNL_RV_IMPEDANCE_VALUE: 494 OHM
MDC_IDC_MSMT_LEADCHNL_RV_PACING_THRESHOLD_AMPLITUDE: 0.5 V
MDC_IDC_MSMT_LEADCHNL_RV_PACING_THRESHOLD_PULSEWIDTH: 0.4 MS
MDC_IDC_MSMT_LEADCHNL_RV_SENSING_INTR_AMPL: 11.75 MV
MDC_IDC_MSMT_LEADCHNL_RV_SENSING_INTR_AMPL: 11.75 MV
MDC_IDC_PG_IMPLANT_DTM: NORMAL
MDC_IDC_PG_MFG: NORMAL
MDC_IDC_PG_MODEL: NORMAL
MDC_IDC_PG_SERIAL: NORMAL
MDC_IDC_PG_TYPE: NORMAL
MDC_IDC_SESS_CLINIC_NAME: NORMAL
MDC_IDC_SESS_DTM: NORMAL
MDC_IDC_SESS_TYPE: NORMAL
MDC_IDC_SET_BRADY_HYSTRATE: NORMAL
MDC_IDC_SET_BRADY_LOWRATE: 60 {BEATS}/MIN
MDC_IDC_SET_BRADY_MAX_SENSOR_RATE: 130 {BEATS}/MIN
MDC_IDC_SET_BRADY_MODE: NORMAL
MDC_IDC_SET_LEADCHNL_RV_PACING_AMPLITUDE: 1.5 V
MDC_IDC_SET_LEADCHNL_RV_PACING_ANODE_ELECTRODE_1: NORMAL
MDC_IDC_SET_LEADCHNL_RV_PACING_ANODE_LOCATION_1: NORMAL
MDC_IDC_SET_LEADCHNL_RV_PACING_CAPTURE_MODE: NORMAL
MDC_IDC_SET_LEADCHNL_RV_PACING_CATHODE_ELECTRODE_1: NORMAL
MDC_IDC_SET_LEADCHNL_RV_PACING_CATHODE_LOCATION_1: NORMAL
MDC_IDC_SET_LEADCHNL_RV_PACING_POLARITY: NORMAL
MDC_IDC_SET_LEADCHNL_RV_PACING_PULSEWIDTH: 0.4 MS
MDC_IDC_SET_LEADCHNL_RV_SENSING_ANODE_ELECTRODE_1: NORMAL
MDC_IDC_SET_LEADCHNL_RV_SENSING_ANODE_LOCATION_1: NORMAL
MDC_IDC_SET_LEADCHNL_RV_SENSING_CATHODE_ELECTRODE_1: NORMAL
MDC_IDC_SET_LEADCHNL_RV_SENSING_CATHODE_LOCATION_1: NORMAL
MDC_IDC_SET_LEADCHNL_RV_SENSING_POLARITY: NORMAL
MDC_IDC_SET_LEADCHNL_RV_SENSING_SENSITIVITY: 0.9 MV
MDC_IDC_SET_ZONE_DETECTION_INTERVAL: 360 MS
MDC_IDC_SET_ZONE_TYPE: NORMAL
MDC_IDC_STAT_BRADY_DTM_END: NORMAL
MDC_IDC_STAT_BRADY_DTM_START: NORMAL
MDC_IDC_STAT_BRADY_RV_PERCENT_PACED: 93.85 %
MDC_IDC_STAT_EPISODE_RECENT_COUNT: 0
MDC_IDC_STAT_EPISODE_RECENT_COUNT_DTM_END: NORMAL
MDC_IDC_STAT_EPISODE_RECENT_COUNT_DTM_START: NORMAL
MDC_IDC_STAT_EPISODE_TOTAL_COUNT: 0
MDC_IDC_STAT_EPISODE_TOTAL_COUNT: 0
MDC_IDC_STAT_EPISODE_TOTAL_COUNT: 16
MDC_IDC_STAT_EPISODE_TOTAL_COUNT_DTM_END: NORMAL
MDC_IDC_STAT_EPISODE_TOTAL_COUNT_DTM_START: NORMAL
MDC_IDC_STAT_EPISODE_TYPE: NORMAL

## 2022-12-13 ENCOUNTER — OFFICE VISIT (OUTPATIENT)
Dept: CARDIOLOGY | Facility: CLINIC | Age: 82
End: 2022-12-13
Payer: COMMERCIAL

## 2022-12-13 ENCOUNTER — ANCILLARY PROCEDURE (OUTPATIENT)
Dept: CARDIOLOGY | Facility: CLINIC | Age: 82
End: 2022-12-13
Attending: INTERNAL MEDICINE
Payer: COMMERCIAL

## 2022-12-13 VITALS
DIASTOLIC BLOOD PRESSURE: 76 MMHG | BODY MASS INDEX: 27.77 KG/M2 | SYSTOLIC BLOOD PRESSURE: 140 MMHG | RESPIRATION RATE: 16 BRPM | HEIGHT: 70 IN | WEIGHT: 194 LBS | HEART RATE: 64 BPM

## 2022-12-13 DIAGNOSIS — I49.5 SICK SINUS SYNDROME (H): ICD-10-CM

## 2022-12-13 DIAGNOSIS — I49.5 SICK SINUS SYNDROME (H): Primary | ICD-10-CM

## 2022-12-13 DIAGNOSIS — I48.21 PERMANENT ATRIAL FIBRILLATION (H): ICD-10-CM

## 2022-12-13 DIAGNOSIS — Z95.0 CARDIAC PACEMAKER: ICD-10-CM

## 2022-12-13 DIAGNOSIS — I10 BENIGN ESSENTIAL HYPERTENSION: ICD-10-CM

## 2022-12-13 DIAGNOSIS — Z95.0 PACEMAKER: Primary | ICD-10-CM

## 2022-12-13 DIAGNOSIS — I47.29 NSVT (NONSUSTAINED VENTRICULAR TACHYCARDIA) (H): ICD-10-CM

## 2022-12-13 DIAGNOSIS — N18.32 STAGE 3B CHRONIC KIDNEY DISEASE (H): ICD-10-CM

## 2022-12-13 LAB
MDC_IDC_LEAD_IMPLANT_DT: NORMAL
MDC_IDC_LEAD_LOCATION: NORMAL
MDC_IDC_LEAD_LOCATION_DETAIL_1: NORMAL
MDC_IDC_LEAD_MFG: NORMAL
MDC_IDC_LEAD_MODEL: NORMAL
MDC_IDC_LEAD_POLARITY_TYPE: NORMAL
MDC_IDC_LEAD_SERIAL: NORMAL
MDC_IDC_LEAD_SPECIAL_FUNCTION: NORMAL
MDC_IDC_MSMT_BATTERY_DTM: NORMAL
MDC_IDC_MSMT_BATTERY_REMAINING_LONGEVITY: 137 MO
MDC_IDC_MSMT_BATTERY_RRT_TRIGGER: 2.62
MDC_IDC_MSMT_BATTERY_STATUS: NORMAL
MDC_IDC_MSMT_BATTERY_VOLTAGE: 3.01 V
MDC_IDC_MSMT_LEADCHNL_RV_IMPEDANCE_VALUE: 437 OHM
MDC_IDC_MSMT_LEADCHNL_RV_IMPEDANCE_VALUE: 513 OHM
MDC_IDC_MSMT_LEADCHNL_RV_PACING_THRESHOLD_AMPLITUDE: 0.5 V
MDC_IDC_MSMT_LEADCHNL_RV_PACING_THRESHOLD_PULSEWIDTH: 0.4 MS
MDC_IDC_MSMT_LEADCHNL_RV_SENSING_INTR_AMPL: 11.75 MV
MDC_IDC_MSMT_LEADCHNL_RV_SENSING_INTR_AMPL: 11.75 MV
MDC_IDC_PG_IMPLANT_DTM: NORMAL
MDC_IDC_PG_MFG: NORMAL
MDC_IDC_PG_MODEL: NORMAL
MDC_IDC_PG_SERIAL: NORMAL
MDC_IDC_PG_TYPE: NORMAL
MDC_IDC_SESS_CLINIC_NAME: NORMAL
MDC_IDC_SESS_DTM: NORMAL
MDC_IDC_SESS_TYPE: NORMAL
MDC_IDC_SET_BRADY_HYSTRATE: NORMAL
MDC_IDC_SET_BRADY_LOWRATE: 60 {BEATS}/MIN
MDC_IDC_SET_BRADY_MAX_SENSOR_RATE: 130 {BEATS}/MIN
MDC_IDC_SET_BRADY_MODE: NORMAL
MDC_IDC_SET_LEADCHNL_RV_PACING_AMPLITUDE: 1.5 V
MDC_IDC_SET_LEADCHNL_RV_PACING_ANODE_ELECTRODE_1: NORMAL
MDC_IDC_SET_LEADCHNL_RV_PACING_ANODE_LOCATION_1: NORMAL
MDC_IDC_SET_LEADCHNL_RV_PACING_CAPTURE_MODE: NORMAL
MDC_IDC_SET_LEADCHNL_RV_PACING_CATHODE_ELECTRODE_1: NORMAL
MDC_IDC_SET_LEADCHNL_RV_PACING_CATHODE_LOCATION_1: NORMAL
MDC_IDC_SET_LEADCHNL_RV_PACING_POLARITY: NORMAL
MDC_IDC_SET_LEADCHNL_RV_PACING_PULSEWIDTH: 0.4 MS
MDC_IDC_SET_LEADCHNL_RV_SENSING_ANODE_ELECTRODE_1: NORMAL
MDC_IDC_SET_LEADCHNL_RV_SENSING_ANODE_LOCATION_1: NORMAL
MDC_IDC_SET_LEADCHNL_RV_SENSING_CATHODE_ELECTRODE_1: NORMAL
MDC_IDC_SET_LEADCHNL_RV_SENSING_CATHODE_LOCATION_1: NORMAL
MDC_IDC_SET_LEADCHNL_RV_SENSING_POLARITY: NORMAL
MDC_IDC_SET_LEADCHNL_RV_SENSING_SENSITIVITY: 0.9 MV
MDC_IDC_SET_ZONE_DETECTION_INTERVAL: 360 MS
MDC_IDC_SET_ZONE_TYPE: NORMAL
MDC_IDC_STAT_BRADY_DTM_END: NORMAL
MDC_IDC_STAT_BRADY_DTM_START: NORMAL
MDC_IDC_STAT_BRADY_RV_PERCENT_PACED: 91.02 %
MDC_IDC_STAT_EPISODE_RECENT_COUNT: 0
MDC_IDC_STAT_EPISODE_RECENT_COUNT: 3
MDC_IDC_STAT_EPISODE_RECENT_COUNT_DTM_END: NORMAL
MDC_IDC_STAT_EPISODE_RECENT_COUNT_DTM_END: NORMAL
MDC_IDC_STAT_EPISODE_RECENT_COUNT_DTM_START: NORMAL
MDC_IDC_STAT_EPISODE_RECENT_COUNT_DTM_START: NORMAL
MDC_IDC_STAT_EPISODE_TOTAL_COUNT: 0
MDC_IDC_STAT_EPISODE_TOTAL_COUNT: 16
MDC_IDC_STAT_EPISODE_TOTAL_COUNT_DTM_END: NORMAL
MDC_IDC_STAT_EPISODE_TOTAL_COUNT_DTM_END: NORMAL
MDC_IDC_STAT_EPISODE_TOTAL_COUNT_DTM_START: NORMAL
MDC_IDC_STAT_EPISODE_TOTAL_COUNT_DTM_START: NORMAL
MDC_IDC_STAT_EPISODE_TYPE: NORMAL

## 2022-12-13 PROCEDURE — 99214 OFFICE O/P EST MOD 30 MIN: CPT | Performed by: INTERNAL MEDICINE

## 2022-12-13 PROCEDURE — 93279 PRGRMG DEV EVAL PM/LDLS PM: CPT | Performed by: INTERNAL MEDICINE
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
## 2022-12-13 NOTE — PATIENT INSTRUCTIONS
It was a pleasure to meet with you today.      Below is a summary of your visit.   Continue your medications without changes.  I encourage continued regular exercise  Follow up with me in a year or sooner if needed.     Please do not hesitate to call the [facility] with any questions or concerns at [phone].     Sincerely,

## 2023-02-16 ENCOUNTER — ANESTHESIA (OUTPATIENT)
Dept: SURGERY | Facility: CLINIC | Age: 83
End: 2023-02-16
Payer: COMMERCIAL

## 2023-02-16 ENCOUNTER — ANESTHESIA EVENT (OUTPATIENT)
Dept: SURGERY | Facility: CLINIC | Age: 83
End: 2023-02-16
Payer: COMMERCIAL

## 2023-02-16 ENCOUNTER — HOSPITAL ENCOUNTER (EMERGENCY)
Facility: CLINIC | Age: 83
Discharge: ANOTHER HOSPITAL | End: 2023-02-16
Attending: EMERGENCY MEDICINE | Admitting: EMERGENCY MEDICINE
Payer: COMMERCIAL

## 2023-02-16 ENCOUNTER — HOSPITAL ENCOUNTER (OUTPATIENT)
Facility: CLINIC | Age: 83
Discharge: HOME | End: 2023-02-17
Attending: EMERGENCY MEDICINE | Admitting: OTOLARYNGOLOGY
Payer: COMMERCIAL

## 2023-02-16 VITALS
HEART RATE: 65 BPM | TEMPERATURE: 98.2 F | OXYGEN SATURATION: 98 % | WEIGHT: 180 LBS | BODY MASS INDEX: 25.64 KG/M2 | SYSTOLIC BLOOD PRESSURE: 154 MMHG | DIASTOLIC BLOOD PRESSURE: 71 MMHG | RESPIRATION RATE: 16 BRPM

## 2023-02-16 DIAGNOSIS — R04.0 EPISTAXIS: ICD-10-CM

## 2023-02-16 DIAGNOSIS — R04.0 POSTERIOR EPISTAXIS: ICD-10-CM

## 2023-02-16 LAB
ABO/RH(D): NORMAL
ANTIBODY SCREEN: NEGATIVE
BASOPHILS # BLD AUTO: 0 10E3/UL (ref 0–0.2)
BASOPHILS NFR BLD AUTO: 0 %
EOSINOPHIL # BLD AUTO: 0.7 10E3/UL (ref 0–0.7)
EOSINOPHIL NFR BLD AUTO: 9 %
ERYTHROCYTE [DISTWIDTH] IN BLOOD BY AUTOMATED COUNT: 13.3 % (ref 10–15)
HCT VFR BLD AUTO: 35.2 % (ref 40–53)
HGB BLD-MCNC: 10.4 G/DL (ref 13.3–17.7)
HGB BLD-MCNC: 11.1 G/DL (ref 13.3–17.7)
IMM GRANULOCYTES # BLD: 0 10E3/UL
IMM GRANULOCYTES NFR BLD: 0 %
INR PPP: 1.05 (ref 0.85–1.15)
LYMPHOCYTES # BLD AUTO: 1.4 10E3/UL (ref 0.8–5.3)
LYMPHOCYTES NFR BLD AUTO: 18 %
MCH RBC QN AUTO: 32.5 PG (ref 26.5–33)
MCHC RBC AUTO-ENTMCNC: 31.5 G/DL (ref 31.5–36.5)
MCV RBC AUTO: 103 FL (ref 78–100)
MONOCYTES # BLD AUTO: 0.8 10E3/UL (ref 0–1.3)
MONOCYTES NFR BLD AUTO: 9 %
NEUTROPHILS # BLD AUTO: 5.1 10E3/UL (ref 1.6–8.3)
NEUTROPHILS NFR BLD AUTO: 64 %
NRBC # BLD AUTO: 0 10E3/UL
NRBC BLD AUTO-RTO: 0 /100
PLATELET # BLD AUTO: 187 10E3/UL (ref 150–450)
RBC # BLD AUTO: 3.42 10E6/UL (ref 4.4–5.9)
SPECIMEN EXPIRATION DATE: NORMAL
WBC # BLD AUTO: 8.1 10E3/UL (ref 4–11)

## 2023-02-16 PROCEDURE — 258N000003 HC RX IP 258 OP 636

## 2023-02-16 PROCEDURE — 370N000017 HC ANESTHESIA TECHNICAL FEE, PER MIN: Performed by: OTOLARYNGOLOGY

## 2023-02-16 PROCEDURE — 272N000001 HC OR GENERAL SUPPLY STERILE: Performed by: OTOLARYNGOLOGY

## 2023-02-16 PROCEDURE — 30905 CONTROL OF NOSEBLEED: CPT | Mod: XE | Performed by: PHYSICIAN ASSISTANT

## 2023-02-16 PROCEDURE — 36415 COLL VENOUS BLD VENIPUNCTURE: CPT | Performed by: EMERGENCY MEDICINE

## 2023-02-16 PROCEDURE — 250N000009 HC RX 250

## 2023-02-16 PROCEDURE — 250N000009 HC RX 250: Performed by: EMERGENCY MEDICINE

## 2023-02-16 PROCEDURE — 250N000024 HC ISOFLURANE, PER MIN: Performed by: OTOLARYNGOLOGY

## 2023-02-16 PROCEDURE — 360N000077 HC SURGERY LEVEL 4, PER MIN: Performed by: OTOLARYNGOLOGY

## 2023-02-16 PROCEDURE — 99285 EMERGENCY DEPT VISIT HI MDM: CPT | Performed by: EMERGENCY MEDICINE

## 2023-02-16 PROCEDURE — 31238 NSL/SINS NDSC SRG NSL HEMRRG: CPT | Mod: RT | Performed by: OTOLARYNGOLOGY

## 2023-02-16 PROCEDURE — 250N000009 HC RX 250: Performed by: ANESTHESIOLOGY

## 2023-02-16 PROCEDURE — 86901 BLOOD TYPING SEROLOGIC RH(D): CPT

## 2023-02-16 PROCEDURE — 85025 COMPLETE CBC W/AUTO DIFF WBC: CPT | Performed by: EMERGENCY MEDICINE

## 2023-02-16 PROCEDURE — 99285 EMERGENCY DEPT VISIT HI MDM: CPT | Mod: 25 | Performed by: EMERGENCY MEDICINE

## 2023-02-16 PROCEDURE — 30905 CONTROL OF NOSEBLEED: CPT

## 2023-02-16 PROCEDURE — 710N000010 HC RECOVERY PHASE 1, LEVEL 2, PER MIN: Performed by: OTOLARYNGOLOGY

## 2023-02-16 PROCEDURE — 710N000012 HC RECOVERY PHASE 2, PER MINUTE: Performed by: OTOLARYNGOLOGY

## 2023-02-16 PROCEDURE — 85610 PROTHROMBIN TIME: CPT | Performed by: EMERGENCY MEDICINE

## 2023-02-16 PROCEDURE — 250N000011 HC RX IP 250 OP 636

## 2023-02-16 PROCEDURE — 99284 EMERGENCY DEPT VISIT MOD MDM: CPT | Mod: 25

## 2023-02-16 PROCEDURE — 99204 OFFICE O/P NEW MOD 45 MIN: CPT | Mod: 25 | Performed by: PHYSICIAN ASSISTANT

## 2023-02-16 PROCEDURE — 250N000013 HC RX MED GY IP 250 OP 250 PS 637: Performed by: EMERGENCY MEDICINE

## 2023-02-16 PROCEDURE — 85018 HEMOGLOBIN: CPT | Performed by: EMERGENCY MEDICINE

## 2023-02-16 RX ORDER — LIDOCAINE HYDROCHLORIDE 20 MG/ML
INJECTION, SOLUTION INFILTRATION; PERINEURAL PRN
Status: DISCONTINUED | OUTPATIENT
Start: 2023-02-16 | End: 2023-02-16

## 2023-02-16 RX ORDER — METOPROLOL SUCCINATE 100 MG/1
100 TABLET, EXTENDED RELEASE ORAL DAILY
Status: DISCONTINUED | OUTPATIENT
Start: 2023-02-17 | End: 2023-02-17 | Stop reason: HOSPADM

## 2023-02-16 RX ORDER — SODIUM CHLORIDE, SODIUM GLUCONATE, SODIUM ACETATE, POTASSIUM CHLORIDE AND MAGNESIUM CHLORIDE 526; 502; 368; 37; 30 MG/100ML; MG/100ML; MG/100ML; MG/100ML; MG/100ML
INJECTION, SOLUTION INTRAVENOUS CONTINUOUS PRN
Status: DISCONTINUED | OUTPATIENT
Start: 2023-02-16 | End: 2023-02-16

## 2023-02-16 RX ORDER — ONDANSETRON 4 MG/1
4 TABLET, ORALLY DISINTEGRATING ORAL EVERY 30 MIN PRN
Status: DISCONTINUED | OUTPATIENT
Start: 2023-02-16 | End: 2023-02-16

## 2023-02-16 RX ORDER — HYDROMORPHONE HYDROCHLORIDE 1 MG/ML
0.2 INJECTION, SOLUTION INTRAMUSCULAR; INTRAVENOUS; SUBCUTANEOUS EVERY 5 MIN PRN
Status: DISCONTINUED | OUTPATIENT
Start: 2023-02-16 | End: 2023-02-16

## 2023-02-16 RX ORDER — FENTANYL CITRATE 50 UG/ML
INJECTION, SOLUTION INTRAMUSCULAR; INTRAVENOUS PRN
Status: DISCONTINUED | OUTPATIENT
Start: 2023-02-16 | End: 2023-02-16

## 2023-02-16 RX ORDER — ONDANSETRON 2 MG/ML
INJECTION INTRAMUSCULAR; INTRAVENOUS PRN
Status: DISCONTINUED | OUTPATIENT
Start: 2023-02-16 | End: 2023-02-16

## 2023-02-16 RX ORDER — LIDOCAINE 40 MG/G
CREAM TOPICAL
Status: DISCONTINUED | OUTPATIENT
Start: 2023-02-16 | End: 2023-02-17 | Stop reason: HOSPADM

## 2023-02-16 RX ORDER — OXYCODONE HYDROCHLORIDE 5 MG/1
5 TABLET ORAL EVERY 4 HOURS PRN
Status: DISCONTINUED | OUTPATIENT
Start: 2023-02-16 | End: 2023-02-17 | Stop reason: HOSPADM

## 2023-02-16 RX ORDER — AMLODIPINE BESYLATE 5 MG/1
5 TABLET ORAL DAILY
Status: DISCONTINUED | OUTPATIENT
Start: 2023-02-17 | End: 2023-02-17 | Stop reason: HOSPADM

## 2023-02-16 RX ORDER — AMLODIPINE BESYLATE 2.5 MG/1
2.5 TABLET ORAL ONCE
Status: COMPLETED | OUTPATIENT
Start: 2023-02-16 | End: 2023-02-16

## 2023-02-16 RX ORDER — OXYMETAZOLINE HYDROCHLORIDE 0.05 G/100ML
2 SPRAY NASAL ONCE
Status: COMPLETED | OUTPATIENT
Start: 2023-02-16 | End: 2023-02-16

## 2023-02-16 RX ORDER — OXYMETAZOLINE HYDROCHLORIDE 0.05 G/100ML
2 SPRAY NASAL
Status: DISCONTINUED | OUTPATIENT
Start: 2023-02-16 | End: 2023-02-17 | Stop reason: HOSPADM

## 2023-02-16 RX ORDER — METOPROLOL SUCCINATE 50 MG/1
100 TABLET, EXTENDED RELEASE ORAL DAILY
Status: DISCONTINUED | OUTPATIENT
Start: 2023-02-16 | End: 2023-02-16

## 2023-02-16 RX ORDER — OXYMETAZOLINE HYDROCHLORIDE 0.05 G/100ML
2 SPRAY NASAL
Status: DISCONTINUED | OUTPATIENT
Start: 2023-02-16 | End: 2023-02-16

## 2023-02-16 RX ORDER — ACETAMINOPHEN 325 MG/1
650 TABLET ORAL EVERY 4 HOURS PRN
Status: DISCONTINUED | OUTPATIENT
Start: 2023-02-16 | End: 2023-02-17 | Stop reason: HOSPADM

## 2023-02-16 RX ORDER — ALBUTEROL SULFATE 0.83 MG/ML
2.5 SOLUTION RESPIRATORY (INHALATION) EVERY 4 HOURS PRN
Status: DISCONTINUED | OUTPATIENT
Start: 2023-02-16 | End: 2023-02-16

## 2023-02-16 RX ORDER — PROPOFOL 10 MG/ML
INJECTION, EMULSION INTRAVENOUS PRN
Status: DISCONTINUED | OUTPATIENT
Start: 2023-02-16 | End: 2023-02-16

## 2023-02-16 RX ORDER — FENTANYL CITRATE 50 UG/ML
50 INJECTION, SOLUTION INTRAMUSCULAR; INTRAVENOUS EVERY 5 MIN PRN
Status: DISCONTINUED | OUTPATIENT
Start: 2023-02-16 | End: 2023-02-16

## 2023-02-16 RX ORDER — SODIUM CHLORIDE, SODIUM LACTATE, POTASSIUM CHLORIDE, CALCIUM CHLORIDE 600; 310; 30; 20 MG/100ML; MG/100ML; MG/100ML; MG/100ML
INJECTION, SOLUTION INTRAVENOUS CONTINUOUS
Status: DISCONTINUED | OUTPATIENT
Start: 2023-02-16 | End: 2023-02-16

## 2023-02-16 RX ORDER — ASPIRIN 81 MG/1
81 TABLET ORAL DAILY
Status: DISCONTINUED | OUTPATIENT
Start: 2023-02-17 | End: 2023-02-17 | Stop reason: HOSPADM

## 2023-02-16 RX ORDER — FENTANYL CITRATE 50 UG/ML
25 INJECTION, SOLUTION INTRAMUSCULAR; INTRAVENOUS EVERY 5 MIN PRN
Status: DISCONTINUED | OUTPATIENT
Start: 2023-02-16 | End: 2023-02-16

## 2023-02-16 RX ORDER — ONDANSETRON 2 MG/ML
4 INJECTION INTRAMUSCULAR; INTRAVENOUS EVERY 30 MIN PRN
Status: DISCONTINUED | OUTPATIENT
Start: 2023-02-16 | End: 2023-02-16

## 2023-02-16 RX ORDER — TRANEXAMIC ACID 100 MG/ML
500 INJECTION, SOLUTION INTRAVENOUS ONCE
Status: COMPLETED | OUTPATIENT
Start: 2023-02-16 | End: 2023-02-16

## 2023-02-16 RX ADMIN — PHENYLEPHRINE HYDROCHLORIDE 100 MCG: 10 INJECTION INTRAVENOUS at 12:56

## 2023-02-16 RX ADMIN — Medication 5 MG: at 12:36

## 2023-02-16 RX ADMIN — SODIUM CHLORIDE, SODIUM GLUCONATE, SODIUM ACETATE, POTASSIUM CHLORIDE AND MAGNESIUM CHLORIDE: 526; 502; 368; 37; 30 INJECTION, SOLUTION INTRAVENOUS at 12:17

## 2023-02-16 RX ADMIN — AMLODIPINE BESYLATE 2.5 MG: 2.5 TABLET ORAL at 11:15

## 2023-02-16 RX ADMIN — PROPOFOL 120 MG: 10 INJECTION, EMULSION INTRAVENOUS at 12:38

## 2023-02-16 RX ADMIN — METOPROLOL SUCCINATE 100 MG: 50 TABLET, EXTENDED RELEASE ORAL at 09:48

## 2023-02-16 RX ADMIN — LIDOCAINE HYDROCHLORIDE 100 MG: 20 INJECTION, SOLUTION INFILTRATION; PERINEURAL at 12:37

## 2023-02-16 RX ADMIN — FENTANYL CITRATE 50 MCG: 50 INJECTION, SOLUTION INTRAMUSCULAR; INTRAVENOUS at 13:49

## 2023-02-16 RX ADMIN — Medication 95 MG: at 12:38

## 2023-02-16 RX ADMIN — PHENYLEPHRINE HYDROCHLORIDE 200 MCG: 10 INJECTION INTRAVENOUS at 12:49

## 2023-02-16 RX ADMIN — PHENYLEPHRINE HYDROCHLORIDE 200 MCG: 10 INJECTION INTRAVENOUS at 12:59

## 2023-02-16 RX ADMIN — TRANEXAMIC ACID 500 MG: 1 INJECTION, SOLUTION INTRAVENOUS at 02:26

## 2023-02-16 RX ADMIN — FENTANYL CITRATE 50 MCG: 50 INJECTION, SOLUTION INTRAMUSCULAR; INTRAVENOUS at 13:37

## 2023-02-16 RX ADMIN — ONDANSETRON 4 MG: 2 INJECTION INTRAMUSCULAR; INTRAVENOUS at 13:58

## 2023-02-16 RX ADMIN — PHENYLEPHRINE HYDROCHLORIDE 100 MCG: 10 INJECTION INTRAVENOUS at 12:54

## 2023-02-16 RX ADMIN — OXYMETAZOLINE HYDROCHLORIDE 2 SPRAY: 0.05 SPRAY NASAL at 02:26

## 2023-02-16 RX ADMIN — NOREPINEPHRINE BITARTRATE 6.4 MCG: 1 INJECTION, SOLUTION, CONCENTRATE INTRAVENOUS at 12:58

## 2023-02-16 ASSESSMENT — ENCOUNTER SYMPTOMS
LIGHT-HEADEDNESS: 0
DIZZINESS: 0
NAUSEA: 0

## 2023-02-16 ASSESSMENT — ACTIVITIES OF DAILY LIVING (ADL)
ADLS_ACUITY_SCORE: 35
ADLS_ACUITY_SCORE: 33
ADLS_ACUITY_SCORE: 35

## 2023-02-16 NOTE — ANESTHESIA CARE TRANSFER NOTE
Patient: Dereck Elliott    Procedure: Procedure(s):  Endoscopic control of nasal hemmorhage.       Diagnosis: Epistaxis [R04.0]  Diagnosis Additional Information: No value filed.    Anesthesia Type:   No value filed.     Note:      Level of Consciousness: awake and drowsy  Oxygen Supplementation: nasal cannula    Independent Airway: airway patency satisfactory and stable  Dentition: dentition unchanged  Vital Signs Stable: post-procedure vital signs reviewed and stable  Report to RN Given: handoff report given  Patient transferred to: PACU    Handoff Report: Identifed the Patient, Identified the Reponsible Provider, Reviewed the pertinent medical history, Discussed the surgical course, Reviewed Intra-OP anesthesia mangement and issues during anesthesia, Set expectations for post-procedure period and Allowed opportunity for questions and acknowledgement of understanding      Vitals:  Vitals Value Taken Time   /66 02/16/23 1415   Temp     Pulse 60 02/16/23 1419   Resp     SpO2 99 % 02/16/23 1419   Vitals shown include unvalidated device data.    Electronically Signed By: GUERRERO Miller CRNA  February 16, 2023  2:26 PM

## 2023-02-16 NOTE — ANESTHESIA PROCEDURE NOTES
Airway       Patient location during procedure: OR       Procedure Start/Stop Times: 2/16/2023 12:40 PM  Staff -        CRNA: Kevin Coelho APRN CRNA       Performed By: CRNAIndications and Patient Condition       Indications for airway management: joselyn-procedural       Induction type:RSI       Mask difficulty assessment: 0 - not attempted    Final Airway Details       Final airway type: endotracheal airway       Successful airway: Oral and ETT - single  Endotracheal Airway Details        ETT size (mm): 7.5       Cuffed: yes       Successful intubation technique: video laryngoscopy       VL Blade Size: Glidescope 4       Grade View of Cords: 1       Adjucts: stylet       Position: Left       Measured from: lips       Secured at (cm): 23       Bite block used: None    Post intubation assessment        Placement verified by: capnometry, equal breath sounds and chest rise        Number of attempts at approach: 1       Secured with: pink tape       Ease of procedure: easy       Dentition: Unchanged and Intact    Medication(s) Administered   Medication Administration Time: 2/16/2023 12:40 PM

## 2023-02-16 NOTE — ED NOTES
Sign out note: Dereck Elliott is a 82 year old male was signed out to me by Dr. Luna at 0700 am.  Please see initial dictation for full details and history.  Patient has posterior epistaxis and was sent to the [facility] for an ENT consult..  Plan at time of sign out is consult ENT..       Course in the ED:  I spoke with ENT at approximately 7:30 AM.  He was seen by ENT this morning.  Please see separate consult note for details.  I did treat the patient with his regular antihypertensive medications in the ED, including metoprolol and amlodipine.    ENT had quite a bit of difficulty controlling the bleeding.  Plan at 11:20 AM is to take the patient to the operating room later today  to attempt to control the bleeding.    Clinical impression: Epistaxis, likely posterior epistaxis      This note was created in part by the use of Dragon voice recognition dictation system. Inadvertent grammatical errors and typographical errors may still exist.  MD Brody Rae Alda L, MD  02/16/23 [id number]

## 2023-02-16 NOTE — OR NURSING
Patient's necklace, watch and eyeglass placed in a small bag and put inside patient's belonging bag. Hearing aids with the chart.

## 2023-02-16 NOTE — OP NOTE
Date of surgery: 2/16/2023    Surgeon: Melissa Roberson MD    Resident Surgeon: Jaziel Bennett MD, Maynor Nunes MD    Preoperative diagnosis: Epistaxis    Postoperative diagnosis: Same    Operation: Control of epistaxis, right    Indications: This is an 82-year-old male who presented to the emergency department with right-sided epistaxis that was not able to be controlled at the bedside.  After discussion of the risks and benefits of the above operation, the patient elected to proceed.    Anesthesia: General    EBL: 100 cc    Implants: Surgicel    Specimens: None    Findings: Hemorrhage from the medial side of the middle turbinate as well as the anterior face of the middle turbinate and superior turbinate.    Description of procedure: The patient was brought back to the operating by the anesthesia team and induced into plane of anesthesia and intubated.  The patient was rotated 90 degrees face the operating team.  A timeout was formed identify the patient, procedure, and all operating staff are in agreement.  We used an endoscope to suction the right nasal cavity and irrigated.  There was fibrillar that had been placed previously and this was all removed.  There was bleeding from the above-mentioned sites and a combination of bipolar cautery and monopolar suction cautery was used for hemostasis.  Surgicel was then placed over the areas.  There was no nonabsorbable packing used.  The patient was then handed off to the anesthesia team for extubation and to bring the patient to the PACU in stable condition.    Complications: None    Dr. Roberson was present during all portions of the case    Maynor Nunes MD  ENT resident    I was present with the resident during the entire procedure and participated in the critical aspects.  .me
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
## 2023-02-16 NOTE — ED TRIAGE NOTES
Triage Assessment     Row Name 02/16/23 0641       Triage Assessment (Adult)    Airway WDL WDL       Respiratory WDL    Respiratory WDL WDL       Skin Circulation/Temperature WDL    Skin Circulation/Temperature WDL WDL       Peripheral/Neurovascular WDL    Peripheral Neurovascular WDL WDL       Cognitive/Neuro/Behavioral WDL    Cognitive/Neuro/Behavioral WDL WDL
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
## 2023-02-16 NOTE — BRIEF OP NOTE
[facility]    Brief Operative Note    Pre-operative diagnosis: Epistaxis [R04.0]  Post-operative diagnosis Same as pre-operative diagnosis    Procedure: Procedure(s):  Endoscopic control of nasal hemmorhage.  Surgeon: Surgeon(s) and Role:     * Melissa Roberson MD - Primary     * Maynor Nunes MD - Resident - Assisting     * Jaziel Bennett MD - Resident - Assisting     * Shila León PA-C - Resident - Observing  Anesthesia: General   Estimated Blood Loss: 100 ml    Drains: None  Specimens: * No specimens in log *  Findings:   Bleeding at superior attachment of middle meatus.   Complications: None.  Implants: * No implants in log *

## 2023-02-16 NOTE — ANESTHESIA POSTPROCEDURE EVALUATION
Patient: Dereck Elliott    Procedure: Procedure(s):  Endoscopic control of nasal hemmorhage.       Anesthesia Type:  No value filed.    Note:  Disposition: Inpatient   Postop Pain Control: Uneventful            Sign Out: Well controlled pain   PONV: No   Neuro/Psych: Uneventful            Sign Out: Acceptable/Baseline neuro status   Airway/Respiratory: Uneventful            Sign Out: Acceptable/Baseline resp. status   CV/Hemodynamics: Uneventful            Sign Out: Acceptable CV status; No obvious hypovolemia; No obvious fluid overload (Device nurse contacted. No need to re-interrogate PM. Paced at 60bpm)   Other NRE: NONE   DID A NON-ROUTINE EVENT OCCUR? No           Last vitals:  Vitals Value Taken Time   /73 02/16/23 1530   Temp 36.8  C (98.2  F) 02/16/23 1500   Pulse 60 02/16/23 1541   Resp 18 02/16/23 1500   SpO2 98 % 02/16/23 1541   Vitals shown include unvalidated device data.    Electronically Signed By: Jesse Marie MD  February 16, 2023  3:43 PM

## 2023-02-16 NOTE — ED PROVIDER NOTES
EMERGENCY DEPARTMENT ENCOUNTER      NAME: Dereck Elliott  AGE: 82 year old male  YOB: 1940  MRN: [id number]  EVALUATION DATE & TIME: No admission date for patient encounter.    PCP: Donald Machado    ED PROVIDER: Bárbara Damon M.D.      Chief Complaint   Patient presents with     Epistaxis         FINAL IMPRESSION:  1. Posterior epistaxis        MEDICAL DECISION MAKING:    Pertinent Labs & Imaging studies reviewed. (See chart for details)  ED Course as of 02/16/23 0610   Thu Feb 16, 2023   0235 Afebrile.  Vital signs here with some mild hypertension.  Patient is coming in for evaluation of nosebleed.  Woke up with nosebleed from sleep.  Has a history of a posterior nasal polyp that is blood in the past.  Has had this happen 1 time to him, they had to do electrocautery under ENT.  No trauma.  No dizziness or lightheadedness.  He does take 81 mg of aspirin daily.  Reports blood primarily posteriorly down his throat.  No nausea.    Physical exam for patient here consistently spitting up blood, mild bleeding noted anterior from his right nare.  Under visual examination no overt signs of bleeding identified in the anterior nares.  Posterior oropharynx still with consistent bleeding.    Initially tried TXA, Afrin here with anterior nose clamp with patient continues to have bleeding down his posterior oropharynx and is spitting out blood fairly consistently.  Given this is likely posterior bleed.  I am unable to visualize to attempt any silver nitrate cautery.  Posterior nasal pack was placed with some improvement in symptoms per patient.  We will continue to monitor for the next 30 minutes to see if we have resolution of bleeding.  Otherwise they already have an ENT that they can follow-up with and will need to be seen in 2 to 3 days   0328 Patient continues with some posterior bleeding but improved from previous.  We will continue to monitor closely   0328 Patient continues now with more brisk posterior  bleeding.  Does not have any significant anterior bleeding.  Given the fact not able to visualize this whatsoever and has needed electric cautery in the past will call and speak with ENT to see if we will be able to get him in to be seen this morning for further treatment.   0427 Second posterior Rhino Rocket placed in left nare.  Patient continues with some posterior bleeding.  We will continue to monitor.  Page was sent out to ENT.   0524 Patient continues with posterior bleeding.  I did call and speak with ENT to discuss.  They recommend at this point transfer to a place where they can visually get good inspection and have access to the OR in case they are not able to get this to stop bleeding.  I did call over and speak with the [facility] emergency department Dr. Luna who accepts patient for transfer.  Plan to transfer to [place].         Medical Decision Making    History:    Supplemental history from: Documented in chart, if applicable    External Record(s) reviewed: Documented in chart, if applicable.    Work Up:    Chart documentation includes differential considered and any EKGs or imaging independently interpreted by provider, where specified.    In additional to work up documented, I considered the following work up: Documented in chart, if applicable.    External consultation:    Discussion of management with another provider: Documented in chart, if applicable    Complicating factors:    Care impacted by chronic illness: Heart Disease, Hyperlipidemia and Hypertension    Care affected by social determinants of health: N/A    Disposition considerations: Transfer to other facility          Critical care: 0 minutes excluding separately billable procedures.  Includes bedside management, time reviewing test results, review of records, discussing the case with staff, documenting the medical record and time spent with family members (or surrogate decision makers) discussing specific treatment  issues.          ED COURSE:  2:14 AM I met with the patient, obtained history, performed an initial exam, and discussed options and plan for diagnostics and treatment here in the ED. Placed posterior nasal pack.   3:00 AM Re-evaluated patient. Epistaxis is slowing down.   3:56 AM Checked in on and updated patient. Bleeding has still not stopped. Paged for ENT.   5:01 AM Consulted with ENT, Dr. Chauhan from [facility]  5:13 AM Consulted with ED Dr. Luna, who accepts the patient for transfer to [facility].       The importance of close follow up was discussed. We reviewed warning signs and symptoms, and I instructed Mr. Elliott to return to the emergency department immediately if he develops any new or worsening symptoms. I provided additional verbal discharge instructions. Mr. Elliott expressed understanding and agreement with this plan of care, his questions were answered, and he was discharged in stable condition.     MEDICATIONS GIVEN IN THE EMERGENCY:  Medications   oxymetazoline (AFRIN) 0.05 % spray 2 spray (2 sprays Nasal Given 2/16/23 0226)   tranexamic acid (CYKLOKAPRON) spray 500 mg (500 mg Both Nostrils Given 2/16/23 0226)       NEW PRESCRIPTIONS STARTED AT TODAY'S ER VISIT:  New Prescriptions    No medications on file          =================================================================    HPI    Patient information was obtained from: Patient     Use of : N/A        Dereck Elliott is a 82 year old male who presents for epistaxis.     Patient has a prior history of a posterior nasal polyp that bled in the past. Today, patient woke up with nosebleed from sleep. Reports that blood primarily drains posteriorly down his throat. This has happened 1 other time to him and electrocautery under ENT was needed. Denies trauma to the area. Additionally denies nausea, dizziness or lightheadedness. He does take 81 mg of aspirin daily.    REVIEW OF SYSTEMS   Review of Systems   HENT: Positive for nosebleeds.     Gastrointestinal: Negative for nausea.   Neurological: Negative for dizziness and light-headedness.   All other systems reviewed and are negative.      PAST MEDICAL HISTORY:  Past Medical History:   Diagnosis Date     Chronic kidney disease (CKD), stage III (moderate) (H)      Essential hypertension      Hard of hearing      History of rheumatic fever 04/25/2017     Onychomycosis 10/27/2017     Permanent atrial fibrillation (H) 02/03/2017     SSS (sick sinus syndrome) (H) 07/29/2019     Unspecified cataract      Vitamin D deficiency disease 10/06/2015       PAST SURGICAL HISTORY:  Past Surgical History:   Procedure Laterality Date     AS FUSION OF WRIST JOINT Right      CATARACT EXTRACTION Left      COLON SURGERY       EP PACEMAKER INSERT N/A 8/1/2019    EP Pacemaker Insertion; Emeka Dean MD; [facility] Cath Lab;  Service: Cardiology     IR ABDOMINAL ENDOVASCULAR STENT GRAFT  12/29/2021     REPAIR ANEURYSM ABDOMINAL AORTA N/A 12/29/2021    Procedure: ENDOVASCULAR REPAIR OF ABDOMINAL AORTIC ANEURYSM  WITH ENDOGRAFT;  Surgeon: Melia Granger MD;  Location: [facility] OR     ROTATOR CUFF REPAIR RT/LT Left      TOTAL KNEE ARTHROPLASTY Left        CURRENT MEDICATIONS:    No current facility-administered medications for this encounter.    Current Outpatient Medications:      acetaminophen (TYLENOL) 500 MG tablet, Take 500-1,000 mg by mouth every 6 hours as needed for pain , Disp: , Rfl:      amLODIPine (NORVASC) 5 MG tablet, [AMLODIPINE (NORVASC) 2.5 MG TABLET] TAKE 1 TABLET BY MOUTH EVERY DAY, Disp: 90 tablet, Rfl: 3     amoxicillin (AMOXIL) 500 MG capsule, Take 4 capsules by mouth once as needed (prior to dental appointment)  (Patient not taking: Reported on 7/5/2022), Disp: , Rfl:      aspirin 81 MG EC tablet, [ASPIRIN 81 MG EC TABLET] Take 81 mg by mouth daily., Disp: , Rfl:      cholecalciferol, vitamin D3, (CHOLECALCIFEROL) 1,000 unit tablet, [CHOLECALCIFEROL, VITAMIN D3,  (CHOLECALCIFEROL) 1,000 UNIT TABLET] Take 2,000 Units by mouth daily., Disp: , Rfl:      cyanocobalamin 100 MCG tablet, [CYANOCOBALAMIN 100 MCG TABLET] Take 100 mcg by mouth daily., Disp: , Rfl:      metoprolol succinate ER (TOPROL-XL) 100 MG 24 hr tablet, Take 1 tablet (100 mg) by mouth daily, Disp: 90 tablet, Rfl: 3    ALLERGIES:  No Known Allergies    FAMILY HISTORY:  Family History   Problem Relation Age of Onset     Valvular heart disease Mother         care at [place]     Colon Cancer Father      No Known Problems Daughter      No Known Problems Daughter        SOCIAL HISTORY:   Social History     Socioeconomic History     Marital status:      Number of children: 2   Tobacco Use     Smoking status: Former     Packs/day: 0.00     Types: Cigars, Cigarettes     Quit date: 2016     Years since quittin.1     Smokeless tobacco: Never   Substance and Sexual Activity     Alcohol use: Yes     Alcohol/week: 1.0 standard drink     Comment: Alcoholic Drinks/day: per week 2     Drug use: No       PHYSICAL EXAM:    Vitals: BP (!) 154/71   Pulse 65   Temp 98.2  F (36.8  C)   Resp 16   Wt 81.6 kg (180 lb)   SpO2 98%   BMI 25.64 kg/m     General:. Alert and interactive, comfortable appearing.  HENT: Oropharynx without erythema or exudates. MMM.  TMs clear bilaterally. Spitting up blood, mild bleeding noted anterior from his right nare. Under visual examination no overt signs of bleeding identified in the anterior nares. Posterior oropharynx still with consistent bleeding.  Eyes: Pupils mid-sized and equally reactive.   Neck: Full AROM.  No midline tenderness to palpation.  Cardiovascular: Regular rate and rhythm. Peripheral pulses 2+ bilaterally.  Chest/Pulmonary: Normal work of breathing. Lung sounds clear and equal throughout, no wheezes or crackles. No chest wall tenderness or deformities.  Abdomen: Soft, nondistended. Nontender without guarding or rebound.  Back/Spine: No CVA or midline  tenderness.  Extremities: Normal ROM of all major joints. No lower extremity edema.   Skin: Warm and dry. Normal skin color.   Neuro: Speech clear. CNs grossly intact. Moves all extremities appropriately. Strength and sensation grossly intact to all extremities.   Psych: Normal affect/mood, cooperative, memory appropriate.     LAB:  All pertinent labs reviewed and interpreted.  Labs Ordered and Resulted from Time of ED Arrival to Time of ED Departure   CBC WITH PLATELETS AND DIFFERENTIAL - Abnormal       Result Value    WBC Count 8.1      RBC Count 3.42 (*)     Hemoglobin 11.1 (*)     Hematocrit 35.2 (*)      (*)     MCH 32.5      MCHC 31.5      RDW 13.3      Platelet Count 187      % Neutrophils 64      % Lymphocytes 18      % Monocytes 9      % Eosinophils 9      % Basophils 0      % Immature Granulocytes 0      NRBCs per 100 WBC 0      Absolute Neutrophils 5.1      Absolute Lymphocytes 1.4      Absolute Monocytes 0.8      Absolute Eosinophils 0.7      Absolute Basophils 0.0      Absolute Immature Granulocytes 0.0      Absolute NRBCs 0.0     INR - Normal    INR 1.05         RADIOLOGY:  No orders to display           I, Mi Min, am serving as a scribe to document services personally performed by Dr. Bárbara Damon  based on my observation and the provider's statements to me. IBárbara MD attest that Mi Min is acting in a scribe capacity, has observed my performance of the services and has documented them in accordance with my direction.      Bárbara Damon M.D.  Emergency Medicine  [facility] EMERGENCY ROOM  [address] [id number] [phone]  Dept: [phone]     Bárbara Damon MD  02/16/23 0611

## 2023-02-16 NOTE — ED PROVIDER NOTES
ED Provider Note  [facility]      History     Chief Complaint   Patient presents with     Epistaxis     Transfer from [facility] ER for epistaxis and ENT consult .     HPI  Dereck Elliott is a 82 year old male who has a PMHx of nasal polyps, CKD, AAA, CKD presenting with epistaxis from outside hospital. Patient is on 81mg asa daily.  Denies any trauma or nose picking.  Prior physician placed 2 posterior packs and was not able to get cessation of bleeding.  Patient otherwise protecting airway.  The patient has been seen by ENT for posterior nasal polyp cautery in the past.  Patient has no chest pain, shortness of breath or dizziness.    Past Medical History  Past Medical History:   Diagnosis Date     Chronic kidney disease (CKD), stage III (moderate) (H)      Essential hypertension      Hard of hearing      History of rheumatic fever 04/25/2017     Onychomycosis 10/27/2017     Permanent atrial fibrillation (H) 02/03/2017     SSS (sick sinus syndrome) (H) 07/29/2019     Unspecified cataract      Vitamin D deficiency disease 10/06/2015     Past Surgical History:   Procedure Laterality Date     AS FUSION OF WRIST JOINT Right      CATARACT EXTRACTION Left      COLON SURGERY       EP PACEMAKER INSERT N/A 8/1/2019    EP Pacemaker Insertion; Emeka Dena MD; [facility] Cath [facility];  Service: Cardiology     IR ABDOMINAL ENDOVASCULAR STENT GRAFT  12/29/2021     REPAIR ANEURYSM ABDOMINAL AORTA N/A 12/29/2021    Procedure: ENDOVASCULAR REPAIR OF ABDOMINAL AORTIC ANEURYSM  WITH ENDOGRAFT;  Surgeon: Melia Granger MD;  Location: [facility] OR     ROTATOR CUFF REPAIR RT/LT Left      TOTAL KNEE ARTHROPLASTY Left      acetaminophen (TYLENOL) 500 MG tablet  amLODIPine (NORVASC) 5 MG tablet  amoxicillin (AMOXIL) 500 MG capsule  aspirin 81 MG EC tablet  cholecalciferol, vitamin D3, (CHOLECALCIFEROL) 1,000 unit tablet  cyanocobalamin 100 MCG tablet  metoprolol succinate ER  (TOPROL-XL) 100 MG 24 hr tablet      No Known Allergies  Family History  Family History   Problem Relation Age of Onset     Valvular heart disease Mother         care at [place]     Colon Cancer Father      No Known Problems Daughter      No Known Problems Daughter      Social History   Social History     Tobacco Use     Smoking status: Former     Packs/day: 0.00     Types: Cigars, Cigarettes     Quit date: 2016     Years since quittin.1     Smokeless tobacco: Never   Substance Use Topics     Alcohol use: Yes     Alcohol/week: 1.0 standard drink     Comment: Alcoholic Drinks/day: per week 2     Drug use: No      Past medical history, past surgical history, medications, allergies, family history, and social history were reviewed with the patient. No additional pertinent items.      A complete review of systems was performed with pertinent positives and negatives noted in the HPI, and all other systems negative.    Physical Exam   BP: (!) 150/87  Pulse: 66  Temp: 98.1  F (36.7  C)  Resp: 15  SpO2: 98 %  Physical Exam  Physical Exam   Constitutional: oriented to person, place, and time. appears well-developed and well-nourished.   HENT:   Head: 2 nasal packs in place.  There is ongoing oozing and clot in the posterior pharynx.   Neck: Normal range of motion.   Pulmonary/Chest: Effort normal. No respiratory distress.   Cardiac: No murmurs, rubs, gallops. RRR.  Abdominal: Abdomen soft, nontender, nondistended. No rebound tenderness.  MSK: Long bones without deformity or evidence of trauma  Neurological: alert and oriented to person, place, and time.   Skin: Skin is warm and dry.   Psychiatric:  normal mood and affect.  behavior is normal. Thought content normal.       ED Course, Procedures, & Data      Procedures                      Results for orders placed or performed during the hospital encounter of 23   INR     Status: Normal   Result Value Ref Range    INR 1.05 0.85 - 1.15   CBC with platelets and  differential     Status: Abnormal   Result Value Ref Range    WBC Count 8.1 4.0 - 11.0 10e3/uL    RBC Count 3.42 (L) 4.40 - 5.90 10e6/uL    Hemoglobin 11.1 (L) 13.3 - 17.7 g/dL    Hematocrit 35.2 (L) 40.0 - 53.0 %     (H) 78 - 100 fL    MCH 32.5 26.5 - 33.0 pg    MCHC 31.5 31.5 - 36.5 g/dL    RDW 13.3 10.0 - 15.0 %    Platelet Count 187 150 - 450 10e3/uL    % Neutrophils 64 %    % Lymphocytes 18 %    % Monocytes 9 %    % Eosinophils 9 %    % Basophils 0 %    % Immature Granulocytes 0 %    NRBCs per 100 WBC 0 <1 /100    Absolute Neutrophils 5.1 1.6 - 8.3 10e3/uL    Absolute Lymphocytes 1.4 0.8 - 5.3 10e3/uL    Absolute Monocytes 0.8 0.0 - 1.3 10e3/uL    Absolute Eosinophils 0.7 0.0 - 0.7 10e3/uL    Absolute Basophils 0.0 0.0 - 0.2 10e3/uL    Absolute Immature Granulocytes 0.0 <=0.4 10e3/uL    Absolute NRBCs 0.0 10e3/uL   CBC with platelets + differential     Status: Abnormal    Narrative    The following orders were created for panel order CBC with platelets + differential.  Procedure                               Abnormality         Status                     ---------                               -----------         ------                     CBC with platelets and d...[[id number]]  Abnormal            Final result                 Please view results for these tests on the individual orders.     Medications - No data to display  Labs Ordered and Resulted from Time of ED Arrival to Time of ED Departure - No data to display  No orders to display          Medical Decision Making  The patient's presentation is strongly suggestive of an acute health issue posing potential threat to life or bodily function.    The patient's evaluation involved:  review of external note(s) from 1 sources (prior ED visit)  ordering and/or review of 1 test(s) in this encounter (see separate area of note for details)  discussion of management or test interpretation with another health professional (ENT)    The patient's management  involved a decision regarding minor procedure/surgery with identified risk factors and further care after sign-out to Dr. Skinner (see their note for further management).      Assessment & Plan    MDM   patient is here with presumed posterior epistaxis.  He does have some ongoing oozing and clotting despite 2 posterior packs.  The patient is protecting his airway.  We will recheck a hemoglobin. ENT paged on arrival. The patient will be signed out to oncoming provider.    I have reviewed the nursing notes. I have reviewed the findings, diagnosis, plan and need for follow up with the patient.    New Prescriptions    No medications on file       Final diagnoses:   Epistaxis       Huy Luna  [facility] EMERGENCY DEPARTMENT  2/16/2023     Huy Luna MD  02/16/23 0658

## 2023-02-16 NOTE — ED TRIAGE NOTES
Pt on blood thinners. Hx of nasal polyps. States awoke to nose bleeding. Denies light headedness sob. Had to be seen by an ENT last time.

## 2023-02-17 VITALS
RESPIRATION RATE: 16 BRPM | DIASTOLIC BLOOD PRESSURE: 65 MMHG | OXYGEN SATURATION: 96 % | TEMPERATURE: 97.8 F | HEART RATE: 61 BPM | SYSTOLIC BLOOD PRESSURE: 123 MMHG

## 2023-02-17 RX ORDER — OXYMETAZOLINE HYDROCHLORIDE 0.05 G/100ML
2 SPRAY NASAL 2 TIMES DAILY PRN
Qty: 37 ML | Refills: 3 | Status: SHIPPED | OUTPATIENT
Start: 2023-02-17

## 2023-02-17 RX ORDER — ECHINACEA PURPUREA EXTRACT 125 MG
2 TABLET ORAL 3 TIMES DAILY
Qty: 104 ML | Refills: 4 | Status: ON HOLD | OUTPATIENT
Start: 2023-02-17 | End: 2023-05-18

## 2023-02-17 ASSESSMENT — ACTIVITIES OF DAILY LIVING (ADL)
ADLS_ACUITY_SCORE: 35

## 2023-02-17 NOTE — DISCHARGE INSTRUCTIONS
[facility]  Same-Day Surgery   Adult Discharge Orders & Instructions     For 24 hours after surgery    Get plenty of rest.  A responsible adult must stay with you for at least 24 hours after you leave the hospital.   Do not drive or use heavy equipment.  If you have weakness or tingling, don't drive or use heavy equipment until this feeling goes away.  Do not drink alcohol.  Avoid strenuous or risky activities.  Ask for help when climbing stairs.   You may feel lightheaded.  IF so, sit for a few minutes before standing.  Have someone help you get up.   If you have nausea (feel sick to your stomach): Drink only clear liquids such as apple juice, ginger ale, broth or 7-Up.  Rest may also help.  Be sure to drink enough fluids.  Move to a regular diet as you feel able.  You may have a slight fever. Call the doctor if your fever is over 100 F (37.7 C) (taken under the tongue) or lasts longer than 24 hours.  You may have a dry mouth, a sore throat, muscle aches or trouble sleeping.  These should go away after 24 hours.  Do not make important or legal decisions.   Call your doctor for any of the followin.  Signs of infection (fever, growing tenderness at the surgery site, a large amount of drainage or bleeding, severe pain, foul-smelling drainage, redness, swelling).    2. It has been over 8 to 10 hours since surgery and you are still not able to urinate (pass water).    3.  Headache for over 24 hours.    To contact a doctor, call Dr Roberson's office at [phone] at the Otolaryngology/ENT Clinic from 8 am till 5 pm   or: [phone] and ask for the resident on call for ENT (answered 24 hours a day)  '   Emergency Department:    [facility]: [phone]       (TTY for hearing impaired: [phone])    [facility]: [phone]       (TTY for hearing impaired: [phone])

## 2023-02-17 NOTE — DISCHARGE SUMMARY
Discharge Summary  Dereck Elliott  [id number]  1940    Date of Admission: 2/16/2023  Date of Discharge: 2/17/2023    Admission Diagnosis: Epistaxis [R04.0]  Discharge Diagnosis: Same    Procedures:  Date: 2/16/2023  Procedure(s):  Endoscopic control of nasal hemmorhage.    Pathology: None    HPI: Dereck Elliott is a 82 year old male who presented to the emergency department with right-sided epistaxis that was not able to be controlled at home.  The patient had several rounds of packing in the emergency department by our team and was not able to resolve his epistaxis.  He then consented to undergo control of nasal hemorrhage in the operating room and this was performed on 2/16/2023.    Hospital Course: The patient was admitted to the hospital. He tolerated the procedure without any intra- or joselyn-operative complications. Please see the operative report for full details of the procedure. The patient was admitted for post-operative monitoring. His postoperative course was uneventful and he had no further bleeding. At discharge, the patient's pain was well controlled, the patient was voiding on his own, and he was ambulating and tolerating a regular diet.     Discharge Exam:  Vitals:    02/16/23 1800 02/16/23 1900 02/16/23 2000 02/17/23 0300   BP: 138/73 138/70  138/69   Pulse: 60 60 60    Resp: 14 16 16 16   Temp: 98.2  F (36.8  C)   98.3  F (36.8  C)   TempSrc: Oral   Axillary   SpO2: 93% 94% 90% 96%       General: A&O x 3, No acute distress  HEENT: PERRL, EOMI without spontaneous or gaze evoked nystagmus.  No bleeding from bilateral nares or from oropharynx.  Respiratory: Breathing non-labored on room air, no stridor, no accessory muscle use.     Discharge Medications:     Medication List        Started      oxymetazoline 0.05 % nasal spray  Commonly known as: AFRIN  2 sprays, Both Nostrils, 2 TIMES DAILY PRN     sodium chloride 0.65 % nasal spray  Commonly known as: OCEAN  2 sprays, Nasal, 3 TIMES DAILY             Discontinued      amoxicillin 500 MG capsule  Commonly known as: AMOXIL              Discharge Procedure Orders   Reason for your hospital stay   Order Comments: epistaxis     Activity   Order Comments: Your activity upon discharge: no lifting, driving, or strenuous exercise for 2 weeks     Order Specific Question Answer Comments   Is discharge order? Yes      When to contact your care team   Order Comments: Call our clinic if you have continued bleeding. Our number is [phone]     Diet   Order Comments: Follow this diet upon discharge:Regular     Order Specific Question Answer Comments   Is discharge order? Yes        Dispo: To home in good condition. All of the patient's questions/concerns have been addressed at this time.     Maynor Nunes ENT Resident   Otolaryngology-Head & Neck Surgery  Please contact ENT by dialing * [phone] and entering job code [phone].    I, Melissa Roberson MD, discussed this patient prior to discharge with the resident.   Melissa Roberson MD

## 2023-03-02 ENCOUNTER — HOSPITAL ENCOUNTER (OUTPATIENT)
Dept: CT IMAGING | Facility: HOSPITAL | Age: 83
Discharge: HOME | End: 2023-03-02
Attending: SURGERY
Payer: COMMERCIAL

## 2023-03-02 ENCOUNTER — OFFICE VISIT (OUTPATIENT)
Dept: VASCULAR SURGERY | Facility: CLINIC | Age: 83
End: 2023-03-02
Attending: SURGERY
Payer: COMMERCIAL

## 2023-03-02 ENCOUNTER — ANCILLARY PROCEDURE (OUTPATIENT)
Dept: VASCULAR ULTRASOUND | Facility: CLINIC | Age: 83
End: 2023-03-02
Attending: SURGERY
Payer: COMMERCIAL

## 2023-03-02 VITALS
SYSTOLIC BLOOD PRESSURE: 126 MMHG | BODY MASS INDEX: 25.64 KG/M2 | RESPIRATION RATE: 12 BRPM | OXYGEN SATURATION: 97 % | DIASTOLIC BLOOD PRESSURE: 78 MMHG | WEIGHT: 180 LBS | HEART RATE: 67 BPM

## 2023-03-02 DIAGNOSIS — I71.012 DISSECTION OF DESCENDING THORACIC AORTA (H): ICD-10-CM

## 2023-03-02 DIAGNOSIS — I71.40 AAA (ABDOMINAL AORTIC ANEURYSM) WITHOUT RUPTURE (H): ICD-10-CM

## 2023-03-02 DIAGNOSIS — I71.43 INFRARENAL ABDOMINAL AORTIC ANEURYSM (AAA) WITHOUT RUPTURE (H): Primary | ICD-10-CM

## 2023-03-02 DIAGNOSIS — I71.40 AAA (ABDOMINAL AORTIC ANEURYSM) WITHOUT RUPTURE (H): Primary | ICD-10-CM

## 2023-03-02 LAB
CREAT BLD-MCNC: 3.1 MG/DL (ref 0.7–1.3)
GFR SERPL CREATININE-BSD FRML MDRD: 19 ML/MIN/1.73M2

## 2023-03-02 PROCEDURE — 93978 VASCULAR STUDY: CPT

## 2023-03-02 PROCEDURE — 82565 ASSAY OF CREATININE: CPT

## 2023-03-02 PROCEDURE — 71250 CT THORAX DX C-: CPT

## 2023-03-02 PROCEDURE — 99213 OFFICE O/P EST LOW 20 MIN: CPT | Performed by: SURGERY

## 2023-03-02 PROCEDURE — 93978 VASCULAR STUDY: CPT | Mod: 26 | Performed by: SURGERY

## 2023-03-02 PROCEDURE — G0463 HOSPITAL OUTPT CLINIC VISIT: HCPCS | Mod: 25 | Performed by: SURGERY

## 2023-03-02 ASSESSMENT — PAIN SCALES - GENERAL: PAINLEVEL: NO PAIN (0)

## 2023-03-02 NOTE — NURSING NOTE
[facility]        Patient is here for a  year follow up  to discuss Abdominal aortic aneurysm (AAA).    Pt is currently taking Aspirin.    /78   Pulse 67   Resp 12   Wt 180 lb (81.6 kg)   SpO2 97%   BMI 25.64 kg/m      The provider has been notified that the patient has no concerns.     Questions patient would like addressed today are: N/A.    Refills are needed: No    Has homecare services and agency name:  Ana María Carrasco MA
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
## 2023-03-02 NOTE — PATIENT INSTRUCTIONS
Understanding Abdominal Aortic Aneurysm  You may have been told that you have an aneurysm . This is when a weakened part of a blood vessel expands like a balloon. An aneurysm in the main blood vessel in your stomach area is called an abdominal aortic aneurysm (AAA).    What is AAA?  Front view of abdominal aorta with aneurysms. Dotted line shows normal width of aorta.  An aneurysm happens when a weakened part of the aorta wall stretches and expands.  The aorta is the large artery that carries blood from the heart to the rest of the body. With AAA, part of the aorta weakens and expands. If an aneurysm gets large enough, it may burst. This is very serious, and usually fatal.        How is an aneurysm found?  AAA usually causes no symptoms. It's often found when tests (such as an X-ray, MRI, or CT scan) are done for an unrelated problem. Or your healthcare provider may find it while feeling your stomach during a routine exam.      Who develops AAA?  These things increase your chances of having AAA:    AAA runs in your family  Your age. AAA is more likely as you get older.  Men are more likely than women to have AAA  Smoking  High blood pressure  High cholesterol level. This is a buildup of fat and other materials in the blood.  Injury, such as a car accident    What can be done?  Surgery can be done to remove an aneurysm. Your healthcare provider will weigh the chances that the aneurysm will burst against the risks of treatment. Because a small and slow growing aneurysm is not likely to burst, it may be watched for a while. When it reaches a certain size, you may have surgery to replace that section of your aorta.        0768-4749 [employer]. [address] [place] [address], [place] [address]. All rights reserved. This information is not intended as a substitute for professional medical care. Always follow your healthcare professional's instructions.

## 2023-03-06 ENCOUNTER — TELEPHONE (OUTPATIENT)
Dept: OTOLARYNGOLOGY | Facility: CLINIC | Age: 83
End: 2023-03-06

## 2023-03-06 NOTE — TELEPHONE ENCOUNTER
[facility] Call Center    Phone Message    May a detailed message be left on voicemail: yes     Reason for Call: Other: Pt's Wife called to cancel follow up appointment due to weather, she's wondering if Pt can just be seen by his PCP because it's a lot closer to their house, please call to discuss further, thanks     Action Taken: Other: ENT    Travel Screening: Not Applicable

## 2023-03-06 NOTE — TELEPHONE ENCOUNTER
This author spoke with the patient's wife on the phone in response to her earlier inquiry about whether the patient can follow-up with his primary care provider for his epistaxis.  This author informed the patient's wife that it is ok to follow up with his primary care provider, but that he would likely be referred to ENT if he was experiencing ongoing epistaxis issues.  The patient's wife expressed an understanding of this explanation and stated that she is comfortable having the patient follow-up with primary care because he has been stable since his emergency department visit.  Patient's wife was agreeable to the plan and had no further questions at the time of the phone call.  Michela Nava LPN

## 2023-03-16 ENCOUNTER — ANCILLARY PROCEDURE (OUTPATIENT)
Dept: CARDIOLOGY | Facility: CLINIC | Age: 83
End: 2023-03-16
Attending: INTERNAL MEDICINE
Payer: COMMERCIAL

## 2023-03-16 DIAGNOSIS — I49.5 SICK SINUS SYNDROME (H): ICD-10-CM

## 2023-03-16 DIAGNOSIS — Z95.0 PACEMAKER: ICD-10-CM

## 2023-03-16 LAB
MDC_IDC_LEAD_IMPLANT_DT: NORMAL
MDC_IDC_LEAD_LOCATION: NORMAL
MDC_IDC_LEAD_LOCATION_DETAIL_1: NORMAL
MDC_IDC_LEAD_MFG: NORMAL
MDC_IDC_LEAD_MODEL: NORMAL
MDC_IDC_LEAD_POLARITY_TYPE: NORMAL
MDC_IDC_LEAD_SERIAL: NORMAL
MDC_IDC_LEAD_SPECIAL_FUNCTION: NORMAL
MDC_IDC_MSMT_BATTERY_DTM: NORMAL
MDC_IDC_MSMT_BATTERY_REMAINING_LONGEVITY: 134 MO
MDC_IDC_MSMT_BATTERY_RRT_TRIGGER: 2.62
MDC_IDC_MSMT_BATTERY_STATUS: NORMAL
MDC_IDC_MSMT_BATTERY_VOLTAGE: 3.01 V
MDC_IDC_MSMT_LEADCHNL_RV_IMPEDANCE_VALUE: 418 OHM
MDC_IDC_MSMT_LEADCHNL_RV_IMPEDANCE_VALUE: 532 OHM
MDC_IDC_MSMT_LEADCHNL_RV_PACING_THRESHOLD_AMPLITUDE: 0.5 V
MDC_IDC_MSMT_LEADCHNL_RV_PACING_THRESHOLD_PULSEWIDTH: 0.4 MS
MDC_IDC_MSMT_LEADCHNL_RV_SENSING_INTR_AMPL: 11.75 MV
MDC_IDC_MSMT_LEADCHNL_RV_SENSING_INTR_AMPL: 11.75 MV
MDC_IDC_PG_IMPLANT_DTM: NORMAL
MDC_IDC_PG_MFG: NORMAL
MDC_IDC_PG_MODEL: NORMAL
MDC_IDC_PG_SERIAL: NORMAL
MDC_IDC_PG_TYPE: NORMAL
MDC_IDC_SESS_CLINIC_NAME: NORMAL
MDC_IDC_SESS_DTM: NORMAL
MDC_IDC_SESS_TYPE: NORMAL
MDC_IDC_SET_BRADY_HYSTRATE: NORMAL
MDC_IDC_SET_BRADY_LOWRATE: 60 {BEATS}/MIN
MDC_IDC_SET_BRADY_MAX_SENSOR_RATE: 130 {BEATS}/MIN
MDC_IDC_SET_BRADY_MODE: NORMAL
MDC_IDC_SET_LEADCHNL_RV_PACING_AMPLITUDE: 1.5 V
MDC_IDC_SET_LEADCHNL_RV_PACING_ANODE_ELECTRODE_1: NORMAL
MDC_IDC_SET_LEADCHNL_RV_PACING_ANODE_LOCATION_1: NORMAL
MDC_IDC_SET_LEADCHNL_RV_PACING_CAPTURE_MODE: NORMAL
MDC_IDC_SET_LEADCHNL_RV_PACING_CATHODE_ELECTRODE_1: NORMAL
MDC_IDC_SET_LEADCHNL_RV_PACING_CATHODE_LOCATION_1: NORMAL
MDC_IDC_SET_LEADCHNL_RV_PACING_POLARITY: NORMAL
MDC_IDC_SET_LEADCHNL_RV_PACING_PULSEWIDTH: 0.4 MS
MDC_IDC_SET_LEADCHNL_RV_SENSING_ANODE_ELECTRODE_1: NORMAL
MDC_IDC_SET_LEADCHNL_RV_SENSING_ANODE_LOCATION_1: NORMAL
MDC_IDC_SET_LEADCHNL_RV_SENSING_CATHODE_ELECTRODE_1: NORMAL
MDC_IDC_SET_LEADCHNL_RV_SENSING_CATHODE_LOCATION_1: NORMAL
MDC_IDC_SET_LEADCHNL_RV_SENSING_POLARITY: NORMAL
MDC_IDC_SET_LEADCHNL_RV_SENSING_SENSITIVITY: 0.9 MV
MDC_IDC_SET_ZONE_DETECTION_INTERVAL: 360 MS
MDC_IDC_SET_ZONE_TYPE: NORMAL
MDC_IDC_STAT_BRADY_DTM_END: NORMAL
MDC_IDC_STAT_BRADY_DTM_START: NORMAL
MDC_IDC_STAT_BRADY_RV_PERCENT_PACED: 98.31 %
MDC_IDC_STAT_EPISODE_RECENT_COUNT: 0
MDC_IDC_STAT_EPISODE_RECENT_COUNT_DTM_END: NORMAL
MDC_IDC_STAT_EPISODE_RECENT_COUNT_DTM_START: NORMAL
MDC_IDC_STAT_EPISODE_TOTAL_COUNT: 0
MDC_IDC_STAT_EPISODE_TOTAL_COUNT: 0
MDC_IDC_STAT_EPISODE_TOTAL_COUNT: 16
MDC_IDC_STAT_EPISODE_TOTAL_COUNT_DTM_END: NORMAL
MDC_IDC_STAT_EPISODE_TOTAL_COUNT_DTM_START: NORMAL
MDC_IDC_STAT_EPISODE_TYPE: NORMAL

## 2023-03-16 PROCEDURE — 93296 REM INTERROG EVL PM/IDS: CPT | Performed by: INTERNAL MEDICINE

## 2023-03-16 PROCEDURE — 93294 REM INTERROG EVL PM/LDLS PM: CPT | Performed by: INTERNAL MEDICINE

## 2023-03-22 ENCOUNTER — HOSPITAL ENCOUNTER (EMERGENCY)
Facility: HOSPITAL | Age: 83
Discharge: HOME | End: 2023-03-22
Attending: EMERGENCY MEDICINE | Admitting: EMERGENCY MEDICINE
Payer: COMMERCIAL

## 2023-03-22 VITALS
DIASTOLIC BLOOD PRESSURE: 79 MMHG | TEMPERATURE: 97.6 F | OXYGEN SATURATION: 98 % | HEIGHT: 71 IN | SYSTOLIC BLOOD PRESSURE: 168 MMHG | WEIGHT: 185 LBS | BODY MASS INDEX: 25.9 KG/M2 | RESPIRATION RATE: 20 BRPM | HEART RATE: 61 BPM

## 2023-03-22 DIAGNOSIS — R04.0 EPISTAXIS: ICD-10-CM

## 2023-03-22 PROCEDURE — 250N000009 HC RX 250: Performed by: EMERGENCY MEDICINE

## 2023-03-22 PROCEDURE — 99284 EMERGENCY DEPT VISIT MOD MDM: CPT | Mod: 25

## 2023-03-22 PROCEDURE — 30903 CONTROL OF NOSEBLEED: CPT | Mod: RT

## 2023-03-22 RX ORDER — TRANEXAMIC ACID 100 MG/ML
500 INJECTION, SOLUTION INTRAVENOUS ONCE
Status: COMPLETED | OUTPATIENT
Start: 2023-03-22 | End: 2023-03-22

## 2023-03-22 RX ORDER — OXYMETAZOLINE HYDROCHLORIDE 0.05 G/100ML
2 SPRAY NASAL ONCE
Status: COMPLETED | OUTPATIENT
Start: 2023-03-22 | End: 2023-03-22

## 2023-03-22 RX ADMIN — SILVER NITRATE APPLICATORS: 25; 75 STICK TOPICAL at 08:22

## 2023-03-22 RX ADMIN — OXYMETAZOLINE HYDROCHLORIDE 2 SPRAY: 0.05 SPRAY NASAL at 06:39

## 2023-03-22 ASSESSMENT — ACTIVITIES OF DAILY LIVING (ADL): ADLS_ACUITY_SCORE: 35

## 2023-03-22 NOTE — ED NOTES
Pt states his bleeding is controled. Has not passed any clots, no visible blood, and not draining down his throat. Will continue to monitor.

## 2023-03-22 NOTE — ED TRIAGE NOTES
"Spontaneous nosebleed, right nares, since 0400 with hypertension. HX of nosebleeds with cauterization. He is on aspirin \"therapy\". Hypertensive, other VSS, A&O, Cahto. Nose clip applied in TRIAG.     Triage Assessment       Row Name 03/22/23 0627       Triage Assessment (Adult)    Airway WDL WDL       Respiratory WDL    Respiratory WDL WDL       Skin Circulation/Temperature WDL    Skin Circulation/Temperature WDL WDL       Cardiac WDL    Cardiac WDL X  HTN       Peripheral/Neurovascular WDL    Peripheral Neurovascular WDL WDL       Cognitive/Neuro/Behavioral WDL    Cognitive/Neuro/Behavioral WDL WDL                  "

## 2023-03-22 NOTE — ED PROVIDER NOTES
EMERGENCY DEPARTMENT ENCOUNTER      NAME: Dereck Elliott  AGE: 83 year old male  YOB: 1940  MRN: [id number]  EVALUATION DATE & TIME: No admission date for patient encounter.    PCP: Donald Machado    ED PROVIDER: Blas Simon D.O.      Chief Complaint   Patient presents with     Epistaxis       FINAL IMPRESSION:  1. Epistaxis        ED COURSE & MEDICAL DECISION MAKIN:28 AM I met with the patient to gather history and to perform my initial exam. I discussed the plan for care while in the Emergency Department.  7:24 AM I reexamined patient.   7:35 AM I cauterized patient's nose bleed. It appears the nostril is still oozing.   8:13 AM I rechecked and updated the patient with results. No further bleeding. Will plan to discharge home.          Pertinent Labs & Imaging studies reviewed. (See chart for details)  83 year old male presents to the Emergency Department for evaluation of epistaxis.  Patient is on aspirin otherwise no other blood thinners.  No trauma.  Patient has had several nosebleeds like this in the past requiring intervention from ENT the last incidence.  On exam today, there is obvious area of anterior bleeding, do not see any evidence of posterior bleeding.  No history of trauma, therefore does not require CT imaging of the face.  After using Afrin, I was able to easily cauterize the area.  I did observe him in the emergency department for over an hour, without repeat occurrence of bleeding.  Patient was comfortable with discharge home.  Did not require packing.  Initially thought we would use TXA in this patient however after the cauterization work, decided against using the TXA today.  Patient will follow-up as an outpatient with his ENT.  Return precautions discussed.    Medical Decision Making    History:    Supplemental history from: Documented in chart, if applicable    External Record(s) reviewed: Documented in chart, if applicable.    Work Up:    Chart documentation includes  differential considered and any EKGs or imaging independently interpreted by provider, where specified.    In additional to work up documented, I considered the following work up: Documented in chart, if applicable.    External consultation:    Discussion of management with another provider: Documented in chart, if applicable    Complicating factors:    Care impacted by chronic illness: Hypertension    Care affected by social determinants of health: N/A    Disposition considerations: Discharge. No recommendations on prescription strength medication(s). N/A.        At the conclusion of the encounter I discussed the results of all of the tests and the disposition. The questions were answered. The patient or family acknowledged understanding and was agreeable with the care plan.        HPI    Patient information was obtained from: Patient     Use of : N/A     Dereck Elliott is a 83 year old male who presents with epistaxis. Patient reports at 4:00AM this morning he began to have a spontaneous right nare nose bleed. Denies any pain with bleeding. Endorses feeling sensation of blood running down posterior throat. He notes he had a nasal cauterization in February 2023. On baby Asprin daily. Patient has a pacemaker and two stents.     Denies shortness of breath, chest pain, cough, congestion.     Per Chart Review: Patient was see at [facility] 2/16-2/17 for epistaxis and had a endoscopic control of nasal hemorrhage procedure. He tolerated the procedure without any intra- or joselyn-operative complications. Discharged and started on oxymetazoline.     REVIEW OF SYSTEMS  Constitutional:  Denies fever, chills, weight loss or weakness  Eyes:  No pain, discharge, redness  HENT:  Denies sore throat, ear pain, congestion. Endorses nosebleed.   Respiratory: No SOB, wheeze or cough  Cardiovascular:  No CP, palpitations  GI:  Denies abdominal pain, nausea, vomiting, diarrhea  : Denies dysuria,  hematuria  Musculoskeletal:  Denies any new muscle/joint pain, swelling or loss of function.  Skin:  Denies rash, pallor  Neurologic:  Denies headache, focal weakness or sensory changes  Lymph: Denies swollen nodes    All other systems negative unless noted in HPI.    PAST MEDICAL HISTORY:  Past Medical History:   Diagnosis Date     Chronic kidney disease (CKD), stage III (moderate) (H)      Essential hypertension      Hard of hearing      History of rheumatic fever 04/25/2017     Onychomycosis 10/27/2017     Permanent atrial fibrillation (H) 02/03/2017     SSS (sick sinus syndrome) (H) 07/29/2019     Unspecified cataract      Vitamin D deficiency disease 10/06/2015       PAST SURGICAL HISTORY:  Past Surgical History:   Procedure Laterality Date     AS FUSION OF WRIST JOINT Right      CATARACT EXTRACTION Left      COLON SURGERY       EP PACEMAKER INSERT N/A 8/1/2019    EP Pacemaker Insertion; Emeka Dean MD; [facility];  Service: Cardiology     IR ABDOMINAL ENDOVASCULAR STENT GRAFT  12/29/2021     LIGATE ARTERY ETHMOID Right 2/16/2023    Procedure: Endoscopic control of nasal hemmorhage.;  Surgeon: Melissa Roberson MD;  Location: [facility] OR     REPAIR ANEURYSM ABDOMINAL AORTA N/A 12/29/2021    Procedure: ENDOVASCULAR REPAIR OF ABDOMINAL AORTIC ANEURYSM  WITH ENDOGRAFT;  Surgeon: Melia Granger MD;  Location: [facility] OR     ROTATOR CUFF REPAIR RT/LT Left      TOTAL KNEE ARTHROPLASTY Left          CURRENT MEDICATIONS:    No current facility-administered medications for this encounter.     Current Outpatient Medications   Medication     acetaminophen (TYLENOL) 500 MG tablet     amLODIPine (NORVASC) 5 MG tablet     aspirin 81 MG EC tablet     cholecalciferol, vitamin D3, (CHOLECALCIFEROL) 1,000 unit tablet     cyanocobalamin 100 MCG tablet     metoprolol succinate ER (TOPROL-XL) 100 MG 24 hr tablet     oxymetazoline (AFRIN) 0.05 % nasal spray     sodium chloride (OCEAN) 0.65 % nasal spray  "        ALLERGIES:  No Known Allergies    FAMILY HISTORY:  Family History   Problem Relation Age of Onset     Valvular heart disease Mother         care at [place]     Colon Cancer Father      No Known Problems Daughter      No Known Problems Daughter        SOCIAL HISTORY:  Social History     Socioeconomic History     Marital status:      Number of children: 2   Tobacco Use     Smoking status: Former     Packs/day: 0.00     Types: Cigars, Cigarettes     Quit date: 2016     Years since quittin.2     Smokeless tobacco: Never   Substance and Sexual Activity     Alcohol use: Yes     Alcohol/week: 1.0 standard drink     Comment: Alcoholic Drinks/day: per week 2     Drug use: No       VITALS:  Patient Vitals for the past 24 hrs:   BP Temp Temp src Pulse Resp SpO2 Height Weight   23 0800 (!) 168/79 -- -- 61 -- 98 % -- --   23 0730 (!) 155/75 -- -- 61 -- 97 % -- --   23 0702 (!) 159/75 -- -- 60 -- 97 % -- --   23 0646 -- -- -- 68 -- 99 % -- --   23 0645 (!) 170/83 -- -- -- -- -- -- --   23 0625 (!) 193/102 97.6  F (36.4  C) Temporal 80 20 96 % 1.803 m (5' 11\") 83.9 kg (185 lb)       PHYSICAL EXAM    Vitals: BP (!) 168/79   Pulse 61   Temp 97.6  F (36.4  C) (Temporal)   Resp 20   Ht 1.803 m (5' 11\")   Wt 83.9 kg (185 lb)   SpO2 98%   BMI 25.80 kg/m    General: Appears in no acute distress, awake, alert, interactive.  Eyes: Conjunctivae non-injected. Sclera anicteric.  HENT: Atraumatic, normocephalic. Right nare with dried blood, no active bleeding on exam.   Neck: Supple, normal ROM  Respiratory/Chest: Respiration unlabored, no tachypnea  Abdomen: non distended  Musculoskeletal: Normal extremities. No edema or erythema.  Skin: Normal color. No rash or diaphoresis.  Neurologic: Alert and oriented, face symmetric, moves all extremities spontaneously. Speech clear.  Psychiatric:  Affect normal, Judgment normal, Mood normal.    LABS  No results found for this or any " previous visit (from the past 24 hour(s)).      RADIOLOGY  No orders to display       PROCEDURES:    PROCEDURE: Epistaxis Management   INDICATIONS: Failure of epistaxis control with non-invasive management techniques.   PROCEDURE PROVIDER: Dr Blas Simon   SITE: Right, Anterior   MEDICATION:  Afrin   NOTE: Anterior Source:  The area was evaluated and cleared with nasal suction to locate source of bleeding. The bleeding location was managed with limited chemical cautery.  Following treatment the patient was observed and no significant bleeding was noted to recur.       COMPLICATIONS: Patient tolerated procedure well, without complication       MEDICATIONS GIVEN IN THE EMERGENCY:  Medications   oxymetazoline (AFRIN) 0.05 % spray 2 spray (2 sprays Nasal $Given 3/22/23 0639)   silver nitrate (ARZOL) Misc ( Topical $Given 3/22/23 0222)   tranexamic acid (CYKLOKAPRON) spray 500 mg (500 mg Right nostril Not Given 3/22/23 0823)       NEW PRESCRIPTIONS STARTED AT TODAY'S ER VISIT  Discharge Medication List as of 3/22/2023  8:20 AM           I, Pinky Mccormack, am serving as a scribe to document services personally performed by Blas Simon D.O., based on my observations and the provider's statements to me.  I, Blas Simon D.O., attest that Pinky Mccormack is acting in a scribe capacity, has observed my performance of the services and has documented them in accordance with my direction.     Blas Simon D.O.  Emergency Medicine  [facility] EMERGENCY DEPARTMENT  [address] [place] [id number] [phone]  Dept: [phone]       Blas Simon,   03/22/23 0938

## 2023-03-23 ENCOUNTER — TELEPHONE (OUTPATIENT)
Dept: FAMILY MEDICINE | Facility: CLINIC | Age: 83
End: 2023-03-23
Payer: COMMERCIAL

## 2023-03-23 NOTE — TELEPHONE ENCOUNTER
FYI - Status Update    Who is Calling: patient    Update: Shauna was told to call you today to notify you that Dereck was in the ED at [facility] with a nose bleed that had to be cauterized. No instructions to follow up. Only instruction was to call you today. She is wondering if he should have an appointment or what you recommend.     Does caller want a call/response back: Yes     Okay to leave a detailed message?: Yes at Home number on file [phone] (home)

## 2023-03-30 ENCOUNTER — OFFICE VISIT (OUTPATIENT)
Dept: FAMILY MEDICINE | Facility: CLINIC | Age: 83
End: 2023-03-30
Payer: COMMERCIAL

## 2023-03-30 VITALS
HEIGHT: 71 IN | RESPIRATION RATE: 24 BRPM | OXYGEN SATURATION: 94 % | BODY MASS INDEX: 26.7 KG/M2 | DIASTOLIC BLOOD PRESSURE: 90 MMHG | WEIGHT: 190.7 LBS | SYSTOLIC BLOOD PRESSURE: 168 MMHG | TEMPERATURE: 98 F | HEART RATE: 84 BPM

## 2023-03-30 DIAGNOSIS — I48.21 PERMANENT ATRIAL FIBRILLATION (H): ICD-10-CM

## 2023-03-30 DIAGNOSIS — N18.32 STAGE 3B CHRONIC KIDNEY DISEASE (H): ICD-10-CM

## 2023-03-30 DIAGNOSIS — D53.9 MACROCYTIC ANEMIA: ICD-10-CM

## 2023-03-30 DIAGNOSIS — I71.019 DISSECTION OF THORACIC AORTA, UNSPECIFIED PART (H): ICD-10-CM

## 2023-03-30 DIAGNOSIS — R04.0 EPISTAXIS: Primary | ICD-10-CM

## 2023-03-30 DIAGNOSIS — I10 ESSENTIAL HYPERTENSION: ICD-10-CM

## 2023-03-30 LAB
ANION GAP SERPL CALCULATED.3IONS-SCNC: 13 MMOL/L (ref 7–15)
BUN SERPL-MCNC: 42.7 MG/DL (ref 8–23)
CALCIUM SERPL-MCNC: 9.7 MG/DL (ref 8.8–10.2)
CHLORIDE SERPL-SCNC: 107 MMOL/L (ref 98–107)
CREAT SERPL-MCNC: 2.93 MG/DL (ref 0.67–1.17)
DEPRECATED HCO3 PLAS-SCNC: 22 MMOL/L (ref 22–29)
ERYTHROCYTE [DISTWIDTH] IN BLOOD BY AUTOMATED COUNT: 13.7 % (ref 10–15)
FOLATE SERPL-MCNC: 23.3 NG/ML (ref 4.6–34.8)
GFR SERPL CREATININE-BSD FRML MDRD: 20 ML/MIN/1.73M2
GLUCOSE SERPL-MCNC: 99 MG/DL (ref 70–99)
HCT VFR BLD AUTO: 31.7 % (ref 40–53)
HGB BLD-MCNC: 10.2 G/DL (ref 13.3–17.7)
MCH RBC QN AUTO: 33.4 PG (ref 26.5–33)
MCHC RBC AUTO-ENTMCNC: 32.2 G/DL (ref 31.5–36.5)
MCV RBC AUTO: 104 FL (ref 78–100)
PLATELET # BLD AUTO: 159 10E3/UL (ref 150–450)
POTASSIUM SERPL-SCNC: 5.1 MMOL/L (ref 3.4–5.3)
RBC # BLD AUTO: 3.05 10E6/UL (ref 4.4–5.9)
SODIUM SERPL-SCNC: 142 MMOL/L (ref 136–145)
VIT B12 SERPL-MCNC: 880 PG/ML (ref 232–1245)
WBC # BLD AUTO: 6.6 10E3/UL (ref 4–11)

## 2023-03-30 PROCEDURE — 82746 ASSAY OF FOLIC ACID SERUM: CPT | Performed by: FAMILY MEDICINE

## 2023-03-30 PROCEDURE — 85027 COMPLETE CBC AUTOMATED: CPT | Performed by: FAMILY MEDICINE

## 2023-03-30 PROCEDURE — 99214 OFFICE O/P EST MOD 30 MIN: CPT | Performed by: FAMILY MEDICINE

## 2023-03-30 PROCEDURE — 82607 VITAMIN B-12: CPT | Performed by: FAMILY MEDICINE

## 2023-03-30 PROCEDURE — 80048 BASIC METABOLIC PNL TOTAL CA: CPT | Performed by: FAMILY MEDICINE

## 2023-03-30 PROCEDURE — 36415 COLL VENOUS BLD VENIPUNCTURE: CPT | Performed by: FAMILY MEDICINE

## 2023-03-30 ASSESSMENT — PAIN SCALES - GENERAL: PAINLEVEL: NO PAIN (0)

## 2023-03-30 NOTE — LETTER
March 31, 2023      Dereck Elliott  [address] [address]        Dear ,    We are writing to inform you of your test results.    Your vitamin B12 level was normal.     Mild anemia remains stable.  We will continue to follow closely.     Your folate level was normal.    Your kidney function remains SIGNIFICANTLY reduced.  It appears relatively stable.  We will continue to follow closely.  Avoid anti-inflammatory medications including ibuprofen, Motrin, Aleve, naproxen, etc.    Resulted Orders   Vitamin B12   Result Value Ref Range    Vitamin B12 880 232 - 1,245 pg/mL   CBC with platelets   Result Value Ref Range    WBC Count 6.6 4.0 - 11.0 10e3/uL    RBC Count 3.05 (L) 4.40 - 5.90 10e6/uL    Hemoglobin 10.2 (L) 13.3 - 17.7 g/dL    Hematocrit 31.7 (L) 40.0 - 53.0 %     (H) 78 - 100 fL    MCH 33.4 (H) 26.5 - 33.0 pg    MCHC 32.2 31.5 - 36.5 g/dL    RDW 13.7 10.0 - 15.0 %    Platelet Count 159 150 - 450 10e3/uL   Basic metabolic panel   Result Value Ref Range    Sodium 142 136 - 145 mmol/L    Potassium 5.1 3.4 - 5.3 mmol/L    Chloride 107 98 - 107 mmol/L    Carbon Dioxide (CO2) 22 22 - 29 mmol/L    Anion Gap 13 7 - 15 mmol/L    Urea Nitrogen 42.7 (H) 8.0 - 23.0 mg/dL    Creatinine 2.93 (H) 0.67 - 1.17 mg/dL    Calcium 9.7 8.8 - 10.2 mg/dL    Glucose 99 70 - 99 mg/dL    GFR Estimate 20 (L) >60 mL/min/1.73m2      Comment:      eGFR calculated using 2021 CKD-EPI equation.   Folate   Result Value Ref Range    Folic Acid 23.3 4.6 - 34.8 ng/mL       If you have any questions or concerns, please call the clinic at the number listed above.       Sincerely,      Donald Machado MD
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
## 2023-04-28 ENCOUNTER — OFFICE VISIT (OUTPATIENT)
Dept: FAMILY MEDICINE | Facility: CLINIC | Age: 83
End: 2023-04-28
Payer: COMMERCIAL

## 2023-04-28 VITALS
DIASTOLIC BLOOD PRESSURE: 74 MMHG | TEMPERATURE: 97.1 F | RESPIRATION RATE: 18 BRPM | OXYGEN SATURATION: 92 % | HEART RATE: 91 BPM | BODY MASS INDEX: 26.79 KG/M2 | WEIGHT: 192 LBS | SYSTOLIC BLOOD PRESSURE: 156 MMHG

## 2023-04-28 DIAGNOSIS — E55.9 VITAMIN D DEFICIENCY DISEASE: ICD-10-CM

## 2023-04-28 DIAGNOSIS — I71.019 DISSECTION OF THORACIC AORTA, UNSPECIFIED PART (H): ICD-10-CM

## 2023-04-28 DIAGNOSIS — I10 ESSENTIAL HYPERTENSION: Primary | ICD-10-CM

## 2023-04-28 DIAGNOSIS — N18.32 STAGE 3B CHRONIC KIDNEY DISEASE (H): ICD-10-CM

## 2023-04-28 DIAGNOSIS — D53.9 MACROCYTIC ANEMIA: ICD-10-CM

## 2023-04-28 DIAGNOSIS — M54.2 NECK PAIN: ICD-10-CM

## 2023-04-28 DIAGNOSIS — R40.0 DAYTIME SOMNOLENCE: ICD-10-CM

## 2023-04-28 DIAGNOSIS — R04.0 EPISTAXIS: ICD-10-CM

## 2023-04-28 DIAGNOSIS — I48.21 PERMANENT ATRIAL FIBRILLATION (H): ICD-10-CM

## 2023-04-28 DIAGNOSIS — E78.5 DYSLIPIDEMIA: ICD-10-CM

## 2023-04-28 LAB
ERYTHROCYTE [DISTWIDTH] IN BLOOD BY AUTOMATED COUNT: 14.2 % (ref 10–15)
FOLATE SERPL-MCNC: 15.2 NG/ML (ref 4.6–34.8)
HCT VFR BLD AUTO: 33.2 % (ref 40–53)
HGB BLD-MCNC: 10.7 G/DL (ref 13.3–17.7)
MCH RBC QN AUTO: 33.8 PG (ref 26.5–33)
MCHC RBC AUTO-ENTMCNC: 32.2 G/DL (ref 31.5–36.5)
MCV RBC AUTO: 105 FL (ref 78–100)
PLATELET # BLD AUTO: 165 10E3/UL (ref 150–450)
RBC # BLD AUTO: 3.17 10E6/UL (ref 4.4–5.9)
WBC # BLD AUTO: 7.5 10E3/UL (ref 4–11)

## 2023-04-28 PROCEDURE — 36415 COLL VENOUS BLD VENIPUNCTURE: CPT | Performed by: FAMILY MEDICINE

## 2023-04-28 PROCEDURE — 99214 OFFICE O/P EST MOD 30 MIN: CPT | Performed by: FAMILY MEDICINE

## 2023-04-28 PROCEDURE — 80048 BASIC METABOLIC PNL TOTAL CA: CPT | Performed by: FAMILY MEDICINE

## 2023-04-28 PROCEDURE — 82306 VITAMIN D 25 HYDROXY: CPT | Performed by: FAMILY MEDICINE

## 2023-04-28 PROCEDURE — 85027 COMPLETE CBC AUTOMATED: CPT | Performed by: FAMILY MEDICINE

## 2023-04-28 PROCEDURE — 82746 ASSAY OF FOLIC ACID SERUM: CPT | Performed by: FAMILY MEDICINE

## 2023-04-28 PROCEDURE — 82607 VITAMIN B-12: CPT | Performed by: FAMILY MEDICINE

## 2023-04-28 PROCEDURE — 80061 LIPID PANEL: CPT | Performed by: FAMILY MEDICINE

## 2023-04-28 RX ORDER — METOPROLOL SUCCINATE 100 MG/1
100 TABLET, EXTENDED RELEASE ORAL DAILY
Qty: 90 TABLET | Refills: 3 | Status: SHIPPED | OUTPATIENT
Start: 2023-04-28 | End: 2024-05-02

## 2023-04-28 RX ORDER — AMLODIPINE BESYLATE 5 MG/1
TABLET ORAL
Qty: 90 TABLET | Refills: 3 | Status: SHIPPED | OUTPATIENT
Start: 2023-04-28 | End: 2024-05-06

## 2023-04-28 RX ORDER — GABAPENTIN 300 MG/1
300 CAPSULE ORAL AT BEDTIME
Qty: 90 CAPSULE | Refills: 3 | Status: SHIPPED | OUTPATIENT
Start: 2023-04-28 | End: 2023-05-17

## 2023-04-28 ASSESSMENT — PAIN SCALES - GENERAL: PAINLEVEL: MODERATE PAIN (5)
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
## 2023-04-28 NOTE — LETTER
April 30, 2023      Dereck Elliott  [address] [facility] [address]        Dear ,    We are writing to inform you of your test results.    Your kidney function remains significantly reduced.  It appears stable.  We will continue to follow closely.     Your cholesterol results are at goal.     Your folic acid level and vitamin B12 levels are normal.    Okay to cut back slightly on any vitamin D3 supplement that you are taking.  We will continue to follow closely.     Your anemia (low hemoglobin) remains mildly low.  It appears stable.  We will continue to follow closely.     Resulted Orders   Basic metabolic panel   Result Value Ref Range    Sodium 141 136 - 145 mmol/L    Potassium 5.2 3.4 - 5.3 mmol/L    Chloride 105 98 - 107 mmol/L    Carbon Dioxide (CO2) 24 22 - 29 mmol/L    Anion Gap 12 7 - 15 mmol/L    Urea Nitrogen 39.7 (H) 8.0 - 23.0 mg/dL    Creatinine 2.96 (H) 0.67 - 1.17 mg/dL    Calcium 9.8 8.8 - 10.2 mg/dL    Glucose 97 70 - 99 mg/dL    GFR Estimate 20 (L) >60 mL/min/1.73m2      Comment:      eGFR calculated using 2021 CKD-EPI equation.   Lipid panel reflex to direct LDL Fasting   Result Value Ref Range    Cholesterol 122 <200 mg/dL    Triglycerides 63 <150 mg/dL    Direct Measure HDL 47 >=40 mg/dL    LDL Cholesterol Calculated 62 <=100 mg/dL    Non HDL Cholesterol 75 <130 mg/dL    Narrative    Cholesterol  Desirable:  <200 mg/dL    Triglycerides  Normal:  Less than 150 mg/dL  Borderline High:  150-199 mg/dL  High:  200-499 mg/dL  Very High:  Greater than or equal to 500 mg/dL    Direct Measure HDL  Female:  Greater than or equal to 50 mg/dL   Male:  Greater than or equal to 40 mg/dL    LDL Cholesterol  Desirable:  <100mg/dL  Above Desirable:  100-129 mg/dL   Borderline High:  130-159 mg/dL   High:  160-189 mg/dL   Very High:  >= 190 mg/dL    Non HDL Cholesterol  Desirable:  130 mg/dL  Above Desirable:  130-159 mg/dL  Borderline High:  160-189 mg/dL  High:  190-219 mg/dL  Very High:  Greater  than or equal to 220 mg/dL   Folate   Result Value Ref Range    Folic Acid 15.2 4.6 - 34.8 ng/mL   Vitamin B12   Result Value Ref Range    Vitamin B12 819 232 - 1,245 pg/mL   Vitamin D Deficiency   Result Value Ref Range    Vitamin D, Total (25-Hydroxy) 84 (H) 20 - 75 ug/L    Narrative    Season, race, dietary intake, and treatment affect the concentration of 25-hydroxy-Vitamin D. Values may decrease during winter months and increase during summer months. Values 20-29 ug/L may indicate Vitamin D insufficiency and values <20 ug/L may indicate Vitamin D deficiency.    Vitamin D determination is routinely performed by an immunoassay specific for 25 hydroxyvitamin D3.  If an individual is on vitamin D2(ergocalciferol) supplementation, please specify 25 OH vitamin D2 and D3 level determination by LCMSMS test VITD23.     CBC with platelets   Result Value Ref Range    WBC Count 7.5 4.0 - 11.0 10e3/uL    RBC Count 3.17 (L) 4.40 - 5.90 10e6/uL    Hemoglobin 10.7 (L) 13.3 - 17.7 g/dL    Hematocrit 33.2 (L) 40.0 - 53.0 %     (H) 78 - 100 fL    MCH 33.8 (H) 26.5 - 33.0 pg    MCHC 32.2 31.5 - 36.5 g/dL    RDW 14.2 10.0 - 15.0 %    Platelet Count 165 150 - 450 10e3/uL       If you have any questions or concerns, please call the clinic at the number listed above.       Sincerely,      Donald Machado MD

## 2023-04-29 LAB
ANION GAP SERPL CALCULATED.3IONS-SCNC: 12 MMOL/L (ref 7–15)
BUN SERPL-MCNC: 39.7 MG/DL (ref 8–23)
CALCIUM SERPL-MCNC: 9.8 MG/DL (ref 8.8–10.2)
CHLORIDE SERPL-SCNC: 105 MMOL/L (ref 98–107)
CHOLEST SERPL-MCNC: 122 MG/DL
CREAT SERPL-MCNC: 2.96 MG/DL (ref 0.67–1.17)
DEPRECATED CALCIDIOL+CALCIFEROL SERPL-MC: 84 UG/L (ref 20–75)
DEPRECATED HCO3 PLAS-SCNC: 24 MMOL/L (ref 22–29)
GFR SERPL CREATININE-BSD FRML MDRD: 20 ML/MIN/1.73M2
GLUCOSE SERPL-MCNC: 97 MG/DL (ref 70–99)
HDLC SERPL-MCNC: 47 MG/DL
LDLC SERPL CALC-MCNC: 62 MG/DL
NONHDLC SERPL-MCNC: 75 MG/DL
POTASSIUM SERPL-SCNC: 5.2 MMOL/L (ref 3.4–5.3)
SODIUM SERPL-SCNC: 141 MMOL/L (ref 136–145)
TRIGL SERPL-MCNC: 63 MG/DL
VIT B12 SERPL-MCNC: 819 PG/ML (ref 232–1245)

## 2023-05-15 ENCOUNTER — TELEPHONE (OUTPATIENT)
Dept: FAMILY MEDICINE | Facility: CLINIC | Age: 83
End: 2023-05-15
Payer: COMMERCIAL

## 2023-05-15 NOTE — TELEPHONE ENCOUNTER
"Provider Recommendation Follow Up:   Reached patient/caregiver. Informed of provider's recommendations. Patient verbalized understanding and agrees with the plan.     Informed wife that per Dr Machado:  \"Multiple reasons for daytime sleepiness requiring further evaluation to determine exact cause.  Patient should be scheduled to be seen by any available provider in order to complete additional evaluation and testing regarding somnolence, etc.\"    Assisted with scheduling. No other questions or concerns at this time.    MARC JansenN, RN  [facility]          "

## 2023-05-15 NOTE — TELEPHONE ENCOUNTER
"Nurse SBAR    Situation:   On and off drowsy all day for a good 1 month or two; patient cannot stay away during the day and kept dosing off. Patient is wondering what could be causing it: his amlodipine, kidney disease, or what else?    Background:   Stage 4 kidney disease     From last OV 4/29/23:  \"Daytime somnolence  Daytime somnolence likely associated with poor sleep associated with neck pain etc.  Has had sleep studies in the past and does not utilize CPAP.  Anticipate improvement with trial of gabapentin 300 mg at bedtime as noted below for neck pain management\"    Assessment:   Wife stated that when pt was seen on 4/29/23, she is not sure if pt brought it up to Dr Machado or not about his ongoing on and off dosing off during the day for a month or two.    Have not take gabapentin - took one dose and stopped taking it since pt stated that it was causing hallucination. Wife stated that pt stated he saw people walking around in the bedroom and does not want to continue taking gabapentin, which he has not.     Patient will sit in chair all day long with him dosing on and off , which wife stated he seems drowsy and lethargic with no energy. Patient can have visitors over, but he cannot stay awake to hang out with them.    Wife stated she changed illow was changed that is under neck to support. Neck pain has improved per wife. Used a tablet, that he was looking down a lot at it and he has not been using it as much as well.      Still going to the gym every morning, but wife stated there's no improvement with his strength.     No SOB/difficulty breathing or chest pain.    Appetite is poor, which is no change for pt.    Has Irritable bowel syndrome, which is not a change for pt either.    Drinking okay and having normal urine output.    Denied dizziness.      Recommendation:   Will route to PCP to review and advise on next step. Wife is wondering if it's patient's kidney disease or his amlodipine that is causing pt " to not be able to stay away during the day? What else could be causing that? What is the next step as wife believed the neck pain has improved and pt will not take the gabapentin since he believed it caused hallucination.    MARC JansenN, RN  [facility]

## 2023-05-17 ENCOUNTER — NURSE TRIAGE (OUTPATIENT)
Dept: NURSING | Facility: CLINIC | Age: 83
End: 2023-05-17

## 2023-05-17 ENCOUNTER — OFFICE VISIT (OUTPATIENT)
Dept: FAMILY MEDICINE | Facility: CLINIC | Age: 83
End: 2023-05-17
Payer: COMMERCIAL

## 2023-05-17 VITALS
SYSTOLIC BLOOD PRESSURE: 160 MMHG | TEMPERATURE: 97.5 F | DIASTOLIC BLOOD PRESSURE: 88 MMHG | OXYGEN SATURATION: 98 % | HEART RATE: 91 BPM | BODY MASS INDEX: 26.79 KG/M2 | WEIGHT: 192 LBS | RESPIRATION RATE: 16 BRPM

## 2023-05-17 DIAGNOSIS — R40.0 DAYTIME SOMNOLENCE: Primary | ICD-10-CM

## 2023-05-17 DIAGNOSIS — I48.21 PERMANENT ATRIAL FIBRILLATION (H): Chronic | ICD-10-CM

## 2023-05-17 DIAGNOSIS — M54.2 NECK PAIN: ICD-10-CM

## 2023-05-17 DIAGNOSIS — N18.4 CKD (CHRONIC KIDNEY DISEASE) STAGE 4, GFR 15-29 ML/MIN (H): ICD-10-CM

## 2023-05-17 DIAGNOSIS — Z95.0 CARDIAC PACEMAKER IN SITU: ICD-10-CM

## 2023-05-17 DIAGNOSIS — D53.9 MACROCYTIC ANEMIA: ICD-10-CM

## 2023-05-17 LAB
ANION GAP SERPL CALCULATED.3IONS-SCNC: 12 MMOL/L (ref 7–15)
BUN SERPL-MCNC: 39.4 MG/DL (ref 8–23)
CALCIUM SERPL-MCNC: 9.9 MG/DL (ref 8.8–10.2)
CHLORIDE SERPL-SCNC: 107 MMOL/L (ref 98–107)
CREAT SERPL-MCNC: 2.76 MG/DL (ref 0.67–1.17)
DEPRECATED HCO3 PLAS-SCNC: 22 MMOL/L (ref 22–29)
ERYTHROCYTE [DISTWIDTH] IN BLOOD BY AUTOMATED COUNT: 14.4 % (ref 10–15)
GFR SERPL CREATININE-BSD FRML MDRD: 22 ML/MIN/1.73M2
GLUCOSE SERPL-MCNC: 90 MG/DL (ref 70–99)
HCT VFR BLD AUTO: 33.1 % (ref 40–53)
HGB BLD-MCNC: 10.5 G/DL (ref 13.3–17.7)
MCH RBC QN AUTO: 33.9 PG (ref 26.5–33)
MCHC RBC AUTO-ENTMCNC: 31.7 G/DL (ref 31.5–36.5)
MCV RBC AUTO: 107 FL (ref 78–100)
PLATELET # BLD AUTO: 179 10E3/UL (ref 150–450)
POTASSIUM SERPL-SCNC: 6.5 MMOL/L (ref 3.4–5.3)
RBC # BLD AUTO: 3.1 10E6/UL (ref 4.4–5.9)
SODIUM SERPL-SCNC: 141 MMOL/L (ref 136–145)
TSH SERPL DL<=0.005 MIU/L-ACNC: 2.42 UIU/ML (ref 0.3–4.2)
WBC # BLD AUTO: 7.2 10E3/UL (ref 4–11)

## 2023-05-17 PROCEDURE — 99214 OFFICE O/P EST MOD 30 MIN: CPT | Performed by: FAMILY MEDICINE

## 2023-05-17 PROCEDURE — 80048 BASIC METABOLIC PNL TOTAL CA: CPT | Performed by: FAMILY MEDICINE

## 2023-05-17 PROCEDURE — 84443 ASSAY THYROID STIM HORMONE: CPT | Performed by: FAMILY MEDICINE

## 2023-05-17 PROCEDURE — 85027 COMPLETE CBC AUTOMATED: CPT | Performed by: FAMILY MEDICINE

## 2023-05-17 PROCEDURE — 36415 COLL VENOUS BLD VENIPUNCTURE: CPT | Performed by: FAMILY MEDICINE

## 2023-05-17 RX ORDER — MODAFINIL 100 MG/1
100 TABLET ORAL DAILY
Qty: 30 TABLET | Refills: 3 | Status: SHIPPED | OUTPATIENT
Start: 2023-05-17 | End: 2023-06-14

## 2023-05-17 ASSESSMENT — PAIN SCALES - GENERAL: PAINLEVEL: MILD PAIN (3)
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
## 2023-05-18 ENCOUNTER — APPOINTMENT (OUTPATIENT)
Dept: CARDIOLOGY | Facility: HOSPITAL | Age: 83
DRG: 291 | End: 2023-05-18
Attending: INTERNAL MEDICINE
Payer: COMMERCIAL

## 2023-05-18 ENCOUNTER — HOSPITAL ENCOUNTER (INPATIENT)
Facility: HOSPITAL | Age: 83
LOS: 3 days | Discharge: HOME | DRG: 291 | End: 2023-05-21
Attending: EMERGENCY MEDICINE | Admitting: INTERNAL MEDICINE
Payer: COMMERCIAL

## 2023-05-18 ENCOUNTER — APPOINTMENT (OUTPATIENT)
Dept: RADIOLOGY | Facility: HOSPITAL | Age: 83
DRG: 291 | End: 2023-05-18
Attending: EMERGENCY MEDICINE
Payer: COMMERCIAL

## 2023-05-18 ENCOUNTER — TELEPHONE (OUTPATIENT)
Dept: CARDIOLOGY | Facility: CLINIC | Age: 83
End: 2023-05-18

## 2023-05-18 ENCOUNTER — APPOINTMENT (OUTPATIENT)
Dept: OCCUPATIONAL THERAPY | Facility: HOSPITAL | Age: 83
DRG: 291 | End: 2023-05-18
Attending: INTERNAL MEDICINE
Payer: COMMERCIAL

## 2023-05-18 DIAGNOSIS — I50.9 ACUTE DECOMPENSATED HEART FAILURE (H): Primary | ICD-10-CM

## 2023-05-18 DIAGNOSIS — I50.9 ACUTE CONGESTIVE HEART FAILURE, UNSPECIFIED HEART FAILURE TYPE (H): ICD-10-CM

## 2023-05-18 DIAGNOSIS — N18.9 CHRONIC KIDNEY DISEASE, UNSPECIFIED CKD STAGE: ICD-10-CM

## 2023-05-18 LAB
ANION GAP SERPL CALCULATED.3IONS-SCNC: 11 MMOL/L (ref 7–15)
ANION GAP SERPL CALCULATED.3IONS-SCNC: 11 MMOL/L (ref 7–15)
ATRIAL RATE - MUSE: 66 BPM
BASOPHILS # BLD AUTO: 0.1 10E3/UL (ref 0–0.2)
BASOPHILS NFR BLD AUTO: 1 %
BUN SERPL-MCNC: 45.3 MG/DL (ref 8–23)
BUN SERPL-MCNC: 46.5 MG/DL (ref 8–23)
CALCIUM SERPL-MCNC: 9.7 MG/DL (ref 8.8–10.2)
CALCIUM SERPL-MCNC: 9.9 MG/DL (ref 8.8–10.2)
CHLORIDE SERPL-SCNC: 102 MMOL/L (ref 98–107)
CHLORIDE SERPL-SCNC: 105 MMOL/L (ref 98–107)
CREAT SERPL-MCNC: 3.03 MG/DL (ref 0.67–1.17)
CREAT SERPL-MCNC: 3.07 MG/DL (ref 0.67–1.17)
DEPRECATED HCO3 PLAS-SCNC: 23 MMOL/L (ref 22–29)
DEPRECATED HCO3 PLAS-SCNC: 26 MMOL/L (ref 22–29)
DIASTOLIC BLOOD PRESSURE - MUSE: NORMAL MMHG
EOSINOPHIL # BLD AUTO: 0.8 10E3/UL (ref 0–0.7)
EOSINOPHIL NFR BLD AUTO: 12 %
ERYTHROCYTE [DISTWIDTH] IN BLOOD BY AUTOMATED COUNT: 14.4 % (ref 10–15)
FLUAV RNA SPEC QL NAA+PROBE: NEGATIVE
FLUBV RNA RESP QL NAA+PROBE: NEGATIVE
GFR SERPL CREATININE-BSD FRML MDRD: 19 ML/MIN/1.73M2
GFR SERPL CREATININE-BSD FRML MDRD: 20 ML/MIN/1.73M2
GLUCOSE SERPL-MCNC: 100 MG/DL (ref 70–99)
GLUCOSE SERPL-MCNC: 103 MG/DL (ref 70–99)
HCT VFR BLD AUTO: 32.9 % (ref 40–53)
HGB BLD-MCNC: 10.4 G/DL (ref 13.3–17.7)
HOLD SPECIMEN: NORMAL
IMM GRANULOCYTES # BLD: 0 10E3/UL
IMM GRANULOCYTES NFR BLD: 0 %
INTERPRETATION ECG - MUSE: NORMAL
LVEF ECHO: NORMAL
LYMPHOCYTES # BLD AUTO: 1 10E3/UL (ref 0.8–5.3)
LYMPHOCYTES NFR BLD AUTO: 15 %
MCH RBC QN AUTO: 33.7 PG (ref 26.5–33)
MCHC RBC AUTO-ENTMCNC: 31.6 G/DL (ref 31.5–36.5)
MCV RBC AUTO: 107 FL (ref 78–100)
MONOCYTES # BLD AUTO: 0.8 10E3/UL (ref 0–1.3)
MONOCYTES NFR BLD AUTO: 12 %
NEUTROPHILS # BLD AUTO: 4.1 10E3/UL (ref 1.6–8.3)
NEUTROPHILS NFR BLD AUTO: 60 %
NRBC # BLD AUTO: 0 10E3/UL
NRBC BLD AUTO-RTO: 0 /100
NT-PROBNP SERPL-MCNC: 7281 PG/ML (ref 0–1800)
P AXIS - MUSE: 38 DEGREES
PLATELET # BLD AUTO: 194 10E3/UL (ref 150–450)
POTASSIUM SERPL-SCNC: 4.8 MMOL/L (ref 3.4–5.3)
POTASSIUM SERPL-SCNC: 5.4 MMOL/L (ref 3.4–5.3)
PR INTERVAL - MUSE: 356 MS
QRS DURATION - MUSE: 158 MS
QT - MUSE: 438 MS
QTC - MUSE: 459 MS
R AXIS - MUSE: 43 DEGREES
RBC # BLD AUTO: 3.09 10E6/UL (ref 4.4–5.9)
RSV RNA SPEC NAA+PROBE: NEGATIVE
SARS-COV-2 RNA RESP QL NAA+PROBE: NEGATIVE
SODIUM SERPL-SCNC: 139 MMOL/L (ref 136–145)
SODIUM SERPL-SCNC: 139 MMOL/L (ref 136–145)
SYSTOLIC BLOOD PRESSURE - MUSE: NORMAL MMHG
T AXIS - MUSE: 122 DEGREES
TROPONIN T SERPL HS-MCNC: 48 NG/L
VENTRICULAR RATE- MUSE: 66 BPM
WBC # BLD AUTO: 6.8 10E3/UL (ref 4–11)

## 2023-05-18 PROCEDURE — 250N000011 HC RX IP 250 OP 636: Performed by: EMERGENCY MEDICINE

## 2023-05-18 PROCEDURE — 84484 ASSAY OF TROPONIN QUANT: CPT | Performed by: EMERGENCY MEDICINE

## 2023-05-18 PROCEDURE — 83880 ASSAY OF NATRIURETIC PEPTIDE: CPT | Performed by: EMERGENCY MEDICINE

## 2023-05-18 PROCEDURE — 36415 COLL VENOUS BLD VENIPUNCTURE: CPT | Performed by: EMERGENCY MEDICINE

## 2023-05-18 PROCEDURE — 93005 ELECTROCARDIOGRAM TRACING: CPT | Performed by: EMERGENCY MEDICINE

## 2023-05-18 PROCEDURE — 99285 EMERGENCY DEPT VISIT HI MDM: CPT | Mod: 25

## 2023-05-18 PROCEDURE — 36415 COLL VENOUS BLD VENIPUNCTURE: CPT | Performed by: INTERNAL MEDICINE

## 2023-05-18 PROCEDURE — 82947 ASSAY GLUCOSE BLOOD QUANT: CPT | Performed by: EMERGENCY MEDICINE

## 2023-05-18 PROCEDURE — 87637 SARSCOV2&INF A&B&RSV AMP PRB: CPT | Performed by: EMERGENCY MEDICINE

## 2023-05-18 PROCEDURE — 210N000001 HC R&B IMCU HEART CARE

## 2023-05-18 PROCEDURE — 71046 X-RAY EXAM CHEST 2 VIEWS: CPT

## 2023-05-18 PROCEDURE — 250N000013 HC RX MED GY IP 250 OP 250 PS 637: Performed by: INTERNAL MEDICINE

## 2023-05-18 PROCEDURE — 80048 BASIC METABOLIC PNL TOTAL CA: CPT | Performed by: INTERNAL MEDICINE

## 2023-05-18 PROCEDURE — 85025 COMPLETE CBC W/AUTO DIFF WBC: CPT | Performed by: EMERGENCY MEDICINE

## 2023-05-18 PROCEDURE — 93306 TTE W/DOPPLER COMPLETE: CPT | Mod: 26 | Performed by: INTERNAL MEDICINE

## 2023-05-18 PROCEDURE — 97165 OT EVAL LOW COMPLEX 30 MIN: CPT | Mod: GO

## 2023-05-18 PROCEDURE — 84132 ASSAY OF SERUM POTASSIUM: CPT | Performed by: EMERGENCY MEDICINE

## 2023-05-18 PROCEDURE — 99223 1ST HOSP IP/OBS HIGH 75: CPT | Performed by: INTERNAL MEDICINE

## 2023-05-18 PROCEDURE — 93306 TTE W/DOPPLER COMPLETE: CPT

## 2023-05-18 PROCEDURE — 97110 THERAPEUTIC EXERCISES: CPT | Mod: GO

## 2023-05-18 PROCEDURE — 97535 SELF CARE MNGMENT TRAINING: CPT | Mod: GO

## 2023-05-18 PROCEDURE — C9803 HOPD COVID-19 SPEC COLLECT: HCPCS

## 2023-05-18 PROCEDURE — 99207 PR APP CREDIT; MD BILLING SHARED VISIT: CPT | Performed by: INTERNAL MEDICINE

## 2023-05-18 PROCEDURE — 96374 THER/PROPH/DIAG INJ IV PUSH: CPT

## 2023-05-18 PROCEDURE — 250N000011 HC RX IP 250 OP 636: Performed by: INTERNAL MEDICINE

## 2023-05-18 RX ORDER — AMLODIPINE BESYLATE 5 MG/1
5 TABLET ORAL DAILY
Status: DISCONTINUED | OUTPATIENT
Start: 2023-05-19 | End: 2023-05-18

## 2023-05-18 RX ORDER — PROCHLORPERAZINE MALEATE 5 MG
5 TABLET ORAL EVERY 6 HOURS PRN
Status: DISCONTINUED | OUTPATIENT
Start: 2023-05-18 | End: 2023-05-21 | Stop reason: HOSPADM

## 2023-05-18 RX ORDER — DOCUSATE SODIUM 100 MG/1
100 CAPSULE, LIQUID FILLED ORAL 2 TIMES DAILY
Status: DISCONTINUED | OUTPATIENT
Start: 2023-05-18 | End: 2023-05-21 | Stop reason: HOSPADM

## 2023-05-18 RX ORDER — ACETAMINOPHEN 325 MG/1
650 TABLET ORAL EVERY 6 HOURS PRN
Status: DISCONTINUED | OUTPATIENT
Start: 2023-05-18 | End: 2023-05-21 | Stop reason: HOSPADM

## 2023-05-18 RX ORDER — LIDOCAINE 40 MG/G
CREAM TOPICAL
Status: DISCONTINUED | OUTPATIENT
Start: 2023-05-18 | End: 2023-05-21 | Stop reason: HOSPADM

## 2023-05-18 RX ORDER — ONDANSETRON 2 MG/ML
4 INJECTION INTRAMUSCULAR; INTRAVENOUS EVERY 6 HOURS PRN
Status: DISCONTINUED | OUTPATIENT
Start: 2023-05-18 | End: 2023-05-21 | Stop reason: HOSPADM

## 2023-05-18 RX ORDER — AMLODIPINE BESYLATE 5 MG/1
5 TABLET ORAL ONCE
Status: COMPLETED | OUTPATIENT
Start: 2023-05-18 | End: 2023-05-18

## 2023-05-18 RX ORDER — FUROSEMIDE 10 MG/ML
40 INJECTION INTRAMUSCULAR; INTRAVENOUS ONCE
Status: COMPLETED | OUTPATIENT
Start: 2023-05-18 | End: 2023-05-18

## 2023-05-18 RX ORDER — PROCHLORPERAZINE 25 MG
12.5 SUPPOSITORY, RECTAL RECTAL EVERY 12 HOURS PRN
Status: DISCONTINUED | OUTPATIENT
Start: 2023-05-18 | End: 2023-05-21 | Stop reason: HOSPADM

## 2023-05-18 RX ORDER — ONDANSETRON 4 MG/1
4 TABLET, ORALLY DISINTEGRATING ORAL EVERY 6 HOURS PRN
Status: DISCONTINUED | OUTPATIENT
Start: 2023-05-18 | End: 2023-05-21 | Stop reason: HOSPADM

## 2023-05-18 RX ORDER — METOPROLOL SUCCINATE 100 MG/1
100 TABLET, EXTENDED RELEASE ORAL DAILY
Status: DISCONTINUED | OUTPATIENT
Start: 2023-05-18 | End: 2023-05-21 | Stop reason: HOSPADM

## 2023-05-18 RX ORDER — ASPIRIN 81 MG/1
81 TABLET ORAL DAILY
Status: DISCONTINUED | OUTPATIENT
Start: 2023-05-18 | End: 2023-05-18

## 2023-05-18 RX ORDER — AMLODIPINE BESYLATE 5 MG/1
5 TABLET ORAL DAILY
Status: DISCONTINUED | OUTPATIENT
Start: 2023-05-18 | End: 2023-05-18

## 2023-05-18 RX ORDER — UBIDECARENONE 75 MG
100 CAPSULE ORAL DAILY
Status: DISCONTINUED | OUTPATIENT
Start: 2023-05-18 | End: 2023-05-21 | Stop reason: HOSPADM

## 2023-05-18 RX ORDER — BISACODYL 10 MG
10 SUPPOSITORY, RECTAL RECTAL DAILY PRN
Status: DISCONTINUED | OUTPATIENT
Start: 2023-05-18 | End: 2023-05-21 | Stop reason: HOSPADM

## 2023-05-18 RX ORDER — ASPIRIN 81 MG/1
81 TABLET ORAL DAILY
Status: DISCONTINUED | OUTPATIENT
Start: 2023-05-18 | End: 2023-05-21 | Stop reason: HOSPADM

## 2023-05-18 RX ORDER — ACETAMINOPHEN 650 MG/1
650 SUPPOSITORY RECTAL EVERY 6 HOURS PRN
Status: DISCONTINUED | OUTPATIENT
Start: 2023-05-18 | End: 2023-05-21 | Stop reason: HOSPADM

## 2023-05-18 RX ORDER — FUROSEMIDE 10 MG/ML
40 INJECTION INTRAMUSCULAR; INTRAVENOUS EVERY 8 HOURS
Status: DISCONTINUED | OUTPATIENT
Start: 2023-05-18 | End: 2023-05-19

## 2023-05-18 RX ADMIN — DOCUSATE SODIUM 100 MG: 100 CAPSULE, LIQUID FILLED ORAL at 08:12

## 2023-05-18 RX ADMIN — DOCUSATE SODIUM 100 MG: 100 CAPSULE, LIQUID FILLED ORAL at 19:31

## 2023-05-18 RX ADMIN — METOPROLOL SUCCINATE 100 MG: 100 TABLET, EXTENDED RELEASE ORAL at 10:49

## 2023-05-18 RX ADMIN — FUROSEMIDE 40 MG: 10 INJECTION, SOLUTION INTRAVENOUS at 10:49

## 2023-05-18 RX ADMIN — FUROSEMIDE 40 MG: 10 INJECTION, SOLUTION INTRAMUSCULAR; INTRAVENOUS at 03:50

## 2023-05-18 RX ADMIN — FUROSEMIDE 40 MG: 10 INJECTION, SOLUTION INTRAVENOUS at 18:28

## 2023-05-18 RX ADMIN — Medication 81 MG: at 08:11

## 2023-05-18 RX ADMIN — Medication 100 MCG: at 10:51

## 2023-05-18 RX ADMIN — AMLODIPINE BESYLATE 5 MG: 5 TABLET ORAL at 08:23

## 2023-05-18 ASSESSMENT — ACTIVITIES OF DAILY LIVING (ADL)
ADLS_ACUITY_SCORE: 35
FALL_HISTORY_WITHIN_LAST_SIX_MONTHS: YES
TOILETING_MANAGEMENT: ASSIST X1
WALKING_OR_CLIMBING_STAIRS_DIFFICULTY: YES
TOILETING: 1-->ASSISTANCE (EQUIPMENT/PERSON) NEEDED (NOT DEVELOPMENTALLY APPROPRIATE)
CHANGE_IN_FUNCTIONAL_STATUS_SINCE_ONSET_OF_CURRENT_ILLNESS/INJURY: NO
DIFFICULTY_COMMUNICATING: NO
PATIENT'S_PREFERRED_MEANS_OF_COMMUNICATION: VERBAL
TOILETING_ASSISTANCE: TOILETING DIFFICULTY, ASSISTANCE 1 PERSON
WERE_AUXILIARY_AIDS_OFFERED?: NO
ADLS_ACUITY_SCORE: 31
ADLS_ACUITY_SCORE: 37
TRANSFERRING: 1-->ASSISTANCE (EQUIPMENT/PERSON) NEEDED
CONCENTRATING,_REMEMBERING_OR_MAKING_DECISIONS_DIFFICULTY: YES
NUMBER_OF_TIMES_PATIENT_HAS_FALLEN_WITHIN_LAST_SIX_MONTHS: 1
TRANSFERRING: 0-->ASSISTANCE NEEDED (DEVELOPMENTALLY APPROPRIATE)
DESCRIBE_HEARING_LOSS: BILATERAL HEARING LOSS
DRESSING/BATHING_DIFFICULTY: NO
HEARING_DIFFICULTY_OR_DEAF: YES
USE_OF_HEARING_ASSISTIVE_DEVICES: BILATERAL HEARING AIDS
ADLS_ACUITY_SCORE: 37
ADLS_ACUITY_SCORE: 31
ADLS_ACUITY_SCORE: 35
ADLS_ACUITY_SCORE: 31
ADLS_ACUITY_SCORE: 37
WEAR_GLASSES_OR_BLIND: YES
DOING_ERRANDS_INDEPENDENTLY_DIFFICULTY: NO
ADLS_ACUITY_SCORE: 37
DEPENDENT_IADLS:: INDEPENDENT
ADLS_ACUITY_SCORE: 36
VISION_MANAGEMENT: READING
TOILETING: 1-->ASSISTANCE (EQUIPMENT/PERSON) NEEDED
ADLS_ACUITY_SCORE: 36
DIFFICULTY_EATING/SWALLOWING: NO
WALKING_OR_CLIMBING_STAIRS: AMBULATION DIFFICULTY, ASSISTANCE 1 PERSON
TOILETING_ISSUES: YES
EQUIPMENT_CURRENTLY_USED_AT_HOME: CANE, STRAIGHT

## 2023-05-18 NOTE — ED PROVIDER NOTES
EMERGENCY DEPARTMENT ENCOUNTER      NAME: Dereck Elliott  AGE: 83 year old male  YOB: 1940  MRN: [id number]  EVALUATION DATE & TIME: 2023  1:59 AM    PCP: Donald Machado    ED PROVIDER: Raul Aaron M.D.      Chief Complaint   Patient presents with     Abnormal Labs         FINAL IMPRESSION:  1. Acute congestive heart failure, unspecified heart failure type (H)    2. Chronic kidney disease, unspecified CKD stage          ED COURSE & MEDICAL DECISION MAKIN year old male presents to the Emergency Department for evaluation of shortness of breath and orthopnea.  He has a history of chronic kidney disease, atrial fibrillation, permanent pacemaker, thoracic aortic dissection repair.  He presents with complaint of dyspnea.  The actual direct cause of his presentation was an abnormal lab test with a potassium which was elevated to 6.5 on testing today.  I think this was falsely elevated due to hemolysis, repeat potassium here is 5.4.  EKG shows paced rhythm.  Creatinine is stably elevated at 3.0.  He is simultaneously concerned about a couple days of progressive shortness of breath.  He does have some lower extremity pitting edema and chest x-ray shows some interstitial edema.  This appears to be a new problem of congestive heart failure/pulmonary edema for him.  He is intermittently desatting to the upper 80s while asleep although generally stable or consistently in the low 90s on room air here.  He is not in any respiratory distress while at rest.  I think given this finding especially in light of his CKD he should be admitted to trial some diuresis.  I ordered 40 mg of IV Lasix as a first dose here for him and will admit him to continue his CHF work-up and monitor his renal function during diuresis.  Updated patient about this and he was in agreement.  Discussed case with hospitalist Dr. Murphy.    At the conclusion of the encounter I discussed the results of all of the tests and the  disposition. The questions were answered. The patient or family acknowledged understanding and was agreeable with the care plan.       Medical Decision Making    History:    Supplemental history from: Family Member/Significant Other    External Record(s) reviewed: Documented in chart, if applicable.    Work Up:    Chart documentation includes differential considered and any EKGs or imaging independently interpreted by provider, where specified.    In additional to work up documented, I considered the following work up: Documented in chart, if applicable.    External consultation:    Discussion of management with another provider: Documented in chart, if applicable    Complicating factors:    Care impacted by chronic illness: Chronic Kidney Disease, Heart Disease and Hypertension    Care affected by social determinants of health: N/A    Disposition considerations: Admit.            MEDICATIONS GIVEN IN THE EMERGENCY:  Medications   furosemide (LASIX) injection 40 mg (40 mg Intravenous $Given 5/18/23 0350)       NEW PRESCRIPTIONS STARTED AT TODAY'S ER VISIT  New Prescriptions    No medications on file          =================================================================    HPI    Patient information was obtained from: Patient and patient's wife     Use of : N/A         Dereck Elliott is a 83 year old male with a pertinent history of htn, CKD stage 4, RBBB, SSS, afib, s/p cardiac pacemaker who presents to this ED by walking for evaluation of abnormal labs and shortness of breath.     Per chart review: Patient was seen yesterday (5/17/2023) at [facility] for a office visit due to daytime somnolence and shortness of breath. Attempted to have sleep study in the past however could not tolerate the mask and therefore does not have good information following sleep study historically.  Does have permanent atrial fibrillation cardiac pacemaker in place.  Neck pain recently treated with  gabapentin 300 mg however described hallucinations however wife feels it is just more vivid dreaming in fact.  CKD stage IV.  Macrocytic anemia as well with hemoglobin 10.7 and  with recent normal B12 and folic acid levels.  Last TSH 3 years ago was normal.    Per wife: Patient had labs preformed at 11 AM yesterday (refer to chart review). At 12 AM patient's provider called him and informed him of a critical potassium result, asking him to present to the ED for evaluation. Patient initially wanted to stay home but wife convinced him to present to the ED for evaluation of intermittent shortness of breath, which has been new within the past couple of days. States that from 10 PM-1AM patient had sat up from bed ~6 time due to shortness of breath. Patient is not on oxygen. Otherwise patient denies new leg swelling or any other complaints at this time.    REVIEW OF SYSTEMS   All systems reviewed and negative except as noted in HPI.    PAST MEDICAL HISTORY:  Past Medical History:   Diagnosis Date     Chronic kidney disease (CKD), stage III (moderate) (H)      Essential hypertension      Hard of hearing      History of rheumatic fever 04/25/2017     Onychomycosis 10/27/2017     Permanent atrial fibrillation (H) 02/03/2017     SSS (sick sinus syndrome) (H) 07/29/2019     Unspecified cataract      Vitamin D deficiency disease 10/06/2015       PAST SURGICAL HISTORY:  Past Surgical History:   Procedure Laterality Date     AS FUSION OF WRIST JOINT Right      CATARACT EXTRACTION Left      COLON SURGERY       EP PACEMAKER INSERT N/A 8/1/2019    EP Pacemaker Insertion; Emeka Dean MD; [facility];  Service: Cardiology     IR ABDOMINAL ENDOVASCULAR STENT GRAFT  12/29/2021     LIGATE ARTERY ETHMOID Right 2/16/2023    Procedure: Endoscopic control of nasal hemmorhage.;  Surgeon: Melissa Roberson MD;  Location: [facility] OR     REPAIR ANEURYSM ABDOMINAL AORTA N/A 12/29/2021    Procedure: ENDOVASCULAR REPAIR OF  "ABDOMINAL AORTIC ANEURYSM  WITH ENDOGRAFT;  Surgeon: Melia Granger MD;  Location: [facility] OR     ROTATOR CUFF REPAIR RT/LT Left      TOTAL KNEE ARTHROPLASTY Left            CURRENT MEDICATIONS:    No current facility-administered medications for this encounter.     Current Outpatient Medications   Medication     acetaminophen (TYLENOL) 500 MG tablet     amLODIPine (NORVASC) 5 MG tablet     aspirin 81 MG EC tablet     cholecalciferol, vitamin D3, (CHOLECALCIFEROL) 1,000 unit tablet     cyanocobalamin 100 MCG tablet     metoprolol succinate ER (TOPROL XL) 100 MG 24 hr tablet     modafinil (PROVIGIL) 100 MG tablet     oxymetazoline (AFRIN) 0.05 % nasal spray     sodium chloride (OCEAN) 0.65 % nasal spray         ALLERGIES:  No Known Allergies    FAMILY HISTORY:  Family History   Problem Relation Age of Onset     Valvular heart disease Mother         care at [place]     Colon Cancer Father      No Known Problems Daughter      No Known Problems Daughter        SOCIAL HISTORY:   Social History     Socioeconomic History     Marital status:      Number of children: 2   Tobacco Use     Smoking status: Former     Packs/day: 0.00     Types: Cigars, Cigarettes     Quit date: 2016     Years since quittin.3     Smokeless tobacco: Never   Substance and Sexual Activity     Alcohol use: Yes     Alcohol/week: 1.0 standard drink of alcohol     Comment: Alcoholic Drinks/day: per week 2     Drug use: No       VITALS:  BP (!) 190/90   Pulse 64   Temp 97.9  F (36.6  C) (Oral)   Resp 20   Ht 1.803 m (5' 11\")   Wt 87.1 kg (192 lb)   SpO2 95%   BMI 26.78 kg/m      PHYSICAL EXAM    Constitutional: Elderly male patient, sitting up in bed, no acute distress  HENT: Normocephalic, Atraumatic. Neck Supple.  Eyes: EOMI, Conjunctiva normal.  Respiratory: Breathing comfortably on room air. Speaks full sentences easily. Lungs clear to ascultation.  Cardiovascular: Normal heart rate, Regular rhythm.  Mild " bilateral lower extremity pitting edema.  Abdomen: Soft, nontender  Musculoskeletal: Good range of motion in all major joints. No major deformities noted.  Integument: Warm, Dry.  Neurologic: Alert & awake, Normal motor function, Normal sensory function, No focal deficits noted.   Psychiatric: Cooperative. Affect appropriate.     LAB:  All pertinent labs reviewed and interpreted.  Labs Ordered and Resulted from Time of ED Arrival to Time of ED Departure   BASIC METABOLIC PANEL - Abnormal       Result Value    Sodium 139      Potassium 5.4 (*)     Chloride 105      Carbon Dioxide (CO2) 23      Anion Gap 11      Urea Nitrogen 45.3 (*)     Creatinine 3.03 (*)     Calcium 9.9      Glucose 103 (*)     GFR Estimate 20 (*)    CBC WITH PLATELETS AND DIFFERENTIAL - Abnormal    WBC Count 6.8      RBC Count 3.09 (*)     Hemoglobin 10.4 (*)     Hematocrit 32.9 (*)      (*)     MCH 33.7 (*)     MCHC 31.6      RDW 14.4      Platelet Count 194      % Neutrophils 60      % Lymphocytes 15      % Monocytes 12      % Eosinophils 12      % Basophils 1      % Immature Granulocytes 0      NRBCs per 100 WBC 0      Absolute Neutrophils 4.1      Absolute Lymphocytes 1.0      Absolute Monocytes 0.8      Absolute Eosinophils 0.8 (*)     Absolute Basophils 0.1      Absolute Immature Granulocytes 0.0      Absolute NRBCs 0.0     TROPONIN T, HIGH SENSITIVITY - Abnormal    Troponin T, High Sensitivity 48 (*)    NT PROBNP INPATIENT - Abnormal    N terminal Pro BNP Inpatient 7,281 (*)    INFLUENZA A/B, RSV, & SARS-COV2 PCR - Normal    Influenza A PCR Negative      Influenza B PCR Negative      RSV PCR Negative      SARS CoV2 PCR Negative         RADIOLOGY:  Reviewed all pertinent imaging. Please see official radiology report.  Chest XR,  PA & LAT   Final Result   IMPRESSION: Stent graft repair of the descending thoracic aorta. Stable cardiomegaly. Prominent interstitial markings consistent with mild congestion and edema. Mild elevation of the  left hemidiaphragm and left basilar atelectasis/infiltrate. No    pneumothorax. Left subclavian pacemaker with right ventricular lead.          EKG:    Performed at: 205    Impression: Paced rhythm, no acute ischemic change appreciated    Rate: 66  Rhythm: Paced  Axis: Normal  HI Interval: 356  QRS Interval: 158  QTc Interval: 459  ST Changes: None significant  Comparison: Compared to 1/14/2022, paced rhythm has replaced atrial fibrillation with RVR    I have independently reviewed and interpreted the EKG(s) documented above.        I, Mi Min, am serving as a scribe to document services personally performed by Dr. Raul Aaron, based on my observation and the provider's statements to me. I, Raul Aaron MD attest that Mi Min is acting in a scribe capacity, has observed my performance of the services and has documented them in accordance with my direction.    Raul Aaron M.D.  Emergency Medicine  [facility] EMERGENCY DEPARTMENT  [address] [place] [id number]  [phone]  Dept: [phone]     Raul Aaron MD  05/18/23 041

## 2023-05-18 NOTE — PHARMACY-ADMISSION MEDICATION HISTORY
Pharmacist Admission Medication History    Admission medication history is complete. The information provided in this note is only as accurate as the sources available at the time of the update.    Medication reconciliation/reorder completed by provider prior to medication history? No    Information Source(s): Patient and Clinic records via in-person    Pertinent Information: patient newly prescribed modafinil but has not yet started (prescribed on 5/17/23)    Changes made to PTA medication list:    Added: None    Deleted: None    Changed: saline nasal spray    Medication Affordability:  Not including over the counter (OTC) medications, was there a time in the past 3 months when you did not take your medications as prescribed because of cost?: No    Allergies reviewed with patient and updates made in EHR: yes    Medication History Completed By: Claudia Yeager RPH 5/18/2023 8:25 AM    Prior to Admission medications    Medication Sig Last Dose Taking? Auth Provider Long Term End Date   acetaminophen (TYLENOL) 500 MG tablet Take 500-1,000 mg by mouth every 6 hours as needed for pain  Unknown at PRN Yes Provider, Historical     amLODIPine (NORVASC) 5 MG tablet TAKE ONE TABLET BY MOUTH ONE TIME DAILY  Patient taking differently: Take 5 mg by mouth daily TAKE ONE TABLET BY MOUTH ONE TIME DAILY 5/17/2023 at am Yes Donald Machado MD Yes    aspirin 81 MG EC tablet [ASPIRIN 81 MG EC TABLET] Take 81 mg by mouth daily. 5/17/2023 at am Yes Provider, Historical     cholecalciferol, vitamin D3, (CHOLECALCIFEROL) 1,000 unit tablet [CHOLECALCIFEROL, VITAMIN D3, (CHOLECALCIFEROL) 1,000 UNIT TABLET] Take 2,000 Units by mouth daily. 5/17/2023 at am Yes Provider, Historical     cyanocobalamin 100 MCG tablet [CYANOCOBALAMIN 100 MCG TABLET] Take 100 mcg by mouth daily. 5/17/2023 at am Yes Provider, Historical     metoprolol succinate ER (TOPROL XL) 100 MG 24 hr tablet Take 1 tablet (100 mg) by mouth daily 5/17/2023 at am Yes Carter  Donald ALVARADO MD Yes    modafinil (PROVIGIL) 100 MG tablet Take 1 tablet (100 mg) by mouth daily NEW RX at NOT YET STARTED Yes Donald Machado MD     oxymetazoline (AFRIN) 0.05 % nasal spray Spray 2 sprays into both nostrils 2 times daily as needed for congestion Unknown at PRN Yes Maynor Nunes MD     sodium chloride (OCEAN) 0.65 % nasal spray Spray 1 spray into both nostrils 4 times daily as needed for congestion Unknown at PRN Yes Unknown, Entered By History

## 2023-05-18 NOTE — CONSULTS
Care Management Initial Consult    General Information  Assessment completed with: Dereck Xiong  Type of CM/SW Visit: Initial Assessment    Primary Care Provider verified and updated as needed: Yes   Readmission within the last 30 days: no previous admission in last 30 days      Reason for Consult: discharge planning  Advance Care Planning: Advance Care Planning Reviewed: other (see comments) (pt states has no HCD and does not want info)          Communication Assessment  Patient's communication style: spoken language (English or Bilingual)    Hearing Difficulty or Deaf: yes   Wear Glasses or Blind: yes    Cognitive  Cognitive/Neuro/Behavioral: WDL                      Living Environment:   People in home: spouse     Current living Arrangements: house      Able to return to prior arrangements: yes       Family/Social Support:  Care provided by: self  Provides care for: no one  Marital Status:   Wife, Children          Description of Support System: Supportive, Involved    Support Assessment: Adequate family and caregiver support, Adequate social supports    Current Resources:   Patient receiving home care services: No     Community Resources: None  Equipment currently used at home: cane, straight  Supplies currently used at home: None    Employment/Financial:  Employment Status: retired        Financial Concerns: none           Does the patient's insurance plan have a 3 day qualifying hospital stay waiver?  No    Lifestyle & Psychosocial Needs:  Social Determinants of Health     Tobacco Use: Medium Risk (5/18/2023)    Patient History      Smoking Tobacco Use: Former      Smokeless Tobacco Use: Never      Passive Exposure: Not on file   Alcohol Use: Not on file   Financial Resource Strain: Not on file   Food Insecurity: Not on file   Transportation Needs: Not on file   Physical Activity: Not on file   Stress: Not on file   Social Connections: Not on file   Intimate Partner Violence: Not on file    Depression: Not at risk (4/28/2023)    PHQ-2      PHQ-2 Score: 0   Housing Stability: Not on file       Functional Status:  Prior to admission patient needed assistance:   Dependent ADLs:: Independent  Dependent IADLs:: Independent  Assesssment of Functional Status: At functional baseline    Mental Health Status:          Chemical Dependency Status:                Values/Beliefs:  Spiritual, Cultural Beliefs, Amish Practices, Values that affect care:                 Additional Information:    SW met with pt to complete assessment and discuss discharge planning.  Pt lives with spouse in private home.  Pt lives in split level home but stays on one level primarily.  Pt independent at baseline.  Pt reports that he goes to the gym five days per week and is very active.  [facility]  Pt does not anticipate any discharge needs.  Spouse to transport.        CHRISTIANO Vega, KEATON 05/18/23 2:55 PM

## 2023-05-18 NOTE — PLAN OF CARE
Problem: Plan of Care - These are the overarching goals to be used throughout the patient stay.    Goal: Absence of Hospital-Acquired Illness or Injury  Intervention: Identify and Manage Fall Risk  Recent Flowsheet Documentation  Taken 5/18/2023 0530 by Jason Jules RN  Safety Promotion/Fall Prevention:   activity supervised   assistive device/personal items within reach   lighting adjusted   nonskid shoes/slippers when out of bed   supervised activity   toileting scheduled     Problem: Plan of Care - These are the overarching goals to be used throughout the patient stay.    Goal: Absence of Hospital-Acquired Illness or Injury  Intervention: Prevent Skin Injury  Recent Flowsheet Documentation  Taken 5/18/2023 0530 by Jason Jules RN  Body Position: supine     Problem: Plan of Care - These are the overarching goals to be used throughout the patient stay.    Goal: Readiness for Transition of Care  Intervention: Mutually Develop Transition Plan  Recent Flowsheet Documentation  Taken 5/18/2023 0500 by Jason Jules RN  Equipment Currently Used at Home: cane, straight     Problem: Risk for Delirium  Goal: Improved Attention and Thought Clarity  Intervention: Maximize Cognitive Function  Recent Flowsheet Documentation  Taken 5/18/2023 0530 by Jason Jules RN  Sensory Stimulation Regulation:   auditory stimulation minimized   care clustered   lighting decreased   quiet environment promoted  Reorientation Measures:   clock in view   glasses use encouraged   hearing device use encouraged   Goal Outcome Evaluation:

## 2023-05-18 NOTE — TELEPHONE ENCOUNTER
Paged On call Dr Rios at 940 pm regard potassium 6.5.  Paged Dr Rios again at 10:11 pm.  Paged for on call back up at 10:33 pm  Shayna Persaud MD paged no call back.   Dr Sherwood called back and recommended pt be evaluated in the ED.  Called Mrs Elliott who has written consent because Dereck is deaf.   She will take him to the ED at [facility] in the AM.  Cami Dumont RN on 5/17/2023 at 11:18 PM

## 2023-05-18 NOTE — PROGRESS NOTES
[facility]    PROGRESS NOTE - Hospitalist Service    Patient seen and examined, chart is reviewed.  83 years old male admitted early this morning by Dr. Murphy for acute on chronic CHF and hyperkalemia, please refer to H&P for details  Medication reconciliation was done, continue IV diuresis and check echo  Cardiology consult, appreciate input    Zak Christie MD  [facility] Medicine Service  [phone]

## 2023-05-18 NOTE — ED TRIAGE NOTES
Pt here d/t abnormal labs. K+ 6.5. All other labs similar to previous or WNL. Was called by clinic to come in. Pt also reports SOB, no pain with deep breaths. PMH: stage 4 CKD, pacemaker.      Triage Assessment       Row Name 05/18/23 0155       Triage Assessment (Adult)    Airway WDL WDL

## 2023-05-18 NOTE — PLAN OF CARE
Heart Failure Care Map  GOALS TO BE MET BEFORE DISCHARGE:    1. Decrease congestion and/or edema with diuretic therapy to achieve near optimal volume status.     Dyspnea improved: No, further care required to meet this goal. Please explain   Patient is sob with activity but states it is improving.   Edema improved: No, further care required to meet this goal. Please explain   Patient has scattered, faint crackles in lungs. Scant BLE edema         Last 24 hour I/O:   Intake/Output Summary (Last 24 hours) at 5/18/2023 1506  Last data filed at 5/18/2023 1440  Gross per 24 hour   Intake 476 ml   Output 3500 ml   Net -3024 ml           Net I/O and Weights since admission:   04/18 2300 - 05/18 2259  In: 476 [P.O.:476]  Out: 3500 [Urine:3500]  Net: -3024     Vitals:    05/18/23 0153 05/18/23 0454   Weight: 87.1 kg (192 lb) 86.3 kg (190 lb 4.8 oz)       2.  O2 sats > 90% on room air, or at prior home O2 therapy level.      Able to wean O2 this shift to keep sats above 90%?: Yes, satisfactory for discharge.   Does patient use Home O2? No          Current oxygenation status:   SpO2: 92 %     O2 Device: None (Room air),      3.  Tolerates ambulation and mobility near baseline.     Ambulation: Yes, satisfactory for discharge.   Times patient ambulated with staff this shift: 3    Please review the Heart Failure Care Map for additional HF goal outcomes.  Patient is voiding well. Ambulated in room and hallway.    Milagros Carter RN  5/18/2023

## 2023-05-18 NOTE — ED NOTES
"[facility] ED Handoff Report    ED Chief Complaint: SOB    ED Diagnosis:  (I50.9) Acute congestive heart failure, unspecified heart failure type (H)  Comment: elevated BNP, LE edema, SOB  Plan: IV Lasix, fluid restriction    (N18.9) Chronic kidney disease, unspecified CKD stage         PMH:    Past Medical History:   Diagnosis Date     Chronic kidney disease (CKD), stage III (moderate) (H)      Essential hypertension      Hard of hearing      History of rheumatic fever 04/25/2017     Onychomycosis 10/27/2017     Permanent atrial fibrillation (H) 02/03/2017     SSS (sick sinus syndrome) (H) 07/29/2019     Unspecified cataract      Vitamin D deficiency disease 10/06/2015        Code Status:  Prior     Falls Risk: Yes Band: Applied    Current Living Situation/Residence: lives with a significant other     Elimination Status: Continent: Yes     Activity Level: Independent with cane    Patients Preferred Language:  English     Needed: No    Vital Signs:  BP (!) 190/90   Pulse 64   Temp 97.9  F (36.6  C) (Oral)   Resp 20   Ht 1.803 m (5' 11\")   Wt 87.1 kg (192 lb)   SpO2 95%   BMI 26.78 kg/m       Cardiac Rhythm: NSR    Pain Score: 0/10    Is the Patient Confused:  No    Last Food or Drink: 5/17/23    Focused Assessment:  SOB with exertion    Tests Performed: Done: Labs and Imaging    Treatments Provided:  40mg Lasix IV    Family Dynamics/Concerns: No    Family Updated On Visitor Policy: Yes    Plan of Care Communicated to Family: Yes    Who Was Updated about Plan of Care: pt's wife    Belongings Checklist Done and Signed by Patient: No    Medications sent with patient: none    Covid: asymptomatic , negative        RN: Varun Light RN   5/18/2023 4:18 AM      "

## 2023-05-18 NOTE — TELEPHONE ENCOUNTER
Critical lab ; K+ = 6.5 reported by [facility] lab    Further  Intervention per  ARSLAN Machado RN  [facility]

## 2023-05-18 NOTE — PROGRESS NOTES
05/18/23 1045   Appointment Info   Signing Clinician's Name / Credentials (OT) Yessenia Groves, OTR/L   Living Environment   People in Home spouse   Current Living Arrangements house   Home Accessibility stairs to enter home   Number of Stairs, Main Entrance 3   Stair Railings, Main Entrance railing on right side (ascending)   Living Environment Comments pt generally remains on main level of home. uses lift recliner.   Self-Care   Usual Activity Tolerance good   Current Activity Tolerance moderate   Regular Exercise Yes   Activity/Exercise Type biking;walking   Exercise Amount/Frequency 3-5 times/wk   Equipment Currently Used at Home cane, straight   Activity/Exercise/Self-Care Comment ind for ADLs - wears back brace for comfort   Instrumental Activities of Daily Living (IADL)   IADL Comments spouse assists with meds and does most household tasks. pt drives.   General Information   Onset of Illness/Injury or Date of Surgery 05/18/23   Referring Physician Fei Murphy MD   Patient/Family Therapy Goal Statement (OT) increase endurance   Additional Occupational Profile Info/Pertinent History of Current Problem admit from PCP for potassium levels, found to have fluid overload and acute CHF. PMH CKD 4 and essential hypertension   Existing Precautions/Restrictions fall   Cognitive Status Examination   Orientation Status orientation to person, place and time   Affect/Mental Status (Cognitive) WFL   Follows Commands WFL   Cognitive Status Comments significant difficulty with short-term recall: 0/3 on screening   Pain Assessment   Patient Currently in Pain Yes, see Vital Sign flowsheet   Range of Motion Comprehensive   General Range of Motion bilateral upper extremity ROM WFL   Strength Comprehensive (MMT)   Comment, General Manual Muscle Testing (MMT) Assessment WFL for ADLs   Bed Mobility   Bed Mobility supine-sit;sit-supine   Supine-Sit [place] (Bed Mobility) supervision   Sit-Supine [place]ro (Bed Mobility)  supervision   Assistive Device (Bed Mobility) bed rails   Comment (Bed Mobility) elevated HOB   Transfers   Transfers sit-stand transfer   Sit-Stand Transfer   Assistive Device (Sit-Stand Transfers) cane, straight   Sit/Stand Transfer Comments SBA. elevated bed to simulate lift chair   Balance   Balance Comments SBA with SPC for mobility in room   Activities of Daily Living   BADL Assessment/Intervention lower body dressing   Lower Body Dressing Assessment/Training   Position (Lower Body Dressing) unsupported sitting   [place] Level (Lower Body Dressing) set up   Clinical Impression   Criteria for Skilled Therapeutic Interventions Met (OT) Yes, treatment indicated   OT Diagnosis dec functional mobility   OT Problem List-Impairments impacting ADL problems related to;activity tolerance impaired;mobility  (health management)   Assessment of Occupational Performance 3-5 Performance Deficits   Identified Performance Deficits household mobility, functional transfers, community mobility, health management   Planned Therapy Interventions (OT) home program guidelines;progressive activity/exercise;risk factor education;transfer training   Clinical Decision Making Complexity (OT) low complexity   Risk & Benefits of therapy have been explained evaluation/treatment results reviewed;participants included;patient   OT Total Evaluation Time   OT Eval, Low Complexity Minutes (65968) 10   OT Goals   Therapy Frequency (OT) Daily   OT Predicted Duration/Target Date for Goal Attainment 05/25/23   OT Goals Cardiac Phase 1   OT: Understanding of cardiac education to maximize quality of life, condition management, and health outcomes Patient;Caregiver;Verbalize   OT: Perform aerobic activity with stable cardiovascular response intermittent;10 minutes   OT: Functional/aerobic ambulation tolerance with stable cardiovascular response in order to return to home and community environment Supervision/SBA;Straight cane;Greater than 300 feet    OT: Navigation of stairs simulating home set up with stable cardiovascular response in order to return to home and community environment Supervision/SBA;3 stairs;Rail on right   Self-Care/Home Management   Self-Care/Home Mgmt/ADL, Compensatory, Meal Prep Minutes (70621) 10   Symptoms Noted During/After Treatment (Meal Preparation/Planning Training) none   Treatment Detail/Skilled Intervention Educ pt on CHF materials including stop light tool, daily weights, symptom tracking, diet, and progressive exercise. Pt requesting materials be shared with spouse as she generally manages his health.   Therapeutic Procedures/Exercise   Therapeutic Procedure: strength, endurance, ROM, flexibillity minutes (36431) 10   Symptoms Noted During/After Treatment fatigue;increased pain  (back pain)   Treatment Detail/Skilled Intervention Educ pt on symptom tracking during exercise. SBA with SPC for burnham walk 150ft - pt reporting being limited by back pain and not cardiac symptoms. Before ex: /78, O2 94%, HR 64. After ex: /86, O2 89%, HR 78. O2 recovering with PLB in less than 30 sec.   OT Discharge Planning   OT Plan progress amb, transfers from lower surfaces, 3 steps, SLUMs   OT Discharge Recommendation (DC Rec) home with assist   OT Rationale for DC Rec spouse very supportive. Pt currently requires SBA for transfers/mobility and PRN assist for ADLs.   OT Brief overview of current status SBA burnham walk, provided cardiac educ, short-term recall impaired   Total Session Time   Timed Code Treatment Minutes 20   Total Session Time (sum of timed and untimed services) 30

## 2023-05-18 NOTE — CONSULTS
CLINICAL NUTRITION SERVICES NOTE    Received consult to provide 2 gm Na diet education.  Pt busy when RD visited - admitted from ED and moved to nursing floor this morning.  Will plan to provide education prior to discharge along with renal (non-dialysis) eduction if pt is agreeable.

## 2023-05-18 NOTE — H&P
[facility]    History and Physical - Hospitalist Service       Date of Admission:  5/18/2023    Assessment & Plan      Dereck Elliott is a 83 year old male admitted on 5/18/2023.  He is CKD 4 and essential hypertension.  Initially sent by PCP for serum potassium of 6.5.  Repeat potassium in the ER was 5.4.  However patient was noticed to have fluid overload and therefore given IV Lasix.    Acute pulmonary edema likely due to diastolic heart failure  --  TSH   Date Value Ref Range Status   05/17/2023 2.42 0.30 - 4.20 uIU/mL Final   07/23/2019 2.32 0.30 - 5.00 uIU/mL Final   --Ordered echocardiogram  -- Order IV Lasix 40 mg every 8 hours    Hypertensive urgency likely due to fluid overload  -- Anticipate improvement IV Lasix  -- Hold other home antihypertensives to allow adequate diuresis    CKD 4  -- Patient does not want dialysis  -- Primary care discussed about nephrology consult but patient refused.    Permanent atrial fibrillation intraventricular conduction block  -- Hold beta-blocker for now if applicable  -- Patient has pacemaker    Elevated troponin likely due to demand ischemia  -- Patient denies any chest pain    Hyperkalemia  -- Mild  -- Anticipate improvement with IV diuresis    Hearing impaired  -- Reads lips of the reader    History of AAA repair in 12/2021    CODE STATUS DNR    Chronic microcytic anemia  -- B12 and folic acid level  -- Defer further work-up to PCP    Chronic somnolence  -- Patient takes Provigil at home.  Hold Provigil in the hospital         Diet:  2 g sodium renal  DVT Prophylaxis: Low Risk/Ambulatory with no VTE prophylaxis indicated  Ortiz Catheter: Not present  Lines: None     Cardiac Monitoring: None  Code Status:  DNR    Clinically Significant Risk Factors Present on Admission        # Hyperkalemia: Highest K = 6.5 mmol/L in last 2 days, will monitor as appropriate         # Drug Induced Platelet Defect: home medication list includes an antiplatelet  "medication   # Hypertension: Noted on problem list      # Overweight: Estimated body mass index is 26.78 kg/m  as calculated from the following:    Height as of this encounter: 1.803 m (5' 11\").    Weight as of this encounter: 87.1 kg (192 lb).            Disposition Plan      Expected Discharge Date: 05/20/2023                  Fei Murphy MD  Hospitalist Service  [facility]  Securely message with [employer] (more info)  Text page via [employer] Paging/Directory     ______________________________________________________________________    Chief Complaint   Shortness of breath on exertion and at rest and chest tightness 1 week    History is obtained from the patient    History of Present Illness   Dereck Elliott is a 83 year old male who with history of chronic kidney disease stage IV, essential hypertension, hearing impairment, sick sinus syndrome with pacemaker who has been having shortness of breath with exertion and rest for the past 1 week.  He endorses chest tightness.  He is unable to sleep at night and wakes up frequently.  He notices swelling of his feet but no swelling of his abdomen.  He had a usual blood work done at PCPs office and was called in tonight because of potassium of 6.5.  Repeat potassium in the ER was 5.4.  Chest x-ray showed fluid overload and patient was given IV Lasix.  Patient does not carry a formal diagnosis of CHF.        Past Medical History    Past Medical History:   Diagnosis Date     Chronic kidney disease (CKD), stage III (moderate) (H)      Essential hypertension      Hard of hearing      History of rheumatic fever 04/25/2017     Onychomycosis 10/27/2017     Permanent atrial fibrillation (H) 02/03/2017     SSS (sick sinus syndrome) (H) 07/29/2019     Unspecified cataract      Vitamin D deficiency disease 10/06/2015       Past Surgical History   Past Surgical History:   Procedure Laterality Date     AS FUSION OF WRIST JOINT Right      CATARACT EXTRACTION Left  "     COLON SURGERY       EP PACEMAKER INSERT N/A 8/1/2019    EP Pacemaker Insertion; Emeka Dean MD; [facility] Lab;  Service: Cardiology     IR ABDOMINAL ENDOVASCULAR STENT GRAFT  12/29/2021     LIGATE ARTERY ETHMOID Right 2/16/2023    Procedure: Endoscopic control of nasal hemmorhage.;  Surgeon: Melissa Roberson MD;  Location: [facility] OR     REPAIR ANEURYSM ABDOMINAL AORTA N/A 12/29/2021    Procedure: ENDOVASCULAR REPAIR OF ABDOMINAL AORTIC ANEURYSM  WITH ENDOGRAFT;  Surgeon: Melia Granger MD;  Location: [facility] OR     ROTATOR CUFF REPAIR RT/LT Left      TOTAL KNEE ARTHROPLASTY Left        Prior to Admission Medications   Prior to Admission Medications   Prescriptions Last Dose Informant Patient Reported? Taking?   acetaminophen (TYLENOL) 500 MG tablet   Yes No   Sig: Take 500-1,000 mg by mouth every 6 hours as needed for pain    amLODIPine (NORVASC) 5 MG tablet   No No   Sig: TAKE ONE TABLET BY MOUTH ONE TIME DAILY   aspirin 81 MG EC tablet   Yes No   Sig: [ASPIRIN 81 MG EC TABLET] Take 81 mg by mouth daily.   cholecalciferol, vitamin D3, (CHOLECALCIFEROL) 1,000 unit tablet   Yes No   Sig: [CHOLECALCIFEROL, VITAMIN D3, (CHOLECALCIFEROL) 1,000 UNIT TABLET] Take 2,000 Units by mouth daily.   cyanocobalamin 100 MCG tablet   Yes No   Sig: [CYANOCOBALAMIN 100 MCG TABLET] Take 100 mcg by mouth daily.   metoprolol succinate ER (TOPROL XL) 100 MG 24 hr tablet   No No   Sig: Take 1 tablet (100 mg) by mouth daily   modafinil (PROVIGIL) 100 MG tablet   No No   Sig: Take 1 tablet (100 mg) by mouth daily   oxymetazoline (AFRIN) 0.05 % nasal spray   No No   Sig: Spray 2 sprays into both nostrils 2 times daily as needed for congestion   sodium chloride (OCEAN) 0.65 % nasal spray   No No   Sig: Spray 2 sprays in nostril 3 times daily      Facility-Administered Medications: None        Social History   I have reviewed this patient's social history and updated it with pertinent information if  needed.  Social History     Tobacco Use     Smoking status: Former     Packs/day: 0.00     Types: Cigars, Cigarettes     Quit date: 2016     Years since quittin.3     Smokeless tobacco: Never   Substance Use Topics     Alcohol use: Yes     Alcohol/week: 1.0 standard drink of alcohol     Comment: Alcoholic Drinks/day: per week 2     Drug use: No    CODE STATUS discussed and wants to be DNR    Physical Exam   Vital Signs: Temp: 97.9  F (36.6  C) Temp src: Oral BP: (!) 190/90 Pulse: 64   Resp: 20 SpO2: 95 % O2 Device: None (Room air)    Weight: 192 lbs 0 oz    Patient able to speak full sentences  Normal mood and affect  Bilateral inspiratory crackles up to the angle of scapula  Abdomen is soft  Moves all 4 extremities  Hearing impaired and reads lips  1+ pedal edema  Surgical scar of previous surgery on the medial part of the right leg  Medical Decision Making       75 MINUTES SPENT BY ME on the date of service doing chart review, history, exam, documentation & further activities per the note.  MANAGEMENT DISCUSSED with the following over the past 24 hours: Patient   NOTE(S)/MEDICAL RECORDS REVIEWED over the past 24 hours: ER notes and PCP notes      Data     I have personally reviewed the following data over the past 24 hrs:    6.8  \   10.4 (L)   / 194     139 105 45.3 (H) /  103 (H)   5.4 (H) 23 3.03 (H) \       Trop: 48 (H) BNP: 7,281 (H)       TSH: 2.42 T4: N/A A1C: N/A       Imaging results reviewed over the past 24 hrs:   Recent Results (from the past 24 hour(s))   Chest XR,  PA & LAT    Narrative    EXAM: XR CHEST 2 VIEWS  LOCATION: [facility]  DATE/TIME: 2023 2:37 AM CDT    INDICATION: Dyspnea, orthopnea  COMPARISON: CT chest 2023.      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 Stable cardiomegaly. Prominent interstitial markings consistent with mild congestion and edema. Mild elevation of the left hemidiaphragm and left basilar  atelectasis/infiltrate. No   pneumothorax. Left subclavian pacemaker with right ventricular lead.

## 2023-05-18 NOTE — CONSULTS
"HEART CARE NOTE        Thank you, Dr. Christie, for asking the [facility] team to see Dereck Elliott to evaluate ADHF.      Assessment/Recommendations     1. HFpEF c/b ADHF  Assessment / Plan    Hypervolemic on physical; endorses progressive orthopnea, fluid retention and dyspnea x several weeks - diuresing well on current regimen; no changes at this time    GDMT as detailed below; mainstay of treatment for HFpEF includes diuretics and adequate BP control (class I) and SGLT2-I (class 2a); additional medical therapy (ARNI, MRA, ARB) demonstrated less robust evidence for indication but may be considered per guideline recommendations (2b); no indication for BBlockers     Current Pharmacotherapy AHA Guideline-Directed Medical Therapy   Losartan - on hold given ARIANA on CKD ARNI/ARB   Spironolactone not started  MRA   SGLT2 inhibitor not started SGLT2-I    Furosemide IV Loop diuretic      2. ARIANA on CKD   Assessment / Plan    CKD stage IV - likely significant component of CRS compounding underlying intrinsic renal dysfunction    Continue renal function closely    3. Atrial fibrillation  Assessment / Plan    C/b SSS - s/p PPM    Clinically Significant Risk Factors Present on Admission        # Hyperkalemia: Highest K = 6.5 mmol/L in last 2 days, will monitor as appropriate         # Drug Induced Platelet Defect: home medication list includes an antiplatelet medication   # Hypertension: Noted on problem list      # Overweight: Estimated body mass index is 26.54 kg/m  as calculated from the following:    Height as of this encounter: 1.803 m (5' 11\").    Weight as of this encounter: 86.3 kg (190 lb 4.8 oz).           Cardiac Arrhythmia: Atrial fibrillation: Unspecified  Sick sinus syndrome    Hyperkalemia, Fluid overload, unspecified, Other fluid overload and Other disorders of electrolyte and fluid balance, not elsewhere classified    Acute kidney failure, unspecified  CKD POA List: Stage 4 (GFR 15-29)      History of " "Present Illness/Subjective    Mr. Dereck Elliott is a 83 year old male with a PMHx significant for (per Dr. Murphy's note) chronic kidney disease stage IV, essential hypertension, hearing impairment, sick sinus syndrome with pacemaker who has been having shortness of breath with exertion and rest.    Today, Mr. Elliott (is hard of hearing and reads lips) reports some improvement in his breathing since presenting to the hospital and starting IV diuresis; Management plan as detailed above    ECG: Personally reviewed. Paced rhythm.    ECHO (personnaly Reviewed): repeat study pending    Left ventricle ejection fraction is normal. The calculated left ventricular ejection fraction is 65%.    Normal left ventricular size.    Left atrial volume is moderately increased.    Normal right ventricular size and systolic function.    Estimated central venous pressure equal to 13 mmHg.    The aortic valve is sclerotic without reduced excursion.    Mild to moderate mitral regurgitation.    No previous study for comparison.        Physical Examination Review of Systems   BP (!) 172/81 (BP Location: Left arm)   Pulse 64   Temp 97.6  F (36.4  C) (Oral)   Resp 20   Ht 1.803 m (5' 11\")   Wt 86.3 kg (190 lb 4.8 oz)   SpO2 95%   BMI 26.54 kg/m    Body mass index is 26.54 kg/m .  Wt Readings from Last 3 Encounters:   05/18/23 86.3 kg (190 lb 4.8 oz)   05/17/23 87.1 kg (192 lb)   04/28/23 87.1 kg (192 lb)     General Appearance:   no distress, normal body habitus   ENT/Mouth: membranes moist, no oral lesions or bleeding gums.      EYES:  no scleral icterus, normal conjunctivae   Neck: no carotid bruits or thyromegaly   Chest/Lungs:   lungs are clear to auscultation, no rales or wheezing, equal chest wall expansion    Cardiovascular:   Regular. Normal first and second heart sounds with no murmurs, rubs, or gallops; the carotid, radial and posterior tibial pulses are intact, + JVD and LE edema bilaterally    Abdomen:  no organomegaly, " masses, bruits, or tenderness; bowel sounds are present   Extremities: no cyanosis or clubbing   Skin: no xanthelasma, warm.    Neurologic: alert and calm     Psychiatric: alert and calm     A complete 10 systems ROS was reviewed  And is negative except what is listed in the HPI.          Medical History  Surgical History Family History Social History   Past Medical History:   Diagnosis Date     Chronic kidney disease (CKD), stage III (moderate) (H)      Essential hypertension      Hard of hearing      History of rheumatic fever 2017     Onychomycosis 10/27/2017     Permanent atrial fibrillation (H) 2017     SSS (sick sinus syndrome) (H) 2019     Unspecified cataract      Vitamin D deficiency disease 10/06/2015    Past Surgical History:   Procedure Laterality Date     AS FUSION OF WRIST JOINT Right      CATARACT EXTRACTION Left      COLON SURGERY       EP PACEMAKER INSERT N/A 2019    EP Pacemaker Insertion; Emeka Dean MD; [facility];  Service: Cardiology     IR ABDOMINAL ENDOVASCULAR STENT GRAFT  2021     LIGATE ARTERY ETHMOID Right 2023    Procedure: Endoscopic control of nasal hemmorhage.;  Surgeon: Melissa Roberson MD;  [facility] OR     REPAIR ANEURYSM ABDOMINAL AORTA N/A 2021    Procedure: ENDOVASCULAR REPAIR OF ABDOMINAL AORTIC ANEURYSM  WITH ENDOGRAFT;  Surgeon: Melia Granger MD;  Location: [facility] OR     ROTATOR CUFF REPAIR RT/LT Left      TOTAL KNEE ARTHROPLASTY Left     no family history of premature coronary artery disease Social History     Socioeconomic History     Marital status:      Spouse name: Not on file     Number of children: 2     Years of education: Not on file     Highest education level: Not on file   Occupational History     Not on file   Tobacco Use     Smoking status: Former     Packs/day: 0.00     Types: Cigars, Cigarettes     Quit date: 2016     Years since quittin.3     Smokeless tobacco:  Never   Vaping Use     Vaping status: Not on file   Substance and Sexual Activity     Alcohol use: Yes     Alcohol/week: 1.0 standard drink of alcohol     Comment: Alcoholic Drinks/day: per week 2     Drug use: No     Sexual activity: Not on file   Other Topics Concern     Parent/sibling w/ CABG, MI or angioplasty before 65F 55M? Not Asked   Social History Narrative     Not on file     Social Determinants of Health     Financial Resource Strain: Not on file   Food Insecurity: Not on file   Transportation Needs: Not on file   Physical Activity: Not on file   Stress: Not on file   Social Connections: Not on file   Intimate Partner Violence: Not on file   Housing Stability: Not on file           Lab Results    Chemistry/lipid CBC Cardiac Enzymes/BNP/TSH/INR   Lab Results   Component Value Date    CHOL 122 04/28/2023    HDL 47 04/28/2023    TRIG 63 04/28/2023    BUN 45.3 (H) 05/18/2023     05/18/2023    CO2 23 05/18/2023    Lab Results   Component Value Date    WBC 6.8 05/18/2023    HGB 10.4 (L) 05/18/2023    HCT 32.9 (L) 05/18/2023     (H) 05/18/2023     05/18/2023    Lab Results   Component Value Date     (H) 07/16/2019    TSH 2.42 05/17/2023    INR 1.05 02/16/2023     No results found for: CKTOTAL, CKMB, TROPONINI       Weight:    Wt Readings from Last 3 Encounters:   05/18/23 86.3 kg (190 lb 4.8 oz)   05/17/23 87.1 kg (192 lb)   04/28/23 87.1 kg (192 lb)       Allergies  No Known Allergies      Surgical History  Past Surgical History:   Procedure Laterality Date     AS FUSION OF WRIST JOINT Right      CATARACT EXTRACTION Left      COLON SURGERY       EP PACEMAKER INSERT N/A 8/1/2019    EP Pacemaker Insertion; Emeka Dean MD; [facility] Lab;  Service: Cardiology     IR ABDOMINAL ENDOVASCULAR STENT GRAFT  12/29/2021     LIGATE ARTERY ETHMOID Right 2/16/2023    Procedure: Endoscopic control of nasal hemmorhage.;  Surgeon: Melissa Roberson MD;  Location: [facility] OR     REPAIR ANEURYSM  ABDOMINAL AORTA N/A 12/29/2021    Procedure: ENDOVASCULAR REPAIR OF ABDOMINAL AORTIC ANEURYSM  WITH ENDOGRAFT;  Surgeon: Melia Granger MD;  Location: [facility]     ROTATOR CUFF REPAIR RT/LT Left      TOTAL KNEE ARTHROPLASTY Left        Social History  Tobacco:   History   Smoking Status     Former     Packs/day: 0.00     Types: Cigars, Cigarettes     Quit date: 1/1/2016   Smokeless Tobacco     Never    Alcohol:   Social History    Substance and Sexual Activity      Alcohol use: Yes        Alcohol/week: 1.0 standard drink of alcohol        Comment: Alcoholic Drinks/day: per week 2   Illicit Drugs:   History   Drug Use No       Family History  Family History   Problem Relation Age of Onset     Valvular heart disease Mother         care at [place]     Colon Cancer Father      No Known Problems Daughter      No Known Problems Daughter           Arabella Koch MD on 5/18/2023      cc: Donald Machado,

## 2023-05-19 ENCOUNTER — APPOINTMENT (OUTPATIENT)
Dept: OCCUPATIONAL THERAPY | Facility: HOSPITAL | Age: 83
DRG: 291 | End: 2023-05-19
Payer: COMMERCIAL

## 2023-05-19 LAB
ANION GAP SERPL CALCULATED.3IONS-SCNC: 16 MMOL/L (ref 7–15)
BUN SERPL-MCNC: 53.2 MG/DL (ref 8–23)
CALCIUM SERPL-MCNC: 9.6 MG/DL (ref 8.8–10.2)
CHLORIDE SERPL-SCNC: 99 MMOL/L (ref 98–107)
CREAT SERPL-MCNC: 3.21 MG/DL (ref 0.67–1.17)
DEPRECATED HCO3 PLAS-SCNC: 24 MMOL/L (ref 22–29)
ERYTHROCYTE [DISTWIDTH] IN BLOOD BY AUTOMATED COUNT: 14.4 % (ref 10–15)
GFR SERPL CREATININE-BSD FRML MDRD: 18 ML/MIN/1.73M2
GLUCOSE SERPL-MCNC: 123 MG/DL (ref 70–99)
HCT VFR BLD AUTO: 34.5 % (ref 40–53)
HGB BLD-MCNC: 10.9 G/DL (ref 13.3–17.7)
MCH RBC QN AUTO: 33.1 PG (ref 26.5–33)
MCHC RBC AUTO-ENTMCNC: 31.6 G/DL (ref 31.5–36.5)
MCV RBC AUTO: 105 FL (ref 78–100)
PLATELET # BLD AUTO: 213 10E3/UL (ref 150–450)
POTASSIUM SERPL-SCNC: 4.8 MMOL/L (ref 3.4–5.3)
RBC # BLD AUTO: 3.29 10E6/UL (ref 4.4–5.9)
SODIUM SERPL-SCNC: 139 MMOL/L (ref 136–145)
WBC # BLD AUTO: 7.1 10E3/UL (ref 4–11)

## 2023-05-19 PROCEDURE — 97110 THERAPEUTIC EXERCISES: CPT | Mod: GO

## 2023-05-19 PROCEDURE — 250N000013 HC RX MED GY IP 250 OP 250 PS 637: Performed by: INTERNAL MEDICINE

## 2023-05-19 PROCEDURE — 85014 HEMATOCRIT: CPT | Performed by: INTERNAL MEDICINE

## 2023-05-19 PROCEDURE — 80048 BASIC METABOLIC PNL TOTAL CA: CPT | Performed by: INTERNAL MEDICINE

## 2023-05-19 PROCEDURE — 99233 SBSQ HOSP IP/OBS HIGH 50: CPT | Performed by: INTERNAL MEDICINE

## 2023-05-19 PROCEDURE — 36415 COLL VENOUS BLD VENIPUNCTURE: CPT | Performed by: INTERNAL MEDICINE

## 2023-05-19 PROCEDURE — 210N000001 HC R&B IMCU HEART CARE

## 2023-05-19 PROCEDURE — 97129 THER IVNTJ 1ST 15 MIN: CPT | Mod: GO

## 2023-05-19 PROCEDURE — P9047 ALBUMIN (HUMAN), 25%, 50ML: HCPCS | Performed by: INTERNAL MEDICINE

## 2023-05-19 PROCEDURE — 250N000011 HC RX IP 250 OP 636: Performed by: INTERNAL MEDICINE

## 2023-05-19 PROCEDURE — 99232 SBSQ HOSP IP/OBS MODERATE 35: CPT | Performed by: INTERNAL MEDICINE

## 2023-05-19 RX ORDER — BUMETANIDE 2 MG/1
2 TABLET ORAL DAILY
Status: DISCONTINUED | OUTPATIENT
Start: 2023-05-20 | End: 2023-05-21

## 2023-05-19 RX ORDER — ALBUMIN (HUMAN) 12.5 G/50ML
50 SOLUTION INTRAVENOUS ONCE
Status: COMPLETED | OUTPATIENT
Start: 2023-05-19 | End: 2023-05-19

## 2023-05-19 RX ORDER — BUMETANIDE 2 MG/1
2 TABLET ORAL DAILY
Status: DISCONTINUED | OUTPATIENT
Start: 2023-05-19 | End: 2023-05-19

## 2023-05-19 RX ADMIN — FUROSEMIDE 40 MG: 10 INJECTION, SOLUTION INTRAVENOUS at 03:27

## 2023-05-19 RX ADMIN — METOPROLOL SUCCINATE 100 MG: 100 TABLET, EXTENDED RELEASE ORAL at 09:20

## 2023-05-19 RX ADMIN — Medication 100 MCG: at 09:22

## 2023-05-19 RX ADMIN — ALBUMIN HUMAN 50 G: 0.25 SOLUTION INTRAVENOUS at 11:43

## 2023-05-19 RX ADMIN — Medication 81 MG: at 09:20

## 2023-05-19 RX ADMIN — DOCUSATE SODIUM 100 MG: 100 CAPSULE, LIQUID FILLED ORAL at 09:20

## 2023-05-19 ASSESSMENT — ACTIVITIES OF DAILY LIVING (ADL)
ADLS_ACUITY_SCORE: 31
ADLS_ACUITY_SCORE: 33
ADLS_ACUITY_SCORE: 31
ADLS_ACUITY_SCORE: 34
ADLS_ACUITY_SCORE: 34
ADLS_ACUITY_SCORE: 31
ADLS_ACUITY_SCORE: 31
ADLS_ACUITY_SCORE: 34
ADLS_ACUITY_SCORE: 31
ADLS_ACUITY_SCORE: 34

## 2023-05-19 NOTE — PROGRESS NOTES
[facility] was introduced to the patient today including our role in the home management of their heart failure.  Heart Failure Education Materials were shared today with the patient.  Introduction to the expectations including daily weight tracking using the Daily Weight Log. 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 Discussed how daily weights for HF look at weight gain since fluid is heavy and does not matter what initial weight is, only that it stays stable.   Low Sodium diet of 2000mg or less daily, review of label reading, aim for fresh and avoid processed items. Patient reports his wife does all the cooking and he will eat whatever she makes. Discussed low sodium food options and foods to stay away from.  Daily Fluid restriction of 2000ml  Considerations. He reports drinking 2 cups of coffee a day and a shot of dawood every other week. Other than that he drinks only water.   Monitor and report s/s of heart failure. How to report these changes.  Follow up appointments and testing expectations. Patient verbalized understanding and reported he will monitor his fluid closely and discuss sodium intake with his wife.     Rachel Nowak RN BSN  [facility]   [phone] [facility]   [facility]   [address] [place] [address]

## 2023-05-19 NOTE — PLAN OF CARE
Heart Failure Care Map  GOALS TO BE MET BEFORE DISCHARGE:    1. Decrease congestion and/or edema with diuretic therapy to achieve near optimal volume status.     Dyspnea improved: Yes, satisfactory for discharge.   Edema improved: No, further care required to meet this goal. Please explain edema present in BLE. (trace in ankles).        Last 24 hour I/O:   Intake/Output Summary (Last 24 hours) at 5/19/2023 1255  Last data filed at 5/19/2023 0936  Gross per 24 hour   Intake 1080 ml   Output 3575 ml   Net -2495 ml           Net I/O and Weights since admission:   04/19 1500 - 05/19 1459  In: 1556 [P.O.:1556]  Out: 6425 [Urine:6425]  Net: -4869     Vitals:    05/18/23 0153 05/18/23 0454 05/19/23 0317   Weight: 87.1 kg (192 lb) 86.3 kg (190 lb 4.8 oz) 82.5 kg (181 lb 12.8 oz)       2.  O2 sats > 90% on room air, or at prior home O2 therapy level.      Able to wean O2 this shift to keep sats above 90%?: Yes, satisfactory for discharge.   Does patient use Home O2? No          Current oxygenation status:   SpO2: 94 %     O2 Device: None (Room air),      3.  Tolerates ambulation and mobility near baseline.     Ambulation: Yes, satisfactory for discharge.   Times patient ambulated with staff this shift: 2    Please review the Heart Failure Care Map for additional HF goal outcomes.    Pt is alert and oriented. Up with a SBA and cane. Denied pain and SOB at rest. Compliant with fluid restriction. Wife updated per patient request.     Gracie Vargas, BENY  5/19/2023

## 2023-05-19 NOTE — PROGRESS NOTES
NUTRITION EDUCATION      REASON FOR ASSESSMENT:  Consult for 2 gram diet education    NUTRITION HISTORY:  Patient reports he just met with another person on the low sodium diet.  [facility] RN was also consulted to see patient.  Patient states that he has a lot of materials and he will share them with his spouse.      Pt declines need for further diet ed.

## 2023-05-19 NOTE — PROGRESS NOTES
SPIRITUAL HEALTH SERVICES Progress Note  [facility], P3    Saw pt Dereck Elliott per length of stay and call light assist.    Patient/Family Understanding of Illness and Goals of Care - Lan shared about the fluid buildup around his heart, reports feeling pretty well today. He is hoping to go home tomorrow.     Distress and Loss - Ongoing health challenges with aging, otherwise no other distress noted at this time.     Strengths, Coping, and Resources - Family is source of support. He is motivated to see his grandchildren get older and see some more life events. He shared some life history, growing up in [place] and being in the [employer]. He served 13 years before a leg injury caused him to end his service.     Meaning, Beliefs, and Spirituality - Patient comes from [place] background and derives meaning, purpose, and comfort from shazia. He shared about 3 near death experiences in his life, they were overall comforting to him and he is not afraid of dying.    Plan of Care - A  will continue to visit as able or per request by patient/family/staff.      En Olmstead MDiv, [facility]  /Manager Spiritual Health Services  [phone]

## 2023-05-19 NOTE — PLAN OF CARE
Heart Failure Care Map  GOALS TO BE MET BEFORE DISCHARGE:    1. Decrease congestion and/or edema with diuretic therapy to achieve near optimal volume status.     Dyspnea improved: Yes, satisfactory for discharge.   Edema improved: Yes, satisfactory for discharge.        Last 24 hour I/O:   Intake/Output Summary (Last 24 hours) at 5/19/2023 0458  Last data filed at 5/19/2023 0400  Gross per 24 hour   Intake 1196 ml   Output 5350 ml   Net -4154 ml           Net I/O and Weights since admission:   04/19 0700 - 05/19 0659  In: 1196 [P.O.:1196]  Out: 5750 [Urine:5750]  Net: -4554     Vitals:    05/18/23 0153 05/18/23 0454 05/19/23 0317   Weight: 87.1 kg (192 lb) 86.3 kg (190 lb 4.8 oz) 82.5 kg (181 lb 12.8 oz)       2.  O2 sats > 90% on room air, or at prior home O2 therapy level.      Able to wean O2 this shift to keep sats above 90%?: Yes, satisfactory for discharge.   Does patient use Home O2? No          Current oxygenation status:   SpO2: 95 %     O2 Device: None (Room air),      3.  Tolerates ambulation and mobility near baseline.     Ambulation: Yes, satisfactory for discharge.   Times patient ambulated with staff this shift: 2    Please review the Heart Failure Care Map for additional HF goal outcomes.    Rachel Sweet RN  5/19/2023      Goal Outcome Evaluation:      Plan of Care Reviewed With: patient    Overall Patient Progress: improvingOverall Patient Progress: improving    Outcome Evaluation: Pt reports no pain this shift. VSS on room air. SBA up to the bathroom with cane. IV lasix.

## 2023-05-19 NOTE — PLAN OF CARE
Problem: Heart Failure  Goal: Optimal Cardiac Output  Outcome: Progressing     Problem: Heart Failure  Goal: Stable Heart Rate and Rhythm  Outcome: Progressing     Problem: Heart Failure  Goal: Fluid and Electrolyte Balance  Outcome: Progressing     Problem: Heart Failure  Goal: Improved Oral Intake  Outcome: Progressing     Problem: Heart Failure  Goal: Effective Oxygenation and Ventilation  Outcome: Progressing     Problem: Heart Failure  Goal: Effective Breathing Pattern During Sleep  Outcome: Progressing    Goal Outcome Evaluation:          Pt is AOX4 and denies pain. He denies SOB. Fine crackles heard in the lower lobes. O2 sats are 92-95% on RA. Tele is paced rhythm with occasional PVC's. Ambulated with SBA. He is voiding well. Fluid restriction 1800ml. SBA to the bathroom using a cane. Possible discharge tomorrow.

## 2023-05-19 NOTE — PROGRESS NOTES
HEART CARE NOTE          Assessment/Recommendations     1. HFpEF c/b ADHF  Assessment / Plan    Hold diuresis today and start oral diuretic tomorrow    GDMT as detailed below; mainstay of treatment for HFpEF includes diuretics and adequate BP control (class I) and SGLT2-I (class 2a); additional medical therapy (ARNI, MRA, ARB) demonstrated less robust evidence for indication but may be considered per guideline recommendations (2b); no indication for BBlockers      Current Pharmacotherapy AHA Guideline-Directed Medical Therapy   Losartan - on hold given ARIANA on CKD ARNI/ARB   Spironolactone not started  MRA   SGLT2 inhibitor not started SGLT2-I    Bumex 2 mg daily Loop diuretic       2. ARIANA on CKD   Assessment / Plan    CKD stage IV - likely significant component of CRS compounding underlying intrinsic renal dysfunction    Continue renal function closely     3. Atrial fibrillation  Assessment / Plan    C/b SSS - s/p PPM    History of Present Illness/Subjective      Mr. Dereck Elliott is a 83 year old male with a PMHx significant for (per Dr. Murphy's note) chronic kidney disease stage IV, essential hypertension, hearing impairment, sick sinus syndrome with pacemaker who has been having shortness of breath with exertion and rest.     Today, Mr. Elliott (is hard of hearing and reads lips) denies any acute cardiac events or complaints; Management plan as detailed above     ECG: Personally reviewed. Paced rhythm.     ECHO (personnaly Reviewed): repeat study pending    Left ventricle ejection fraction is normal. The calculated left ventricular ejection fraction is 65%.    Normal left ventricular size.    Left atrial volume is moderately increased.    Normal right ventricular size and systolic function.    Estimated central venous pressure equal to 13 mmHg.    The aortic valve is sclerotic without reduced excursion.    Mild to moderate mitral regurgitation.    No previous study for comparison.          Physical Examination  "Review of Systems   /58 (BP Location: Left arm)   Pulse 69   Temp 98.2  F (36.8  C) (Oral)   Resp 22   Ht 1.803 m (5' 11\")   Wt 82.5 kg (181 lb 12.8 oz)   SpO2 95%   BMI 25.36 kg/m    Body mass index is 25.36 kg/m .  Wt Readings from Last 3 Encounters:   05/19/23 82.5 kg (181 lb 12.8 oz)   05/17/23 87.1 kg (192 lb)   04/28/23 87.1 kg (192 lb)     General Appearance:   no distress, normal body habitus   ENT/Mouth: membranes moist, no oral lesions or bleeding gums.      EYES:  no scleral icterus, normal conjunctivae   Neck: no carotid bruits or thyromegaly   Chest/Lungs:   lungs are clear to auscultation, no rales or wheezing, equal chest wall expansion    Cardiovascular:   Regular. Normal first and second heart sounds with no murmurs, rubs, or gallops; the carotid, radial and posterior tibial pulses are intact, no JVD and trace LEedema bilaterally    Abdomen:  no organomegaly, masses, bruits, or tenderness; bowel sounds are present   Extremities: no cyanosis or clubbing   Skin: no xanthelasma, warm.    Neurologic: alert and oriented x3, calm     Psychiatric: alert and oriented x3, calm     A complete 10 systems ROS was reviewed  And is negative except what is listed in the HPI.          Medical History  Surgical History Family History Social History   Past Medical History:   Diagnosis Date     Chronic kidney disease (CKD), stage III (moderate) (H)      Essential hypertension      Hard of hearing      History of rheumatic fever 04/25/2017     Onychomycosis 10/27/2017     Permanent atrial fibrillation (H) 02/03/2017     SSS (sick sinus syndrome) (H) 07/29/2019     Unspecified cataract      Vitamin D deficiency disease 10/06/2015    Past Surgical History:   Procedure Laterality Date     AS FUSION OF WRIST JOINT Right      CATARACT EXTRACTION Left      COLON SURGERY       EP PACEMAKER INSERT N/A 8/1/2019    EP Pacemaker Insertion; Emeka Dean MD; [facility];  Service: Cardiology     IR " ABDOMINAL ENDOVASCULAR STENT GRAFT  2021     LIGATE ARTERY ETHMOID Right 2023    Procedure: Endoscopic control of nasal hemmorhage.;  Surgeon: Melissa Roberson MD;  Location: [facility] OR     REPAIR ANEURYSM ABDOMINAL AORTA N/A 2021    Procedure: ENDOVASCULAR REPAIR OF ABDOMINAL AORTIC ANEURYSM  WITH ENDOGRAFT;  Surgeon: Melia Granger MD;  Location: [facility] OR     ROTATOR CUFF REPAIR RT/LT Left      TOTAL KNEE ARTHROPLASTY Left     no family history of premature coronary artery disease Social History     Socioeconomic History     Marital status:      Spouse name: Not on file     Number of children: 2     Years of education: Not on file     Highest education level: Not on file   Occupational History     Not on file   Tobacco Use     Smoking status: Former     Packs/day: 0.00     Types: Cigars, Cigarettes     Quit date: 2016     Years since quittin.3     Smokeless tobacco: Never   Vaping Use     Vaping status: Not on file   Substance and Sexual Activity     Alcohol use: Yes     Alcohol/week: 1.0 standard drink of alcohol     Comment: Alcoholic Drinks/day: per week 2     Drug use: No     Sexual activity: Not on file   Other Topics Concern     Parent/sibling w/ CABG, MI or angioplasty before 65F 55M? Not Asked   Social History Narrative     Not on file     Social Determinants of Health     Financial Resource Strain: Not on file   Food Insecurity: Not on file   Transportation Needs: Not on file   Physical Activity: Not on file   Stress: Not on file   Social Connections: Not on file   Intimate Partner Violence: Not on file   Housing Stability: Not on file           Lab Results    Chemistry/lipid CBC Cardiac Enzymes/BNP/TSH/INR   Lab Results   Component Value Date    CHOL 122 2023    HDL 47 2023    TRIG 63 2023    BUN 46.5 (H) 2023     2023    CO2 26 2023    Lab Results   Component Value Date    WBC 7.1 2023    HGB 10.9 (L)  05/19/2023    HCT 34.5 (L) 05/19/2023     (H) 05/19/2023     05/19/2023    Lab Results   Component Value Date     (H) 07/16/2019    TSH 2.42 05/17/2023    INR 1.05 02/16/2023     No results found for: CKTOTAL, CKMB, TROPONINI       Weight:    Wt Readings from Last 3 Encounters:   05/19/23 82.5 kg (181 lb 12.8 oz)   05/17/23 87.1 kg (192 lb)   04/28/23 87.1 kg (192 lb)       Allergies  No Known Allergies      Surgical History  Past Surgical History:   Procedure Laterality Date     AS FUSION OF WRIST JOINT Right      CATARACT EXTRACTION Left      COLON SURGERY       EP PACEMAKER INSERT N/A 8/1/2019    EP Pacemaker Insertion; Emeka Dean MD; [facility] Cath Lab;  Service: Cardiology     IR ABDOMINAL ENDOVASCULAR STENT GRAFT  12/29/2021     LIGATE ARTERY ETHMOID Right 2/16/2023    Procedure: Endoscopic control of nasal hemmorhage.;  Surgeon: Melissa Roberson MD;  Location:  OR     REPAIR ANEURYSM ABDOMINAL AORTA N/A 12/29/2021    Procedure: ENDOVASCULAR REPAIR OF ABDOMINAL AORTIC ANEURYSM  WITH ENDOGRAFT;  Surgeon: Melia Granger MD;  Location: [facility] OR     ROTATOR CUFF REPAIR RT/LT Left      TOTAL KNEE ARTHROPLASTY Left        Social History  Tobacco:   History   Smoking Status     Former     Packs/day: 0.00     Types: Cigars, Cigarettes     Quit date: 1/1/2016   Smokeless Tobacco     Never    Alcohol:   Social History    Substance and Sexual Activity      Alcohol use: Yes        Alcohol/week: 1.0 standard drink of alcohol        Comment: Alcoholic Drinks/day: per week 2   Illicit Drugs:   History   Drug Use No       Family History  Family History   Problem Relation Age of Onset     Valvular heart disease Mother         care at [facility]     Colon Cancer Father      No Known Problems Daughter      No Known Problems Daughter           Arabella Koch MD on 5/19/2023      cc: Donald Machado

## 2023-05-19 NOTE — PROGRESS NOTES
[facility]    PROGRESS NOTE - Hospitalist Service    Assessment and Plan  83 year old male with past medical history of CHF, CKD 4 and essential hypertension who presented to ER for evaluation of hyperkalemia and pulmonary edema, admitted with acute on chronic CHF     Acute on chronic CHF with pulmonary edema  - Diastolic dysfunction  - Echo shows EF of 55 to 60% with biatrial enlargement.  - Cardiology consult, appreciate input  - Improving with IV diuresis  - Albumin IV as per cardiology  - Plan to switch to oral diuretics tomorrow  - Intake and output monitoring    Hypertensive urgency   - likely due to fluid overload  - improvement IV Lasix  - Hold other home antihypertensives to allow adequate diuresis on admission  - Restart home medications     CKD 4  - Patient does not want dialysis  - Primary care discussed about nephrology consult but patient refused.  - Creatinine is slightly up today above baseline  - Transition to oral diuretics, IV abdomen  - Avoid nephrotoxic drugs  - Close monitoring renal function while diuresing     Permanent atrial fibrillation intraventricular conduction block  - Hold beta-blocker on admission  - Restarted next day  - Patient has pacemaker     Elevated troponin  - likely due to demand ischemia  - Patient denies any chest pain  - Cardiology is following  - Defer further ischemic work-up to cardiology     Hyperkalemia  - Mild  - Secondary to CHF and/or CKD  - improvement with IV diuresis     Hearing impaired  - Reads lips of the reader  - Use pocket talker     History of AAA   - S/P repair in 12/2021    Weakness and deconditioning  -Secondary to the above  - PT/OT evaluation  - Plan for home discharge    Anemia  - Of chronic kidney disease  - Stable hemoglobin around 10  - No sign or symptom of acute bleeding  - Continue to monitor CBC    50 MINUTES SPENT BY ME on the date of service doing chart review, history, exam, documentation & further activities per  the note    Principal Problem:    Chronic kidney disease, unspecified CKD stage  Active Problems:    Acute congestive heart failure, unspecified heart failure type (H)      VTE prophylaxis:  Pneumatic Compression Devices  DIET: Orders Placed This Encounter      2 Gram Sodium Diet      Disposition/Barriers to discharge: Monitor renal function, transition to oral diuretics  Code Status: No CPR- Do NOT Intubate    Subjective:  Dereck is feeling better with IV diuresis, good urine output.  Improving shortness of breath.    PHYSICAL EXAM  Vitals:    05/18/23 0153 05/18/23 0454 05/19/23 0317   Weight: 87.1 kg (192 lb) 86.3 kg (190 lb 4.8 oz) 82.5 kg (181 lb 12.8 oz)     B/P:121/69 T:97.6 P:59 R:20     Intake/Output Summary (Last 24 hours) at 5/19/2023 1439  Last data filed at 5/19/2023 1322  Gross per 24 hour   Intake 1200 ml   Output 3575 ml   Net -2375 ml      Body mass index is 25.36 kg/m .    Constitutional: awake, alert, cooperative, no apparent distress, and appears stated age  Respiratory: Decreased breath sounds on lung bases, rhonchi.  No wheeze.  Cardiovascular: Normal apical impulse, regular rate and rhythm, normal S1 and S2, no S3 or S4, and no murmur noted  GI: No scars, normal bowel sounds, soft, non-distended, non-tender, no masses palpated, no hepatosplenomegally  Skin: no bruising or bleeding and normal skin color, texture, turgor  Musculoskeletal: There is no redness, warmth, or swelling of the joints.  Full range of motion noted.  no lower extremity pitting edema present  Neurologic: Awake, alert, oriented to name, place and time.  Cranial nerves II-XII are grossly intact.  Motor is 5 out of 5 bilaterally.   Sensory is intact.    Neuropsychiatric: Appropriate with examiner      PERTINENT LABS/IMAGING:    I have personally reviewed the following data over the past 24 hrs:    7.1  \   10.9 (L)   / 213     139 99 53.2 (H) /  123 (H)   4.8 24 3.21 (H) \       Imaging results reviewed over the past 24 hrs:    No results found for this or any previous visit (from the past 24 hour(s)).    Discussed with patient, family, cardiology, nursing staff and discharge planner    Zak Christie MD  [facility] Medicine Service  [phone]

## 2023-05-20 ENCOUNTER — APPOINTMENT (OUTPATIENT)
Dept: ULTRASOUND IMAGING | Facility: HOSPITAL | Age: 83
DRG: 291 | End: 2023-05-20
Attending: INTERNAL MEDICINE
Payer: COMMERCIAL

## 2023-05-20 ENCOUNTER — APPOINTMENT (OUTPATIENT)
Dept: OCCUPATIONAL THERAPY | Facility: HOSPITAL | Age: 83
DRG: 291 | End: 2023-05-20
Payer: COMMERCIAL

## 2023-05-20 LAB
ALBUMIN UR-MCNC: 70 MG/DL
ANION GAP SERPL CALCULATED.3IONS-SCNC: 11 MMOL/L (ref 7–15)
APPEARANCE UR: CLEAR
BACTERIA #/AREA URNS HPF: ABNORMAL /HPF
BILIRUB UR QL STRIP: NEGATIVE
BUN SERPL-MCNC: 59.5 MG/DL (ref 8–23)
CALCIUM SERPL-MCNC: 10 MG/DL (ref 8.8–10.2)
CHLORIDE SERPL-SCNC: 101 MMOL/L (ref 98–107)
COLOR UR AUTO: ABNORMAL
CREAT SERPL-MCNC: 3.55 MG/DL (ref 0.67–1.17)
DEPRECATED HCO3 PLAS-SCNC: 29 MMOL/L (ref 22–29)
GFR SERPL CREATININE-BSD FRML MDRD: 16 ML/MIN/1.73M2
GLUCOSE SERPL-MCNC: 96 MG/DL (ref 70–99)
GLUCOSE UR STRIP-MCNC: NEGATIVE MG/DL
HGB UR QL STRIP: NEGATIVE
HOLD SPECIMEN: NORMAL
HYALINE CASTS: 1 /LPF
KETONES UR STRIP-MCNC: NEGATIVE MG/DL
LEUKOCYTE ESTERASE UR QL STRIP: NEGATIVE
NITRATE UR QL: NEGATIVE
PH UR STRIP: 5.5 [PH] (ref 5–7)
POTASSIUM SERPL-SCNC: 5.7 MMOL/L (ref 3.4–5.3)
RBC URINE: <1 /HPF
SODIUM SERPL-SCNC: 141 MMOL/L (ref 136–145)
SP GR UR STRIP: 1.01 (ref 1–1.03)
SQUAMOUS EPITHELIAL: <1 /HPF
UROBILINOGEN UR STRIP-MCNC: <2 MG/DL
WBC URINE: <1 /HPF

## 2023-05-20 PROCEDURE — 99232 SBSQ HOSP IP/OBS MODERATE 35: CPT | Performed by: INTERNAL MEDICINE

## 2023-05-20 PROCEDURE — 81001 URINALYSIS AUTO W/SCOPE: CPT | Performed by: INTERNAL MEDICINE

## 2023-05-20 PROCEDURE — 97110 THERAPEUTIC EXERCISES: CPT | Mod: GO

## 2023-05-20 PROCEDURE — 99223 1ST HOSP IP/OBS HIGH 75: CPT | Performed by: INTERNAL MEDICINE

## 2023-05-20 PROCEDURE — 250N000013 HC RX MED GY IP 250 OP 250 PS 637: Performed by: INTERNAL MEDICINE

## 2023-05-20 PROCEDURE — 210N000001 HC R&B IMCU HEART CARE

## 2023-05-20 PROCEDURE — 76770 US EXAM ABDO BACK WALL COMP: CPT

## 2023-05-20 PROCEDURE — 80048 BASIC METABOLIC PNL TOTAL CA: CPT | Performed by: INTERNAL MEDICINE

## 2023-05-20 PROCEDURE — 97530 THERAPEUTIC ACTIVITIES: CPT | Mod: GO

## 2023-05-20 PROCEDURE — 36415 COLL VENOUS BLD VENIPUNCTURE: CPT | Performed by: INTERNAL MEDICINE

## 2023-05-20 PROCEDURE — 84166 PROTEIN E-PHORESIS/URINE/CSF: CPT | Performed by: PATHOLOGY

## 2023-05-20 RX ADMIN — SODIUM ZIRCONIUM CYCLOSILICATE 10 G: 10 POWDER, FOR SUSPENSION ORAL at 14:14

## 2023-05-20 RX ADMIN — BUMETANIDE 2 MG: 2 TABLET ORAL at 08:29

## 2023-05-20 RX ADMIN — METOPROLOL SUCCINATE 100 MG: 100 TABLET, EXTENDED RELEASE ORAL at 08:29

## 2023-05-20 RX ADMIN — Medication 100 MCG: at 08:29

## 2023-05-20 RX ADMIN — DOCUSATE SODIUM 100 MG: 100 CAPSULE, LIQUID FILLED ORAL at 19:25

## 2023-05-20 RX ADMIN — Medication 81 MG: at 08:29

## 2023-05-20 ASSESSMENT — ACTIVITIES OF DAILY LIVING (ADL)
ADLS_ACUITY_SCORE: 33
ADLS_ACUITY_SCORE: 31
ADLS_ACUITY_SCORE: 35
ADLS_ACUITY_SCORE: 32
ADLS_ACUITY_SCORE: 35
ADLS_ACUITY_SCORE: 31
ADLS_ACUITY_SCORE: 33
ADLS_ACUITY_SCORE: 31
ADLS_ACUITY_SCORE: 33
ADLS_ACUITY_SCORE: 32
ADLS_ACUITY_SCORE: 33
ADLS_ACUITY_SCORE: 33
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
## 2023-05-20 NOTE — PLAN OF CARE
Problem: Heart Failure  Goal: Optimal Coping  Outcome: Progressing     Problem: Heart Failure  Goal: Optimal Cardiac Output  Outcome: Progressing     Problem: Heart Failure  Goal: Stable Heart Rate and Rhythm  Outcome: Progressing     Problem: Heart Failure  Goal: Optimal Functional Ability  Outcome: Progressing  Intervention: Optimize Functional Ability  Recent Flowsheet Documentation  Taken 5/20/2023 1500 by Vivian Conde RN  Activity Management:    activity adjusted per tolerance    ambulated to bathroom    ambulated in room    back to bed    sitting, edge of bed     Problem: Heart Failure  Goal: Fluid and Electrolyte Balance  Outcome: Progressing     Problem: Heart Failure  Goal: Optimal Functional Ability  Intervention: Optimize Functional Ability  Recent Flowsheet Documentation  Taken 5/20/2023 1500 by Vivian Conde RN  Activity Management:    activity adjusted per tolerance    ambulated to bathroom    ambulated in room    back to bed    sitting, edge of bed     Problem: Heart Failure  Goal: Effective Oxygenation and Ventilation  Outcome: Progressing  Intervention: Promote Airway Secretion Clearance  Recent Flowsheet Documentation  Taken 5/20/2023 1500 by Vivian Conde RN  Cough And Deep Breathing: done independently per patient  Activity Management:    activity adjusted per tolerance    ambulated to bathroom    ambulated in room    back to bed    sitting, edge of bed   Goal Outcome Evaluation:    Pt is AOX4. He ambulated in the hallway with a SBA w/ cane. He denied SOB while walking. Pt c/o lower back pain while ambulating from previous injury and uses back brace for support. Refused pain medication. Lung sounds clear. O2 sats high 90's. Tele is paced rhythm. HR in the 60's. 1800ml FR. Voiding well.

## 2023-05-20 NOTE — PLAN OF CARE
Problem: Chronic Kidney Disease  Goal: Minimize Renal Failure Effects  Outcome: Progressing  Intervention: Monitor and Support Renal Function  Recent Flowsheet Documentation  Taken 5/20/2023 6751 by Zollinger, Nancy K, RN  Medication Review/Management:   medications reviewed   high-risk medications identified   Goal Outcome Evaluation:  Denies pain.  Complaining of dyspnea when ambulating.  Paced rhythm on tele.  Continues on 1800 fluid restriction.  Ambulating in hallway with SBA with cane.  Urine sample sent.  Wife aat bedside.

## 2023-05-20 NOTE — PROGRESS NOTES
[facility]    PROGRESS NOTE - Hospitalist Service    Assessment and Plan  83 year old male with past medical history of CHF, CKD 4 and essential hypertension who presented to ER for evaluation of hyperkalemia and pulmonary edema, admitted with acute on chronic CHF      Acute on chronic CHF with pulmonary edema  - Diastolic dysfunction  - Echo shows EF of 55 to 60% with biatrial enlargement.  - Cardiology consult, appreciate input  - Improving with IV diuresis  - Albumin IV as per cardiology  - Plan to switch to oral diuretics tomorrow  - Intake and output monitoring     Hypertensive urgency   - likely due to fluid overload  - improvement IV Lasix  - Hold other home antihypertensives to allow adequate diuresis on admission  - Restart home medications     Acute on chronic kidney disease   - Baseline is CKD 4  - Patient does not want dialysis  - Primary care discussed about nephrology consult but patient refused in the past.  - Creatinine is increasing above baseline  - Transition to oral diuretics, currently on hold  - Nephrology consult, appreciate input  - Avoid nephrotoxic drugs  - Check renal ultrasound and UA  - Close monitoring renal function while diuresing     Permanent atrial fibrillation intraventricular conduction block  - Hold beta-blocker on admission  - Restarted next day  - Patient has pacemaker     Elevated troponin  - likely due to demand ischemia  - Patient denies any chest pain  - Cardiology is following  - Defer further ischemic work-up to cardiology  - Unremarkable telemetry monitoring     Hyperkalemia  - Mild  - Secondary to CHF and/or CKD  - improvement with IV diuresis initially  - Increasing again with worsening renal function  - Nephrology consult, appreciate input     Hearing impaired  - Reads lips of the reader  - Use pocket talker     History of AAA   - S/P repair in 12/2021     Weakness and deconditioning  -Secondary to the above  - PT/OT evaluation  - Plan for  home discharge     Anemia  - Of chronic kidney disease  - Stable hemoglobin around 10  - No sign or symptom of acute bleeding  - Continue to monitor CBC         30 MINUTES SPENT BY ME on the date of service doing chart review, history, exam, documentation & further activities per the note    Principal Problem:    Chronic kidney disease, unspecified CKD stage  Active Problems:    Acute congestive heart failure, unspecified heart failure type (H)      VTE prophylaxis:  Pneumatic Compression Devices  DIET: Orders Placed This Encounter      2 Gram Sodium Diet      Disposition/Barriers to discharge: Monitor renal function  Code Status: No CPR- Do NOT Intubate    Subjective:  Trent is feeling about the same today, denies any chest pain or shortness of breath.  No nausea or vomiting.    PHYSICAL EXAM  Vitals:    05/18/23 0454 05/19/23 0317 05/20/23 0424   Weight: 86.3 kg (190 lb 4.8 oz) 82.5 kg (181 lb 12.8 oz) 81.5 kg (179 lb 9.6 oz)     B/P:126/70 T:97.4 P:65 R:18     Intake/Output Summary (Last 24 hours) at 5/20/2023 1318  Last data filed at 5/20/2023 1200  Gross per 24 hour   Intake 1076 ml   Output 2050 ml   Net -974 ml      Body mass index is 25.05 kg/m .    Constitutional: awake, alert, cooperative, no apparent distress, and appears stated age  Respiratory: No increased work of breathing, good air exchange, clear to auscultation bilaterally, no crackles or wheezing  Cardiovascular: Normal apical impulse, regular rate and rhythm, normal S1 and S2, no S3 or S4, and no murmur noted  GI: No scars, normal bowel sounds, soft, non-distended, non-tender, no masses palpated, no hepatosplenomegally  Skin: no bruising or bleeding and normal skin color, texture, turgor  Musculoskeletal: There is no redness, warmth, or swelling of the joints.  Full range of motion noted.  no lower extremity pitting edema present  Neurologic: Awake, alert, oriented to name, place and time.  Cranial nerves II-XII are grossly intact.  Motor is 5  out of 5 bilaterally.   Sensory is intact.    Neuropsychiatric: Appropriate with examiner      PERTINENT LABS/IMAGING:    I have personally reviewed the following data over the past 24 hrs:    N/A  \   N/A   / N/A     141 101 59.5 (H) /  96   5.7 (H) 29 3.55 (H) \       Imaging results reviewed over the past 24 hrs:   No results found for this or any previous visit (from the past 24 hour(s)).    Discussed with patient, family, nephrology, nursing staff and discharge planner    Zak Christie MD  [facility]  [phone]

## 2023-05-20 NOTE — CARE PLAN
Heart Failure Care Map  GOALS TO BE MET BEFORE DISCHARGE:    1. Decrease congestion and/or edema with diuretic therapy to achieve near optimal volume status.     Dyspnea improved: Yes, satisfactory for discharge.   Edema improved: Yes, satisfactory for discharge.        Last 24 hour I/O:   Intake/Output Summary (Last 24 hours) at 5/19/2023 2150  Last data filed at 5/19/2023 2100  Gross per 24 hour   Intake 1443 ml   Output 3725 ml   Net -2282 ml           Net I/O and Weights since admission:   04/19 2300 - 05/19 2259  In: 2159 [P.O.:2156; I.V.:3]  Out: 7625 [Urine:7625]  Net: -5466     Vitals:    05/18/23 0153 05/18/23 0454 05/19/23 0317   Weight: 87.1 kg (192 lb) 86.3 kg (190 lb 4.8 oz) 82.5 kg (181 lb 12.8 oz)       2.  O2 sats > 90% on room air, or at prior home O2 therapy level.      Able to wean O2 this shift to keep sats above 90%?: Yes, satisfactory for discharge.   Does patient use Home O2? No          Current oxygenation status:   SpO2: 91 %     O2 Device: None (Room air),      3.  Tolerates ambulation and mobility near baseline.     Ambulation: Yes, satisfactory for discharge.   Times patient ambulated with staff this shift: 2    Please review the Heart Failure Care Map for additional HF goal outcomes.    Vivian Conde RN  5/19/2023

## 2023-05-20 NOTE — PROGRESS NOTES
"    [facility]  [phone]          Assessment/Recommendations   Patient with heart failure with preserved ejection fraction, admitted for shortness of breath.  Diuretic therapy resulted in improved breathing, good urine output but worsening renal insufficiency which was quite significant at baseline.  Nephrology input appreciated.    We will follow the nephrologist lead on the diuretic therapy which is held at this time.  Continue activity and if renal function stabilizes could go home in the next 1 to 2 days with early follow-up.    No cardiology recommendations for change today.       Subjective   Patient reports that breathing is good.  No chest discomfort.  Has been walking in the halls.         Physical Examination Review of Systems   /70 (BP Location: Left arm)   Pulse 65   Temp 97.4  F (36.3  C) (Oral)   Resp 18   Ht 1.803 m (5' 11\")   Wt 81.5 kg (179 lb 9.6 oz)   SpO2 98%   BMI 25.05 kg/m    Body mass index is 25.05 kg/m .  Wt Readings from Last 3 Encounters:   05/20/23 81.5 kg (179 lb 9.6 oz)   05/17/23 87.1 kg (192 lb)   04/28/23 87.1 kg (192 lb)     General Appearance:   Alert, cooperative and in no acute distress.   ENT/Mouth: Pink/moist     EYES:  no scleral icterus, normal conjunctivae   Neck: JVP normal. No Hepatojugular reflux. Thyroid not visualized.   Chest/Lungs:   Lungs have slight diminished breath sounds at the right base    Cardiovascular:   S1, S2 with 3/6 systolic murmur , no clicks or rubs.    Abdomen:  Nontender.    Extremities: No cyanosis,    Skin: no xanthelasma, warm.    Neurologic: normal arm movement bilateral, no tremors     Psychiatric: Appropriate affect.      Enc Vitals  BP: 126/70  Pulse: 65  Resp: 18  Temp: 97.4  F (36.3  C)  Temp src: Oral  SpO2: 98 %  Weight: 81.5 kg (179 lb 9.6 oz)  Height: 180.3 cm (5' 11\")                                           Medical History  Surgical History Family History Social History   Past Medical History: "   Diagnosis Date     Chronic kidney disease (CKD), stage III (moderate) (H)      Essential hypertension      Hard of hearing      History of rheumatic fever 2017     Onychomycosis 10/27/2017     Permanent atrial fibrillation (H) 2017     SSS (sick sinus syndrome) (H) 2019     Unspecified cataract      Vitamin D deficiency disease 10/06/2015    Past Surgical History:   Procedure Laterality Date     AS FUSION OF WRIST JOINT Right      CATARACT EXTRACTION Left      COLON SURGERY       EP PACEMAKER INSERT N/A 2019    EP Pacemaker Insertion; Emeka Dean MD; [facility];  Service: Cardiology     IR ABDOMINAL ENDOVASCULAR STENT GRAFT  2021     LIGATE ARTERY ETHMOID Right 2023    Procedure: Endoscopic control of nasal hemmorhage.;  Surgeon: Melissa Roberson MD;  Location:  OR     REPAIR ANEURYSM ABDOMINAL AORTA N/A 2021    Procedure: ENDOVASCULAR REPAIR OF ABDOMINAL AORTIC ANEURYSM  WITH ENDOGRAFT;  Surgeon: Melia Granger MD;  Location: [facility]     ROTATOR CUFF REPAIR RT/LT Left      TOTAL KNEE ARTHROPLASTY Left     Family History   Problem Relation Age of Onset     Valvular heart disease Mother         care at [place]     Colon Cancer Father      No Known Problems Daughter      No Known Problems Daughter     Social History     Socioeconomic History     Marital status:      Spouse name: Not on file     Number of children: 2     Years of education: Not on file     Highest education level: Not on file   Occupational History     Not on file   Tobacco Use     Smoking status: Former     Packs/day: 0.00     Types: Cigars, Cigarettes     Quit date: 2016     Years since quittin.3     Smokeless tobacco: Never   Vaping Use     Vaping status: Not on file   Substance and Sexual Activity     Alcohol use: Yes     Alcohol/week: 1.0 standard drink of alcohol     Comment: Alcoholic Drinks/day: per week 2     Drug use: No     Sexual activity: Not on  file   Other Topics Concern     Parent/sibling w/ CABG, MI or angioplasty before 65F 55M? Not Asked   Social History Narrative     Not on file     Social Determinants of Health     Financial Resource Strain: Not on file   Food Insecurity: Not on file   Transportation Needs: Not on file   Physical Activity: Not on file   Stress: Not on file   Social Connections: Not on file   Intimate Partner Violence: Not on file   Housing Stability: Not on file          Medications  Allergies   No current outpatient medications on file.    No Known Allergies      Lab Results    Chemistry/lipid CBC Cardiac Enzymes/BNP/TSH/INR   Lab Results   Component Value Date    CHOL 122 04/28/2023    HDL 47 04/28/2023    TRIG 63 04/28/2023    BUN 59.5 (H) 05/20/2023     05/20/2023    CO2 29 05/20/2023    Lab Results   Component Value Date    WBC 7.1 05/19/2023    HGB 10.9 (L) 05/19/2023    HCT 34.5 (L) 05/19/2023     (H) 05/19/2023     05/19/2023    Lab Results   Component Value Date     (H) 07/16/2019    TSH 2.42 05/17/2023    INR 1.05 02/16/2023

## 2023-05-20 NOTE — PLAN OF CARE
Problem: Heart Failure  Goal: Stable Heart Rate and Rhythm  Outcome: Progressing  Goal: Optimal Functional Ability  Outcome: Progressing  Intervention: Optimize Functional Ability  Recent Flowsheet Documentation  Taken 5/19/2023 2330 by Marge De La Rosa RN  Activity Management:    activity adjusted per tolerance    up ad alessandro  Goal: Fluid and Electrolyte Balance  Outcome: Progressing  Goal: Improved Oral Intake  Outcome: Progressing  Goal: Effective Oxygenation and Ventilation  Outcome: Progressing  Intervention: Promote Airway Secretion Clearance  Recent Flowsheet Documentation  Taken 5/19/2023 2330 by Marge De La Rosa RN  Activity Management:    activity adjusted per tolerance    up ad alessandro  Intervention: Optimize Oxygenation and Ventilation  Recent Flowsheet Documentation  Taken 5/19/2023 2330 by Marge De La Rosa RN  Head of Bed (HOB) Positioning: HOB at 20-30 degrees  Goal: Effective Breathing Pattern During Sleep  Outcome: Progressing   Goal Outcome Evaluation:       Pt is alert and oriented x 4, hard of hearing, has bilateral hearing aids in place. He also read lips for communication. He denies pain or SOB. Lung sounds clear. on RA, 94 % O2 saturation.HR in the 60's, paced rhythm without spikes. SBA with the cane when using the bathroom. Refused to have the bed alarm on, uses call light appropriately.

## 2023-05-20 NOTE — CARE PLAN
Heart Failure Care Map  GOALS TO BE MET BEFORE DISCHARGE:    1. Decrease congestion and/or edema with diuretic therapy to achieve near optimal volume status.     Dyspnea improved: Yes, satisfactory for discharge.   Edema improved: Yes, satisfactory for discharge.        Last 24 hour I/O:   Intake/Output Summary (Last 24 hours) at 5/20/2023 0514  Last data filed at 5/20/2023 0440  Gross per 24 hour   Intake 1086 ml   Output 2525 ml   Net -1439 ml           Net I/O and Weights since admission:   04/20 0700 - 05/20 0659  In: 2282 [P.O.:2276; I.V.:6]  Out: 8275 [Urine:8275]  Net: -5993     Vitals:    05/18/23 0153 05/18/23 0454 05/19/23 0317 05/20/23 0424   Weight: 87.1 kg (192 lb) 86.3 kg (190 lb 4.8 oz) 82.5 kg (181 lb 12.8 oz) 81.5 kg (179 lb 9.6 oz)       2.  O2 sats > 90% on room air, or at prior home O2 therapy level.      Able to wean O2 this shift to keep sats above 90%?: Yes, satisfactory for discharge.   Does patient use Home O2?   NO          Current oxygenation status:   SpO2: 92 %     O2 Device: None (Room air),      3.  Tolerates ambulation and mobility near baseline.     Ambulation: pt sleeping at this time   Times patient ambulated with staff this shift: 0    Please review the Heart Failure Care Map for additional HF goal outcomes.    Marge De La Rosa RN  5/20/2023

## 2023-05-21 VITALS
SYSTOLIC BLOOD PRESSURE: 111 MMHG | DIASTOLIC BLOOD PRESSURE: 65 MMHG | BODY MASS INDEX: 25.34 KG/M2 | RESPIRATION RATE: 18 BRPM | WEIGHT: 181 LBS | HEART RATE: 65 BPM | OXYGEN SATURATION: 97 % | TEMPERATURE: 98.1 F | HEIGHT: 71 IN

## 2023-05-21 LAB
ANION GAP SERPL CALCULATED.3IONS-SCNC: 12 MMOL/L (ref 7–15)
BUN SERPL-MCNC: 63.6 MG/DL (ref 8–23)
CALCIUM SERPL-MCNC: 9.4 MG/DL (ref 8.8–10.2)
CHLORIDE SERPL-SCNC: 98 MMOL/L (ref 98–107)
CREAT SERPL-MCNC: 3.51 MG/DL (ref 0.67–1.17)
DEPRECATED HCO3 PLAS-SCNC: 28 MMOL/L (ref 22–29)
FERRITIN SERPL-MCNC: 98 NG/ML (ref 31–409)
GFR SERPL CREATININE-BSD FRML MDRD: 16 ML/MIN/1.73M2
GLUCOSE SERPL-MCNC: 88 MG/DL (ref 70–99)
HOLD SPECIMEN: NORMAL
IRON BINDING CAPACITY (ROCHE): 328 UG/DL (ref 240–430)
IRON SATN MFR SERPL: 14 % (ref 15–46)
IRON SERPL-MCNC: 45 UG/DL (ref 61–157)
POTASSIUM SERPL-SCNC: 4.6 MMOL/L (ref 3.4–5.3)
SODIUM SERPL-SCNC: 138 MMOL/L (ref 136–145)
TOTAL PROTEIN SERUM FOR ELP: 7.2 G/DL (ref 6.4–8.3)

## 2023-05-21 PROCEDURE — 84165 PROTEIN E-PHORESIS SERUM: CPT | Mod: TC | Performed by: PATHOLOGY

## 2023-05-21 PROCEDURE — 80048 BASIC METABOLIC PNL TOTAL CA: CPT | Performed by: INTERNAL MEDICINE

## 2023-05-21 PROCEDURE — 83550 IRON BINDING TEST: CPT | Performed by: INTERNAL MEDICINE

## 2023-05-21 PROCEDURE — 99232 SBSQ HOSP IP/OBS MODERATE 35: CPT | Performed by: INTERNAL MEDICINE

## 2023-05-21 PROCEDURE — 250N000013 HC RX MED GY IP 250 OP 250 PS 637: Performed by: INTERNAL MEDICINE

## 2023-05-21 PROCEDURE — 84155 ASSAY OF PROTEIN SERUM: CPT | Performed by: INTERNAL MEDICINE

## 2023-05-21 PROCEDURE — 82728 ASSAY OF FERRITIN: CPT | Performed by: INTERNAL MEDICINE

## 2023-05-21 PROCEDURE — 99239 HOSP IP/OBS DSCHRG MGMT >30: CPT | Performed by: INTERNAL MEDICINE

## 2023-05-21 PROCEDURE — 99231 SBSQ HOSP IP/OBS SF/LOW 25: CPT | Performed by: INTERNAL MEDICINE

## 2023-05-21 PROCEDURE — 36415 COLL VENOUS BLD VENIPUNCTURE: CPT | Performed by: INTERNAL MEDICINE

## 2023-05-21 RX ORDER — BUMETANIDE 2 MG/1
2 TABLET ORAL DAILY
Qty: 90 TABLET | Refills: 0 | Status: SHIPPED | OUTPATIENT
Start: 2023-05-22 | End: 2023-06-15

## 2023-05-21 RX ORDER — BUMETANIDE 2 MG/1
2 TABLET ORAL DAILY
Status: DISCONTINUED | OUTPATIENT
Start: 2023-05-21 | End: 2023-05-21 | Stop reason: HOSPADM

## 2023-05-21 RX ADMIN — SODIUM ZIRCONIUM CYCLOSILICATE 10 G: 10 POWDER, FOR SUSPENSION ORAL at 08:01

## 2023-05-21 RX ADMIN — Medication 81 MG: at 08:01

## 2023-05-21 RX ADMIN — METOPROLOL SUCCINATE 100 MG: 100 TABLET, EXTENDED RELEASE ORAL at 08:01

## 2023-05-21 RX ADMIN — Medication 100 MCG: at 08:01

## 2023-05-21 RX ADMIN — BUMETANIDE 2 MG: 2 TABLET ORAL at 11:47

## 2023-05-21 RX ADMIN — DOCUSATE SODIUM 100 MG: 100 CAPSULE, LIQUID FILLED ORAL at 08:01

## 2023-05-21 ASSESSMENT — ACTIVITIES OF DAILY LIVING (ADL)
ADLS_ACUITY_SCORE: 31

## 2023-05-21 NOTE — DISCHARGE SUMMARY
"[facility]  Hospitalist Discharge Summary      Date of Admission:  5/18/2023  Date of Discharge:  5/21/2023  Discharging Provider: Zak Christie MD  Discharge Service: Hospitalist Service    Discharge Diagnoses   Acute on chronic CHF with pulmonary edema, improved  Hypertensive urgency, resolved with  Acute on chronic kidney disease stage IV  Chronic A-fib  Elevated troponin  Hyperkalemia, resolved  Hearing impairment  Anemia chronic kidney disease      Clinically Significant Risk Factors     # Overweight: Estimated body mass index is 25.24 kg/m  as calculated from the following:    Height as of this encounter: 1.803 m (5' 11\").    Weight as of this encounter: 82.1 kg (181 lb).       Follow-ups Needed After Discharge   Follow-up Appointments     Follow-up and recommended labs and tests       Follow up with primary care provider, Donald Machado, within 7 days for   hospital follow- up.  The following labs/tests are recommended: BMP to   follow-up on renal function in 2 to 3 days.    Follow up with cardiology and nephrology as scheduled             Unresulted Labs Ordered in the Past 30 Days of this Admission     Date and Time Order Name Status Description    5/21/2023  4:29 AM Protein Electrophoresis, Serum In process     5/20/2023 11:58 AM Protein electrophoresis random urine In process       These results will be followed up by PCP    Discharge Disposition   Discharged to home  Condition at discharge: Stable    Hospital Course   83 year old male with past medical history of CHF, CKD 4 and essential hypertension who presented to ER for evaluation of hyperkalemia and pulmonary edema, admitted with acute on chronic CHF, please refer to H&P for details     Acute on chronic CHF with pulmonary edema  - Diastolic dysfunction  - Echo shows EF of 55 to 60% with biatrial enlargement.  - Cardiology consult, appreciate input  - Improving with IV diuresis  - Albumin IV as per cardiology  -Switch to " oral diuretics and discharged today as recommended by nephrology  - Intake and output monitoring     Hypertensive urgency   - likely due to fluid overload  - improvement IV Lasix  - Hold other home antihypertensives to allow adequate diuresis on admission  - Restart home medications  - Stable blood pressure afterwards  - Continue to monitor blood pressure as outpatient     Acute on chronic kidney disease   - Baseline is CKD 4  - Patient does not want dialysis  - Primary care discussed about nephrology consult but patient refused in the past.  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  - Hold beta-blocker on admission  - Restarted next day  - Patient has pacemaker     Elevated troponin  - likely due to demand ischemia  - Patient denies any chest pain  - Cardiology is following  - Defer further ischemic work-up to cardiology  - Unremarkable telemetry monitoring     Hyperkalemia  - Mild  - Secondary to CHF and/or CKD  - improvement with IV diuresis initially  - Increasing again with worsening renal function  - Nephrology consult, appreciate input     Hearing impaired  - Reads lips of the reader  - Use pocket talker     History of AAA   - S/P repair in 12/2021     Weakness and deconditioning  -Secondary to the above  - PT/OT evaluation  - Plan for home discharge     Anemia  - Of chronic kidney disease  - Stable hemoglobin around 10  - No sign or symptom of acute bleeding  - Continue to monitor CBC as outpatient     Discussed with patient, family, nephrology, cardiology, nursing staff and discharge planner    Consultations This Hospital Stay   [facility] EVALUATION IP  CONSULT  OCCUPATIONAL THERAPY ADULT IP CONSULT  NUTRITION SERVICES ADULT IP CONSULT  CARE MANAGEMENT / SOCIAL WORK IP CONSULT  CARDIOLOGY IP CONSULT  NEPHROLOGY IP CONSULT    Code Status   No CPR- Do NOT Intubate    Time Spent on this Encounter   I, Zak Christie MD, personally saw the patient today and spent greater than 30 minutes discharging this patient.       Zak Christie MD  [facility]  [address] [address]  Phone: [phone]  Fax: [phone]  ______________________________________________________________________    Physical Exam   Vital Signs: Temp: 98.1  F (36.7  C) Temp src: Oral BP: 111/65 Pulse: 65   Resp: 18 SpO2: 97 % O2 Device: None (Room air)    Weight: 181 lbs 0 oz  Constitutional: awake, alert, cooperative, no apparent distress, and appears stated age  Respiratory: No increased work of breathing, good air exchange, clear to auscultation bilaterally, no crackles or wheezing  Cardiovascular: Normal apical impulse, regular rate and rhythm, normal S1 and S2, no S3 or S4, and no murmur noted  GI: No scars, normal bowel sounds, soft, non-distended, non-tender, no masses palpated, no hepatosplenomegally  Skin: no bruising or bleeding and normal skin color, texture, turgor  Musculoskeletal: There is no redness, warmth, or swelling of the joints.  Full range of motion noted.  no lower extremity pitting edema present  Neurologic: Awake, alert, oriented to name, place and time.  Cranial nerves II-XII are grossly intact.  Motor is 5 out of 5 bilaterally.   Sensory is intact.    Neuropsychiatric: Appropriate with examiner       Primary Care Physician   Donald Machado    Discharge Orders      Follow Up (TCM)    Follow up with [facility], at [address] [place] [address], [address], [facility], [address], within 2 weeks for hospital follow- up. The following labs/tests are recommended: BMP.     Reason for your hospital stay    Acute CHF,  chronic kidney disease     Follow-up and recommended labs and tests     Follow up with primary care provider, Donald Machado, within 7 days for hospital follow- up.  The following labs/tests are recommended: BMP to follow-up on renal function in 2 to 3 days.    Follow up with cardiology and nephrology as scheduled     Activity    Your activity upon discharge: activity as tolerated     Diet    Follow this diet upon discharge: Orders Placed This Encounter      2 Gram Sodium Diet       Significant Results and Procedures   Most Recent 3 CBC's:Recent Labs   Lab Test 05/19/23  0437 05/18/23  0223 05/17/23  1139   WBC 7.1 6.8 7.2   HGB 10.9* 10.4* 10.5*   * 107* 107*    194 179     Most Recent 3 BMP's:Recent Labs   Lab Test 05/21/23  0430 05/20/23  0415 05/19/23  0436    141 139   POTASSIUM 4.6 5.7* 4.8   CHLORIDE 98 101 99   CO2 28 29 24   BUN 63.6* 59.5* 53.2*   CR 3.51* 3.55* 3.21*   ANIONGAP 12 11 16*   JAYANT 9.4 10.0 9.6   GLC 88 96 123*   ,   Results for orders placed or performed during the hospital encounter of 05/18/23   Chest XR,  PA & LAT    Narrative    EXAM: XR CHEST 2 VIEWS  LOCATION: [facility]  DATE/TIME: 5/18/2023 2:37 AM CDT    INDICATION: Dyspnea, orthopnea  COMPARISON: CT chest 03/02/2023.      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 Stable cardiomegaly. Prominent interstitial markings consistent with mild congestion and edema. Mild elevation of the left hemidiaphragm and left basilar atelectasis/infiltrate. No   pneumothorax. Left subclavian pacemaker with right ventricular lead.   US Renal Complete Non-Vascular    Narrative    EXAM: US RENAL COMPLETE NON-VASCULAR  LOCATION: [facility]  DATE/TIME: 5/20/2023 4:49 PM CDT    INDICATION: Acute on chronic kidney injury  COMPARISON: CT 03/02/2023  TECHNIQUE: Routine Bilateral Renal and Bladder Ultrasound.    FINDINGS:    RIGHT KIDNEY: 10.2 cm. Normal  without hydronephrosis or significant masses.     LEFT KIDNEY: 8.7 cm. Normal without hydronephrosis or masses.     BLADDER: Normal.      Impression    IMPRESSION:  1.  No hydronephrosis on either side.   Echocardiogram Complete     Value    LVEF  55-60%    [facility]    [id number]  [id number]  [id number]  [id number]^MAISHA^[place] [address]     Name: ACE EDWARDS  MRN: [id number]  : 1940  Study Date: 2023 09:45 AM  Age: 83 yrs  Gender: Male  Patient Location: [facility]  Reason For Study: Heart Failure  Ordering Physician: FERNANDO FERRERA  Performed By: CHRISTOPHER     BSA: 2.1 m2  Height: 71 in  Weight: 190 lb  HR: 60  BP: 172/81 mmHg  ______________________________________________________________________________  Procedure  Complete Portable Echo Adult.  ______________________________________________________________________________  Interpretation Summary     Left ventricular size, wall motion and function are normal. The ejection  fraction is 55-60%.  Normal right ventricle size and systolic function.  The left atrium is mildly dilated.  The right atrium is mildly dilated.  There is mild (1+) mitral regurgitation.  Mild to moderate valvular aortic stenosis.  Suboptimal Doppler interrogation- AS potentiall may be more severe.  IVC diameter >2.1 cm collapsing <50% with sniff suggests a high RA pressure  estimated at 15 mmHg or greater.  ______________________________________________________________________________  Left Ventricle  Left ventricular size, wall motion and function are normal. The ejection  fraction is 55-60%. There is normal left ventricular wall thickness. Diastolic  function not assessed due to atrial fibrillation. Septal wall motion  abnormality may reflect pacemaker activation.     Right Ventricle  Normal right ventricle size and systolic function. There is a pacemaker lead  in the right ventricle.     Atria  The left atrium is mildly dilated. The right  atrium is mildly dilated. There  is no color Doppler evidence of an atrial shunt.     Mitral Valve  Mitral valve leaflets appear normal. There is mild (1+) mitral regurgitation.     Tricuspid Valve  Tricuspid valve leaflets appear normal. Right ventricle systolic pressure  estimate normal.     Aortic Valve  The aortic valve is not well visualized. Moderate aortic valve calcification  is present. Mild to moderate valvular aortic stenosis. Suboptimal Doppler  interrogation- AS potentiall may be more severe.     Pulmonic Valve  The pulmonic valve is not well seen, but is grossly normal. This degree of  valvular regurgitation is within normal limits.     Vessels  The aorta root is normal. Normal size ascending aorta. IVC diameter >2.1 cm  collapsing <50% with sniff suggests a high RA pressure estimated at 15 mmHg or  greater.     Pericardium  There is no pericardial effusion.     ______________________________________________________________________________  MMode/2D Measurements & Calculations  IVSd: 0.70 cm  LVIDd: 6.0 cm  LVIDs: 4.3 cm  LVPWd: 0.91 cm  FS: 27.6 %     LV mass(C)d: 185.3 grams  LV mass(C)dI: 89.8 grams/m2  Ao root diam: 3.4 cm  LA dimension: 3.9 cm  LA/Ao: 1.1  LVOT diam: 2.2 cm  LVOT area: 3.8 cm2  LA Volume Indexed (AL/bp): 30.2 ml/m2  RWT: 0.30  TAPSE: 1.8 cm     Time Measurements  MM HR: 60.0 BPM     Doppler Measurements & Calculations  MV E max ralf: 107.0 cm/sec  MV A max ralf: 31.4 cm/sec  MV E/A: 3.4  MV dec slope: 465.0 cm/sec2  MV dec time: 0.23 sec  Ao V2 max: 153.0 cm/sec  Ao max P.0 mmHg  Ao V2 mean: 106.0 cm/sec  Ao mean P.0 mmHg  Ao V2 VTI: 29.4 cm  SOLIS(I,D): 1.4 cm2  SOLIS(V,D): 1.7 cm2  LV V1 max P.0 mmHg  LV V1 max: 70.0 cm/sec  LV V1 VTI: 11.2 cm  SV(LVOT): 42.6 ml  SI(LVOT): 20.6 ml/m2  PA acc time: 0.07 sec  AV Ralf Ratio (DI): 0.46  SOLIS Index (cm2/m2): 0.70  E/E': 12.2  E/E' av.6  Lateral E/e': 12.1  Medial E/e': 15.1  Peak E' Ralf: 8.8 cm/sec  RV S Ralf: 9.4 cm/sec      ______________________________________________________________________________  Report approved by: Brendan Han 05/18/2023 11:22 AM               Discharge Medications   Current Discharge Medication List      START taking these medications    Details   bumetanide (BUMEX) 2 MG tablet Take 1 tablet (2 mg) by mouth daily for 90 days  Qty: 90 tablet, Refills: 0    Associated Diagnoses: Acute congestive heart failure, unspecified heart failure type (H)         CONTINUE these medications which have NOT CHANGED    Details   acetaminophen (TYLENOL) 500 MG tablet Take 500-1,000 mg by mouth every 6 hours as needed for pain       amLODIPine (NORVASC) 5 MG tablet TAKE ONE TABLET BY MOUTH ONE TIME DAILY  Qty: 90 tablet, Refills: 3    Associated Diagnoses: Essential hypertension      aspirin 81 MG EC tablet [ASPIRIN 81 MG EC TABLET] Take 81 mg by mouth daily.      cholecalciferol, vitamin D3, (CHOLECALCIFEROL) 1,000 unit tablet [CHOLECALCIFEROL, VITAMIN D3, (CHOLECALCIFEROL) 1,000 UNIT TABLET] Take 2,000 Units by mouth daily.      cyanocobalamin 100 MCG tablet [CYANOCOBALAMIN 100 MCG TABLET] Take 100 mcg by mouth daily.      metoprolol succinate ER (TOPROL XL) 100 MG 24 hr tablet Take 1 tablet (100 mg) by mouth daily  Qty: 90 tablet, Refills: 3    Associated Diagnoses: Essential hypertension      modafinil (PROVIGIL) 100 MG tablet Take 1 tablet (100 mg) by mouth daily  Qty: 30 tablet, Refills: 3    Associated Diagnoses: Daytime somnolence      oxymetazoline (AFRIN) 0.05 % nasal spray Spray 2 sprays into both nostrils 2 times daily as needed for congestion  Qty: 37 mL, Refills: 3    Comments: Use only for bleeding and do not use more than three days in a row  Associated Diagnoses: Epistaxis      sodium chloride (OCEAN) 0.65 % nasal spray Spray 1 spray into both nostrils 4 times daily as needed for congestion           Allergies   No Known Allergies

## 2023-05-21 NOTE — PLAN OF CARE
Problem: Plan of Care - These are the overarching goals to be used throughout the patient stay.    Goal: Readiness for Transition of Care  Outcome: Met   Goal Outcome Evaluation:     Patient received discharge orders.  AVS reviewed with patient and wife. All questions answered.  Wife to transport to home.

## 2023-05-21 NOTE — PLAN OF CARE
Occupational Therapy Discharge Summary    Reason for therapy discharge:    Discharged to home.    Progress towards therapy goal(s). See goals on Care Plan in [facility] electronic health record for goal details.  Goals met    Therapy recommendation(s):    No further therapy is recommended. Pt is going home w/family support.

## 2023-05-21 NOTE — PLAN OF CARE
Patient denied pain and was able to sleep for majority of the night without interruption. Tele V paced no spike with underlying A fib. Depending on kidney function labs vs. Diuresis patient could be ready to discharge soon if creatnine has improved.       Heart Failure Care Map  GOALS TO BE MET BEFORE DISCHARGE:    1. Decrease congestion and/or edema with diuretic therapy to achieve near optimal volume status.     Dyspnea improved: Yes   Edema improved: Absent        Last 24 hour I/O:   Intake/Output Summary (Last 24 hours) at 5/21/2023 0401  Last data filed at 5/20/2023 2000  Gross per 24 hour   Intake 1063 ml   Output 1500 ml   Net -437 ml           Net I/O and Weights since admission:   04/21 0700 - 05/21 0659  In: 3222 [P.O.:3216; I.V.:6]  Out: 9125 [Urine:9125]  Net: -5903     Vitals:    05/18/23 0153 05/18/23 0454 05/19/23 0317 05/20/23 0424   Weight: 87.1 kg (192 lb) 86.3 kg (190 lb 4.8 oz) 82.5 kg (181 lb 12.8 oz) 81.5 kg (179 lb 9.6 oz)    05/21/23 0312   Weight: 82.1 kg (181 lb)       2.  O2 sats > 90% on room air, or at prior home O2 therapy level.      Able to wean O2 this shift to keep sats above 90%?: Yes   Does patient use Home O2? No         Current oxygenation status:   SpO2: 92 %     O2 Device: None (Room air),      3.  Tolerates ambulation and mobility near baseline.     Ambulation: No   Times patient ambulated with staff this shift: None    Please review the Heart Failure Care Map for additional HF goal outcomes.    Pinky Fuentes, RN  5/21/2023      Problem: Plan of Care - These are the overarching goals to be used throughout the patient stay.    Goal: Optimal Comfort and Wellbeing  Outcome: Progressing     Problem: Risk for Delirium  Goal: Improved Sleep  Outcome: Progressing     Problem: Heart Failure Comorbidity  Goal: Maintenance of Heart Failure Symptom Control  Outcome: Progressing  Intervention: Maintain Heart Failure Management  Recent Flowsheet Documentation  Taken 5/21/2023 0043 by  Pinky Fuentes RN  Medication Review/Management: medications reviewed     Problem: Chronic Kidney Disease  Goal: Acceptable Pain Control  Outcome: Progressing   Goal Outcome Evaluation:

## 2023-05-21 NOTE — PROGRESS NOTES
Care Management Discharge Note    Discharge Date: 05/21/2023       Discharge Disposition: Home    Discharge Services: None    Discharge DME: None    Discharge Transportation: family or friend will provide    Private pay costs discussed: Not applicable    Does the patient's insurance plan have a 3 day qualifying hospital stay waiver?  No    PAS Confirmation Code:  (N/A)  Patient/family educated on Medicare website which has current facility and service quality ratings: yes    Education Provided on the Discharge Plan: Yes  Persons Notified of Discharge Plans: pt family, nurse  Patient/Family in Agreement with the Plan: yes    Handoff Referral Completed: Yes    Additional Information:  Pt to be discharged to home. Family to provide transportation.  No CM needs noted        Stephanie Menendez RN

## 2023-05-21 NOTE — PROGRESS NOTES
Patient reports that breathing is improved.  Ready for discharge.  Nephrology note reviewed.  Vital signs reviewed and are quite stable.    We will arrange follow-up in the [facility] here in [place].

## 2023-05-21 NOTE — CARE PLAN
Heart Failure Care Map  GOALS TO BE MET BEFORE DISCHARGE:    1. Decrease congestion and/or edema with diuretic therapy to achieve near optimal volume status.     Dyspnea improved: Yes, satisfactory for discharge.   Edema improved: Yes, satisfactory for discharge.        Last 24 hour I/O:   Intake/Output Summary (Last 24 hours) at 5/20/2023 2207  Last data filed at 5/20/2023 2000  Gross per 24 hour   Intake 1063 ml   Output 1500 ml   Net -437 ml           Net I/O and Weights since admission:   04/20 2300 - 05/20 2259  In: 3222 [P.O.:3216; I.V.:6]  Out: 9125 [Urine:9125]  Net: -5903     Vitals:    05/18/23 0153 05/18/23 0454 05/19/23 0317 05/20/23 0424   Weight: 87.1 kg (192 lb) 86.3 kg (190 lb 4.8 oz) 82.5 kg (181 lb 12.8 oz) 81.5 kg (179 lb 9.6 oz)       2.  O2 sats > 90% on room air, or at prior home O2 therapy level.      Able to wean O2 this shift to keep sats above 90%?: Yes, satisfactory for discharge.   Does patient use Home O2? No          Current oxygenation status:   SpO2: 98 %     O2 Device: None (Room air),      3.  Tolerates ambulation and mobility near baseline.     Ambulation: Yes, satisfactory for discharge.   Times patient ambulated with staff this shift: 1    Please review the Heart Failure Care Map for additional HF goal outcomes.    Vivian Conde RN  5/20/2023

## 2023-05-21 NOTE — PROGRESS NOTES
RENAL PROGRESS NOTE    CC:  ARIANA, CKD, hyperkalemia, CHF    ASSESSMENT & PLAN:   ARIANA - worsening kidney function in the setting of aggressive diuresis when presenting with acute CHF.  Cr will likely run higher in the mid 3s to maintain a better volume status.    Recs:  - oral bumex 2mg daily today  - patient much more open to seeing Nephrology as an outpatient after speaking with me today and would like to follow up, I put f/u instructions in the cart and sent a note to our clinic  - we discussed managing hyperkalemia, anemia, hypertension and volume status as primary objectives  - we also discussed pursuing hospice when CKD progresses further  - okay to discharge today, discussed with primary team    CKD stage 4 - Cr in the 1.5-2.0 range for years until 2021 when he starting showing signs of some progression. Cr up to 3.0 earlier this year.  He has a microalbumin of 291 in 7/2022.  He very likely has primarily renovascular disease.  Will work up with ultrasound, UA and paraproteins.  He has some uremic symptoms in the form of fatigue prior to admission but good appetite.      Acute HFpEF - presented with dyspnea and LOWER EXTREMITY edema.  Now s/p diuresis and feeling much improved.  Was going to start bumex 2mg daily today but held with rising creatinine.  He still has LOWER EXTREMITY edema on my exam but has minimal CHF symptoms. Can go on 2mg bumex daily     Hyperkalemia - chronic tendency to hyperkalemia, related to his poor renal function, discharge on Lokelma every other day, continue as an outpatient if affordable, otherwise if too expensive will continue to work on dietary restriction     Anemia - hgb in the 10s, pending iron studies    History of abdominal and aortic aneurysms - s/p endovascular repairs     Hypertension - controlled on low dose metoprolol alone            SUBJECTIVE:  Feels about the same.  Weight crept up a little.  Breathing is good.  Edema about the same.  Discussed resuming the  pratibha MCDANIELS came down rapidly with Lokelma, discussed continuing this on an every other day basis if its affordable. If not, we'll continue to work on dietary control.  Open to following up with me in clinic.    Called and discussed the above with his wife Shauna.    Discussed potential discharge with Dr. Christie and Dr. Sanchez    OBJECTIVE:  Physical Exam   Temp: 97.4  F (36.3  C) Temp src: Oral BP: 131/75 Pulse: 69   Resp: 18 SpO2: 95 % O2 Device: None (Room air)    Vitals:    05/19/23 0317 05/20/23 0424 05/21/23 0312   Weight: 82.5 kg (181 lb 12.8 oz) 81.5 kg (179 lb 9.6 oz) 82.1 kg (181 lb)     Vital Signs with Ranges  Temp:  [97.4  F (36.3  C)-98.2  F (36.8  C)] 97.4  F (36.3  C)  Pulse:  [60-69] 69  Resp:  [18] 18  BP: (119-131)/(56-75) 131/75  SpO2:  [92 %-98 %] 95 %  I/O last 3 completed shifts:  In: 940 [P.O.:940]  Out: 850 [Urine:850]    @TMAXR(24)@    Patient Vitals for the past 72 hrs:   Weight   05/21/23 0312 82.1 kg (181 lb)   05/20/23 0424 81.5 kg (179 lb 9.6 oz)   05/19/23 0317 82.5 kg (181 lb 12.8 oz)       Intake/Output Summary (Last 24 hours) at 5/21/2023 0945  Last data filed at 5/20/2023 2000  Gross per 24 hour   Intake 710 ml   Output 850 ml   Net -140 ml       PHYSICAL EXAM:  General - A & O x 3, NAD, very hard of hearing  Neck - no carotid bruits, no JVD  Respiratory - Lungs CTA bilat without wheeze, rhonchi, rales  Cardiovascular - AP RRR with murmur  Abdomen - soft, BS present  Extremities - well perfused, 1+ edema in ankles bilaterally  Integumentary - intact, good turgor, no rash/lesions  Neurologic - grossly intact  Psych:  Judgement intact, affect WNL  :  No perez    LABORATORY STUDIES:     Recent Labs   Lab 05/19/23  0437 05/18/23  0223 05/17/23  1139   WBC 7.1 6.8 7.2   RBC 3.29* 3.09* 3.10*   HGB 10.9* 10.4* 10.5*   HCT 34.5* 32.9* 33.1*    194 179       Basic Metabolic Panel:  Recent Labs   Lab 05/21/23  0430 05/20/23  0415 05/19/23  0436 05/18/23  1319 05/18/23  0223  05/17/23  1139    141 139 139 139 141   POTASSIUM 4.6 5.7* 4.8 4.8 5.4* 6.5*   CHLORIDE 98 101 99 102 105 107   CO2 28 29 24 26 23 22   BUN 63.6* 59.5* 53.2* 46.5* 45.3* 39.4*   CR 3.51* 3.55* 3.21* 3.07* 3.03* 2.76*   GLC 88 96 123* 100* 103* 90   JAYANT 9.4 10.0 9.6 9.7 9.9 9.9       INRNo lab results found in last 7 days.     Recent Labs   Lab Test 05/19/23  0437 05/18/23  0223 02/16/23  0659 02/16/23  0532 02/01/22  1205 01/14/22  0932   INR  --   --   --  1.05  --  1.10   WBC 7.1 6.8   < > 8.1   < > 15.6*   HGB 10.9* 10.4*   < > 11.1*   < > 11.6*    194   < > 187   < > 254    < > = values in this interval not displayed.       Personally reviewed current labs      Luciano Palacio  [facility]  [phone]

## 2023-05-22 LAB
ALBUMIN MFR UR ELPH: 73 %
ALBUMIN SERPL ELPH-MCNC: 4.2 G/DL (ref 3.7–5.1)
ALPHA1 GLOB MFR UR ELPH: 4.1 %
ALPHA1 GLOB SERPL ELPH-MCNC: 0.3 G/DL (ref 0.2–0.4)
ALPHA2 GLOB MFR UR ELPH: 3.6 %
ALPHA2 GLOB SERPL ELPH-MCNC: 0.8 G/DL (ref 0.5–0.9)
B-GLOBULIN MFR UR ELPH: 10.6 %
B-GLOBULIN SERPL ELPH-MCNC: 0.8 G/DL (ref 0.6–1)
GAMMA GLOB MFR UR ELPH: 8.7 %
GAMMA GLOB SERPL ELPH-MCNC: 1.1 G/DL (ref 0.7–1.6)
M PROTEIN MFR UR ELPH: 0 %
M PROTEIN SERPL ELPH-MCNC: 0 G/DL
PROT PATTERN SERPL ELPH-IMP: NORMAL
PROT PATTERN UR ELPH-IMP: ABNORMAL

## 2023-05-22 PROCEDURE — 84165 PROTEIN E-PHORESIS SERUM: CPT | Mod: 26

## 2023-05-22 PROCEDURE — 84166 PROTEIN E-PHORESIS/URINE/CSF: CPT | Mod: 26

## 2023-05-23 ENCOUNTER — TELEPHONE (OUTPATIENT)
Dept: CARDIOLOGY | Facility: CLINIC | Age: 83
End: 2023-05-23
Payer: COMMERCIAL

## 2023-05-23 ENCOUNTER — PATIENT OUTREACH (OUTPATIENT)
Dept: CARE COORDINATION | Facility: CLINIC | Age: 83
End: 2023-05-23
Payer: COMMERCIAL

## 2023-05-23 DIAGNOSIS — I50.9 ACUTE DECOMPENSATED HEART FAILURE (H): Primary | ICD-10-CM

## 2023-05-23 NOTE — PROGRESS NOTES
[facility] Contact  [facility]/Voicemail     Clinical Data: Transitional Care Management Outreach     Outreach attempted x 2.  Left message on patient's voicemail, providing [facility]'s 24/7 scheduling and nurse triage phone number [phone] ([phone]) for questions/concerns and/or to schedule an appt with an [facility] provider, if they do not have a PCP.      Plan:  [facility] will do no further outreaches at this time.       Janett Justice RN  Connected Care Resource [place], [facility]    *Connected Care Resource Team does NOT follow patient ongoing. Referrals are identified based on internal discharge reports and the outreach is to ensure patient has an understanding of their discharge instructions.

## 2023-05-23 NOTE — TELEPHONE ENCOUNTER
----- Message from Mendez Becker sent at 12/14/2020  5:14 PM EST -----  Regarding: Dr. Melissa Santana telephone  Appointment not available    Caller's first and last name and relationship to patient (if not the patient): Zack Peterson- daughter      Best contact number: [phone]      Preferred date and time:  before 12/28/20       Scheduled appointment date and time: N/A      Reason for appointment: pre-op eye surgery      Details to clarify the request: Zack Peterson said it was ok to leave a message      Mendez Becker Schedule 2 Week F/U for HF with TSB after Siena's Appt.

## 2023-05-30 ENCOUNTER — OFFICE VISIT (OUTPATIENT)
Dept: FAMILY MEDICINE | Facility: CLINIC | Age: 83
End: 2023-05-30
Payer: COMMERCIAL

## 2023-05-30 VITALS
DIASTOLIC BLOOD PRESSURE: 72 MMHG | RESPIRATION RATE: 14 BRPM | OXYGEN SATURATION: 98 % | WEIGHT: 182.6 LBS | BODY MASS INDEX: 25.56 KG/M2 | TEMPERATURE: 98 F | HEART RATE: 73 BPM | HEIGHT: 71 IN | SYSTOLIC BLOOD PRESSURE: 120 MMHG

## 2023-05-30 DIAGNOSIS — I50.33 ACUTE ON CHRONIC DIASTOLIC CONGESTIVE HEART FAILURE (H): Primary | ICD-10-CM

## 2023-05-30 DIAGNOSIS — N18.4 CHRONIC RENAL FAILURE (CRF), STAGE 4 (SEVERE) (H): ICD-10-CM

## 2023-05-30 LAB
ANION GAP SERPL CALCULATED.3IONS-SCNC: 13 MMOL/L (ref 7–15)
BUN SERPL-MCNC: 57.7 MG/DL (ref 8–23)
CALCIUM SERPL-MCNC: 9.8 MG/DL (ref 8.8–10.2)
CHLORIDE SERPL-SCNC: 99 MMOL/L (ref 98–107)
CREAT SERPL-MCNC: 3.22 MG/DL (ref 0.67–1.17)
DEPRECATED HCO3 PLAS-SCNC: 29 MMOL/L (ref 22–29)
GFR SERPL CREATININE-BSD FRML MDRD: 18 ML/MIN/1.73M2
GLUCOSE SERPL-MCNC: 100 MG/DL (ref 70–99)
POTASSIUM SERPL-SCNC: 4.6 MMOL/L (ref 3.4–5.3)
SODIUM SERPL-SCNC: 141 MMOL/L (ref 136–145)

## 2023-05-30 PROCEDURE — 36415 COLL VENOUS BLD VENIPUNCTURE: CPT | Performed by: FAMILY MEDICINE

## 2023-05-30 PROCEDURE — 80048 BASIC METABOLIC PNL TOTAL CA: CPT | Performed by: FAMILY MEDICINE

## 2023-05-30 PROCEDURE — 99214 OFFICE O/P EST MOD 30 MIN: CPT | Performed by: FAMILY MEDICINE

## 2023-05-30 ASSESSMENT — PAIN SCALES - GENERAL: PAINLEVEL: NO PAIN (0)

## 2023-05-30 NOTE — PROGRESS NOTES
"  Assessment & Plan     Acute on chronic diastolic congestive heart failure (H)    - Basic metabolic panel  (Ca, Cl, CO2, Creat, Gluc, K, Na, BUN)      Chronic renal failure (CRF), stage 4 (severe) (H)  Patient will meet with his nephrologist on June 16  - Basic metabolic panel  (Ca, Cl, CO2, Creat, Gluc, K, Na, BUN)               MED REC REQUIRED  Post Medication Reconciliation Status:   BMI: Normal for age  Estimated body mass index is 25.47 kg/m  as calculated from the following:    Height as of this encounter: 1.803 m (5' 11\").    Weight as of this encounter: 82.8 kg (182 lb 9.6 oz).           Blas Hernandez MD  [facility] KYRA Harden is a 83 year old, presenting for the following health issues:  No chief complaint on file.        3/30/2023     2:17 PM   Additional Questions   Roomed by Gumee   Accompanied by Wife     HPI the patient was admitted on 5/18/2023 with a diagnosis of acute on chronic congestive heart failure with mild pulmonary edema he is a stage IV chronic kidney disease patient who has chronic atrial fib; he was extremely short of breath he was admitted to the hospital and discharged on the fourth hospital day treatment included correction of hyperkalemia and pulmonary edema with diuresis and stabilization of his atrial fibrillation he did have an echocardiogram which showed ejection fraction of 55 to 60% cardiology was consulted as well as nephrology; the patient improved slowly over the 3 to 4 days and breathing became much less labored.  Discharge diagnosis as per discharge sheet chronic congestive heart failure and chronic kidney failure due to fluid overload; the patient's blood pressure was stabilized.  He was seen here today in primary care feeling fine is extremely hard of hearing his wife was able to translate for us as he can read lips.  We will continue his Bumex at 2 mg in addition to his metoprolol and he has amlodipine.  He is seeing nephrology June " 16.  All medical questions asked were answered I did do a repeat metabolic profile today time spent face-to-face non-face-to-face 31 minutes      Hospital Follow-up Visit:    Hospital/Nursing Home/IP Rehab Facility: [facility]  Date of Admission: 5-18-23  Date of Discharge: 5-21-23  Reason(s) for Admission: Fluid on lungs and Kidney    Was your hospitalization related to COVID-19? No   Problems taking medications regularly:  None  Medication changes since discharge: None  Problems adhering to non-medication therapy:  None    Summary of hospitalization:  [facility] discharge summary reviewed  Diagnostic Tests/Treatments reviewed.  Follow up needed: none  Other Healthcare Providers Involved in Patient s Care:         None  Update since discharge: improved.   Plan of care communicated with patient           Review of Systems   Constitutional, HEENT, cardiovascular, pulmonary, gi and gu systems are negative, except as otherwise noted.      Objective    There were no vitals taken for this visit.  There is no height or weight on file to calculate BMI.  Physical Exam   GENERAL: healthy, alert and no distress  EYES: Eyes grossly normal to inspection, PERRL and conjunctivae and sclerae normal  HENT: ear canals and TM's normal, nose and mouth without ulcers or lesions  NECK: no adenopathy, no asymmetry, masses, or scars and thyroid normal to palpation  RESP: lungs clear to auscultation - no rales, rhonchi or wheezes  CV: regular rate and rhythm, normal S1 S2, no S3 or S4, no murmur, click or rub, no peripheral edema and peripheral pulses strong  ABDOMEN: soft, nontender, no hepatosplenomegaly, no masses and bowel sounds normal  MS: no gross musculoskeletal defects noted, no edema  SKIN: no suspicious lesions or rashes  NEURO: Normal strength and tone, mentation intact and speech normal  PSYCH: mentation appears normal, affect normal/bright    Patient was asymptomatic today there were  no overt signs of systolic or diastolic congestive heart failure

## 2023-05-31 ENCOUNTER — OFFICE VISIT (OUTPATIENT)
Dept: CARDIOLOGY | Facility: CLINIC | Age: 83
End: 2023-05-31
Payer: COMMERCIAL

## 2023-05-31 ENCOUNTER — APPOINTMENT (OUTPATIENT)
Dept: CARDIOLOGY | Facility: CLINIC | Age: 83
End: 2023-05-31
Payer: COMMERCIAL

## 2023-05-31 VITALS
HEART RATE: 64 BPM | RESPIRATION RATE: 20 BRPM | DIASTOLIC BLOOD PRESSURE: 60 MMHG | SYSTOLIC BLOOD PRESSURE: 130 MMHG | BODY MASS INDEX: 25.66 KG/M2 | WEIGHT: 184 LBS | OXYGEN SATURATION: 96 %

## 2023-05-31 DIAGNOSIS — I50.32 CHRONIC HEART FAILURE WITH PRESERVED EJECTION FRACTION (H): Primary | ICD-10-CM

## 2023-05-31 DIAGNOSIS — I10 ESSENTIAL HYPERTENSION: Chronic | ICD-10-CM

## 2023-05-31 DIAGNOSIS — I48.21 PERMANENT ATRIAL FIBRILLATION (H): Chronic | ICD-10-CM

## 2023-05-31 DIAGNOSIS — N18.4 CHRONIC KIDNEY DISEASE, STAGE IV (SEVERE) (H): ICD-10-CM

## 2023-05-31 PROCEDURE — 99214 OFFICE O/P EST MOD 30 MIN: CPT | Performed by: NURSE PRACTITIONER

## 2023-05-31 NOTE — LETTER
5/31/2023    Donald Machado MD  [address]  [facility] [id number]    RE: Dereck Elliott       Dear Colleague,     I had the pleasure of seeing Dereck Elliott in the [facility].        Assessment/Recommendations   Assessment:    1.  Heart failure with preserved ejection fraction, NYHA class II: Compensated.  He has mild fatigue.  His dyspnea on exertion has resolved.  We discussed monitoring symptoms, following a low-sodium diet and monitoring daily weights.  His weight has been stable.  He met with the nurse clinician during hospitalization for further education.  2.  Hypertension: Controlled. Blood pressure 130/60  3.  CKD stage IV: He had labs yesterday which were stable. He will see Dr Pandya in June for follow-up  4.  Permanent atrial fibrillation: Rate controlled based on last device check.  He continues Toprol for rate control.  Has declined both anticoagulation and referral for atrial appendage closure in the past.    Plan:  1.  Continue current medications  2.  Continue monitoring daily weights and following a low-salt diet  3.  Continue regular exercise    Dereck Elliott will follow up with Dr Koch June 14 and Dr Traore in 3 months.     History of Present Illness/Subjective    Mr. Dereck Elliott is a 83 year old male seen at [facility] heart failure clinic today for continued follow-up.  He follows up for heart failure with preserved ejection fraction.  His wife accompanies him today.  He was hospitalized May 18 to May 21 with acute heart failure and hyperkalemia.He had a potassium level of 6.5.  He was given IV diuretics and symptoms improved.  He had an echocardiogram which showed ejection fraction of 55 to 60%.  He met with nephrology and does not want dialysis.  He has a past medical history significant for hypertension, chronic kidney disease stage IV, permanent atrial fibrillation, sick sinus syndrome status post pacemaker, AAA repair in 2021, and  anemia.    Today, he denies any acute heart failure symptoms.  He denies lightheadedness, shortness of breath, dyspnea on exertion, orthopnea, PND, palpitations, chest pain, abdominal fullness/bloating and lower extremity edema.      He is monitoring home weights which are stable around 174 pounds.  He is following a low sodium diet.  He participates in regular physical activity including going to the gym 5 days a week.      ECHOCARDIOGRAM: 5/18/2023  Interpretation Summary     Left ventricular size, wall motion and function are normal. The ejection  fraction is 55-60%.  Normal right ventricle size and systolic function.  The left atrium is mildly dilated.  The right atrium is mildly dilated.  There is mild (1+) mitral regurgitation.  Mild to moderate valvular aortic stenosis.  Suboptimal Doppler interrogation- AS potentiall may be more severe.  IVC diameter >2.1 cm collapsing <50% with sniff suggests a high RA pressure  estimated at 15 mmHg or greater.     Physical Examination Review of Systems   /60 (BP Location: Left arm, Patient Position: Sitting, Cuff Size: Adult Regular)   Pulse 64   Resp 20   Wt 83.5 kg (184 lb)   SpO2 96%   BMI 25.66 kg/m    Body mass index is 25.66 kg/m .  Wt Readings from Last 3 Encounters:   05/31/23 83.5 kg (184 lb)   05/30/23 82.8 kg (182 lb 9.6 oz)   05/21/23 82.1 kg (181 lb)       General Appearance:   no acute distress   ENT/Mouth: No abnormalities   EYES:  no scleral icterus, normal conjunctivae   Neck: no thyromegaly   Chest/Lungs:   lungs are clear to auscultation, no rales or wheezing, equal chest wall expansion    Cardiovascular:   Regular. Normal first and second heart sounds with no murmurs, rubs, or gallops, no edema bilaterally    Abdomen:  bowel sounds are present   Extremities: no cyanosis or clubbing   Skin: warm   Neurologic: no tremors     Psychiatric: alert and oriented x3                                              Medical History  Surgical History Family  History Social History   Past Medical History:   Diagnosis Date    Atrial fibrillation (H)     Chronic heart failure with preserved ejection fraction (H) 2023    Chronic kidney disease     Chronic kidney disease (CKD), stage III (moderate) (H)     Chronic kidney disease, stage IV (severe) (H) 2023    Congestive heart failure (H)     Essential hypertension     Hard of hearing     History of rheumatic fever 2017    Onychomycosis 10/27/2017    Permanent atrial fibrillation (H) 2017    SSS (sick sinus syndrome) (H) 2019    Unspecified cataract     Vitamin D deficiency disease 10/06/2015    Past Surgical History:   Procedure Laterality Date    AS FUSION OF WRIST JOINT Right     CATARACT EXTRACTION Left     COLON SURGERY      EP PACEMAKER INSERT N/A 2019    EP Pacemaker Insertion; Emeka Dean MD; [facility];  Service: Cardiology    IMPLANT PACEMAKER      IR ABDOMINAL ENDOVASCULAR STENT GRAFT  2021    LIGATE ARTERY ETHMOID Right 2023    Procedure: Endoscopic control of nasal hemmorhage.;  Surgeon: Melissa Roberson MD;  Location:  OR    REPAIR ANEURYSM ABDOMINAL AORTA N/A 2021    Procedure: ENDOVASCULAR REPAIR OF ABDOMINAL AORTIC ANEURYSM  WITH ENDOGRAFT;  Surgeon: Melia Granger MD;  Location: [facility] OR    ROTATOR CUFF REPAIR RT/LT Left     TOTAL KNEE ARTHROPLASTY Left     Family History   Problem Relation Age of Onset    Valvular heart disease Mother         care at [place]    Colon Cancer Father     No Known Problems Daughter     No Known Problems Daughter     Social History     Socioeconomic History    Marital status:      Spouse name: Not on file    Number of children: 2    Years of education: Not on file    Highest education level: Not on file   Occupational History    Not on file   Tobacco Use    Smoking status: Former     Packs/day: 0.00     Types: Cigars, Cigarettes     Quit date: 2016     Years since quittin.4     Smokeless tobacco: Never   Vaping Use    Vaping status: Not on file   Substance and Sexual Activity    Alcohol use: Yes     Alcohol/week: 1.0 standard drink of alcohol     Comment: Alcoholic Drinks/day: per week 2    Drug use: No    Sexual activity: Not on file   Other Topics Concern    Parent/sibling w/ CABG, MI or angioplasty before 65F 55M? Not Asked   Social History Narrative    Not on file     Social Determinants of Health     Financial Resource Strain: Not on file   Food Insecurity: Not on file   Transportation Needs: Not on file   Physical Activity: Not on file   Stress: Not on file   Social Connections: Not on file   Intimate Partner Violence: Not on file   Housing Stability: Not on file          Medications  Allergies   Current Outpatient Medications   Medication Sig Dispense Refill    acetaminophen (TYLENOL) 500 MG tablet Take 500-1,000 mg by mouth every 6 hours as needed for pain       amLODIPine (NORVASC) 5 MG tablet TAKE ONE TABLET BY MOUTH ONE TIME DAILY (Patient taking differently: Take 5 mg by mouth daily TAKE ONE TABLET BY MOUTH ONE TIME DAILY) 90 tablet 3    aspirin 81 MG EC tablet [ASPIRIN 81 MG EC TABLET] Take 81 mg by mouth daily.      bumetanide (BUMEX) 2 MG tablet Take 1 tablet (2 mg) by mouth daily for 90 days 90 tablet 0    cholecalciferol, vitamin D3, (CHOLECALCIFEROL) 1,000 unit tablet [CHOLECALCIFEROL, VITAMIN D3, (CHOLECALCIFEROL) 1,000 UNIT TABLET] Take 2,000 Units by mouth daily.      cyanocobalamin 100 MCG tablet [CYANOCOBALAMIN 100 MCG TABLET] Take 100 mcg by mouth daily.      metoprolol succinate ER (TOPROL XL) 100 MG 24 hr tablet Take 1 tablet (100 mg) by mouth daily 90 tablet 3    sodium chloride (OCEAN) 0.65 % nasal spray Spray 1 spray into both nostrils 4 times daily as needed for congestion      modafinil (PROVIGIL) 100 MG tablet Take 1 tablet (100 mg) by mouth daily (Patient not taking: Reported on 5/31/2023) 30 tablet 3    oxymetazoline (AFRIN) 0.05 % nasal spray Spray 2  sprays into both nostrils 2 times daily as needed for congestion (Patient not taking: Reported on 5/31/2023) 37 mL 3    No Known Allergies      Lab Results    Chemistry/lipid CBC Cardiac Enzymes/BNP/TSH/INR   Lab Results   Component Value Date    CHOL 122 04/28/2023    HDL 47 04/28/2023    TRIG 63 04/28/2023    BUN 57.7 (H) 05/30/2023     05/30/2023    CO2 29 05/30/2023    Lab Results   Component Value Date    WBC 7.1 05/19/2023    HGB 10.9 (L) 05/19/2023    HCT 34.5 (L) 05/19/2023     (H) 05/19/2023     05/19/2023    Lab Results   Component Value Date     (H) 07/16/2019    TSH 2.42 05/17/2023    INR 1.05 02/16/2023             This note has been dictated using voice recognition software. Any grammatical, typographical, or context distortions are unintentional and inherent to the software    30 minutes spent on the date of encounter doing chart review, review of outside records, review of test results, interpretation with above tests, patient visit, documentation and discussion with family.                  Thank you for allowing me to participate in the care of your patient.      Sincerely,     GUERRERO [facility]  cc:   Arabella Koch MD  [address]  [place] [address]

## 2023-05-31 NOTE — PATIENT INSTRUCTIONS
Dereck Elliott,    It was a pleasure to see you today at [facility].     My recommendations after this visit include:  - Please follow up with Dr Koch June 14   - Please follow up with Dr Traore in 3 months  - Nurse line [phone]  - Continue current medications    Siena Duenas, CNP

## 2023-05-31 NOTE — PROGRESS NOTES
Assessment/Recommendations   Assessment:    1.  Heart failure with preserved ejection fraction, NYHA class II: Compensated.  He has mild fatigue.  His dyspnea on exertion has resolved.  We discussed monitoring symptoms, following a low-sodium diet and monitoring daily weights.  His weight has been stable.  He met with the nurse clinician during hospitalization for further education.  2.  Hypertension: Controlled. Blood pressure 130/60  3.  CKD stage IV: He had labs yesterday which were stable. He will see Dr Pandya in June for follow-up  4.  Permanent atrial fibrillation: Rate controlled based on last device check.  He continues Toprol for rate control.  Has declined both anticoagulation and referral for atrial appendage closure in the past.    Plan:  1.  Continue current medications  2.  Continue monitoring daily weights and following a low-salt diet  3.  Continue regular exercise    Dereck Elliott will follow up with Dr Koch June 14 and Dr Traore in 3 months.     History of Present Illness/Subjective    Mr. Dereck Elliott is a 83 year old male seen at [facility] heart failure clinic today for continued follow-up.  He follows up for heart failure with preserved ejection fraction.  His wife accompanies him today.  He was hospitalized May 18 to May 21 with acute heart failure and hyperkalemia.He had a potassium level of 6.5.  He was given IV diuretics and symptoms improved.  He had an echocardiogram which showed ejection fraction of 55 to 60%.  He met with nephrology and does not want dialysis.  He has a past medical history significant for hypertension, chronic kidney disease stage IV, permanent atrial fibrillation, sick sinus syndrome status post pacemaker, AAA repair in 2021, and anemia.    Today, he denies any acute heart failure symptoms.  He denies lightheadedness, shortness of breath, dyspnea on exertion, orthopnea, PND, palpitations, chest pain, abdominal fullness/bloating and lower  extremity edema.      He is monitoring home weights which are stable around 174 pounds.  He is following a low sodium diet.  He participates in regular physical activity including going to the gym 5 days a week.      ECHOCARDIOGRAM: 5/18/2023  Interpretation Summary     Left ventricular size, wall motion and function are normal. The ejection  fraction is 55-60%.  Normal right ventricle size and systolic function.  The left atrium is mildly dilated.  The right atrium is mildly dilated.  There is mild (1+) mitral regurgitation.  Mild to moderate valvular aortic stenosis.  Suboptimal Doppler interrogation- AS potentiall may be more severe.  IVC diameter >2.1 cm collapsing <50% with sniff suggests a high RA pressure  estimated at 15 mmHg or greater.     Physical Examination Review of Systems   /60 (BP Location: Left arm, Patient Position: Sitting, Cuff Size: Adult Regular)   Pulse 64   Resp 20   Wt 83.5 kg (184 lb)   SpO2 96%   BMI 25.66 kg/m    Body mass index is 25.66 kg/m .  Wt Readings from Last 3 Encounters:   05/31/23 83.5 kg (184 lb)   05/30/23 82.8 kg (182 lb 9.6 oz)   05/21/23 82.1 kg (181 lb)       General Appearance:   no acute distress   ENT/Mouth: No abnormalities   EYES:  no scleral icterus, normal conjunctivae   Neck: no thyromegaly   Chest/Lungs:   lungs are clear to auscultation, no rales or wheezing, equal chest wall expansion    Cardiovascular:   Regular. Normal first and second heart sounds with no murmurs, rubs, or gallops, no edema bilaterally    Abdomen:  bowel sounds are present   Extremities: no cyanosis or clubbing   Skin: warm   Neurologic: no tremors     Psychiatric: alert and oriented x3                                              Medical History  Surgical History Family History Social History   Past Medical History:   Diagnosis Date     Atrial fibrillation (H)      Chronic heart failure with preserved ejection fraction (H) 05/31/2023     Chronic kidney disease      Chronic  kidney disease (CKD), stage III (moderate) (H)      Chronic kidney disease, stage IV (severe) (H) 2023     Congestive heart failure (H)      Essential hypertension      Hard of hearing      History of rheumatic fever 2017     Onychomycosis 10/27/2017     Permanent atrial fibrillation (H) 2017     SSS (sick sinus syndrome) (H) 2019     Unspecified cataract      Vitamin D deficiency disease 10/06/2015    Past Surgical History:   Procedure Laterality Date     AS FUSION OF WRIST JOINT Right      CATARACT EXTRACTION Left      COLON SURGERY       EP PACEMAKER INSERT N/A 2019    EP Pacemaker Insertion; Emeka Dean MD; [facility];  Service: Cardiology     IMPLANT PACEMAKER       IR ABDOMINAL ENDOVASCULAR STENT GRAFT  2021     LIGATE ARTERY ETHMOID Right 2023    Procedure: Endoscopic control of nasal hemmorhage.;  Surgeon: Melissa Roberson MD;  Location:  OR     REPAIR ANEURYSM ABDOMINAL AORTA N/A 2021    Procedure: ENDOVASCULAR REPAIR OF ABDOMINAL AORTIC ANEURYSM  WITH ENDOGRAFT;  Surgeon: Melia Granger MD;  Location: [facility] OR     ROTATOR CUFF REPAIR RT/LT Left      TOTAL KNEE ARTHROPLASTY Left     Family History   Problem Relation Age of Onset     Valvular heart disease Mother         care at [place]     Colon Cancer Father      No Known Problems Daughter      No Known Problems Daughter     Social History     Socioeconomic History     Marital status:      Spouse name: Not on file     Number of children: 2     Years of education: Not on file     Highest education level: Not on file   Occupational History     Not on file   Tobacco Use     Smoking status: Former     Packs/day: 0.00     Types: Cigars, Cigarettes     Quit date: 2016     Years since quittin.4     Smokeless tobacco: Never   Vaping Use     Vaping status: Not on file   Substance and Sexual Activity     Alcohol use: Yes     Alcohol/week: 1.0 standard drink of alcohol      Comment: Alcoholic Drinks/day: per week 2     Drug use: No     Sexual activity: Not on file   Other Topics Concern     Parent/sibling w/ CABG, MI or angioplasty before 65F 55M? Not Asked   Social History Narrative     Not on file     Social Determinants of Health     Financial Resource Strain: Not on file   Food Insecurity: Not on file   Transportation Needs: Not on file   Physical Activity: Not on file   Stress: Not on file   Social Connections: Not on file   Intimate Partner Violence: Not on file   Housing Stability: Not on file          Medications  Allergies   Current Outpatient Medications   Medication Sig Dispense Refill     acetaminophen (TYLENOL) 500 MG tablet Take 500-1,000 mg by mouth every 6 hours as needed for pain        amLODIPine (NORVASC) 5 MG tablet TAKE ONE TABLET BY MOUTH ONE TIME DAILY (Patient taking differently: Take 5 mg by mouth daily TAKE ONE TABLET BY MOUTH ONE TIME DAILY) 90 tablet 3     aspirin 81 MG EC tablet [ASPIRIN 81 MG EC TABLET] Take 81 mg by mouth daily.       bumetanide (BUMEX) 2 MG tablet Take 1 tablet (2 mg) by mouth daily for 90 days 90 tablet 0     cholecalciferol, vitamin D3, (CHOLECALCIFEROL) 1,000 unit tablet [CHOLECALCIFEROL, VITAMIN D3, (CHOLECALCIFEROL) 1,000 UNIT TABLET] Take 2,000 Units by mouth daily.       cyanocobalamin 100 MCG tablet [CYANOCOBALAMIN 100 MCG TABLET] Take 100 mcg by mouth daily.       metoprolol succinate ER (TOPROL XL) 100 MG 24 hr tablet Take 1 tablet (100 mg) by mouth daily 90 tablet 3     sodium chloride (OCEAN) 0.65 % nasal spray Spray 1 spray into both nostrils 4 times daily as needed for congestion       modafinil (PROVIGIL) 100 MG tablet Take 1 tablet (100 mg) by mouth daily (Patient not taking: Reported on 5/31/2023) 30 tablet 3     oxymetazoline (AFRIN) 0.05 % nasal spray Spray 2 sprays into both nostrils 2 times daily as needed for congestion (Patient not taking: Reported on 5/31/2023) 37 mL 3    No Known Allergies      Lab Results     Chemistry/lipid CBC Cardiac Enzymes/BNP/TSH/INR   Lab Results   Component Value Date    CHOL 122 04/28/2023    HDL 47 04/28/2023    TRIG 63 04/28/2023    BUN 57.7 (H) 05/30/2023     05/30/2023    CO2 29 05/30/2023    Lab Results   Component Value Date    WBC 7.1 05/19/2023    HGB 10.9 (L) 05/19/2023    HCT 34.5 (L) 05/19/2023     (H) 05/19/2023     05/19/2023    Lab Results   Component Value Date     (H) 07/16/2019    TSH 2.42 05/17/2023    INR 1.05 02/16/2023             This note has been dictated using voice recognition software. Any grammatical, typographical, or context distortions are unintentional and inherent to the software    30 minutes spent on the date of encounter doing chart review, review of outside records, review of test results, interpretation with above tests, patient visit, documentation and discussion with family.

## 2023-06-14 ENCOUNTER — OFFICE VISIT (OUTPATIENT)
Dept: CARDIOLOGY | Facility: CLINIC | Age: 83
End: 2023-06-14
Payer: COMMERCIAL

## 2023-06-14 VITALS
BODY MASS INDEX: 25.9 KG/M2 | HEART RATE: 68 BPM | HEIGHT: 71 IN | DIASTOLIC BLOOD PRESSURE: 66 MMHG | SYSTOLIC BLOOD PRESSURE: 122 MMHG | WEIGHT: 185 LBS | RESPIRATION RATE: 16 BRPM

## 2023-06-14 DIAGNOSIS — I50.9 ACUTE DECOMPENSATED HEART FAILURE (H): Primary | ICD-10-CM

## 2023-06-14 LAB
ANION GAP SERPL CALCULATED.3IONS-SCNC: 15 MMOL/L (ref 7–15)
BUN SERPL-MCNC: 61.3 MG/DL (ref 8–23)
CALCIUM SERPL-MCNC: 10.1 MG/DL (ref 8.8–10.2)
CHLORIDE SERPL-SCNC: 99 MMOL/L (ref 98–107)
CREAT SERPL-MCNC: 3.8 MG/DL (ref 0.67–1.17)
DEPRECATED HCO3 PLAS-SCNC: 28 MMOL/L (ref 22–29)
GFR SERPL CREATININE-BSD FRML MDRD: 15 ML/MIN/1.73M2
GLUCOSE SERPL-MCNC: 115 MG/DL (ref 70–99)
POTASSIUM SERPL-SCNC: 4.8 MMOL/L (ref 3.4–5.3)
SODIUM SERPL-SCNC: 142 MMOL/L (ref 136–145)

## 2023-06-14 PROCEDURE — 36415 COLL VENOUS BLD VENIPUNCTURE: CPT | Performed by: INTERNAL MEDICINE

## 2023-06-14 PROCEDURE — 80048 BASIC METABOLIC PNL TOTAL CA: CPT | Performed by: INTERNAL MEDICINE

## 2023-06-14 PROCEDURE — 99214 OFFICE O/P EST MOD 30 MIN: CPT | Performed by: INTERNAL MEDICINE

## 2023-06-14 NOTE — LETTER
6/14/2023    Donald Machado MD  [address]  [place] [address]    RE: Dereck Elliott       Dear Colleague,     I had the pleasure of seeing Dereck Elliott in the [facility].    HEART CARE NOTE          Assessment/Recommendations     1. HFpEF   Assessment / Plan  Near euvolemia on physical exam; denies HF symptoms of orthopnea, PND, fluid retention or edema - no changes to regimen at this time  GDMT as detailed below; mainstay of treatment for HFpEF includes diuretics and adequate BP control (class I) and SGLT2-I (class 2a); additional medical therapy (ARNI, MRA, ARB) demonstrated less robust evidence for indication but may be considered per guideline recommendations (2b); no indication for BBlockers      Current Pharmacotherapy AHA Guideline-Directed Medical Therapy   Losartan - on hold given ARIANA on CKD ARNI/ARB   Spironolactone not started  MRA   SGLT2 inhibitor not started SGLT2-I    Bumex 2 mg daily Loop diuretic       2.CKD   Assessment / Plan  CKD stage IV - Continue renal function closely; will get repeat BMP today     3. Atrial fibrillation  Assessment / Plan  C/b SSS - s/p PPM    History of Present Illness/Subjective      Mr. Dereck Elliott is a 83 year old male with a PMHx significant for (per Dr. Murphy's note) chronic kidney disease stage IV, essential hypertension, hearing impairment, sick sinus syndrome with pacemaker who has been having shortness of breath with exertion and rest.     Today, Mr. Elliott (is hard of hearing and reads lips) denies any acute cardiac events or complaints; Management plan as detailed above     ECG: Personally reviewed. Paced rhythm.     ECHO (personnaly Reviewed): repeat study pending    Left ventricle ejection fraction is normal. The calculated left ventricular ejection fraction is 65%.    Normal left ventricular size.    Left atrial volume is moderately increased.    Normal right ventricular size and systolic function.    Estimated central  "venous pressure equal to 13 mmHg.    The aortic valve is sclerotic without reduced excursion.    Mild to moderate mitral regurgitation.    No previous study for comparison.          Physical Examination Review of Systems   /66 (BP Location: Right arm, Patient Position: Sitting, Cuff Size: Adult Regular)   Pulse 68   Resp 16   Ht 1.803 m (5' 11\")   Wt 83.9 kg (185 lb)   BMI 25.80 kg/m    Body mass index is 25.8 kg/m .  Wt Readings from Last 3 Encounters:   06/14/23 83.9 kg (185 lb)   05/31/23 83.5 kg (184 lb)   05/30/23 82.8 kg (182 lb 9.6 oz)     General Appearance:   no distress, normal body habitus   ENT/Mouth: membranes moist, no oral lesions or bleeding gums.      EYES:  no scleral icterus, normal conjunctivae   Neck: no carotid bruits or thyromegaly   Chest/Lungs:   lungs are clear to auscultation, no rales or wheezing, equal chest wall expansion    Cardiovascular:   Regular. Normal first and second heart sounds with no murmurs, rubs, or gallops; the carotid, radial and posterior tibial pulses are intact, no JVD and trace LE edema bilaterally    Abdomen:  no organomegaly, masses, bruits, or tenderness; bowel sounds are present   Extremities: no cyanosis or clubbing   Skin: no xanthelasma, warm.    Neurologic: alert and oriented x3, calm     Psychiatric: alert and oriented x3, calm     A complete 10 systems ROS was reviewed  And is negative except what is listed in the HPI.          Medical History  Surgical History Family History Social History   Past Medical History:   Diagnosis Date    Atrial fibrillation (H)     Chronic heart failure with preserved ejection fraction (H) 05/31/2023    Chronic kidney disease     Chronic kidney disease (CKD), stage III (moderate) (H)     Chronic kidney disease, stage IV (severe) (H) 05/31/2023    Congestive heart failure (H)     Essential hypertension     Hard of hearing     History of rheumatic fever 04/25/2017    Onychomycosis 10/27/2017    Permanent atrial " fibrillation (H) 2017    SSS (sick sinus syndrome) (H) 2019    Unspecified cataract     Vitamin D deficiency disease 10/06/2015    Past Surgical History:   Procedure Laterality Date    AS FUSION OF WRIST JOINT Right     CATARACT EXTRACTION Left     COLON SURGERY      EP PACEMAKER INSERT N/A 2019    EP Pacemaker Insertion; Emeka Dean MD; [facility];  Service: Cardiology    IMPLANT PACEMAKER      IR ABDOMINAL ENDOVASCULAR STENT GRAFT  2021    LIGATE ARTERY ETHMOID Right 2023    Procedure: Endoscopic control of nasal hemmorhage.;  Surgeon: Melissa Roberson MD;  Location: [facility] OR    REPAIR ANEURYSM ABDOMINAL AORTA N/A 2021    Procedure: ENDOVASCULAR REPAIR OF ABDOMINAL AORTIC ANEURYSM  WITH ENDOGRAFT;  Surgeon: Melia Granger MD;  Location: [facility] OR    ROTATOR CUFF REPAIR RT/LT Left     TOTAL KNEE ARTHROPLASTY Left     no family history of premature coronary artery disease Social History     Socioeconomic History    Marital status:      Spouse name: Not on file    Number of children: 2    Years of education: Not on file    Highest education level: Not on file   Occupational History    Not on file   Tobacco Use    Smoking status: Former     Packs/day: 0.00     Types: Cigars, Cigarettes     Quit date: 2016     Years since quittin.4    Smokeless tobacco: Never   Vaping Use    Vaping status: Not on file   Substance and Sexual Activity    Alcohol use: Yes     Alcohol/week: 1.0 standard drink of alcohol     Comment: Alcoholic Drinks/day: per week 2    Drug use: No    Sexual activity: Not on file   Other Topics Concern    Parent/sibling w/ CABG, MI or angioplasty before 65F 55M? Not Asked   Social History Narrative    Not on file     Social Determinants of Health     Financial Resource Strain: Not on file   Food Insecurity: Not on file   Transportation Needs: Not on file   Physical Activity: Not on file   Stress: Not on file   Social  Connections: Not on file   Intimate Partner Violence: Not on file   Housing Stability: Not on file           Lab Results    Chemistry/lipid CBC Cardiac Enzymes/BNP/TSH/INR   Lab Results   Component Value Date    CHOL 122 04/28/2023    HDL 47 04/28/2023    TRIG 63 04/28/2023    BUN 57.7 (H) 05/30/2023     05/30/2023    CO2 29 05/30/2023    Lab Results   Component Value Date    WBC 7.1 05/19/2023    HGB 10.9 (L) 05/19/2023    HCT 34.5 (L) 05/19/2023     (H) 05/19/2023     05/19/2023    Lab Results   Component Value Date     (H) 07/16/2019    TSH 2.42 05/17/2023    INR 1.05 02/16/2023     No results found for: CKTOTAL, CKMB, TROPONINI       Weight:    Wt Readings from Last 3 Encounters:   06/14/23 83.9 kg (185 lb)   05/31/23 83.5 kg (184 lb)   05/30/23 82.8 kg (182 lb 9.6 oz)       Allergies  No Known Allergies      Surgical History  Past Surgical History:   Procedure Laterality Date    AS FUSION OF WRIST JOINT Right     CATARACT EXTRACTION Left     COLON SURGERY      EP PACEMAKER INSERT N/A 08/01/2019    EP Pacemaker Insertion; Emeka Dean MD; [facility];  Service: Cardiology    IMPLANT PACEMAKER      IR ABDOMINAL ENDOVASCULAR STENT GRAFT  12/29/2021    LIGATE ARTERY ETHMOID Right 02/16/2023    Procedure: Endoscopic control of nasal hemmorhage.;  Surgeon: Melissa Roberson MD;  Location:  OR    REPAIR ANEURYSM ABDOMINAL AORTA N/A 12/29/2021    Procedure: ENDOVASCULAR REPAIR OF ABDOMINAL AORTIC ANEURYSM  WITH ENDOGRAFT;  Surgeon: Melia Granger MD;  Location: [facility] OR    ROTATOR CUFF REPAIR RT/LT Left     TOTAL KNEE ARTHROPLASTY Left        Social History  Tobacco:   History   Smoking Status    Former    Packs/day: 0.00    Types: Cigars, Cigarettes    Quit date: 1/1/2016   Smokeless Tobacco    Never    Alcohol:   Social History    Substance and Sexual Activity      Alcohol use: Yes        Alcohol/week: 1.0 standard drink of alcohol        Comment:  Alcoholic Drinks/day: per week 2   Illicit Drugs:   History   Drug Use No       Family History  Family History   Problem Relation Age of Onset    Valvular heart disease Mother         care at [place]    Colon Cancer Father     No Known Problems Daughter     No Known Problems Daughter           Arabella Koch MD on 6/14/2023      cc: Donald Machado        Thank you for allowing me to participate in the care of your patient.      Sincerely,     Arabella Koch MD     [facility]  cc:   Arabella Koch MD  [address] [address]

## 2023-06-14 NOTE — PROGRESS NOTES
HEART CARE NOTE          Assessment/Recommendations     1. HFpEF   Assessment / Plan    Near euvolemia on physical exam; denies HF symptoms of orthopnea, PND, fluid retention or edema - no changes to regimen at this time    GDMT as detailed below; mainstay of treatment for HFpEF includes diuretics and adequate BP control (class I) and SGLT2-I (class 2a); additional medical therapy (ARNI, MRA, ARB) demonstrated less robust evidence for indication but may be considered per guideline recommendations (2b); no indication for BBlockers      Current Pharmacotherapy AHA Guideline-Directed Medical Therapy   Losartan - on hold given ARIANA on CKD ARNI/ARB   Spironolactone not started  MRA   SGLT2 inhibitor not started SGLT2-I    Bumex 2 mg daily Loop diuretic       2.CKD   Assessment / Plan    CKD stage IV - Continue renal function closely; will get repeat BMP today     3. Atrial fibrillation  Assessment / Plan    C/b SSS - s/p PPM    History of Present Illness/Subjective      Mr. Dereck Elliott is a 83 year old male with a PMHx significant for (per Dr. Murphy's note) chronic kidney disease stage IV, essential hypertension, hearing impairment, sick sinus syndrome with pacemaker who has been having shortness of breath with exertion and rest.     Today, Mr. Elliott (is hard of hearing and reads lips) denies any acute cardiac events or complaints; Management plan as detailed above     ECG: Personally reviewed. Paced rhythm.     ECHO (personnaly Reviewed): repeat study pending    Left ventricle ejection fraction is normal. The calculated left ventricular ejection fraction is 65%.    Normal left ventricular size.    Left atrial volume is moderately increased.    Normal right ventricular size and systolic function.    Estimated central venous pressure equal to 13 mmHg.    The aortic valve is sclerotic without reduced excursion.    Mild to moderate mitral regurgitation.    No previous study for comparison.          Physical Examination  "Review of Systems   /66 (BP Location: Right arm, Patient Position: Sitting, Cuff Size: Adult Regular)   Pulse 68   Resp 16   Ht 1.803 m (5' 11\")   Wt 83.9 kg (185 lb)   BMI 25.80 kg/m    Body mass index is 25.8 kg/m .  Wt Readings from Last 3 Encounters:   06/14/23 83.9 kg (185 lb)   05/31/23 83.5 kg (184 lb)   05/30/23 82.8 kg (182 lb 9.6 oz)     General Appearance:   no distress, normal body habitus   ENT/Mouth: membranes moist, no oral lesions or bleeding gums.      EYES:  no scleral icterus, normal conjunctivae   Neck: no carotid bruits or thyromegaly   Chest/Lungs:   lungs are clear to auscultation, no rales or wheezing, equal chest wall expansion    Cardiovascular:   Regular. Normal first and second heart sounds with no murmurs, rubs, or gallops; the carotid, radial and posterior tibial pulses are intact, no JVD and trace LE edema bilaterally    Abdomen:  no organomegaly, masses, bruits, or tenderness; bowel sounds are present   Extremities: no cyanosis or clubbing   Skin: no xanthelasma, warm.    Neurologic: alert and oriented x3, calm     Psychiatric: alert and oriented x3, calm     A complete 10 systems ROS was reviewed  And is negative except what is listed in the HPI.          Medical History  Surgical History Family History Social History   Past Medical History:   Diagnosis Date     Atrial fibrillation (H)      Chronic heart failure with preserved ejection fraction (H) 05/31/2023     Chronic kidney disease      Chronic kidney disease (CKD), stage III (moderate) (H)      Chronic kidney disease, stage IV (severe) (H) 05/31/2023     Congestive heart failure (H)      Essential hypertension      Hard of hearing      History of rheumatic fever 04/25/2017     Onychomycosis 10/27/2017     Permanent atrial fibrillation (H) 02/03/2017     SSS (sick sinus syndrome) (H) 07/29/2019     Unspecified cataract      Vitamin D deficiency disease 10/06/2015    Past Surgical History:   Procedure Laterality Date "     AS FUSION OF WRIST JOINT Right      CATARACT EXTRACTION Left      COLON SURGERY       EP PACEMAKER INSERT N/A 2019    EP Pacemaker Insertion; Emeka Dean MD; [facility];  Service: Cardiology     IMPLANT PACEMAKER       IR ABDOMINAL ENDOVASCULAR STENT GRAFT  2021     LIGATE ARTERY ETHMOID Right 2023    Procedure: Endoscopic control of nasal hemmorhage.;  Surgeon: Melissa Roberson MD;  Location: [facility] OR     REPAIR ANEURYSM ABDOMINAL AORTA N/A 2021    Procedure: ENDOVASCULAR REPAIR OF ABDOMINAL AORTIC ANEURYSM  WITH ENDOGRAFT;  Surgeon: Melia Granger MD;  [place] [facility] [place]     ROTATOR CUFF REPAIR RT/LT Left      TOTAL KNEE ARTHROPLASTY Left     no family history of premature coronary artery disease Social History     Socioeconomic History     Marital status:      Spouse name: Not on file     Number of children: 2     Years of education: Not on file     Highest education level: Not on file   Occupational History     Not on file   Tobacco Use     Smoking status: Former     Packs/day: 0.00     Types: Cigars, Cigarettes     Quit date: 2016     Years since quittin.4     Smokeless tobacco: Never   Vaping Use     Vaping status: Not on file   Substance and Sexual Activity     Alcohol use: Yes     Alcohol/week: 1.0 standard drink of alcohol     Comment: Alcoholic Drinks/day: per week 2     Drug use: No     Sexual activity: Not on file   Other Topics Concern     Parent/sibling w/ CABG, MI or angioplasty before 65F 55M? Not Asked   Social History Narrative     Not on file     Social Determinants of Health     Financial Resource Strain: Not on file   Food Insecurity: Not on file   Transportation Needs: Not on file   Physical Activity: Not on file   Stress: Not on file   Social Connections: Not on file   Intimate Partner Violence: Not on file   Housing Stability: Not on file           Lab Results    Chemistry/lipid CBC Cardiac Enzymes/BNP/TSH/INR   Lab  Results   Component Value Date    CHOL 122 04/28/2023    HDL 47 04/28/2023    TRIG 63 04/28/2023    BUN 57.7 (H) 05/30/2023     05/30/2023    CO2 29 05/30/2023    Lab Results   Component Value Date    WBC 7.1 05/19/2023    HGB 10.9 (L) 05/19/2023    HCT 34.5 (L) 05/19/2023     (H) 05/19/2023     05/19/2023    Lab Results   Component Value Date     (H) 07/16/2019    TSH 2.42 05/17/2023    INR 1.05 02/16/2023     No results found for: CKTOTAL, CKMB, TROPONINI       Weight:    Wt Readings from Last 3 Encounters:   06/14/23 83.9 kg (185 lb)   05/31/23 83.5 kg (184 lb)   05/30/23 82.8 kg (182 lb 9.6 oz)       Allergies  No Known Allergies      Surgical History  Past Surgical History:   Procedure Laterality Date     AS FUSION OF WRIST JOINT Right      CATARACT EXTRACTION Left      COLON SURGERY       EP PACEMAKER INSERT N/A 08/01/2019    EP Pacemaker Insertion; Emeka Dean MD; [facility] Cath Lab;  Service: Cardiology     IMPLANT PACEMAKER       IR ABDOMINAL ENDOVASCULAR STENT GRAFT  12/29/2021     LIGATE ARTERY ETHMOID Right 02/16/2023    Procedure: Endoscopic control of nasal hemmorhage.;  Surgeon: Melissa Roberson MD;  Location:  OR     REPAIR ANEURYSM ABDOMINAL AORTA N/A 12/29/2021    Procedure: ENDOVASCULAR REPAIR OF ABDOMINAL AORTIC ANEURYSM  WITH ENDOGRAFT;  Surgeon: Melia Granger MD;  Location: [facility] OR     ROTATOR CUFF REPAIR RT/LT Left      TOTAL KNEE ARTHROPLASTY Left        Social History  Tobacco:   History   Smoking Status     Former     Packs/day: 0.00     Types: Cigars, Cigarettes     Quit date: 1/1/2016   Smokeless Tobacco     Never    Alcohol:   Social History    Substance and Sexual Activity      Alcohol use: Yes        Alcohol/week: 1.0 standard drink of alcohol        Comment: Alcoholic Drinks/day: per week 2   Illicit Drugs:   History   Drug Use No       Family History  Family History   Problem Relation Age of Onset     Valvular heart disease  Mother         care at [facility]     Colon Cancer Father      No Known Problems Daughter      No Known Problems Daughter           Arabella Koch MD on 6/14/2023      cc: Donald Machado

## 2023-06-15 DIAGNOSIS — I50.9 ACUTE CONGESTIVE HEART FAILURE, UNSPECIFIED HEART FAILURE TYPE (H): Primary | ICD-10-CM

## 2023-06-15 RX ORDER — BUMETANIDE 1 MG/1
1 TABLET ORAL DAILY
Qty: 90 TABLET | Refills: 3
Start: 2023-06-15 | End: 2023-12-26

## 2023-06-16 ENCOUNTER — TRANSFERRED RECORDS (OUTPATIENT)
Dept: HEALTH INFORMATION MANAGEMENT | Facility: CLINIC | Age: 83
End: 2023-06-16
Payer: COMMERCIAL

## 2023-06-22 ENCOUNTER — ANCILLARY PROCEDURE (OUTPATIENT)
Dept: CARDIOLOGY | Facility: CLINIC | Age: 83
End: 2023-06-22
Attending: INTERNAL MEDICINE
Payer: COMMERCIAL

## 2023-06-22 DIAGNOSIS — Z95.0 PACEMAKER: ICD-10-CM

## 2023-06-22 DIAGNOSIS — I49.5 SICK SINUS SYNDROME (H): ICD-10-CM

## 2023-06-23 ENCOUNTER — TELEPHONE (OUTPATIENT)
Dept: CARDIOLOGY | Facility: CLINIC | Age: 83
End: 2023-06-23
Payer: COMMERCIAL

## 2023-06-23 LAB
MDC_IDC_EPISODE_DTM: NORMAL
MDC_IDC_EPISODE_DTM: NORMAL
MDC_IDC_EPISODE_DURATION: 0 S
MDC_IDC_EPISODE_DURATION: 1 S
MDC_IDC_EPISODE_ID: 17
MDC_IDC_EPISODE_ID: 18
MDC_IDC_EPISODE_TYPE: NORMAL
MDC_IDC_EPISODE_TYPE: NORMAL
MDC_IDC_LEAD_IMPLANT_DT: NORMAL
MDC_IDC_LEAD_LOCATION: NORMAL
MDC_IDC_LEAD_LOCATION_DETAIL_1: NORMAL
MDC_IDC_LEAD_MFG: NORMAL
MDC_IDC_LEAD_MODEL: NORMAL
MDC_IDC_LEAD_POLARITY_TYPE: NORMAL
MDC_IDC_LEAD_SERIAL: NORMAL
MDC_IDC_LEAD_SPECIAL_FUNCTION: NORMAL
MDC_IDC_MSMT_BATTERY_DTM: NORMAL
MDC_IDC_MSMT_BATTERY_REMAINING_LONGEVITY: 130 MO
MDC_IDC_MSMT_BATTERY_RRT_TRIGGER: 2.62
MDC_IDC_MSMT_BATTERY_STATUS: NORMAL
MDC_IDC_MSMT_BATTERY_VOLTAGE: 3.01 V
MDC_IDC_MSMT_LEADCHNL_RV_IMPEDANCE_VALUE: 437 OHM
MDC_IDC_MSMT_LEADCHNL_RV_IMPEDANCE_VALUE: 532 OHM
MDC_IDC_MSMT_LEADCHNL_RV_PACING_THRESHOLD_AMPLITUDE: 0.5 V
MDC_IDC_MSMT_LEADCHNL_RV_PACING_THRESHOLD_PULSEWIDTH: 0.4 MS
MDC_IDC_MSMT_LEADCHNL_RV_SENSING_INTR_AMPL: 1.88 MV
MDC_IDC_MSMT_LEADCHNL_RV_SENSING_INTR_AMPL: 1.88 MV
MDC_IDC_PG_IMPLANT_DTM: NORMAL
MDC_IDC_PG_MFG: NORMAL
MDC_IDC_PG_MODEL: NORMAL
MDC_IDC_PG_SERIAL: NORMAL
MDC_IDC_PG_TYPE: NORMAL
MDC_IDC_SESS_CLINIC_NAME: NORMAL
MDC_IDC_SESS_DTM: NORMAL
MDC_IDC_SESS_TYPE: NORMAL
MDC_IDC_SET_BRADY_HYSTRATE: NORMAL
MDC_IDC_SET_BRADY_LOWRATE: 60 {BEATS}/MIN
MDC_IDC_SET_BRADY_MAX_SENSOR_RATE: 130 {BEATS}/MIN
MDC_IDC_SET_BRADY_MODE: NORMAL
MDC_IDC_SET_LEADCHNL_RV_PACING_AMPLITUDE: 1.5 V
MDC_IDC_SET_LEADCHNL_RV_PACING_ANODE_ELECTRODE_1: NORMAL
MDC_IDC_SET_LEADCHNL_RV_PACING_ANODE_LOCATION_1: NORMAL
MDC_IDC_SET_LEADCHNL_RV_PACING_CAPTURE_MODE: NORMAL
MDC_IDC_SET_LEADCHNL_RV_PACING_CATHODE_ELECTRODE_1: NORMAL
MDC_IDC_SET_LEADCHNL_RV_PACING_CATHODE_LOCATION_1: NORMAL
MDC_IDC_SET_LEADCHNL_RV_PACING_POLARITY: NORMAL
MDC_IDC_SET_LEADCHNL_RV_PACING_PULSEWIDTH: 0.4 MS
MDC_IDC_SET_LEADCHNL_RV_SENSING_ANODE_ELECTRODE_1: NORMAL
MDC_IDC_SET_LEADCHNL_RV_SENSING_ANODE_LOCATION_1: NORMAL
MDC_IDC_SET_LEADCHNL_RV_SENSING_CATHODE_ELECTRODE_1: NORMAL
MDC_IDC_SET_LEADCHNL_RV_SENSING_CATHODE_LOCATION_1: NORMAL
MDC_IDC_SET_LEADCHNL_RV_SENSING_POLARITY: NORMAL
MDC_IDC_SET_LEADCHNL_RV_SENSING_SENSITIVITY: 0.9 MV
MDC_IDC_SET_ZONE_DETECTION_INTERVAL: 360 MS
MDC_IDC_SET_ZONE_TYPE: NORMAL
MDC_IDC_STAT_BRADY_DTM_END: NORMAL
MDC_IDC_STAT_BRADY_DTM_START: NORMAL
MDC_IDC_STAT_BRADY_RV_PERCENT_PACED: 99.79 %
MDC_IDC_STAT_EPISODE_RECENT_COUNT: 0
MDC_IDC_STAT_EPISODE_RECENT_COUNT: 0
MDC_IDC_STAT_EPISODE_RECENT_COUNT: 2
MDC_IDC_STAT_EPISODE_RECENT_COUNT_DTM_END: NORMAL
MDC_IDC_STAT_EPISODE_RECENT_COUNT_DTM_START: NORMAL
MDC_IDC_STAT_EPISODE_TOTAL_COUNT: 0
MDC_IDC_STAT_EPISODE_TOTAL_COUNT: 0
MDC_IDC_STAT_EPISODE_TOTAL_COUNT: 18
MDC_IDC_STAT_EPISODE_TOTAL_COUNT_DTM_END: NORMAL
MDC_IDC_STAT_EPISODE_TOTAL_COUNT_DTM_START: NORMAL
MDC_IDC_STAT_EPISODE_TYPE: NORMAL

## 2023-06-23 NOTE — TELEPHONE ENCOUNTER
6/23/23: called patient to ask if any symptoms with NSVT episodes on 4/28/23 at 18:54 pm and 5/26/23 at 06:12 am. Talked to patient's wife, she said he doesn't complain about palpitations. He is following his heart healthy diet and watching salt very closely, he had an admission with heart failure recently.    two ventricular high rate episodes, both appear to be NSVT 8-14 bts 180-200 bpm. 1/18/19 Echo EF 65%.  Mary Casanova, Device RN

## 2023-06-24 PROCEDURE — 93296 REM INTERROG EVL PM/IDS: CPT | Performed by: INTERNAL MEDICINE

## 2023-06-24 PROCEDURE — 93294 REM INTERROG EVL PM/LDLS PM: CPT | Performed by: INTERNAL MEDICINE

## 2023-06-28 ENCOUNTER — LAB (OUTPATIENT)
Dept: LAB | Facility: CLINIC | Age: 83
End: 2023-06-28
Payer: COMMERCIAL

## 2023-06-28 DIAGNOSIS — I50.9 ACUTE CONGESTIVE HEART FAILURE, UNSPECIFIED HEART FAILURE TYPE (H): ICD-10-CM

## 2023-06-28 DIAGNOSIS — N18.1 CHRONIC KIDNEY DISEASE, STAGE I: ICD-10-CM

## 2023-06-28 LAB
ALBUMIN SERPL BCG-MCNC: 4.6 G/DL (ref 3.5–5.2)
ANION GAP SERPL CALCULATED.3IONS-SCNC: 12 MMOL/L (ref 7–15)
BUN SERPL-MCNC: 54 MG/DL (ref 8–23)
CALCIUM SERPL-MCNC: 9.8 MG/DL (ref 8.8–10.2)
CHLORIDE SERPL-SCNC: 101 MMOL/L (ref 98–107)
CREAT SERPL-MCNC: 3 MG/DL (ref 0.67–1.17)
DEPRECATED HCO3 PLAS-SCNC: 26 MMOL/L (ref 22–29)
GFR SERPL CREATININE-BSD FRML MDRD: 20 ML/MIN/1.73M2
GLUCOSE SERPL-MCNC: 117 MG/DL (ref 70–99)
HOLD SPECIMEN: NORMAL
PHOSPHATE SERPL-MCNC: 4.2 MG/DL (ref 2.5–4.5)
POTASSIUM SERPL-SCNC: 5.4 MMOL/L (ref 3.4–5.3)
SODIUM SERPL-SCNC: 139 MMOL/L (ref 136–145)

## 2023-06-28 PROCEDURE — 36415 COLL VENOUS BLD VENIPUNCTURE: CPT

## 2023-06-28 PROCEDURE — 80069 RENAL FUNCTION PANEL: CPT

## 2023-09-29 ENCOUNTER — ANCILLARY PROCEDURE (OUTPATIENT)
Dept: CARDIOLOGY | Facility: CLINIC | Age: 83
End: 2023-09-29
Attending: INTERNAL MEDICINE
Payer: COMMERCIAL

## 2023-09-29 ENCOUNTER — TRANSFERRED RECORDS (OUTPATIENT)
Dept: HEALTH INFORMATION MANAGEMENT | Facility: CLINIC | Age: 83
End: 2023-09-29
Payer: COMMERCIAL

## 2023-09-29 DIAGNOSIS — Z95.0 PACEMAKER: ICD-10-CM

## 2023-09-29 DIAGNOSIS — I49.5 SICK SINUS SYNDROME (H): ICD-10-CM

## 2023-09-29 LAB
CREATININE (EXTERNAL): 2.82 MG/DL (ref 0.7–1.22)
GFR ESTIMATED (EXTERNAL): 21 ML/MIN/1.73M2
GLUCOSE (EXTERNAL): 91 MG/DL (ref 65–99)
POTASSIUM (EXTERNAL): 5.4 MMOL/L (ref 3.5–5.3)

## 2023-10-03 LAB
MDC_IDC_LEAD_IMPLANT_DT: NORMAL
MDC_IDC_LEAD_LOCATION: NORMAL
MDC_IDC_LEAD_LOCATION_DETAIL_1: NORMAL
MDC_IDC_LEAD_MFG: NORMAL
MDC_IDC_LEAD_MODEL: NORMAL
MDC_IDC_LEAD_POLARITY_TYPE: NORMAL
MDC_IDC_LEAD_SERIAL: NORMAL
MDC_IDC_LEAD_SPECIAL_FUNCTION: NORMAL
MDC_IDC_MSMT_BATTERY_DTM: NORMAL
MDC_IDC_MSMT_BATTERY_REMAINING_LONGEVITY: 127 MO
MDC_IDC_MSMT_BATTERY_RRT_TRIGGER: 2.62
MDC_IDC_MSMT_BATTERY_STATUS: NORMAL
MDC_IDC_MSMT_BATTERY_VOLTAGE: 3 V
MDC_IDC_MSMT_LEADCHNL_RV_IMPEDANCE_VALUE: 418 OHM
MDC_IDC_MSMT_LEADCHNL_RV_IMPEDANCE_VALUE: 532 OHM
MDC_IDC_MSMT_LEADCHNL_RV_PACING_THRESHOLD_AMPLITUDE: 0.5 V
MDC_IDC_MSMT_LEADCHNL_RV_PACING_THRESHOLD_PULSEWIDTH: 0.4 MS
MDC_IDC_MSMT_LEADCHNL_RV_SENSING_INTR_AMPL: 21.88 MV
MDC_IDC_MSMT_LEADCHNL_RV_SENSING_INTR_AMPL: 21.88 MV
MDC_IDC_PG_IMPLANT_DTM: NORMAL
MDC_IDC_PG_MFG: NORMAL
MDC_IDC_PG_MODEL: NORMAL
MDC_IDC_PG_SERIAL: NORMAL
MDC_IDC_PG_TYPE: NORMAL
MDC_IDC_SESS_CLINIC_NAME: NORMAL
MDC_IDC_SESS_DTM: NORMAL
MDC_IDC_SESS_TYPE: NORMAL
MDC_IDC_SET_BRADY_HYSTRATE: NORMAL
MDC_IDC_SET_BRADY_LOWRATE: 60 {BEATS}/MIN
MDC_IDC_SET_BRADY_MAX_SENSOR_RATE: 130 {BEATS}/MIN
MDC_IDC_SET_BRADY_MODE: NORMAL
MDC_IDC_SET_LEADCHNL_RV_PACING_AMPLITUDE: 1.5 V
MDC_IDC_SET_LEADCHNL_RV_PACING_ANODE_ELECTRODE_1: NORMAL
MDC_IDC_SET_LEADCHNL_RV_PACING_ANODE_LOCATION_1: NORMAL
MDC_IDC_SET_LEADCHNL_RV_PACING_CAPTURE_MODE: NORMAL
MDC_IDC_SET_LEADCHNL_RV_PACING_CATHODE_ELECTRODE_1: NORMAL
MDC_IDC_SET_LEADCHNL_RV_PACING_CATHODE_LOCATION_1: NORMAL
MDC_IDC_SET_LEADCHNL_RV_PACING_POLARITY: NORMAL
MDC_IDC_SET_LEADCHNL_RV_PACING_PULSEWIDTH: 0.4 MS
MDC_IDC_SET_LEADCHNL_RV_SENSING_ANODE_ELECTRODE_1: NORMAL
MDC_IDC_SET_LEADCHNL_RV_SENSING_ANODE_LOCATION_1: NORMAL
MDC_IDC_SET_LEADCHNL_RV_SENSING_CATHODE_ELECTRODE_1: NORMAL
MDC_IDC_SET_LEADCHNL_RV_SENSING_CATHODE_LOCATION_1: NORMAL
MDC_IDC_SET_LEADCHNL_RV_SENSING_POLARITY: NORMAL
MDC_IDC_SET_LEADCHNL_RV_SENSING_SENSITIVITY: 0.9 MV
MDC_IDC_SET_ZONE_DETECTION_INTERVAL: 360 MS
MDC_IDC_SET_ZONE_TYPE: NORMAL
MDC_IDC_STAT_BRADY_DTM_END: NORMAL
MDC_IDC_STAT_BRADY_DTM_START: NORMAL
MDC_IDC_STAT_BRADY_RV_PERCENT_PACED: 99.58 %
MDC_IDC_STAT_EPISODE_RECENT_COUNT: 0
MDC_IDC_STAT_EPISODE_RECENT_COUNT_DTM_END: NORMAL
MDC_IDC_STAT_EPISODE_RECENT_COUNT_DTM_START: NORMAL
MDC_IDC_STAT_EPISODE_TOTAL_COUNT: 0
MDC_IDC_STAT_EPISODE_TOTAL_COUNT: 0
MDC_IDC_STAT_EPISODE_TOTAL_COUNT: 18
MDC_IDC_STAT_EPISODE_TOTAL_COUNT_DTM_END: NORMAL
MDC_IDC_STAT_EPISODE_TOTAL_COUNT_DTM_START: NORMAL
MDC_IDC_STAT_EPISODE_TYPE: NORMAL

## 2023-10-03 PROCEDURE — 93294 REM INTERROG EVL PM/LDLS PM: CPT | Performed by: INTERNAL MEDICINE

## 2023-10-03 PROCEDURE — 93296 REM INTERROG EVL PM/IDS: CPT | Performed by: INTERNAL MEDICINE

## 2023-10-24 ENCOUNTER — OFFICE VISIT (OUTPATIENT)
Dept: CARDIOLOGY | Facility: CLINIC | Age: 83
End: 2023-10-24
Attending: NURSE PRACTITIONER
Payer: COMMERCIAL

## 2023-10-24 ENCOUNTER — ANCILLARY PROCEDURE (OUTPATIENT)
Dept: CARDIOLOGY | Facility: CLINIC | Age: 83
End: 2023-10-24
Attending: INTERNAL MEDICINE
Payer: COMMERCIAL

## 2023-10-24 VITALS
HEART RATE: 65 BPM | RESPIRATION RATE: 16 BRPM | BODY MASS INDEX: 26.36 KG/M2 | SYSTOLIC BLOOD PRESSURE: 120 MMHG | DIASTOLIC BLOOD PRESSURE: 64 MMHG | OXYGEN SATURATION: 95 % | WEIGHT: 189 LBS

## 2023-10-24 DIAGNOSIS — Z95.0 PACEMAKER: ICD-10-CM

## 2023-10-24 DIAGNOSIS — I49.5 SICK SINUS SYNDROME (H): ICD-10-CM

## 2023-10-24 DIAGNOSIS — I48.21 PERMANENT ATRIAL FIBRILLATION (H): ICD-10-CM

## 2023-10-24 DIAGNOSIS — I10 BENIGN ESSENTIAL HYPERTENSION: ICD-10-CM

## 2023-10-24 DIAGNOSIS — N18.32 STAGE 3B CHRONIC KIDNEY DISEASE (H): ICD-10-CM

## 2023-10-24 DIAGNOSIS — I47.29 NSVT (NONSUSTAINED VENTRICULAR TACHYCARDIA) (H): ICD-10-CM

## 2023-10-24 DIAGNOSIS — I50.32 CHRONIC HEART FAILURE WITH PRESERVED EJECTION FRACTION (H): ICD-10-CM

## 2023-10-24 DIAGNOSIS — Z95.0 CARDIAC PACEMAKER: ICD-10-CM

## 2023-10-24 LAB
MDC_IDC_LEAD_CONNECTION_STATUS: NORMAL
MDC_IDC_LEAD_IMPLANT_DT: NORMAL
MDC_IDC_LEAD_LOCATION: NORMAL
MDC_IDC_LEAD_LOCATION_DETAIL_1: NORMAL
MDC_IDC_LEAD_MFG: NORMAL
MDC_IDC_LEAD_MODEL: NORMAL
MDC_IDC_LEAD_POLARITY_TYPE: NORMAL
MDC_IDC_LEAD_SERIAL: NORMAL
MDC_IDC_LEAD_SPECIAL_FUNCTION: NORMAL
MDC_IDC_MSMT_BATTERY_DTM: NORMAL
MDC_IDC_MSMT_BATTERY_REMAINING_LONGEVITY: 128 MO
MDC_IDC_MSMT_BATTERY_RRT_TRIGGER: 2.62
MDC_IDC_MSMT_BATTERY_STATUS: NORMAL
MDC_IDC_MSMT_BATTERY_VOLTAGE: 3 V
MDC_IDC_MSMT_LEADCHNL_RV_IMPEDANCE_VALUE: 475 OHM
MDC_IDC_MSMT_LEADCHNL_RV_IMPEDANCE_VALUE: 589 OHM
MDC_IDC_MSMT_LEADCHNL_RV_PACING_THRESHOLD_AMPLITUDE: 0.5 V
MDC_IDC_MSMT_LEADCHNL_RV_PACING_THRESHOLD_AMPLITUDE: 0.62 V
MDC_IDC_MSMT_LEADCHNL_RV_PACING_THRESHOLD_PULSEWIDTH: 0.4 MS
MDC_IDC_MSMT_LEADCHNL_RV_PACING_THRESHOLD_PULSEWIDTH: 0.4 MS
MDC_IDC_PG_IMPLANT_DTM: NORMAL
MDC_IDC_PG_MFG: NORMAL
MDC_IDC_PG_MODEL: NORMAL
MDC_IDC_PG_SERIAL: NORMAL
MDC_IDC_PG_TYPE: NORMAL
MDC_IDC_SESS_CLINIC_NAME: NORMAL
MDC_IDC_SESS_DTM: NORMAL
MDC_IDC_SESS_TYPE: NORMAL
MDC_IDC_SET_BRADY_HYSTRATE: NORMAL
MDC_IDC_SET_BRADY_LOWRATE: 60 {BEATS}/MIN
MDC_IDC_SET_BRADY_MAX_SENSOR_RATE: 130 {BEATS}/MIN
MDC_IDC_SET_BRADY_MODE: NORMAL
MDC_IDC_SET_LEADCHNL_RV_PACING_AMPLITUDE: NORMAL
MDC_IDC_SET_LEADCHNL_RV_PACING_ANODE_ELECTRODE_1: NORMAL
MDC_IDC_SET_LEADCHNL_RV_PACING_ANODE_LOCATION_1: NORMAL
MDC_IDC_SET_LEADCHNL_RV_PACING_CAPTURE_MODE: NORMAL
MDC_IDC_SET_LEADCHNL_RV_PACING_CATHODE_ELECTRODE_1: NORMAL
MDC_IDC_SET_LEADCHNL_RV_PACING_CATHODE_LOCATION_1: NORMAL
MDC_IDC_SET_LEADCHNL_RV_PACING_POLARITY: NORMAL
MDC_IDC_SET_LEADCHNL_RV_PACING_PULSEWIDTH: 0.4 MS
MDC_IDC_SET_LEADCHNL_RV_SENSING_ANODE_ELECTRODE_1: NORMAL
MDC_IDC_SET_LEADCHNL_RV_SENSING_ANODE_LOCATION_1: NORMAL
MDC_IDC_SET_LEADCHNL_RV_SENSING_CATHODE_ELECTRODE_1: NORMAL
MDC_IDC_SET_LEADCHNL_RV_SENSING_CATHODE_LOCATION_1: NORMAL
MDC_IDC_SET_LEADCHNL_RV_SENSING_POLARITY: NORMAL
MDC_IDC_SET_LEADCHNL_RV_SENSING_SENSITIVITY: 0.9 MV
MDC_IDC_SET_ZONE_DETECTION_INTERVAL: 360 MS
MDC_IDC_SET_ZONE_STATUS: NORMAL
MDC_IDC_SET_ZONE_TYPE: NORMAL
MDC_IDC_SET_ZONE_VENDOR_TYPE: NORMAL
MDC_IDC_STAT_BRADY_DTM_END: NORMAL
MDC_IDC_STAT_BRADY_DTM_START: NORMAL
MDC_IDC_STAT_BRADY_RV_PERCENT_PACED: 99 %
MDC_IDC_STAT_EPISODE_RECENT_COUNT: 0
MDC_IDC_STAT_EPISODE_RECENT_COUNT: 0
MDC_IDC_STAT_EPISODE_RECENT_COUNT: 2
MDC_IDC_STAT_EPISODE_RECENT_COUNT_DTM_END: NORMAL
MDC_IDC_STAT_EPISODE_RECENT_COUNT_DTM_START: NORMAL
MDC_IDC_STAT_EPISODE_TOTAL_COUNT: 0
MDC_IDC_STAT_EPISODE_TOTAL_COUNT: 0
MDC_IDC_STAT_EPISODE_TOTAL_COUNT: 18
MDC_IDC_STAT_EPISODE_TOTAL_COUNT_DTM_END: NORMAL
MDC_IDC_STAT_EPISODE_TOTAL_COUNT_DTM_START: NORMAL
MDC_IDC_STAT_EPISODE_TYPE: NORMAL

## 2023-10-24 PROCEDURE — 93279 PRGRMG DEV EVAL PM/LDLS PM: CPT | Performed by: INTERNAL MEDICINE

## 2023-10-24 PROCEDURE — 99214 OFFICE O/P EST MOD 30 MIN: CPT | Performed by: INTERNAL MEDICINE

## 2023-10-24 NOTE — PATIENT INSTRUCTIONS
It was a pleasure to meet with you today.      Below is a summary of your visit.   Continue your medications without changes  Continue regular exercise as tolerated  Follow up in a year or sooner if needed.     Please do not hesitate to call the [facility] with any questions or concerns at [phone].     Sincerely,

## 2023-10-24 NOTE — LETTER
10/24/2023    Donald Machado MD  [address]  [facility] [address]    RE: Dereck Elliott       Dear Colleague,     I had the pleasure of seeing Dereck Elliott in the [facility].    [facility]   [address]  [place] [address]   [email or web address]   OFFICE: [phone]     CARDIOLOGY CLINIC NOTE     Thank you, Donald Shaffer, for asking the [facility] team to see Mr. Dereck Elliott to evaluate Follow Up         Assessment/Recommendations   Assessment:    Permanent atrial fibrillation - asymptomatic. He is not on anticoagulation due to fall risk and patient preference. DOREEN closure was previously discussed and declined by the patient.   Sick sinus syndrome s/p placement of a single chamber Medtronic MRI compatible pacemaker. Functioning normally by interrogation today.  NSVT -has been noted on device interrogations and has been asymptomatic.   Hypertension - not optimally controlled.  Chronic kidney disease, stage IIIb  AAA - s/p endovascular repair.    Plan:  Continue medications without changes.  Continue regular activity/exercise  Follow up in 1 year or sooner if needed.         History of Present Illness   Mr. Dereck Elliott is a 82 year old male who presents for follow-up.     Mr. Elliott has a longstanding history of atrial fibrillation and was noted in July 2019 to have severe bradycardia with frequent pauses.  He was referred for urgent pacemaker placement which occurred on 8/1/2019. He has done well from the standpoint of his afib and pacemaker, however he is not on full anticoagulation due to cost of DOACs and inability to maintain therapeutic INR with warfarin.    Mr. Elliott continues to feel generally well. He denies any new cardiac symptoms. He is walking at a health club regularly and is slowly increasing his tolerance to walking.    Other than noted above, Mr. Elliott denies any chest  pain/pressure/tightness, shortness of breath at rest or with exertion, light headedness/dizziness, pre-syncope, syncope, lower extremity swelling, palpitations, paroxysmal nocturnal dyspnea (PND), or orthopnea.     Cardiac Problems and Cardiac Diagnostics     Most Recent Cardiac testing:  ECG dated 1/14/22 (personaly reviewed and interpreted): atrial fibrillation with RVR, RBBB, occasional ventricular paced complexes.    Pacemaker interrogation today, 10/24/2023   Encounter Type: Patient seen in clinic for annual device evaluation and iterative programming, followed by Dr. Traore (pt accompanied by wife)  Device: Medtronic Standard (D) pacemaker  Pacing %/Programmed:  99%, VVIR 60bpm  Lead(s): Stable  Battery longevity: Estimating 10.5 years remaining  Presenting rhythm:  81bpm  Underlying rhythm: CHB, no R's @ VVI 30bpm  Heart rates: Stable, HR's per histogram range 60-130bpm and primarily 60-100bpm   Atrial High rates: Documented chronic AF, no atrial lead, programmed VVIR  Anticoagulant: None d/t fall risk  Ventricular High rates: 2 VHR episodes logged, EGM's suggests 8-17 beats NSVT 182-200bpm, patient denies symptoms. EF 55-60% per echo 5/18/23.  Comments: Normal device function. No programming changes.       ECHO (report reviewed):   TTE 1/18/19    Left ventricle ejection fraction is normal. The calculated left ventricular ejection fraction is 65%.    Normal left ventricular size.    Left atrial volume is moderately increased.    Normal right ventricular size and systolic function.    Estimated central venous pressure equal to 13 mmHg.    The aortic valve is sclerotic without reduced excursion.    Mild to moderate mitral regurgitation.    No previous study for comparison.    CT angiogram coronary artery  Moderate burden of coronary atherosclerosis but no severe obstructive lesions are seen.  Moderate biatrial enlargement.  Decreased opacification in the distal left atrial appendage, likely representing  incomplete mixing of contrast due to low flow. No obvious thrombus.         Medications  Allergies   Current Outpatient Medications   Medication Sig Dispense Refill    acetaminophen (TYLENOL) 500 MG tablet Take 500-1,000 mg by mouth every 6 hours as needed for pain       amLODIPine (NORVASC) 5 MG tablet TAKE ONE TABLET BY MOUTH ONE TIME DAILY (Patient taking differently: Take 5 mg by mouth daily TAKE ONE TABLET BY MOUTH ONE TIME DAILY) 90 tablet 3    aspirin 81 MG EC tablet [ASPIRIN 81 MG EC TABLET] Take 81 mg by mouth daily.      bumetanide (BUMEX) 1 MG tablet Take 1 tablet (1 mg) by mouth daily 90 tablet 3    cholecalciferol, vitamin D3, (CHOLECALCIFEROL) 1,000 unit tablet [CHOLECALCIFEROL, VITAMIN D3, (CHOLECALCIFEROL) 1,000 UNIT TABLET] Take 2,000 Units by mouth daily.      cyanocobalamin 100 MCG tablet [CYANOCOBALAMIN 100 MCG TABLET] Take 100 mcg by mouth daily.      metoprolol succinate ER (TOPROL XL) 100 MG 24 hr tablet Take 1 tablet (100 mg) by mouth daily 90 tablet 3    oxymetazoline (AFRIN) 0.05 % nasal spray Spray 2 sprays into both nostrils 2 times daily as needed for congestion 37 mL 3    sodium chloride (OCEAN) 0.65 % nasal spray Spray 1 spray into both nostrils 4 times daily as needed for congestion        No Known Allergies     Physical Examination Review of Systems   Vitals: /64 (BP Location: Right arm, Patient Position: Sitting, Cuff Size: Adult Regular)   Pulse 65   Resp 16   Wt 85.7 kg (189 lb)   SpO2 95%   BMI 26.36 kg/m    BMI= Body mass index is 26.36 kg/m .  Wt Readings from Last 3 Encounters:   10/24/23 85.7 kg (189 lb)   06/14/23 83.9 kg (185 lb)   05/31/23 83.5 kg (184 lb)       General Appearance:   Pleasant male, appears stated age. no acute distress, normal body habitus   ENT/Mouth: membranes moist, no apparent gingival bleeding.      EYES:  no scleral icterus, normal conjunctivae   Neck:  supple   Respiratory:   lungs are clear to auscultation, no rales or wheezing, equal  chest wall expansion    Cardiovascular:   Irregularly irregular rhythm, normal rate. Normal first and second heart sounds with no murmurs, rubs, or gallops; the carotid, radial and posterior tibial pulses are intact, Jugular venous pressure normal, no edema bilaterally    Extremities: no cyanosis or clubbing   Skin: no xanthelasma, warm.    Heme/lymph/ Immunology No apparent bleeding noted.   Neurologic: Alert and oriented. normal gait, no tremors   Psychiatric: Pleasant, calm, appropriate affect.         Please refer above for cardiac ROS details.       Past History   Past Medical History:   Past Medical History:   Diagnosis Date    Atrial fibrillation (H)     Chronic heart failure with preserved ejection fraction (H) 05/31/2023    Chronic kidney disease     Chronic kidney disease (CKD), stage III (moderate) (H)     Chronic kidney disease, stage IV (severe) (H) 05/31/2023    Congestive heart failure (H)     Essential hypertension     Hard of hearing     History of rheumatic fever 04/25/2017    Onychomycosis 10/27/2017    Permanent atrial fibrillation (H) 02/03/2017    SSS (sick sinus syndrome) (H) 07/29/2019    Unspecified cataract     Vitamin D deficiency disease 10/06/2015        Past Surgical History:   Past Surgical History:   Procedure Laterality Date    AS FUSION OF WRIST JOINT Right     CATARACT EXTRACTION Left     COLON SURGERY      EP PACEMAKER INSERT N/A 08/01/2019    EP Pacemaker Insertion; Emeka Dean MD; [facility] Lab;  Service: Cardiology    IMPLANT PACEMAKER      IR ABDOMINAL ENDOVASCULAR STENT GRAFT  12/29/2021    LIGATE ARTERY ETHMOID Right 02/16/2023    Procedure: Endoscopic control of nasal hemmorhage.;  Surgeon: Melissa Roberson MD;  Location: [facility] OR    REPAIR ANEURYSM ABDOMINAL AORTA N/A 12/29/2021    Procedure: ENDOVASCULAR REPAIR OF ABDOMINAL AORTIC ANEURYSM  WITH ENDOGRAFT;  Surgeon: Melia Granger MD;  [facility]    ROTATOR CUFF REPAIR RT/LT Left      TOTAL KNEE ARTHROPLASTY Left         Family History:   Family History   Problem Relation Age of Onset    Valvular heart disease Mother         care at [place]    Colon Cancer Father     No Known Problems Daughter     No Known Problems Daughter         Social History:   Social History     Socioeconomic History    Marital status:      Spouse name: Not on file    Number of children: 2    Years of education: Not on file    Highest education level: Not on file   Occupational History    Not on file   Tobacco Use    Smoking status: Former     Packs/day: 0     Types: Cigars, Cigarettes     Quit date: 2016     Years since quittin.8    Smokeless tobacco: Never   Substance and Sexual Activity    Alcohol use: Yes     Alcohol/week: 1.0 standard drink of alcohol     Comment: Alcoholic Drinks/day: per week 2    Drug use: No    Sexual activity: Not on file   Other Topics Concern    Parent/sibling w/ CABG, MI or angioplasty before 65F 55M? Not Asked   Social History Narrative    Not on file     Social Determinants of Health     Financial Resource Strain: Not on file   Food Insecurity: Not on file   Transportation Needs: Not on file   Physical Activity: Not on file   Stress: Not on file   Social Connections: Not on file   Interpersonal Safety: Not on file   Housing Stability: Not on file            Lab Results    Chemistry/lipid CBC Cardiac Enzymes/BNP/TSH/INR   Lab Results   Component Value Date    CHOL 122 2023    HDL 47 2023    TRIG 63 2023    BUN 54.0 (H) 2023     2023    CO2 26 2023    Lab Results   Component Value Date    WBC 7.1 2023    HGB 10.9 (L) 2023    HCT 34.5 (L) 2023     (H) 2023     2023    Lab Results   Component Value Date     (H) 2019    TSH 2.42 2023    INR 1.05 2023                Thank you for allowing me to participate in the care of your patient.      Sincerely,     Johny Traore MD      [facility]  cc:   Siena Duenas, GUERRERO CNP  [address], [address]  [place] [address]

## 2023-10-24 NOTE — PROGRESS NOTES
[facility] HEART CARE   [address]  [place] [address]   [email or web address]   OFFICE: [phone]     CARDIOLOGY CLINIC NOTE     Thank you, Donald Shaffer, for asking the [facility] team to see Mr. Dereck Elliott to evaluate Follow Up         Assessment/Recommendations   Assessment:    Permanent atrial fibrillation - asymptomatic. He is not on anticoagulation due to fall risk and patient preference. DOREEN closure was previously discussed and declined by the patient.   Sick sinus syndrome s/p placement of a single chamber Medtronic MRI compatible pacemaker. Functioning normally by interrogation today.  NSVT -has been noted on device interrogations and has been asymptomatic.   Hypertension - not optimally controlled.  Chronic kidney disease, stage IIIb  AAA - s/p endovascular repair.    Plan:  Continue medications without changes.  Continue regular activity/exercise  Follow up in 1 year or sooner if needed.         History of Present Illness   Mr. Dereck Elliott is a 82 year old male who presents for follow-up.     Mr. Elliott has a longstanding history of atrial fibrillation and was noted in July 2019 to have severe bradycardia with frequent pauses.  He was referred for urgent pacemaker placement which occurred on 8/1/2019. He has done well from the standpoint of his afib and pacemaker, however he is not on full anticoagulation due to cost of DOACs and inability to maintain therapeutic INR with warfarin.    Mr. Elliott continues to feel generally well. He denies any new cardiac symptoms. He is walking at a health club regularly and is slowly increasing his tolerance to walking.    Other than noted above, Mr. Elliott denies any chest pain/pressure/tightness, shortness of breath at rest or with exertion, light headedness/dizziness, pre-syncope, syncope, lower extremity swelling, palpitations, paroxysmal nocturnal dyspnea (PND), or orthopnea.     Cardiac  Problems and Cardiac Diagnostics     Most Recent Cardiac testing:  ECG dated 1/14/22 (personaly reviewed and interpreted): atrial fibrillation with RVR, RBBB, occasional ventricular paced complexes.    Pacemaker interrogation today, 10/24/2023   Encounter Type: Patient seen in clinic for annual device evaluation and iterative programming, followed by Dr. Traore (pt accompanied by wife)  Device: Medtronic Mattie (D) pacemaker  Pacing %/Programmed:  99%, VVIR 60bpm  Lead(s): Stable  Battery longevity: Estimating 10.5 years remaining  Presenting rhythm:  81bpm  Underlying rhythm: CHB, no R's @ VVI 30bpm  Heart rates: Stable, HR's per histogram range 60-130bpm and primarily 60-100bpm   Atrial High rates: Documented chronic AF, no atrial lead, programmed VVIR  Anticoagulant: None d/t fall risk  Ventricular High rates: 2 VHR episodes logged, EGM's suggests 8-17 beats NSVT 182-200bpm, patient denies symptoms. EF 55-60% per echo 5/18/23.  Comments: Normal device function. No programming changes.       ECHO (report reviewed):   TTE 1/18/19    Left ventricle ejection fraction is normal. The calculated left ventricular ejection fraction is 65%.    Normal left ventricular size.    Left atrial volume is moderately increased.    Normal right ventricular size and systolic function.    Estimated central venous pressure equal to 13 mmHg.    The aortic valve is sclerotic without reduced excursion.    Mild to moderate mitral regurgitation.    No previous study for comparison.    CT angiogram coronary artery  Moderate burden of coronary atherosclerosis but no severe obstructive lesions are seen.  Moderate biatrial enlargement.  Decreased opacification in the distal left atrial appendage, likely representing incomplete mixing of contrast due to low flow. No obvious thrombus.         Medications  Allergies   Current Outpatient Medications   Medication Sig Dispense Refill    acetaminophen (TYLENOL) 500 MG tablet Take 500-1,000 mg by  mouth every 6 hours as needed for pain       amLODIPine (NORVASC) 5 MG tablet TAKE ONE TABLET BY MOUTH ONE TIME DAILY (Patient taking differently: Take 5 mg by mouth daily TAKE ONE TABLET BY MOUTH ONE TIME DAILY) 90 tablet 3    aspirin 81 MG EC tablet [ASPIRIN 81 MG EC TABLET] Take 81 mg by mouth daily.      bumetanide (BUMEX) 1 MG tablet Take 1 tablet (1 mg) by mouth daily 90 tablet 3    cholecalciferol, vitamin D3, (CHOLECALCIFEROL) 1,000 unit tablet [CHOLECALCIFEROL, VITAMIN D3, (CHOLECALCIFEROL) 1,000 UNIT TABLET] Take 2,000 Units by mouth daily.      cyanocobalamin 100 MCG tablet [CYANOCOBALAMIN 100 MCG TABLET] Take 100 mcg by mouth daily.      metoprolol succinate ER (TOPROL XL) 100 MG 24 hr tablet Take 1 tablet (100 mg) by mouth daily 90 tablet 3    oxymetazoline (AFRIN) 0.05 % nasal spray Spray 2 sprays into both nostrils 2 times daily as needed for congestion 37 mL 3    sodium chloride (OCEAN) 0.65 % nasal spray Spray 1 spray into both nostrils 4 times daily as needed for congestion        No Known Allergies     Physical Examination Review of Systems   Vitals: /64 (BP Location: Right arm, Patient Position: Sitting, Cuff Size: Adult Regular)   Pulse 65   Resp 16   Wt 85.7 kg (189 lb)   SpO2 95%   BMI 26.36 kg/m    BMI= Body mass index is 26.36 kg/m .  Wt Readings from Last 3 Encounters:   10/24/23 85.7 kg (189 lb)   06/14/23 83.9 kg (185 lb)   05/31/23 83.5 kg (184 lb)       General Appearance:   Pleasant male, appears stated age. no acute distress, normal body habitus   ENT/Mouth: membranes moist, no apparent gingival bleeding.      EYES:  no scleral icterus, normal conjunctivae   Neck:  supple   Respiratory:   lungs are clear to auscultation, no rales or wheezing, equal chest wall expansion    Cardiovascular:   Irregularly irregular rhythm, normal rate. Normal first and second heart sounds with no murmurs, rubs, or gallops; the carotid, radial and posterior tibial pulses are intact, Jugular  venous pressure normal, no edema bilaterally    Extremities: no cyanosis or clubbing   Skin: no xanthelasma, warm.    Heme/lymph/ Immunology No apparent bleeding noted.   Neurologic: Alert and oriented. normal gait, no tremors   Psychiatric: Pleasant, calm, appropriate affect.         Please refer above for cardiac ROS details.       Past History   Past Medical History:   Past Medical History:   Diagnosis Date    Atrial fibrillation (H)     Chronic heart failure with preserved ejection fraction (H) 05/31/2023    Chronic kidney disease     Chronic kidney disease (CKD), stage III (moderate) (H)     Chronic kidney disease, stage IV (severe) (H) 05/31/2023    Congestive heart failure (H)     Essential hypertension     Hard of hearing     History of rheumatic fever 04/25/2017    Onychomycosis 10/27/2017    Permanent atrial fibrillation (H) 02/03/2017    SSS (sick sinus syndrome) (H) 07/29/2019    Unspecified cataract     Vitamin D deficiency disease 10/06/2015        Past Surgical History:   Past Surgical History:   Procedure Laterality Date    AS FUSION OF WRIST JOINT Right     CATARACT EXTRACTION Left     COLON SURGERY      EP PACEMAKER INSERT N/A 08/01/2019    EP Pacemaker Insertion; Emeka Dean MD; [facility];  Service: Cardiology    IMPLANT PACEMAKER      IR ABDOMINAL ENDOVASCULAR STENT GRAFT  12/29/2021    LIGATE ARTERY ETHMOID Right 02/16/2023    Procedure: Endoscopic control of nasal hemmorhage.;  Surgeon: Melissa Roberson MD;  Location:  OR    REPAIR ANEURYSM ABDOMINAL AORTA N/A 12/29/2021    Procedure: ENDOVASCULAR REPAIR OF ABDOMINAL AORTIC ANEURYSM  WITH ENDOGRAFT;  Surgeon: Melia Granger MD;  Location: [facility] OR    ROTATOR CUFF REPAIR RT/LT Left     TOTAL KNEE ARTHROPLASTY Left         Family History:   Family History   Problem Relation Age of Onset    Valvular heart disease Mother         care at [place]    Colon Cancer Father     No Known Problems Daughter     No Known  Problems Daughter         Social History:   Social History     Socioeconomic History    Marital status:      Spouse name: Not on file    Number of children: 2    Years of education: Not on file    Highest education level: Not on file   Occupational History    Not on file   Tobacco Use    Smoking status: Former     Packs/day: 0     Types: Cigars, Cigarettes     Quit date: 2016     Years since quittin.8    Smokeless tobacco: Never   Substance and Sexual Activity    Alcohol use: Yes     Alcohol/week: 1.0 standard drink of alcohol     Comment: Alcoholic Drinks/day: per week 2    Drug use: No    Sexual activity: Not on file   Other Topics Concern    Parent/sibling w/ CABG, MI or angioplasty before 65F 55M? Not Asked   Social History Narrative    Not on file     Social Determinants of Health     Financial Resource Strain: Not on file   Food Insecurity: Not on file   Transportation Needs: Not on file   Physical Activity: Not on file   Stress: Not on file   Social Connections: Not on file   Interpersonal Safety: Not on file   Housing Stability: Not on file            Lab Results    Chemistry/lipid CBC Cardiac Enzymes/BNP/TSH/INR   Lab Results   Component Value Date    CHOL 122 2023    HDL 47 2023    TRIG 63 2023    BUN 54.0 (H) 2023     2023    CO2 26 2023    Lab Results   Component Value Date    WBC 7.1 2023    HGB 10.9 (L) 2023    HCT 34.5 (L) 2023     (H) 2023     2023    Lab Results   Component Value Date     (H) 2019    TSH 2.42 2023    INR 1.05 2023

## 2023-10-31 ENCOUNTER — OFFICE VISIT (OUTPATIENT)
Dept: FAMILY MEDICINE | Facility: CLINIC | Age: 83
End: 2023-10-31
Payer: COMMERCIAL

## 2023-10-31 VITALS
HEIGHT: 71 IN | TEMPERATURE: 96.4 F | RESPIRATION RATE: 18 BRPM | HEART RATE: 61 BPM | WEIGHT: 185 LBS | OXYGEN SATURATION: 96 % | SYSTOLIC BLOOD PRESSURE: 120 MMHG | DIASTOLIC BLOOD PRESSURE: 70 MMHG | BODY MASS INDEX: 25.9 KG/M2

## 2023-10-31 DIAGNOSIS — D53.9 MACROCYTIC ANEMIA: ICD-10-CM

## 2023-10-31 DIAGNOSIS — I48.21 PERMANENT ATRIAL FIBRILLATION (H): ICD-10-CM

## 2023-10-31 DIAGNOSIS — H90.3 BILATERAL SENSORINEURAL HEARING LOSS: ICD-10-CM

## 2023-10-31 DIAGNOSIS — Z23 ENCOUNTER FOR IMMUNIZATION: ICD-10-CM

## 2023-10-31 DIAGNOSIS — Z95.0 CARDIAC PACEMAKER IN SITU: ICD-10-CM

## 2023-10-31 DIAGNOSIS — M54.2 NECK PAIN: ICD-10-CM

## 2023-10-31 DIAGNOSIS — I10 ESSENTIAL HYPERTENSION: ICD-10-CM

## 2023-10-31 DIAGNOSIS — Z00.00 ENCOUNTER FOR MEDICARE ANNUAL WELLNESS EXAM: Primary | ICD-10-CM

## 2023-10-31 DIAGNOSIS — N18.4 CKD (CHRONIC KIDNEY DISEASE) STAGE 4, GFR 15-29 ML/MIN (H): ICD-10-CM

## 2023-10-31 DIAGNOSIS — I50.30 HEART FAILURE WITH PRESERVED EJECTION FRACTION, NYHA CLASS I (H): ICD-10-CM

## 2023-10-31 LAB
ERYTHROCYTE [DISTWIDTH] IN BLOOD BY AUTOMATED COUNT: 12.9 % (ref 10–15)
HCT VFR BLD AUTO: 39.8 % (ref 40–53)
HGB BLD-MCNC: 13 G/DL (ref 13.3–17.7)
MCH RBC QN AUTO: 34.1 PG (ref 26.5–33)
MCHC RBC AUTO-ENTMCNC: 32.7 G/DL (ref 31.5–36.5)
MCV RBC AUTO: 105 FL (ref 78–100)
PLATELET # BLD AUTO: 147 10E3/UL (ref 150–450)
RBC # BLD AUTO: 3.81 10E6/UL (ref 4.4–5.9)
WBC # BLD AUTO: 6.9 10E3/UL (ref 4–11)

## 2023-10-31 PROCEDURE — 90662 IIV NO PRSV INCREASED AG IM: CPT | Performed by: FAMILY MEDICINE

## 2023-10-31 PROCEDURE — 91320 SARSCV2 VAC 30MCG TRS-SUC IM: CPT | Performed by: FAMILY MEDICINE

## 2023-10-31 PROCEDURE — G0008 ADMIN INFLUENZA VIRUS VAC: HCPCS | Performed by: FAMILY MEDICINE

## 2023-10-31 PROCEDURE — 36415 COLL VENOUS BLD VENIPUNCTURE: CPT | Performed by: FAMILY MEDICINE

## 2023-10-31 PROCEDURE — 85027 COMPLETE CBC AUTOMATED: CPT | Performed by: FAMILY MEDICINE

## 2023-10-31 PROCEDURE — 80053 COMPREHEN METABOLIC PANEL: CPT | Performed by: FAMILY MEDICINE

## 2023-10-31 PROCEDURE — 90480 ADMN SARSCOV2 VAC 1/ONLY CMP: CPT | Performed by: FAMILY MEDICINE

## 2023-10-31 PROCEDURE — G0439 PPPS, SUBSEQ VISIT: HCPCS | Performed by: FAMILY MEDICINE

## 2023-10-31 RX ORDER — RESPIRATORY SYNCYTIAL VIRUS VACCINE 120MCG/0.5
0.5 KIT INTRAMUSCULAR ONCE
Qty: 1 EACH | Refills: 0 | Status: CANCELLED | OUTPATIENT
Start: 2023-10-31 | End: 2023-10-31

## 2023-10-31 ASSESSMENT — ACTIVITIES OF DAILY LIVING (ADL): CURRENT_FUNCTION: TELEPHONE REQUIRES ASSISTANCE

## 2023-10-31 ASSESSMENT — ENCOUNTER SYMPTOMS
DIARRHEA: 0
SHORTNESS OF BREATH: 0
DIZZINESS: 0
NAUSEA: 0
CHILLS: 0
MYALGIAS: 1
FEVER: 0
HEARTBURN: 0
PARESTHESIAS: 0
FREQUENCY: 0
ARTHRALGIAS: 1
HEADACHES: 0
HEMATURIA: 0
ABDOMINAL PAIN: 0
SORE THROAT: 0
PALPITATIONS: 0
DYSURIA: 0
HEMATOCHEZIA: 0
CONSTIPATION: 0
NERVOUS/ANXIOUS: 0
WEAKNESS: 0
JOINT SWELLING: 0
EYE PAIN: 0

## 2023-10-31 ASSESSMENT — PAIN SCALES - GENERAL: PAINLEVEL: NO PAIN (0)

## 2023-10-31 NOTE — PROGRESS NOTES
SUBJECTIVE:   Dereck is a 83 year old who presents for Preventive Visit.      10/31/2023     1:25 PM   Additional Questions   Roomed by Trish   Accompanied by wife       Are you in the first 12 months of your Medicare coverage?  { :260569}    HPI        Have you ever done Advance Care Planning? (For example, a Health Directive, POLST, or a discussion with a medical provider or your loved ones about your wishes): { :474659}    {Hearing Test Done (Optional):883196}   Fall risk  { :443413}  {If any of the above assessments are answered yes, consider ordering appropriate referrals (Optional):749469}  Cognitive Screening { :540871}    {Do you have sleep apnea, excessive snoring or daytime drowsiness? (Optional):785950}    Reviewed and updated as needed this visit by clinical staff   Tobacco  Allergies  Meds              Reviewed and updated as needed this visit by Provider                 Social History     Tobacco Use    Smoking status: Former     Packs/day: 0     Types: Cigars, Cigarettes     Quit date: 2016     Years since quittin.8    Smokeless tobacco: Never   Substance Use Topics    Alcohol use: Yes     Alcohol/week: 1.0 standard drink of alcohol     Comment: Alcoholic Drinks/day: per week 2     {Rooming staff  Click this link to complete the Prescreen if response below is not for today's visit  Alcohol Use Prescreen >3 drinks/day or > 7 drinks/week.  If the prescreen question answer is YES, complete the full AUDIT  :350100}        10/31/2023     1:18 PM   Alcohol Use   Prescreen: >3 drinks/day or >7 drinks/week? No   {add AUDIT responses (Optional) (A score of 7 for adult men is an indication of hazardous drinking; a score of 8 or more is an indication of an alcohol use disorder.  A score of 7 or more for adult women is an indication of hazardous drinking or an alchohol use disorder):576325}  Do you have a current opioid prescription? { :350812}  Do you use any other controlled substances or  medications that are not prescribed by a provider? {Substance Use :859576}  {Provider  If there are gaps in the social history shown above, please follow the link and refresh the note Link to Social and Substance History :475546}    {Outside tests to abstract? (Optional):211467}    {additional problems to add (Optional):260563}    Current providers sharing in care for this patient include: {Rooming staff:  Please update Care Team from storyboard, refresh this note and then delete this statement}  Patient Care Team:  Donald Machado MD as PCP - General  Donald Machado MD as Assigned PCP  Siena Duenas APRN CNP as Nurse Practitioner (Cardiovascular Disease)  Johny Traore MD as MD (Cardiovascular Disease)  Johny Traore MD as Assigned Heart and Vascular Provider    The following health maintenance items are reviewed in Epic and correct as of today:  Health Maintenance   Topic Date Due    HF ACTION PLAN  Never done    PARATHYROID  Never done    ZOSTER IMMUNIZATION (1 of 2) Never done    RSV VACCINE 60+ (1 - 1-dose 60+ series) Never done    MICROALBUMIN  10/05/2022    ALT  01/14/2023    INFLUENZA VACCINE (1) 09/01/2023    COVID-19 Vaccine (6 - 2023-24 season) 09/01/2023    BMP  09/28/2023    FALL RISK ASSESSMENT  10/28/2023    MEDICARE ANNUAL WELLNESS VISIT  10/28/2023    HEMOGLOBIN  11/19/2023    LIPID  04/28/2024    ANNUAL REVIEW OF HM ORDERS  04/28/2024    CBC  05/19/2024    DTAP/TDAP/TD IMMUNIZATION (2 - Td or Tdap) 04/22/2026    ADVANCE CARE PLANNING  10/28/2027    PHOSPHORUS  Completed    TSH W/FREE T4 REFLEX  Completed    PHQ-2 (once per calendar year)  Completed    Pneumococcal Vaccine: 65+ Years  Completed    URINALYSIS  Completed    ALK PHOS  Completed    IPV IMMUNIZATION  Aged Out    HPV IMMUNIZATION  Aged Out    MENINGITIS IMMUNIZATION  Aged Out    RSV MONOCLONAL ANTIBODY  Aged Out     {Chronicprobdata (optional):472258}  {Decision Support (Optional):277156}        Review of  "Systems  {ROS COMP (Optional):728678}    OBJECTIVE:   /70   Pulse 61   Temp (!) 96.4  F (35.8  C) (Temporal)   Resp 18   Ht 1.803 m (5' 11\")   Wt 83.9 kg (185 lb)   SpO2 96%   BMI 25.80 kg/m   Estimated body mass index is 25.8 kg/m  as calculated from the following:    Height as of this encounter: 1.803 m (5' 11\").    Weight as of this encounter: 83.9 kg (185 lb).  Physical Exam  {Exam (Optional) :508691}    {Diagnostic Test Results (Optional):157851}    ASSESSMENT / PLAN:   {Diag Picklist:408141}    {Patient advised of split billing (Optional):477153}      COUNSELING:  {Medicare Counselin}      BMI:   Estimated body mass index is 25.8 kg/m  as calculated from the following:    Height as of this encounter: 1.803 m (5' 11\").    Weight as of this encounter: 83.9 kg (185 lb).   {Weight Management Plan needed for ACO:484033}      He reports that he quit smoking about 7 years ago. His smoking use included cigars and cigarettes. He has never used smokeless tobacco.      Appropriate preventive services were discussed with this patient, including applicable screening as appropriate for fall prevention, nutrition, physical activity, Tobacco-use cessation, weight loss and cognition.  Checklist reviewing preventive services available has been given to the patient.    Reviewed patients plan of care and provided an AVS. The {CarePlan:191785} for Dereck meets the Care Plan requirement. This Care Plan has been established and reviewed with the {PATIENT, FAMILY MEMBER, CAREGIVER:607958}.    {Counseling Resources  [place] Preventive Services Task Force  Cholesterol Screening  Health diet/nutrition  Pooled Cohorts Equation Calculator  USDA's MyPlate  ASA Prophylaxis  Lung CA Screening  Osteoporosis prevention/bone health :275351}  {Prostate Cancer Screening  Consider for men 55-69 per guidance from USPSTF :920872}    Donald Machado MD  [facility]    Identified Health Risks:  {Medicare required " documentation of substance and opioid use disorders screening :367478}

## 2023-10-31 NOTE — LETTER
November 1, 2023      Dereck Elliott  [address] [address]        Dear ,    We are writing to inform you of your test results.    Your complete blood count results show mild anemia and slightly low platelet count.  Your labs appear stable.  We will continue to follow closely.     Your kidney function remains significantly reduced.  It appears stable.  We will continue to follow closely.     Your liver function tests were normal.     Resulted Orders   CBC with platelets   Result Value Ref Range    WBC Count 6.9 4.0 - 11.0 10e3/uL    RBC Count 3.81 (L) 4.40 - 5.90 10e6/uL    Hemoglobin 13.0 (L) 13.3 - 17.7 g/dL    Hematocrit 39.8 (L) 40.0 - 53.0 %     (H) 78 - 100 fL    MCH 34.1 (H) 26.5 - 33.0 pg    MCHC 32.7 31.5 - 36.5 g/dL    RDW 12.9 10.0 - 15.0 %    Platelet Count 147 (L) 150 - 450 10e3/uL   Comprehensive metabolic panel   Result Value Ref Range    Sodium 140 135 - 145 mmol/L      Comment:      Reference intervals for this test were updated on 09/26/2023 to more accurately reflect our healthy population. There may be differences in the flagging of prior results with similar values performed with this method. Interpretation of those prior results can be made in the context of the updated reference intervals.     Potassium 5.3 3.4 - 5.3 mmol/L    Carbon Dioxide (CO2) 27 22 - 29 mmol/L    Anion Gap 12 7 - 15 mmol/L    Urea Nitrogen 44.6 (H) 8.0 - 23.0 mg/dL    Creatinine 3.02 (H) 0.67 - 1.17 mg/dL    GFR Estimate 20 (L) >60 mL/min/1.73m2    Calcium 10.3 (H) 8.8 - 10.2 mg/dL    Chloride 101 98 - 107 mmol/L    Glucose 99 70 - 99 mg/dL    Alkaline Phosphatase 113 40 - 129 U/L    AST 23 0 - 45 U/L      Comment:      Reference intervals for this test were updated on 6/12/2023 to more accurately reflect our healthy population. There may be differences in the flagging of prior results with similar values performed with this method. Interpretation of those prior results can be made in the  context of the updated reference intervals.    ALT 15 0 - 70 U/L      Comment:      Reference intervals for this test were updated on 6/12/2023 to more accurately reflect our healthy population. There may be differences in the flagging of prior results with similar values performed with this method. Interpretation of those prior results can be made in the context of the updated reference intervals.      Protein Total 8.1 6.4 - 8.3 g/dL    Albumin 4.5 3.5 - 5.2 g/dL    Bilirubin Total 0.6 <=1.2 mg/dL       If you have any questions or concerns, please call the clinic at the number listed above.       Sincerely,      Donald Machado MD
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
## 2023-10-31 NOTE — PATIENT INSTRUCTIONS
Patient Education   Personalized Prevention Plan  You are due for the preventive services outlined below.  Your care team is available to assist you in scheduling these services.  If you have already completed any of these items, please share that information with your care team to update in your medical record.  Health Maintenance Due   Topic Date Due     Heart Failure Action Plan  Never done     Parathyroid Lab  Never done     Zoster (Shingles) Vaccine (1 of 2) Never done     RSV VACCINE 60+ (1 - 1-dose 60+ series) Never done     Kidney Microalbumin Urine Test  10/05/2022     Liver Monitoring Lab  01/14/2023     Flu Vaccine (1) 09/01/2023     COVID-19 Vaccine (6 - 2023-24 season) 09/01/2023     Basic Metabolic Panel  09/28/2023     Hemoglobin  11/19/2023     Your Health Risk Assessment indicates you feel you are not in good health    A healthy lifestyle helps keep the body fit and the mind alert. It helps protect you from disease, helps you fight disease, and helps prevent chronic disease (disease that doesn't go away) from getting worse. This is important as you get older and begin to notice twinges in muscles and joints and a decline in the strength and stamina you once took for granted. A healthy lifestyle includes good healthcare, good nutrition, weight control, recreation, and regular exercise. Avoid harmful substances and do what you can to keep safe. Another part of a healthy lifestyle is stay mentally active and socially involved.    Good healthcare   Have a wellness visit every year.   If you have new symptoms, let us know right away. Don't wait until the next checkup.   Take medicines exactly as prescribed and keep your medicines in a safe place. Tell us if your medicine causes problems.   Healthy diet and weight control   Eat 3 or 4 small, nutritious, low-fat, high-fiber meals a day. Include a variety of fruits, vegetables, and whole-grain foods.   Make sure you get enough calcium in your diet. Calcium,  vitamin D, and exercise help prevent osteoporosis (bone thinning).   If you live alone, try eating with others when you can. That way you get a good meal and have company while you eat it.   Try to keep a healthy weight. If you eat more calories than your body uses for energy, it will be stored as fat and you will gain weight.     Recreation   Recreation is not limited to sports and team events. It includes any activity that provides relaxation, interest, enjoyment, and exercise. Recreation provides an outlet for physical, mental, and social energy. It can give a sense of worth and achievement. It can help you stay healthy.    Mental Exercise and Social Involvement  Mental and emotional health is as important as physical health. Keep in touch with friends and family. Stay as active as possible. Continue to learn and challenge yourself.   Things you can do to stay mentally active are:  Learn something new, like a foreign language or musical instrument.   Play SCRABBLE or do crossword puzzles. If you cannot find people to play these games with you at home, you can play them with others on your computer through the Internet.   Join a games club--anything from card games to chess or checkers or lawn bowling.   Start a new hobby.   Go back to school.   Volunteer.   Read.   Keep up with world events.  Activities of Daily Living    Your Health Risk Assessment indicates you have difficulties with activities of daily living such as housework, bathing, preparing meals, taking medication, etc. Please make a follow up appointment for us to address this issue in more detail.  Hearing Loss: Care Instructions  Overview     Hearing loss is a sudden or slow decrease in how well you hear. It can range from slight to profound. Permanent hearing loss can occur with aging. It also can happen when you are exposed long-term to loud noise. Examples include listening to loud music, riding motorcycles, or being around other loud  machines.  Hearing loss can affect your work and home life. It can make you feel lonely or depressed. You may feel that you have lost your independence. But hearing aids and other devices can help you hear better and feel connected to others.  Follow-up care is a key part of your treatment and safety. Be sure to make and go to all appointments, and call your doctor if you are having problems. It's also a good idea to know your test results and keep a list of the medicines you take.  How can you care for yourself at home?  Avoid loud noises whenever possible. This helps keep your hearing from getting worse.  Always wear hearing protection around loud noises.  Wear a hearing aid as directed.  A professional can help you pick a hearing aid that will work best for you.  You can also get hearing aids over the counter for mild to moderate hearing loss.  Have hearing tests as your doctor suggests. They can show whether your hearing has changed. Your hearing aid may need to be adjusted.  Use other devices as needed. These may include:  Telephone amplifiers and hearing aids that can connect to a television, stereo, radio, or microphone.  Devices that use lights or vibrations. These alert you to the doorbell, a ringing telephone, or a baby monitor.  Television closed-captioning. This shows the words at the bottom of the screen. Most new TVs can do this.  TTY (text telephone). This lets you type messages back and forth on the telephone instead of talking or listening. These devices are also called TDD. When messages are typed on the keyboard, they are sent over the phone line to a receiving TTY. The message is shown on a monitor.  Use text messaging, social media, and email if it is hard for you to communicate by telephone.  Try to learn a listening technique called speechreading. It is not lipreading. You pay attention to people's gestures, expressions, posture, and tone of voice. These clues can help you understand what a  "person is saying. Face the person you are talking to, and have them face you. Make sure the lighting is good. You need to see the other person's face clearly.  Think about counseling if you need help to adjust to your hearing loss.  When should you call for help?  Watch closely for changes in your health, and be sure to contact your doctor if:    You think your hearing is getting worse.     You have new symptoms, such as dizziness or nausea.   Where can you learn more?  Go to https://[email or web address].net/patiented  Enter R798 in the search box to learn more about \"Hearing Loss: Care Instructions.\"  Current as of: March 1, 2023               Content Version: 13.7    4805-9638 [employer].   Care instructions adapted under license by your healthcare professional. If you have questions about a medical condition or this instruction, always ask your healthcare professional. [employer] disclaims any warranty or liability for your use of this information.         "

## 2023-11-01 LAB
ALBUMIN SERPL BCG-MCNC: 4.5 G/DL (ref 3.5–5.2)
ALP SERPL-CCNC: 113 U/L (ref 40–129)
ALT SERPL W P-5'-P-CCNC: 15 U/L (ref 0–70)
ANION GAP SERPL CALCULATED.3IONS-SCNC: 12 MMOL/L (ref 7–15)
AST SERPL W P-5'-P-CCNC: 23 U/L (ref 0–45)
BILIRUB SERPL-MCNC: 0.6 MG/DL
BUN SERPL-MCNC: 44.6 MG/DL (ref 8–23)
CALCIUM SERPL-MCNC: 10.3 MG/DL (ref 8.8–10.2)
CHLORIDE SERPL-SCNC: 101 MMOL/L (ref 98–107)
CREAT SERPL-MCNC: 3.02 MG/DL (ref 0.67–1.17)
DEPRECATED HCO3 PLAS-SCNC: 27 MMOL/L (ref 22–29)
EGFRCR SERPLBLD CKD-EPI 2021: 20 ML/MIN/1.73M2
GLUCOSE SERPL-MCNC: 99 MG/DL (ref 70–99)
POTASSIUM SERPL-SCNC: 5.3 MMOL/L (ref 3.4–5.3)
PROT SERPL-MCNC: 8.1 G/DL (ref 6.4–8.3)
SODIUM SERPL-SCNC: 140 MMOL/L (ref 135–145)

## 2023-12-06 ENCOUNTER — TELEPHONE (OUTPATIENT)
Dept: FAMILY MEDICINE | Facility: CLINIC | Age: 83
End: 2023-12-06

## 2023-12-06 ENCOUNTER — OFFICE VISIT (OUTPATIENT)
Dept: FAMILY MEDICINE | Facility: CLINIC | Age: 83
End: 2023-12-06
Payer: COMMERCIAL

## 2023-12-06 VITALS
TEMPERATURE: 98.2 F | HEART RATE: 78 BPM | DIASTOLIC BLOOD PRESSURE: 80 MMHG | SYSTOLIC BLOOD PRESSURE: 130 MMHG | WEIGHT: 182 LBS | RESPIRATION RATE: 18 BRPM | BODY MASS INDEX: 25.48 KG/M2 | HEIGHT: 71 IN

## 2023-12-06 DIAGNOSIS — Z86.79 HISTORY OF RHEUMATIC FEVER: ICD-10-CM

## 2023-12-06 DIAGNOSIS — H04.202 WATERING OF LEFT EYE: ICD-10-CM

## 2023-12-06 DIAGNOSIS — I10 ESSENTIAL HYPERTENSION: ICD-10-CM

## 2023-12-06 DIAGNOSIS — D53.9 MACROCYTIC ANEMIA: ICD-10-CM

## 2023-12-06 DIAGNOSIS — Z01.818 PREOPERATIVE EXAMINATION: Primary | ICD-10-CM

## 2023-12-06 DIAGNOSIS — H90.3 BILATERAL SENSORINEURAL HEARING LOSS: ICD-10-CM

## 2023-12-06 DIAGNOSIS — N18.4 CHRONIC KIDNEY DISEASE, STAGE IV (SEVERE) (H): ICD-10-CM

## 2023-12-06 DIAGNOSIS — Z95.0 CARDIAC PACEMAKER IN SITU: ICD-10-CM

## 2023-12-06 DIAGNOSIS — I48.21 PERMANENT ATRIAL FIBRILLATION (H): ICD-10-CM

## 2023-12-06 LAB
ALBUMIN SERPL BCG-MCNC: 4.7 G/DL (ref 3.5–5.2)
ANION GAP SERPL CALCULATED.3IONS-SCNC: 13 MMOL/L (ref 7–15)
BUN SERPL-MCNC: 38.5 MG/DL (ref 8–23)
CALCIUM SERPL-MCNC: 10.2 MG/DL (ref 8.8–10.2)
CHLORIDE SERPL-SCNC: 99 MMOL/L (ref 98–107)
CREAT SERPL-MCNC: 3.23 MG/DL (ref 0.67–1.17)
CREAT UR-MCNC: 51.8 MG/DL
DEPRECATED HCO3 PLAS-SCNC: 28 MMOL/L (ref 22–29)
EGFRCR SERPLBLD CKD-EPI 2021: 18 ML/MIN/1.73M2
ERYTHROCYTE [DISTWIDTH] IN BLOOD BY AUTOMATED COUNT: 12.4 % (ref 10–15)
GLUCOSE SERPL-MCNC: 105 MG/DL (ref 70–99)
HCT VFR BLD AUTO: 43.5 % (ref 40–53)
HGB BLD-MCNC: 14.1 G/DL (ref 13.3–17.7)
HOLD SPECIMEN: NORMAL
MCH RBC QN AUTO: 33.9 PG (ref 26.5–33)
MCHC RBC AUTO-ENTMCNC: 32.4 G/DL (ref 31.5–36.5)
MCV RBC AUTO: 105 FL (ref 78–100)
MICROALBUMIN UR-MCNC: 182 MG/L
MICROALBUMIN/CREAT UR: 351.35 MG/G CR (ref 0–17)
PHOSPHATE SERPL-MCNC: 3.2 MG/DL (ref 2.5–4.5)
PLATELET # BLD AUTO: 148 10E3/UL (ref 150–450)
POTASSIUM SERPL-SCNC: 4.8 MMOL/L (ref 3.4–5.3)
PTH-INTACT SERPL-MCNC: 45 PG/ML (ref 15–65)
RBC # BLD AUTO: 4.16 10E6/UL (ref 4.4–5.9)
SODIUM SERPL-SCNC: 140 MMOL/L (ref 135–145)
WBC # BLD AUTO: 8.4 10E3/UL (ref 4–11)

## 2023-12-06 PROCEDURE — 99214 OFFICE O/P EST MOD 30 MIN: CPT | Performed by: FAMILY MEDICINE

## 2023-12-06 PROCEDURE — 85027 COMPLETE CBC AUTOMATED: CPT | Performed by: FAMILY MEDICINE

## 2023-12-06 PROCEDURE — 82043 UR ALBUMIN QUANTITATIVE: CPT | Performed by: FAMILY MEDICINE

## 2023-12-06 PROCEDURE — 80069 RENAL FUNCTION PANEL: CPT | Performed by: FAMILY MEDICINE

## 2023-12-06 PROCEDURE — 36415 COLL VENOUS BLD VENIPUNCTURE: CPT | Performed by: FAMILY MEDICINE

## 2023-12-06 PROCEDURE — 82570 ASSAY OF URINE CREATININE: CPT | Performed by: FAMILY MEDICINE

## 2023-12-06 PROCEDURE — 83970 ASSAY OF PARATHORMONE: CPT | Performed by: FAMILY MEDICINE

## 2023-12-06 RX ORDER — RESPIRATORY SYNCYTIAL VIRUS VACCINE 120MCG/0.5
0.5 KIT INTRAMUSCULAR ONCE
Qty: 1 EACH | Refills: 0 | Status: CANCELLED | OUTPATIENT
Start: 2023-12-06 | End: 2023-12-06

## 2023-12-06 RX ORDER — AMOXICILLIN 500 MG/1
2000 CAPSULE ORAL
Qty: 4 CAPSULE | Refills: 3 | Status: SHIPPED | OUTPATIENT
Start: 2023-12-06

## 2023-12-06 ASSESSMENT — PAIN SCALES - GENERAL: PAINLEVEL: NO PAIN (0)

## 2023-12-06 NOTE — TELEPHONE ENCOUNTER
"S-(situation): Patient's spouse, Shauna, called to report recent symptoms that patient forgot to share at pre-op appointment today. Believes symptoms are side effects of Metoprolol.     B-(background): Patient has surgery for left eye lid on 1/15/23.     Patient is taking amlodipine 5 mg daily and metoprolol succinate 100 mg daily for hypertension. Patient does not     A-(assessment): Shauna reports that when patient got home from appointment today, he vomited. They believe this was due to taking Metoprolol on an empty stomach this morning.     Patient has also been experiencing \"icy cold hands\" and intermittent chills - noted 2 episodes in the last week. Denies fevers.    Also reports patient had decreased appetite due to nausea and did not eat dinner last night.     Patient is currently resting. No other symptoms noted.    R-(recommendations): Shauna would like to inform PCP of symptoms and inquire if they could be related to taking metoprolol on an empty stomach.    Routed to PCP to review and advise.     Mally Delgado RN  [facility]    "

## 2023-12-06 NOTE — PATIENT INSTRUCTIONS
Preparing for Your Surgery  Getting started  A nurse will call you to review your health history and instructions. They will give you an arrival time based on your scheduled surgery time. Please be ready to share:  Your doctor's clinic name and phone number  Your medical, surgical, and anesthesia history  A list of allergies and sensitivities  A list of medicines, including herbal treatments and over-the-counter drugs  Whether the patient has a legal guardian (ask how to send us the papers in advance)  Please tell us if you're pregnant--or if there's any chance you might be pregnant. Some surgeries may injure a fetus (unborn baby), so they require a pregnancy test. Surgeries that are safe for a fetus don't always need a test, and you can choose whether to have one.   If you have a child who's having surgery, please ask for a copy of Preparing for Your Child's Surgery.    Preparing for surgery  Within 10 to 30 days of surgery: Have a pre-op exam (sometimes called an H&P, or History and Physical). This can be done at a clinic or pre-operative center.  If you're having a , you may not need this exam. Talk to your care team.  At your pre-op exam, talk to your care team about all medicines you take. If you need to stop any medicines before surgery, ask when to start taking them again.  We do this for your safety. Many medicines can make you bleed too much during surgery. Some change how well surgery (anesthesia) drugs work.  Call your insurance company to let them know you're having surgery. (If you don't have insurance, call [phone].)  Call your clinic if there's any change in your health. This includes signs of a cold or flu (sore throat, runny nose, cough, rash, fever). It also includes a scrape or scratch near the surgery site.  If you have questions on the day of surgery, call your hospital or surgery center.  Eating and drinking guidelines  For your safety: Unless your surgeon tells you otherwise,  follow the guidelines below.  Eat and drink as usual until 8 hours before you arrive for surgery. After that, no food or milk.  Drink clear liquids until 2 hours before you arrive. These are liquids you can see through, like water, Gatorade, and Propel Water. They also include plain black coffee and tea (no cream or milk), candy, and breath mints. You can spit out gum when you arrive.  If you drink alcohol: Stop drinking it the night before surgery.  If your care team tells you to take medicine on the morning of surgery, it's okay to take it with a sip of water.  Preventing infection  Shower or bathe the night before and morning of your surgery. Follow the instructions your clinic gave you. (If no instructions, use regular soap.)  Don't shave or clip hair near your surgery site. We'll remove the hair if needed.  Don't smoke or vape the morning of surgery. You may chew nicotine gum up to 2 hours before surgery. A nicotine patch is okay.  Note: Some surgeries require you to completely quit smoking and nicotine. Check with your surgeon.  Your care team will make every effort to keep you safe from infection. We will:  Clean our hands often with soap and water (or an alcohol-based hand rub).  Clean the skin at your surgery site with a special soap that kills germs.  Give you a special gown to keep you warm. (Cold raises the risk of infection.)  Wear special hair covers, masks, gowns and gloves during surgery.  Give antibiotic medicine, if prescribed. Not all surgeries need antibiotics.  What to bring on the day of surgery  Photo ID and insurance card  Copy of your health care directive, if you have one  Glasses and hearing aids (bring cases)  You can't wear contacts during surgery  Inhaler and eye drops, if you use them (tell us about these when you arrive)  CPAP machine or breathing device, if you use them  A few personal items, if spending the night  If you have . . .  A pacemaker, ICD (cardiac defibrillator) or other  implant: Bring the ID card.  An implanted stimulator: Bring the remote control.  A legal guardian: Bring a copy of the certified (court-stamped) guardianship papers.  Please remove any jewelry, including body piercings. Leave jewelry and other valuables at home.  If you're going home the day of surgery  You must have a responsible adult drive you home. They should stay with you overnight as well.  If you don't have someone to stay with you, and you aren't safe to go home alone, we may keep you overnight. Insurance often won't pay for this.  After surgery  If it's hard to control your pain or you need more pain medicine, please call your surgeon's office.  Questions?   If you have any questions for your care team, list them here: _________________________________________________________________________________________________________________________________________________________________________ ____________________________________ ____________________________________ ____________________________________  For informational purposes only. Not to replace the advice of your health care provider. Copyright   2003, 2019 [employer] [facility]. All rights reserved. Clinically reviewed by Kathy Pino MD. [employer] [id number] - REV 12/22.    How to Take Your Medication Before Surgery  - Take all of your medications before surgery except as noted below  - HOLD (do not take) Aspirin for 7 days
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
## 2023-12-06 NOTE — LETTER
December 7, 2023      Dereck Elliott  [address] [facility] [address]        Dear ,    We are writing to inform you of your test results.    Your kidney function remains significantly reduced.  Please ensure follow-up with kidney specialist in order to continue to monitor and ensure prevention of further worsening kidney function.    Your urine screen remains abnormal.  Too much protein escapes into your urine.  Continue your current medication as discussed.  We will continue to follow closely.  Please ensure follow-up with your kidney specialist in order to closely monitor.    Your parathyroid hormone level is normal.     Your complete blood count results were fine.  No evidence for anemia, etc.    Resulted Orders   Albumin Random Urine Quantitative with Creat Ratio   Result Value Ref Range    Creatinine Urine mg/dL 51.8 mg/dL      Comment:      The reference ranges have not been established in urine creatinine. The results should be integrated into the clinical context for interpretation.    Albumin Urine mg/L 182.0 mg/L      Comment:      The reference ranges have not been established in urine albumin. The results should be integrated into the clinical context for interpretation.    Albumin Urine mg/g Cr 351.35 (H) 0.00 - 17.00 mg/g Cr      Comment:      Microalbuminuria is defined as an albumin:creatinine ratio of 17 to 299 for males and 25 to 299 for females. A ratio of albumin:creatinine of 300 or higher is indicative of overt proteinuria.  Due to biologic variability, positive results should be confirmed by a second, first-morning random or 24-hour timed urine specimen. If there is discrepancy, a third specimen is recommended. When 2 out of 3 results are in the microalbuminuria range, this is evidence for incipient nephropathy and warrants increased efforts at glucose control, blood pressure control, and institution of therapy with an angiotensin-converting-enzyme (ACE) inhibitor (if the patient  can tolerate it).     Parathyroid Hormone Intact   Result Value Ref Range    Parathyroid Hormone Intact 45 15 - 65 pg/mL    Narrative    This result was obtained with the Roche Elecsys PTH STAT assay.   This reference range differs from PTH assays used in other [facility] laboratories.   Renal panel (Alb, BUN, Ca, Cl, CO2, Creat, Gluc, Phos, K, Na)   Result Value Ref Range    Sodium 140 135 - 145 mmol/L      Comment:      Reference intervals for this test were updated on 09/26/2023 to more accurately reflect our healthy population. There may be differences in the flagging of prior results with similar values performed with this method. Interpretation of those prior results can be made in the context of the updated reference intervals.     Potassium 4.8 3.4 - 5.3 mmol/L    Chloride 99 98 - 107 mmol/L    Carbon Dioxide (CO2) 28 22 - 29 mmol/L    Anion Gap 13 7 - 15 mmol/L    Glucose 105 (H) 70 - 99 mg/dL    Urea Nitrogen 38.5 (H) 8.0 - 23.0 mg/dL    Creatinine 3.23 (H) 0.67 - 1.17 mg/dL    GFR Estimate 18 (L) >60 mL/min/1.73m2    Calcium 10.2 8.8 - 10.2 mg/dL    Albumin 4.7 3.5 - 5.2 g/dL    Phosphorus 3.2 2.5 - 4.5 mg/dL   CBC with platelets   Result Value Ref Range    WBC Count 8.4 4.0 - 11.0 10e3/uL    RBC Count 4.16 (L) 4.40 - 5.90 10e6/uL    Hemoglobin 14.1 13.3 - 17.7 g/dL    Hematocrit 43.5 40.0 - 53.0 %     (H) 78 - 100 fL    MCH 33.9 (H) 26.5 - 33.0 pg    MCHC 32.4 31.5 - 36.5 g/dL    RDW 12.4 10.0 - 15.0 %    Platelet Count 148 (L) 150 - 450 10e3/uL       If you have any questions or concerns, please call the clinic at the number listed above.       Sincerely,      Donald Machado MD

## 2023-12-11 NOTE — TELEPHONE ENCOUNTER
Please notify patient that he may try taking his metoprolol with food in order to see if his symptoms improve.

## 2023-12-11 NOTE — TELEPHONE ENCOUNTER
I left a VM for Shauna or Dereck to call back. Please relay message from Dr. Machado when patient calls back.

## 2023-12-26 DIAGNOSIS — I50.9 ACUTE CONGESTIVE HEART FAILURE, UNSPECIFIED HEART FAILURE TYPE (H): ICD-10-CM

## 2023-12-26 RX ORDER — BUMETANIDE 1 MG/1
1 TABLET ORAL DAILY
Qty: 90 TABLET | Refills: 3 | Status: SHIPPED | OUTPATIENT
Start: 2023-12-26

## 2023-12-26 NOTE — TELEPHONE ENCOUNTER
Pending Prescriptions:                       Disp   Refills    bumetanide (BUMEX) 1 MG tablet            90 tab*3            Sig: Take 1 tablet (1 mg) by mouth daily

## 2023-12-27 ENCOUNTER — TELEPHONE (OUTPATIENT)
Dept: FAMILY MEDICINE | Facility: CLINIC | Age: 83
End: 2023-12-27
Payer: COMMERCIAL

## 2023-12-27 NOTE — TELEPHONE ENCOUNTER
Reason for call:  Other     Patient called regarding (reason for call): call back    Additional comments: Patient's wife is calling and wondering if the patient should stop taking Asprin 2 week prior to surgery. Please advise and call wife back with updates please and thank you.    Phone number to reach patient:  Home number on file [phone] (home)    Best Time:  any    Can we leave a detailed message on this number?  YES

## 2023-12-28 NOTE — TELEPHONE ENCOUNTER
Left detailed message for pt and wife. To return call if they have any other questions or needing clarification.    MARC JansenN, RN  [facility]

## 2024-01-02 ENCOUNTER — TELEPHONE (OUTPATIENT)
Dept: VASCULAR SURGERY | Facility: CLINIC | Age: 84
End: 2024-01-02
Payer: COMMERCIAL

## 2024-01-02 NOTE — TELEPHONE ENCOUNTER
----- Message from Mee Spears RN sent at 1/2/2024  8:19 AM CST -----  Regarding: FW: needs 1 year f/u with Dr MORALES, and aortic us and non contrast ct abd  fellow  ----- Message -----  From: Mary Brooke  Sent: 1/2/2024   7:58 AM CST  To: Mee Spears RN  Subject: FW: needs 1 year f/u with Dr MORALES, and aortic u#      ----- Message -----  From: Xuan Gray RN  Sent: 3/2/2023   1:22 PM CST  To: #  Subject: needs 1 year f/u with Dr MORALES, and aortic us an#

## 2024-01-27 ENCOUNTER — ANCILLARY PROCEDURE (OUTPATIENT)
Dept: CARDIOLOGY | Facility: CLINIC | Age: 84
End: 2024-01-27
Attending: INTERNAL MEDICINE
Payer: COMMERCIAL

## 2024-01-27 DIAGNOSIS — I49.5 SICK SINUS SYNDROME (H): ICD-10-CM

## 2024-01-27 DIAGNOSIS — Z95.0 PACEMAKER: ICD-10-CM

## 2024-02-21 LAB
MDC_IDC_EPISODE_DTM: NORMAL
MDC_IDC_EPISODE_DURATION: 0 S
MDC_IDC_EPISODE_DURATION: 2 S
MDC_IDC_EPISODE_DURATION: 2 S
MDC_IDC_EPISODE_ID: 19
MDC_IDC_EPISODE_ID: 20
MDC_IDC_EPISODE_ID: 21
MDC_IDC_EPISODE_TYPE: NORMAL
MDC_IDC_LEAD_CONNECTION_STATUS: NORMAL
MDC_IDC_LEAD_IMPLANT_DT: NORMAL
MDC_IDC_LEAD_LOCATION: NORMAL
MDC_IDC_LEAD_LOCATION_DETAIL_1: NORMAL
MDC_IDC_LEAD_MFG: NORMAL
MDC_IDC_LEAD_MODEL: NORMAL
MDC_IDC_LEAD_POLARITY_TYPE: NORMAL
MDC_IDC_LEAD_SERIAL: NORMAL
MDC_IDC_LEAD_SPECIAL_FUNCTION: NORMAL
MDC_IDC_MSMT_BATTERY_DTM: NORMAL
MDC_IDC_MSMT_BATTERY_REMAINING_LONGEVITY: 123 MO
MDC_IDC_MSMT_BATTERY_RRT_TRIGGER: 2.62
MDC_IDC_MSMT_BATTERY_STATUS: NORMAL
MDC_IDC_MSMT_BATTERY_VOLTAGE: 3 V
MDC_IDC_MSMT_LEADCHNL_RV_IMPEDANCE_VALUE: 437 OHM
MDC_IDC_MSMT_LEADCHNL_RV_IMPEDANCE_VALUE: 532 OHM
MDC_IDC_MSMT_LEADCHNL_RV_PACING_THRESHOLD_AMPLITUDE: 0.62 V
MDC_IDC_MSMT_LEADCHNL_RV_PACING_THRESHOLD_PULSEWIDTH: 0.4 MS
MDC_IDC_MSMT_LEADCHNL_RV_SENSING_INTR_AMPL: 16.25 MV
MDC_IDC_MSMT_LEADCHNL_RV_SENSING_INTR_AMPL: 16.25 MV
MDC_IDC_PG_IMPLANT_DTM: NORMAL
MDC_IDC_PG_MFG: NORMAL
MDC_IDC_PG_MODEL: NORMAL
MDC_IDC_PG_SERIAL: NORMAL
MDC_IDC_PG_TYPE: NORMAL
MDC_IDC_SESS_CLINIC_NAME: NORMAL
MDC_IDC_SESS_DTM: NORMAL
MDC_IDC_SESS_TYPE: NORMAL
MDC_IDC_SET_BRADY_HYSTRATE: NORMAL
MDC_IDC_SET_BRADY_LOWRATE: 60 {BEATS}/MIN
MDC_IDC_SET_BRADY_MAX_SENSOR_RATE: 130 {BEATS}/MIN
MDC_IDC_SET_BRADY_MODE: NORMAL
MDC_IDC_SET_LEADCHNL_RV_PACING_AMPLITUDE: 1.5 V
MDC_IDC_SET_LEADCHNL_RV_PACING_ANODE_ELECTRODE_1: NORMAL
MDC_IDC_SET_LEADCHNL_RV_PACING_ANODE_LOCATION_1: NORMAL
MDC_IDC_SET_LEADCHNL_RV_PACING_CAPTURE_MODE: NORMAL
MDC_IDC_SET_LEADCHNL_RV_PACING_CATHODE_ELECTRODE_1: NORMAL
MDC_IDC_SET_LEADCHNL_RV_PACING_CATHODE_LOCATION_1: NORMAL
MDC_IDC_SET_LEADCHNL_RV_PACING_POLARITY: NORMAL
MDC_IDC_SET_LEADCHNL_RV_PACING_PULSEWIDTH: 0.4 MS
MDC_IDC_SET_LEADCHNL_RV_SENSING_ANODE_ELECTRODE_1: NORMAL
MDC_IDC_SET_LEADCHNL_RV_SENSING_ANODE_LOCATION_1: NORMAL
MDC_IDC_SET_LEADCHNL_RV_SENSING_CATHODE_ELECTRODE_1: NORMAL
MDC_IDC_SET_LEADCHNL_RV_SENSING_CATHODE_LOCATION_1: NORMAL
MDC_IDC_SET_LEADCHNL_RV_SENSING_POLARITY: NORMAL
MDC_IDC_SET_LEADCHNL_RV_SENSING_SENSITIVITY: 0.9 MV
MDC_IDC_SET_ZONE_DETECTION_INTERVAL: 360 MS
MDC_IDC_SET_ZONE_STATUS: NORMAL
MDC_IDC_SET_ZONE_TYPE: NORMAL
MDC_IDC_SET_ZONE_VENDOR_TYPE: NORMAL
MDC_IDC_STAT_BRADY_DTM_END: NORMAL
MDC_IDC_STAT_BRADY_DTM_START: NORMAL
MDC_IDC_STAT_BRADY_RV_PERCENT_PACED: 96.94 %
MDC_IDC_STAT_EPISODE_RECENT_COUNT: 0
MDC_IDC_STAT_EPISODE_RECENT_COUNT: 0
MDC_IDC_STAT_EPISODE_RECENT_COUNT: 3
MDC_IDC_STAT_EPISODE_RECENT_COUNT_DTM_END: NORMAL
MDC_IDC_STAT_EPISODE_RECENT_COUNT_DTM_START: NORMAL
MDC_IDC_STAT_EPISODE_TOTAL_COUNT: 0
MDC_IDC_STAT_EPISODE_TOTAL_COUNT: 0
MDC_IDC_STAT_EPISODE_TOTAL_COUNT: 21
MDC_IDC_STAT_EPISODE_TOTAL_COUNT_DTM_END: NORMAL
MDC_IDC_STAT_EPISODE_TOTAL_COUNT_DTM_START: NORMAL
MDC_IDC_STAT_EPISODE_TYPE: NORMAL

## 2024-02-21 PROCEDURE — 93296 REM INTERROG EVL PM/IDS: CPT | Performed by: INTERNAL MEDICINE

## 2024-02-21 PROCEDURE — 93294 REM INTERROG EVL PM/LDLS PM: CPT | Performed by: INTERNAL MEDICINE

## 2024-04-01 NOTE — PROGRESS NOTES
[facility]        Patient is here for a 1 year follow up  to discuss history of endovascular abdominal arctic aneurysm repair.     Pt is currently taking Aspirin.    The provider has been notified that the patient has no concerns.     Questions patient would like addressed today are: N/A.    Refills are needed: No    Has homecare services and agency name:  No

## 2024-04-01 NOTE — PATIENT INSTRUCTIONS
Duy Harden,    Thank you for entrusting your care with us today. After your visit today with MD Melia Granger this is the plan that was discussed at your appointment.    Follow-up scheduled in 1 month with hydration and CTA to confirm endoleak. Telephone visit after to discuss results.       I am including additional information on these things and our contact information if you have any questions or concerns.   Please do not hesitate to reach out to us if you felt we did not answer your questions or you are unsure of the treatment plan after your visit today. Our number is [phone].Thank you for trusting us with your care.         Again thank you for your time.
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
## 2024-04-08 ENCOUNTER — HOSPITAL ENCOUNTER (OUTPATIENT)
Dept: CT IMAGING | Facility: HOSPITAL | Age: 84
Discharge: HOME | End: 2024-04-08
Attending: SURGERY
Payer: COMMERCIAL

## 2024-04-08 ENCOUNTER — VIRTUAL VISIT (OUTPATIENT)
Dept: VASCULAR SURGERY | Facility: CLINIC | Age: 84
End: 2024-04-08
Attending: SURGERY
Payer: COMMERCIAL

## 2024-04-08 ENCOUNTER — ANCILLARY PROCEDURE (OUTPATIENT)
Dept: VASCULAR ULTRASOUND | Facility: CLINIC | Age: 84
End: 2024-04-08
Attending: SURGERY
Payer: COMMERCIAL

## 2024-04-08 VITALS — WEIGHT: 174 LBS | BODY MASS INDEX: 24.36 KG/M2 | HEIGHT: 71 IN

## 2024-04-08 DIAGNOSIS — Z95.0 CARDIAC PACEMAKER IN SITU: Primary | ICD-10-CM

## 2024-04-08 DIAGNOSIS — I71.43 INFRARENAL ABDOMINAL AORTIC ANEURYSM (AAA) WITHOUT RUPTURE (H): ICD-10-CM

## 2024-04-08 DIAGNOSIS — I71.40 ABDOMINAL AORTIC ANEURYSM (AAA) WITHOUT RUPTURE, UNSPECIFIED PART (H): ICD-10-CM

## 2024-04-08 DIAGNOSIS — I71.40 ABDOMINAL AORTIC ANEURYSM (AAA) (H): ICD-10-CM

## 2024-04-08 DIAGNOSIS — I10 ESSENTIAL HYPERTENSION: ICD-10-CM

## 2024-04-08 DIAGNOSIS — S22.31XA CLOSED FRACTURE OF ONE RIB OF RIGHT SIDE, INITIAL ENCOUNTER: ICD-10-CM

## 2024-04-08 DIAGNOSIS — I50.32 CHRONIC HEART FAILURE WITH PRESERVED EJECTION FRACTION (H): ICD-10-CM

## 2024-04-08 DIAGNOSIS — I71.019 DISSECTION OF THORACIC AORTA, UNSPECIFIED PART (H): ICD-10-CM

## 2024-04-08 DIAGNOSIS — H90.3 BILATERAL SENSORINEURAL HEARING LOSS: ICD-10-CM

## 2024-04-08 DIAGNOSIS — N18.9 CHRONIC KIDNEY DISEASE, UNSPECIFIED CKD STAGE: ICD-10-CM

## 2024-04-08 DIAGNOSIS — I50.9 ACUTE CONGESTIVE HEART FAILURE, UNSPECIFIED HEART FAILURE TYPE (H): ICD-10-CM

## 2024-04-08 DIAGNOSIS — N18.4 CHRONIC KIDNEY DISEASE, STAGE IV (SEVERE) (H): ICD-10-CM

## 2024-04-08 DIAGNOSIS — E66.3 OVERWEIGHT: ICD-10-CM

## 2024-04-08 DIAGNOSIS — I72.3 ANEURYSM OF COMMON ILIAC ARTERY (H): ICD-10-CM

## 2024-04-08 DIAGNOSIS — G47.33 OBSTRUCTIVE SLEEP APNEA: ICD-10-CM

## 2024-04-08 DIAGNOSIS — I45.10 RBBB (RIGHT BUNDLE BRANCH BLOCK): ICD-10-CM

## 2024-04-08 DIAGNOSIS — D53.9 MACROCYTIC ANEMIA: ICD-10-CM

## 2024-04-08 DIAGNOSIS — I48.21 PERMANENT ATRIAL FIBRILLATION (H): ICD-10-CM

## 2024-04-08 PROCEDURE — 93978 VASCULAR STUDY: CPT

## 2024-04-08 PROCEDURE — 74176 CT ABD & PELVIS W/O CONTRAST: CPT

## 2024-04-08 PROCEDURE — 93978 VASCULAR STUDY: CPT | Mod: 26 | Performed by: SURGERY

## 2024-04-08 PROCEDURE — 99442 PR PHYSICIAN TELEPHONE EVALUATION 11-20 MIN: CPT | Mod: 93

## 2024-04-08 ASSESSMENT — PAIN SCALES - GENERAL: PAINLEVEL: NO PAIN (0)

## 2024-04-08 NOTE — NURSING NOTE
No other vitals to report today      Is the patient currently in the state of [place]? YES    Visit mode:TELEPHONE    If the visit is dropped, the patient can be reconnected by: TELEPHONE VISIT: Phone number: [phone]    Will anyone else be joining the visit? Pts spouse Shauna  (If patient encounters technical issues they should call [phone] :[id number])    How would you like to obtain your AVS? Mail a copy    Are changes needed to the allergy or medication list? No    Patients wife declined individual allergy and medication review by support staff because they deny any changes and state that all information remains accurate since last reviewed/verified.     Are refills needed on medications prescribed by this physician? NO    Reason for visit: JOSEPH Miguel MA VVF

## 2024-04-08 NOTE — PROGRESS NOTES
"Virtual Visit Details    Type of service:  Telephone Visit   Phone call duration: *** minutes   Originating Location (pt. Location): {patient location:591872::\"Home\"}  {PROVIDER LOCATION On-site should be selected for visits conducted from your clinic location or adjoining [facility] hospital, academic office, or other nearby [facility] building. Off-site should be selected for all other provider locations, including home:[id number]}  Distant Location (provider location):  {virtual location provider:[id number]}    [facility]        Patient is here for a virtual visit to discuss CT/US BEFORE; NPO8. 1yr AAA f/u.     Pt is currently taking Aspirin.    Ht 1.803 m (5' 11\")   Wt 78.9 kg (174 lb)   BMI 24.27 kg/m      The provider has been notified that the patient has no concerns.     Questions patient would like addressed today are: N/A.    Refills are needed: No    Has homecare services and agency name:  Unknown      "

## 2024-04-09 RX ORDER — HEPARIN SODIUM,PORCINE 10 UNIT/ML
5-20 VIAL (ML) INTRAVENOUS DAILY PRN
Status: CANCELLED | OUTPATIENT
Start: 2024-04-09

## 2024-04-09 RX ORDER — HEPARIN SODIUM (PORCINE) LOCK FLUSH IV SOLN 100 UNIT/ML 100 UNIT/ML
5 SOLUTION INTRAVENOUS
Status: CANCELLED | OUTPATIENT
Start: 2024-04-09

## 2024-04-09 RX ORDER — SODIUM CHLORIDE 9 MG/ML
INJECTION, SOLUTION INTRAVENOUS CONTINUOUS
Status: CANCELLED
Start: 2024-04-09

## 2024-04-09 NOTE — PROGRESS NOTES
Writer schedule CTA abdomen pelvis with hydration prior. Pt wife was updated on follow-ups and why there are needed. Visit will be telephone call because she stated that is too long of a day for patient to have three appointments.

## 2024-05-02 ENCOUNTER — OFFICE VISIT (OUTPATIENT)
Dept: FAMILY MEDICINE | Facility: CLINIC | Age: 84
End: 2024-05-02
Payer: COMMERCIAL

## 2024-05-02 VITALS
RESPIRATION RATE: 17 BRPM | SYSTOLIC BLOOD PRESSURE: 140 MMHG | BODY MASS INDEX: 26.18 KG/M2 | TEMPERATURE: 97.2 F | DIASTOLIC BLOOD PRESSURE: 80 MMHG | WEIGHT: 187 LBS | OXYGEN SATURATION: 96 % | HEART RATE: 90 BPM | HEIGHT: 71 IN

## 2024-05-02 DIAGNOSIS — I71.40 ABDOMINAL AORTIC ANEURYSM (AAA), UNSPECIFIED PART, UNSPECIFIED WHETHER RUPTURED (H): ICD-10-CM

## 2024-05-02 DIAGNOSIS — D53.9 MACROCYTIC ANEMIA: ICD-10-CM

## 2024-05-02 DIAGNOSIS — N18.4 CHRONIC KIDNEY DISEASE, STAGE IV (SEVERE) (H): ICD-10-CM

## 2024-05-02 DIAGNOSIS — I48.21 PERMANENT ATRIAL FIBRILLATION (H): ICD-10-CM

## 2024-05-02 DIAGNOSIS — I10 ESSENTIAL HYPERTENSION: ICD-10-CM

## 2024-05-02 DIAGNOSIS — E78.5 DYSLIPIDEMIA: ICD-10-CM

## 2024-05-02 DIAGNOSIS — I50.32 CHRONIC HEART FAILURE WITH PRESERVED EJECTION FRACTION (H): Primary | ICD-10-CM

## 2024-05-02 DIAGNOSIS — Z95.0 CARDIAC PACEMAKER IN SITU: Chronic | ICD-10-CM

## 2024-05-02 LAB
ERYTHROCYTE [DISTWIDTH] IN BLOOD BY AUTOMATED COUNT: 12.8 % (ref 10–15)
HCT VFR BLD AUTO: 40.9 % (ref 40–53)
HGB BLD-MCNC: 13.3 G/DL (ref 13.3–17.7)
MCH RBC QN AUTO: 33.3 PG (ref 26.5–33)
MCHC RBC AUTO-ENTMCNC: 32.5 G/DL (ref 31.5–36.5)
MCV RBC AUTO: 102 FL (ref 78–100)
PLATELET # BLD AUTO: 143 10E3/UL (ref 150–450)
RBC # BLD AUTO: 4 10E6/UL (ref 4.4–5.9)
WBC # BLD AUTO: 6.9 10E3/UL (ref 4–11)

## 2024-05-02 PROCEDURE — 99214 OFFICE O/P EST MOD 30 MIN: CPT | Performed by: FAMILY MEDICINE

## 2024-05-02 PROCEDURE — 82570 ASSAY OF URINE CREATININE: CPT | Performed by: FAMILY MEDICINE

## 2024-05-02 PROCEDURE — 80061 LIPID PANEL: CPT | Performed by: FAMILY MEDICINE

## 2024-05-02 PROCEDURE — 85027 COMPLETE CBC AUTOMATED: CPT | Performed by: FAMILY MEDICINE

## 2024-05-02 PROCEDURE — 82043 UR ALBUMIN QUANTITATIVE: CPT | Performed by: FAMILY MEDICINE

## 2024-05-02 PROCEDURE — 36415 COLL VENOUS BLD VENIPUNCTURE: CPT | Performed by: FAMILY MEDICINE

## 2024-05-02 PROCEDURE — 80069 RENAL FUNCTION PANEL: CPT | Performed by: FAMILY MEDICINE

## 2024-05-02 RX ORDER — METOPROLOL SUCCINATE 100 MG/1
100 TABLET, EXTENDED RELEASE ORAL DAILY
Qty: 90 TABLET | Refills: 3 | Status: SHIPPED | OUTPATIENT
Start: 2024-05-02

## 2024-05-02 RX ORDER — RESPIRATORY SYNCYTIAL VIRUS VACCINE 120MCG/0.5
0.5 KIT INTRAMUSCULAR ONCE
Qty: 1 EACH | Refills: 0 | Status: CANCELLED | OUTPATIENT
Start: 2024-05-02 | End: 2024-05-02

## 2024-05-02 ASSESSMENT — PAIN SCALES - GENERAL: PAINLEVEL: NO PAIN (0)

## 2024-05-02 NOTE — LETTER
"May 4, 2024      Dereck Elliott  [address] [address]        Dear ,    We are writing to inform you of your test results.    Your urine screen is abnormal.  Ensure follow-up with Dr. Pandya with [facility] as discussed due to \"chronic kidney disease\", etc.    Your cholesterol results are near goal.  Ensure regular exercise, healthy diet, and weight loss modifications in order to further improve.     Your potassium level was elevated.  Avoid foods high in potassium including tomatoes, potatoes, bananas, watermelon, etc.  Avoid \"salt substitutes\" which are high in potassium.  We will continue to follow closely.      Your complete blood count results were fine.  Mild abnormalities only.  Stable.  We will continue to follow closely.     Resulted Orders   Albumin Random Urine Quantitative with Creat Ratio   Result Value Ref Range    Creatinine Urine mg/dL 69.1 mg/dL      Comment:      The reference ranges have not been established in urine creatinine. The results should be integrated into the clinical context for interpretation.    Albumin Urine mg/L 172.0 mg/L      Comment:      The reference ranges have not been established in urine albumin. The results should be integrated into the clinical context for interpretation.    Albumin Urine mg/g Cr 248.91 (H) 0.00 - 17.00 mg/g Cr      Comment:      Microalbuminuria is defined as an albumin:creatinine ratio of 17 to 299 for males and 25 to 299 for females. A ratio of albumin:creatinine of 300 or higher is indicative of overt proteinuria.  Due to biologic variability, positive results should be confirmed by a second, first-morning random or 24-hour timed urine specimen. If there is discrepancy, a third specimen is recommended. When 2 out of 3 results are in the microalbuminuria range, this is evidence for incipient nephropathy and warrants increased efforts at glucose control, blood pressure control, and institution of therapy with an " angiotensin-converting-enzyme (ACE) inhibitor (if the patient can tolerate it).     Lipid panel reflex to direct LDL Fasting   Result Value Ref Range    Cholesterol 158 <200 mg/dL    Triglycerides 113 <150 mg/dL    Direct Measure HDL 45 >=40 mg/dL    LDL Cholesterol Calculated 90 <=100 mg/dL    Non HDL Cholesterol 113 <130 mg/dL    Patient Fasting > 8hrs? Yes     Narrative    Cholesterol  Desirable:  <200 mg/dL    Triglycerides  Normal:  Less than 150 mg/dL  Borderline High:  150-199 mg/dL  High:  200-499 mg/dL  Very High:  Greater than or equal to 500 mg/dL    Direct Measure HDL  Female:  Greater than or equal to 50 mg/dL   Male:  Greater than or equal to 40 mg/dL    LDL Cholesterol  Desirable:  <100mg/dL  Above Desirable:  100-129 mg/dL   Borderline High:  130-159 mg/dL   High:  160-189 mg/dL   Very High:  >= 190 mg/dL    Non HDL Cholesterol  Desirable:  130 mg/dL  Above Desirable:  130-159 mg/dL  Borderline High:  160-189 mg/dL  High:  190-219 mg/dL  Very High:  Greater than or equal to 220 mg/dL   CBC with platelets   Result Value Ref Range    WBC Count 6.9 4.0 - 11.0 10e3/uL    RBC Count 4.00 (L) 4.40 - 5.90 10e6/uL    Hemoglobin 13.3 13.3 - 17.7 g/dL    Hematocrit 40.9 40.0 - 53.0 %     (H) 78 - 100 fL    MCH 33.3 (H) 26.5 - 33.0 pg    MCHC 32.5 31.5 - 36.5 g/dL    RDW 12.8 10.0 - 15.0 %    Platelet Count 143 (L) 150 - 450 10e3/uL   Renal panel (Alb, BUN, Ca, Cl, CO2, Creat, Gluc, Phos, K, Na)   Result Value Ref Range    Sodium 141 135 - 145 mmol/L      Comment:      Reference intervals for this test were updated on 09/26/2023 to more accurately reflect our healthy population. There may be differences in the flagging of prior results with similar values performed with this method. Interpretation of those prior results can be made in the context of the updated reference intervals.     Potassium 5.8 (H) 3.4 - 5.3 mmol/L    Chloride 103 98 - 107 mmol/L    Carbon Dioxide (CO2) 23 22 - 29 mmol/L    Anion Gap  15 7 - 15 mmol/L    Glucose 98 70 - 99 mg/dL    Urea Nitrogen 49.5 (H) 8.0 - 23.0 mg/dL    Creatinine 3.18 (H) 0.67 - 1.17 mg/dL    GFR Estimate 18 (L) >60 mL/min/1.73m2    Calcium 10.3 (H) 8.8 - 10.2 mg/dL    Albumin 4.7 3.5 - 5.2 g/dL    Phosphorus 4.2 2.5 - 4.5 mg/dL       If you have any questions or concerns, please call the clinic at the number listed above.       Sincerely,      Donald Machado MD
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
## 2024-05-03 ENCOUNTER — ANCILLARY PROCEDURE (OUTPATIENT)
Dept: CARDIOLOGY | Facility: CLINIC | Age: 84
End: 2024-05-03
Attending: INTERNAL MEDICINE
Payer: COMMERCIAL

## 2024-05-03 DIAGNOSIS — Z95.0 PACEMAKER: ICD-10-CM

## 2024-05-03 DIAGNOSIS — I49.5 SICK SINUS SYNDROME (H): ICD-10-CM

## 2024-05-03 LAB
ALBUMIN SERPL BCG-MCNC: 4.7 G/DL (ref 3.5–5.2)
ANION GAP SERPL CALCULATED.3IONS-SCNC: 15 MMOL/L (ref 7–15)
BUN SERPL-MCNC: 49.5 MG/DL (ref 8–23)
CALCIUM SERPL-MCNC: 10.3 MG/DL (ref 8.8–10.2)
CHLORIDE SERPL-SCNC: 103 MMOL/L (ref 98–107)
CHOLEST SERPL-MCNC: 158 MG/DL
CREAT SERPL-MCNC: 3.18 MG/DL (ref 0.67–1.17)
CREAT UR-MCNC: 69.1 MG/DL
DEPRECATED HCO3 PLAS-SCNC: 23 MMOL/L (ref 22–29)
EGFRCR SERPLBLD CKD-EPI 2021: 18 ML/MIN/1.73M2
FASTING STATUS PATIENT QL REPORTED: YES
GLUCOSE SERPL-MCNC: 98 MG/DL (ref 70–99)
HDLC SERPL-MCNC: 45 MG/DL
LDLC SERPL CALC-MCNC: 90 MG/DL
MICROALBUMIN UR-MCNC: 172 MG/L
MICROALBUMIN/CREAT UR: 248.91 MG/G CR (ref 0–17)
NONHDLC SERPL-MCNC: 113 MG/DL
PHOSPHATE SERPL-MCNC: 4.2 MG/DL (ref 2.5–4.5)
POTASSIUM SERPL-SCNC: 5.8 MMOL/L (ref 3.4–5.3)
SODIUM SERPL-SCNC: 141 MMOL/L (ref 135–145)
TRIGL SERPL-MCNC: 113 MG/DL

## 2024-05-04 DIAGNOSIS — I10 ESSENTIAL HYPERTENSION: ICD-10-CM

## 2024-05-06 LAB
MDC_IDC_EPISODE_DTM: NORMAL
MDC_IDC_EPISODE_DURATION: 1 S
MDC_IDC_EPISODE_ID: 22
MDC_IDC_EPISODE_TYPE: NORMAL
MDC_IDC_LEAD_CONNECTION_STATUS: NORMAL
MDC_IDC_LEAD_IMPLANT_DT: NORMAL
MDC_IDC_LEAD_LOCATION: NORMAL
MDC_IDC_LEAD_LOCATION_DETAIL_1: NORMAL
MDC_IDC_LEAD_MFG: NORMAL
MDC_IDC_LEAD_MODEL: NORMAL
MDC_IDC_LEAD_POLARITY_TYPE: NORMAL
MDC_IDC_LEAD_SERIAL: NORMAL
MDC_IDC_LEAD_SPECIAL_FUNCTION: NORMAL
MDC_IDC_MSMT_BATTERY_DTM: NORMAL
MDC_IDC_MSMT_BATTERY_REMAINING_LONGEVITY: 119 MO
MDC_IDC_MSMT_BATTERY_RRT_TRIGGER: 2.62
MDC_IDC_MSMT_BATTERY_STATUS: NORMAL
MDC_IDC_MSMT_BATTERY_VOLTAGE: 3 V
MDC_IDC_MSMT_LEADCHNL_RV_IMPEDANCE_VALUE: 399 OHM
MDC_IDC_MSMT_LEADCHNL_RV_IMPEDANCE_VALUE: 475 OHM
MDC_IDC_MSMT_LEADCHNL_RV_PACING_THRESHOLD_AMPLITUDE: 0.5 V
MDC_IDC_MSMT_LEADCHNL_RV_PACING_THRESHOLD_PULSEWIDTH: 0.4 MS
MDC_IDC_MSMT_LEADCHNL_RV_SENSING_INTR_AMPL: 16.25 MV
MDC_IDC_MSMT_LEADCHNL_RV_SENSING_INTR_AMPL: 16.25 MV
MDC_IDC_PG_IMPLANT_DTM: NORMAL
MDC_IDC_PG_MFG: NORMAL
MDC_IDC_PG_MODEL: NORMAL
MDC_IDC_PG_SERIAL: NORMAL
MDC_IDC_PG_TYPE: NORMAL
MDC_IDC_SESS_CLINIC_NAME: NORMAL
MDC_IDC_SESS_DTM: NORMAL
MDC_IDC_SESS_TYPE: NORMAL
MDC_IDC_SET_BRADY_HYSTRATE: NORMAL
MDC_IDC_SET_BRADY_LOWRATE: 60 {BEATS}/MIN
MDC_IDC_SET_BRADY_MAX_SENSOR_RATE: 130 {BEATS}/MIN
MDC_IDC_SET_BRADY_MODE: NORMAL
MDC_IDC_SET_LEADCHNL_RV_PACING_AMPLITUDE: 1.5 V
MDC_IDC_SET_LEADCHNL_RV_PACING_ANODE_ELECTRODE_1: NORMAL
MDC_IDC_SET_LEADCHNL_RV_PACING_ANODE_LOCATION_1: NORMAL
MDC_IDC_SET_LEADCHNL_RV_PACING_CAPTURE_MODE: NORMAL
MDC_IDC_SET_LEADCHNL_RV_PACING_CATHODE_ELECTRODE_1: NORMAL
MDC_IDC_SET_LEADCHNL_RV_PACING_CATHODE_LOCATION_1: NORMAL
MDC_IDC_SET_LEADCHNL_RV_PACING_POLARITY: NORMAL
MDC_IDC_SET_LEADCHNL_RV_PACING_PULSEWIDTH: 0.4 MS
MDC_IDC_SET_LEADCHNL_RV_SENSING_ANODE_ELECTRODE_1: NORMAL
MDC_IDC_SET_LEADCHNL_RV_SENSING_ANODE_LOCATION_1: NORMAL
MDC_IDC_SET_LEADCHNL_RV_SENSING_CATHODE_ELECTRODE_1: NORMAL
MDC_IDC_SET_LEADCHNL_RV_SENSING_CATHODE_LOCATION_1: NORMAL
MDC_IDC_SET_LEADCHNL_RV_SENSING_POLARITY: NORMAL
MDC_IDC_SET_LEADCHNL_RV_SENSING_SENSITIVITY: 0.9 MV
MDC_IDC_SET_ZONE_DETECTION_INTERVAL: 360 MS
MDC_IDC_SET_ZONE_STATUS: NORMAL
MDC_IDC_SET_ZONE_TYPE: NORMAL
MDC_IDC_SET_ZONE_VENDOR_TYPE: NORMAL
MDC_IDC_STAT_BRADY_DTM_END: NORMAL
MDC_IDC_STAT_BRADY_DTM_START: NORMAL
MDC_IDC_STAT_BRADY_RV_PERCENT_PACED: 98.7 %
MDC_IDC_STAT_EPISODE_RECENT_COUNT: 0
MDC_IDC_STAT_EPISODE_RECENT_COUNT: 0
MDC_IDC_STAT_EPISODE_RECENT_COUNT: 1
MDC_IDC_STAT_EPISODE_RECENT_COUNT_DTM_END: NORMAL
MDC_IDC_STAT_EPISODE_RECENT_COUNT_DTM_START: NORMAL
MDC_IDC_STAT_EPISODE_TOTAL_COUNT: 0
MDC_IDC_STAT_EPISODE_TOTAL_COUNT: 0
MDC_IDC_STAT_EPISODE_TOTAL_COUNT: 22
MDC_IDC_STAT_EPISODE_TOTAL_COUNT_DTM_END: NORMAL
MDC_IDC_STAT_EPISODE_TOTAL_COUNT_DTM_START: NORMAL
MDC_IDC_STAT_EPISODE_TYPE: NORMAL

## 2024-05-06 PROCEDURE — 99207 CARDIAC DEVICE CHECK - REMOTE: CPT | Performed by: INTERNAL MEDICINE

## 2024-05-06 RX ORDER — AMLODIPINE BESYLATE 5 MG/1
TABLET ORAL
Qty: 90 TABLET | Refills: 3 | Status: SHIPPED | OUTPATIENT
Start: 2024-05-06

## 2024-05-20 ENCOUNTER — VIRTUAL VISIT (OUTPATIENT)
Dept: VASCULAR SURGERY | Facility: CLINIC | Age: 84
End: 2024-05-20
Attending: SURGERY
Payer: COMMERCIAL

## 2024-05-20 ENCOUNTER — INFUSION THERAPY VISIT (OUTPATIENT)
Dept: INFUSION THERAPY | Facility: HOSPITAL | Age: 84
End: 2024-05-20
Attending: SURGERY
Payer: COMMERCIAL

## 2024-05-20 ENCOUNTER — HOSPITAL ENCOUNTER (OUTPATIENT)
Dept: CT IMAGING | Facility: HOSPITAL | Age: 84
Discharge: HOME | End: 2024-05-20
Attending: SURGERY
Payer: COMMERCIAL

## 2024-05-20 VITALS — HEIGHT: 71 IN | BODY MASS INDEX: 24.36 KG/M2 | WEIGHT: 174 LBS

## 2024-05-20 VITALS
RESPIRATION RATE: 16 BRPM | DIASTOLIC BLOOD PRESSURE: 70 MMHG | HEART RATE: 64 BPM | TEMPERATURE: 97.7 F | SYSTOLIC BLOOD PRESSURE: 129 MMHG | OXYGEN SATURATION: 98 %

## 2024-05-20 DIAGNOSIS — I71.40 ABDOMINAL AORTIC ANEURYSM (AAA) (H): ICD-10-CM

## 2024-05-20 DIAGNOSIS — I45.10 RBBB (RIGHT BUNDLE BRANCH BLOCK): ICD-10-CM

## 2024-05-20 DIAGNOSIS — I10 ESSENTIAL HYPERTENSION: ICD-10-CM

## 2024-05-20 DIAGNOSIS — I48.21 PERMANENT ATRIAL FIBRILLATION (H): ICD-10-CM

## 2024-05-20 DIAGNOSIS — I71.43 INFRARENAL ABDOMINAL AORTIC ANEURYSM (AAA) WITHOUT RUPTURE (H): Primary | ICD-10-CM

## 2024-05-20 DIAGNOSIS — Z95.0 CARDIAC PACEMAKER IN SITU: ICD-10-CM

## 2024-05-20 DIAGNOSIS — I71.019 DISSECTION OF THORACIC AORTA, UNSPECIFIED PART (H): Primary | ICD-10-CM

## 2024-05-20 DIAGNOSIS — I50.32 CHRONIC HEART FAILURE WITH PRESERVED EJECTION FRACTION (H): ICD-10-CM

## 2024-05-20 DIAGNOSIS — G47.33 OBSTRUCTIVE SLEEP APNEA: ICD-10-CM

## 2024-05-20 DIAGNOSIS — I71.40 ABDOMINAL AORTIC ANEURYSM (AAA) WITHOUT RUPTURE, UNSPECIFIED PART (H): ICD-10-CM

## 2024-05-20 DIAGNOSIS — E66.3 OVERWEIGHT: ICD-10-CM

## 2024-05-20 DIAGNOSIS — N18.9 CHRONIC KIDNEY DISEASE, UNSPECIFIED CKD STAGE: ICD-10-CM

## 2024-05-20 DIAGNOSIS — N18.4 CHRONIC KIDNEY DISEASE, STAGE IV (SEVERE) (H): ICD-10-CM

## 2024-05-20 DIAGNOSIS — I72.3 ANEURYSM OF COMMON ILIAC ARTERY (H): ICD-10-CM

## 2024-05-20 DIAGNOSIS — D53.9 MACROCYTIC ANEMIA: ICD-10-CM

## 2024-05-20 DIAGNOSIS — S22.31XA CLOSED FRACTURE OF ONE RIB OF RIGHT SIDE, INITIAL ENCOUNTER: ICD-10-CM

## 2024-05-20 DIAGNOSIS — H90.3 BILATERAL SENSORINEURAL HEARING LOSS: ICD-10-CM

## 2024-05-20 DIAGNOSIS — I50.9 ACUTE CONGESTIVE HEART FAILURE, UNSPECIFIED HEART FAILURE TYPE (H): ICD-10-CM

## 2024-05-20 PROCEDURE — 250N000011 HC RX IP 250 OP 636: Performed by: SURGERY

## 2024-05-20 PROCEDURE — 99441 PR PHYSICIAN TELEPHONE EVALUATION 5-10 MIN: CPT | Mod: 93 | Performed by: SURGERY

## 2024-05-20 PROCEDURE — 96361 HYDRATE IV INFUSION ADD-ON: CPT

## 2024-05-20 PROCEDURE — 96360 HYDRATION IV INFUSION INIT: CPT | Mod: XU

## 2024-05-20 PROCEDURE — 258N000003 HC RX IP 258 OP 636: Performed by: SURGERY

## 2024-05-20 PROCEDURE — 74174 CTA ABD&PLVS W/CONTRAST: CPT

## 2024-05-20 RX ORDER — HEPARIN SODIUM,PORCINE 10 UNIT/ML
5-20 VIAL (ML) INTRAVENOUS DAILY PRN
OUTPATIENT
Start: 2024-05-20

## 2024-05-20 RX ORDER — IOPAMIDOL 755 MG/ML
75 INJECTION, SOLUTION INTRAVASCULAR ONCE
Status: COMPLETED | OUTPATIENT
Start: 2024-05-20 | End: 2024-05-20

## 2024-05-20 RX ORDER — HEPARIN SODIUM (PORCINE) LOCK FLUSH IV SOLN 100 UNIT/ML 100 UNIT/ML
5 SOLUTION INTRAVENOUS
OUTPATIENT
Start: 2024-05-20

## 2024-05-20 RX ADMIN — IOPAMIDOL 75 ML: 755 INJECTION, SOLUTION INTRAVENOUS at 13:28

## 2024-05-20 RX ADMIN — SODIUM CHLORIDE 500 ML: 9 INJECTION, SOLUTION INTRAVENOUS at 10:55

## 2024-05-20 ASSESSMENT — PAIN SCALES - GENERAL
PAINLEVEL: NO PAIN (0)
PAINLEVEL: NO PAIN (0)

## 2024-05-20 NOTE — PROGRESS NOTES
Infusion Nursing Note:  Dereck Elliott presents today for IV hydration prior to CT for decreased kidney function.    Patient seen by provider today: No   present during visit today: Not Applicable.    Note: pt arrived to  Outpatient Chemotherapy with his wife for IV hydration prior to CT scan due to decreased kidney function.  Pt wife states pt is only supposed to get 500 ml, due to CHF issues and given over an hour and 20 minutes.        Intravenous Access:  18 G Peripheral IV placed. In right AC for CT scan use.    Treatment Conditions:  Not Applicable.      Post Infusion Assessment:  Patient tolerated infusion without incident.  No evidence of extravasations.  Access left in for CT scan.      Discharge Plan:   Discharge instructions reviewed with: Patient.  Patient and/or family verbalized understanding of discharge instructions and all questions answered.  Patient discharged in stable condition accompanied by: self and wife.  Departure Mode: Ambulatory with cane.      Pinky Zhu RN

## 2024-05-20 NOTE — PROGRESS NOTES
84 Y male who is scheduled for a billable telephone visit.  Lasted 10 minutes.  Patient has a history of endovascular aortic aneurysm in 2021.  Most recent CT scan did show an evidence of mild aneurysmal sac growth.  I do not have an official report yet from most recent CTA, however, my measures are 6.3 cm which is 1 mm larger compared to the previous CT scan.  I do see the type II endoleak.  Did not appreciate type I or type III.  And plan to see the patient back in 6 months with repeat CTA.

## 2024-05-20 NOTE — PROGRESS NOTES
"Virtual Visit Details    Type of service:  Telephone Visit   Phone call duration: *** minutes   Originating Location (pt. Location): {patient location:128482::\"Home\"}  {PROVIDER LOCATION On-site should be selected for visits conducted from your clinic location or adjoining [facility] hospital, academic office, or other nearby [facility] building. Off-site should be selected for all other provider locations, including home:[id number]}  Distant Location (provider location):  {virtual location provider:514555}  "

## 2024-05-20 NOTE — NURSING NOTE
No other vitals to report today, Vitals were taken in the hospital and were good per pts spouse      Is the patient currently in the state of [place]? YES    Visit mode:TELEPHONE    If the visit is dropped, the patient can be reconnected by: TELEPHONE VISIT: Phone number: [phone]    Will anyone else be joining the visit? Pts spouse Shauna  (If patient encounters technical issues they should call [phone])    Location of Patient: Home     Location of Provider: [facility]    How would you like to obtain your AVS? Mail a copy    Are changes needed to the allergy or medication list? No    Patients spouse declined individual allergy and medication review by support staff because they deny any changes and state that all information remains accurate since last reviewed/verified.     Are refills needed on medications prescribed by this physician? NO    Reason for visit: JOSEPH Miguel MA VVF

## 2024-05-20 NOTE — PATIENT INSTRUCTIONS
This is the plan that was discussed at your appointment.    Follow up with Dr. Granger in 6 months with repeat CTA prior to visit. We will call you closer to that date to schedule.         I am including additional information on these things and our contact information if you have any questions or concerns.   Please do not hesitate to reach out to us if you felt we did not answer your questions or you are unsure of the treatment plan after your visit today. Our number is [phone].  Thank you for trusting us with your care.         Again thank you for your time.

## 2024-05-20 NOTE — Clinical Note
Please schedule 6 month follow up with Dr. Granger. Pt needs CTA with Hydration prior. CTA will need to be completed at hospital.

## 2024-08-16 ENCOUNTER — ANCILLARY PROCEDURE (OUTPATIENT)
Dept: CARDIOLOGY | Facility: CLINIC | Age: 84
End: 2024-08-16
Attending: INTERNAL MEDICINE
Payer: COMMERCIAL

## 2024-08-16 DIAGNOSIS — I49.5 SICK SINUS SYNDROME (H): ICD-10-CM

## 2024-08-16 DIAGNOSIS — Z95.0 CARDIAC PACEMAKER IN SITU: ICD-10-CM

## 2024-08-16 LAB
MDC_IDC_EPISODE_DTM: NORMAL
MDC_IDC_EPISODE_DURATION: 1 S
MDC_IDC_EPISODE_DURATION: 1 S
MDC_IDC_EPISODE_DURATION: 2 S
MDC_IDC_EPISODE_ID: 23
MDC_IDC_EPISODE_ID: 24
MDC_IDC_EPISODE_ID: 25
MDC_IDC_EPISODE_TYPE: NORMAL
MDC_IDC_LEAD_CONNECTION_STATUS: NORMAL
MDC_IDC_LEAD_IMPLANT_DT: NORMAL
MDC_IDC_LEAD_LOCATION: NORMAL
MDC_IDC_LEAD_LOCATION_DETAIL_1: NORMAL
MDC_IDC_LEAD_MFG: NORMAL
MDC_IDC_LEAD_MODEL: NORMAL
MDC_IDC_LEAD_POLARITY_TYPE: NORMAL
MDC_IDC_LEAD_SERIAL: NORMAL
MDC_IDC_LEAD_SPECIAL_FUNCTION: NORMAL
MDC_IDC_MSMT_BATTERY_DTM: NORMAL
MDC_IDC_MSMT_BATTERY_REMAINING_LONGEVITY: 117 MO
MDC_IDC_MSMT_BATTERY_RRT_TRIGGER: 2.62
MDC_IDC_MSMT_BATTERY_STATUS: NORMAL
MDC_IDC_MSMT_BATTERY_VOLTAGE: 3 V
MDC_IDC_MSMT_LEADCHNL_RV_IMPEDANCE_VALUE: 437 OHM
MDC_IDC_MSMT_LEADCHNL_RV_IMPEDANCE_VALUE: 494 OHM
MDC_IDC_MSMT_LEADCHNL_RV_PACING_THRESHOLD_AMPLITUDE: 0.5 V
MDC_IDC_MSMT_LEADCHNL_RV_PACING_THRESHOLD_PULSEWIDTH: 0.4 MS
MDC_IDC_MSMT_LEADCHNL_RV_SENSING_INTR_AMPL: 7.5 MV
MDC_IDC_MSMT_LEADCHNL_RV_SENSING_INTR_AMPL: 7.5 MV
MDC_IDC_PG_IMPLANT_DTM: NORMAL
MDC_IDC_PG_MFG: NORMAL
MDC_IDC_PG_MODEL: NORMAL
MDC_IDC_PG_SERIAL: NORMAL
MDC_IDC_PG_TYPE: NORMAL
MDC_IDC_SESS_CLINIC_NAME: NORMAL
MDC_IDC_SESS_DTM: NORMAL
MDC_IDC_SESS_TYPE: NORMAL
MDC_IDC_SET_BRADY_HYSTRATE: NORMAL
MDC_IDC_SET_BRADY_LOWRATE: 60 {BEATS}/MIN
MDC_IDC_SET_BRADY_MAX_SENSOR_RATE: 130 {BEATS}/MIN
MDC_IDC_SET_BRADY_MODE: NORMAL
MDC_IDC_SET_LEADCHNL_RV_PACING_AMPLITUDE: 1.5 V
MDC_IDC_SET_LEADCHNL_RV_PACING_ANODE_ELECTRODE_1: NORMAL
MDC_IDC_SET_LEADCHNL_RV_PACING_ANODE_LOCATION_1: NORMAL
MDC_IDC_SET_LEADCHNL_RV_PACING_CAPTURE_MODE: NORMAL
MDC_IDC_SET_LEADCHNL_RV_PACING_CATHODE_ELECTRODE_1: NORMAL
MDC_IDC_SET_LEADCHNL_RV_PACING_CATHODE_LOCATION_1: NORMAL
MDC_IDC_SET_LEADCHNL_RV_PACING_POLARITY: NORMAL
MDC_IDC_SET_LEADCHNL_RV_PACING_PULSEWIDTH: 0.4 MS
MDC_IDC_SET_LEADCHNL_RV_SENSING_ANODE_ELECTRODE_1: NORMAL
MDC_IDC_SET_LEADCHNL_RV_SENSING_ANODE_LOCATION_1: NORMAL
MDC_IDC_SET_LEADCHNL_RV_SENSING_CATHODE_ELECTRODE_1: NORMAL
MDC_IDC_SET_LEADCHNL_RV_SENSING_CATHODE_LOCATION_1: NORMAL
MDC_IDC_SET_LEADCHNL_RV_SENSING_POLARITY: NORMAL
MDC_IDC_SET_LEADCHNL_RV_SENSING_SENSITIVITY: 0.9 MV
MDC_IDC_SET_ZONE_DETECTION_INTERVAL: 360 MS
MDC_IDC_SET_ZONE_STATUS: NORMAL
MDC_IDC_SET_ZONE_TYPE: NORMAL
MDC_IDC_SET_ZONE_VENDOR_TYPE: NORMAL
MDC_IDC_STAT_BRADY_DTM_END: NORMAL
MDC_IDC_STAT_BRADY_DTM_START: NORMAL
MDC_IDC_STAT_BRADY_RV_PERCENT_PACED: 99.41 %
MDC_IDC_STAT_EPISODE_RECENT_COUNT: 0
MDC_IDC_STAT_EPISODE_RECENT_COUNT: 0
MDC_IDC_STAT_EPISODE_RECENT_COUNT: 3
MDC_IDC_STAT_EPISODE_RECENT_COUNT_DTM_END: NORMAL
MDC_IDC_STAT_EPISODE_RECENT_COUNT_DTM_START: NORMAL
MDC_IDC_STAT_EPISODE_TOTAL_COUNT: 0
MDC_IDC_STAT_EPISODE_TOTAL_COUNT: 0
MDC_IDC_STAT_EPISODE_TOTAL_COUNT: 25
MDC_IDC_STAT_EPISODE_TOTAL_COUNT_DTM_END: NORMAL
MDC_IDC_STAT_EPISODE_TOTAL_COUNT_DTM_START: NORMAL
MDC_IDC_STAT_EPISODE_TYPE: NORMAL

## 2024-08-16 PROCEDURE — 93294 REM INTERROG EVL PM/LDLS PM: CPT | Performed by: INTERNAL MEDICINE

## 2024-08-16 PROCEDURE — 93296 REM INTERROG EVL PM/IDS: CPT | Performed by: INTERNAL MEDICINE

## 2024-08-20 ENCOUNTER — TELEPHONE (OUTPATIENT)
Dept: VASCULAR SURGERY | Facility: CLINIC | Age: 84
End: 2024-08-20
Payer: COMMERCIAL

## 2024-08-20 NOTE — TELEPHONE ENCOUNTER
[facility] Dr. Granger around Nov; will need CTA with IV hydration before at [facility] (clinic will need to coordinate with [facility] infusion: [phone]). [phone]  _____________________________________  Iván Santiago, RN  Mary Brooke  It's in the comments for hydration. I updated it to say IV hydration. They should normally be able to use that order.          Previous Messages       ----- Message -----  From: Mary Brooke  Sent: 8/20/2024   8:57 AM CDT  To: [facility] Support Pool    Pls enter orders for infusion.  ----- Message -----  From: Josselyn Jamison RN  Sent: 5/20/2024   3:35 PM CDT  To: *    Please schedule 6 month follow up with Dr. Granger. Pt needs CTA with Hydration prior. CTA will need to be completed at hospital.

## 2024-11-06 ENCOUNTER — OFFICE VISIT (OUTPATIENT)
Dept: FAMILY MEDICINE | Facility: CLINIC | Age: 84
End: 2024-11-06
Payer: COMMERCIAL

## 2024-11-06 VITALS
BODY MASS INDEX: 25.76 KG/M2 | HEIGHT: 71 IN | OXYGEN SATURATION: 93 % | TEMPERATURE: 98.2 F | RESPIRATION RATE: 15 BRPM | DIASTOLIC BLOOD PRESSURE: 80 MMHG | SYSTOLIC BLOOD PRESSURE: 138 MMHG | WEIGHT: 184 LBS | HEART RATE: 82 BPM

## 2024-11-06 DIAGNOSIS — Z00.00 MEDICARE ANNUAL WELLNESS VISIT, SUBSEQUENT: Primary | ICD-10-CM

## 2024-11-06 DIAGNOSIS — H90.3 BILATERAL SENSORINEURAL HEARING LOSS: ICD-10-CM

## 2024-11-06 DIAGNOSIS — D53.9 MACROCYTIC ANEMIA: ICD-10-CM

## 2024-11-06 DIAGNOSIS — Z23 ENCOUNTER FOR IMMUNIZATION: ICD-10-CM

## 2024-11-06 DIAGNOSIS — N18.4 CHRONIC KIDNEY DISEASE, STAGE IV (SEVERE) (H): ICD-10-CM

## 2024-11-06 DIAGNOSIS — E78.5 DYSLIPIDEMIA: ICD-10-CM

## 2024-11-06 DIAGNOSIS — I50.32 CHRONIC HEART FAILURE WITH PRESERVED EJECTION FRACTION (H): ICD-10-CM

## 2024-11-06 DIAGNOSIS — I48.21 PERMANENT ATRIAL FIBRILLATION (H): ICD-10-CM

## 2024-11-06 DIAGNOSIS — I71.40 ABDOMINAL AORTIC ANEURYSM (AAA), UNSPECIFIED PART, UNSPECIFIED WHETHER RUPTURED (H): ICD-10-CM

## 2024-11-06 DIAGNOSIS — Z95.0 CARDIAC PACEMAKER IN SITU: ICD-10-CM

## 2024-11-06 DIAGNOSIS — D69.6 THROMBOCYTOPENIA (H): ICD-10-CM

## 2024-11-06 DIAGNOSIS — I10 ESSENTIAL HYPERTENSION: ICD-10-CM

## 2024-11-06 LAB
ALBUMIN SERPL BCG-MCNC: 4.5 G/DL (ref 3.5–5.2)
ALP SERPL-CCNC: 129 U/L (ref 40–150)
ALT SERPL W P-5'-P-CCNC: 12 U/L (ref 0–70)
ANION GAP SERPL CALCULATED.3IONS-SCNC: 11 MMOL/L (ref 7–15)
AST SERPL W P-5'-P-CCNC: 21 U/L (ref 0–45)
BILIRUB SERPL-MCNC: 0.6 MG/DL
BUN SERPL-MCNC: 44.4 MG/DL (ref 8–23)
CALCIUM SERPL-MCNC: 10 MG/DL (ref 8.8–10.4)
CHLORIDE SERPL-SCNC: 102 MMOL/L (ref 98–107)
CHOLEST SERPL-MCNC: 159 MG/DL
CREAT SERPL-MCNC: 3.19 MG/DL (ref 0.67–1.17)
EGFRCR SERPLBLD CKD-EPI 2021: 18 ML/MIN/1.73M2
ERYTHROCYTE [DISTWIDTH] IN BLOOD BY AUTOMATED COUNT: 12.9 % (ref 10–15)
FASTING STATUS PATIENT QL REPORTED: NO
FASTING STATUS PATIENT QL REPORTED: NO
GLUCOSE SERPL-MCNC: 100 MG/DL (ref 70–99)
HCO3 SERPL-SCNC: 27 MMOL/L (ref 22–29)
HCT VFR BLD AUTO: 40.2 % (ref 40–53)
HDLC SERPL-MCNC: 42 MG/DL
HGB BLD-MCNC: 13 G/DL (ref 13.3–17.7)
LDLC SERPL CALC-MCNC: 94 MG/DL
MCH RBC QN AUTO: 33.4 PG (ref 26.5–33)
MCHC RBC AUTO-ENTMCNC: 32.3 G/DL (ref 31.5–36.5)
MCV RBC AUTO: 103 FL (ref 78–100)
NONHDLC SERPL-MCNC: 117 MG/DL
PLATELET # BLD AUTO: 137 10E3/UL (ref 150–450)
POTASSIUM SERPL-SCNC: 4.7 MMOL/L (ref 3.4–5.3)
PROT SERPL-MCNC: 7.9 G/DL (ref 6.4–8.3)
RBC # BLD AUTO: 3.89 10E6/UL (ref 4.4–5.9)
SODIUM SERPL-SCNC: 140 MMOL/L (ref 135–145)
TRIGL SERPL-MCNC: 116 MG/DL
WBC # BLD AUTO: 6.9 10E3/UL (ref 4–11)

## 2024-11-06 PROCEDURE — 91320 SARSCV2 VAC 30MCG TRS-SUC IM: CPT | Performed by: FAMILY MEDICINE

## 2024-11-06 PROCEDURE — G0439 PPPS, SUBSEQ VISIT: HCPCS | Performed by: FAMILY MEDICINE

## 2024-11-06 PROCEDURE — 80053 COMPREHEN METABOLIC PANEL: CPT | Performed by: FAMILY MEDICINE

## 2024-11-06 PROCEDURE — 80061 LIPID PANEL: CPT | Performed by: FAMILY MEDICINE

## 2024-11-06 PROCEDURE — G0008 ADMIN INFLUENZA VIRUS VAC: HCPCS | Performed by: FAMILY MEDICINE

## 2024-11-06 PROCEDURE — 90480 ADMN SARSCOV2 VAC 1/ONLY CMP: CPT | Performed by: FAMILY MEDICINE

## 2024-11-06 PROCEDURE — 99214 OFFICE O/P EST MOD 30 MIN: CPT | Mod: 25 | Performed by: FAMILY MEDICINE

## 2024-11-06 PROCEDURE — 36415 COLL VENOUS BLD VENIPUNCTURE: CPT | Performed by: FAMILY MEDICINE

## 2024-11-06 PROCEDURE — 90662 IIV NO PRSV INCREASED AG IM: CPT | Performed by: FAMILY MEDICINE

## 2024-11-06 PROCEDURE — 85027 COMPLETE CBC AUTOMATED: CPT | Performed by: FAMILY MEDICINE

## 2024-11-06 SDOH — HEALTH STABILITY: PHYSICAL HEALTH: ON AVERAGE, HOW MANY MINUTES DO YOU ENGAGE IN EXERCISE AT THIS LEVEL?: 0 MIN

## 2024-11-06 SDOH — HEALTH STABILITY: PHYSICAL HEALTH: ON AVERAGE, HOW MANY DAYS PER WEEK DO YOU ENGAGE IN MODERATE TO STRENUOUS EXERCISE (LIKE A BRISK WALK)?: 0 DAYS

## 2024-11-06 ASSESSMENT — SOCIAL DETERMINANTS OF HEALTH (SDOH): HOW OFTEN DO YOU GET TOGETHER WITH FRIENDS OR RELATIVES?: ONCE A WEEK

## 2024-11-06 ASSESSMENT — PAIN SCALES - GENERAL: PAINLEVEL_OUTOF10: EXTREME PAIN (8)
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
## 2024-11-06 NOTE — LETTER
November 7, 2024      Dereck Elliott  [id number] [id number]        Dear ,    We are writing to inform you of your test results.    Mild anemia.  Stable.  We will continue to follow closely.     Your kidney function remains significantly reduced.  It appears stable.  Please ensure follow-up with Dr. Pandya, nephrologist, as discussed.    Your cholesterol results are near goal.  Ensure regular exercise, healthy diet, and weight loss modifications in order to further improve.     Resulted Orders   CBC with platelets   Result Value Ref Range    WBC Count 6.9 4.0 - 11.0 10e3/uL    RBC Count 3.89 (L) 4.40 - 5.90 10e6/uL    Hemoglobin 13.0 (L) 13.3 - 17.7 g/dL    Hematocrit 40.2 40.0 - 53.0 %     (H) 78 - 100 fL    MCH 33.4 (H) 26.5 - 33.0 pg    MCHC 32.3 31.5 - 36.5 g/dL    RDW 12.9 10.0 - 15.0 %    Platelet Count 137 (L) 150 - 450 10e3/uL   Comprehensive metabolic panel   Result Value Ref Range    Sodium 140 135 - 145 mmol/L    Potassium 4.7 3.4 - 5.3 mmol/L    Carbon Dioxide (CO2) 27 22 - 29 mmol/L    Anion Gap 11 7 - 15 mmol/L    Urea Nitrogen 44.4 (H) 8.0 - 23.0 mg/dL    Creatinine 3.19 (H) 0.67 - 1.17 mg/dL    GFR Estimate 18 (L) >60 mL/min/1.73m2      Comment:      eGFR calculated using 2021 CKD-EPI equation.    Calcium 10.0 8.8 - 10.4 mg/dL      Comment:      Reference intervals for this test were updated on 7/16/2024 to reflect our healthy population more accurately. There may be differences in the flagging of prior results with similar values performed with this method. Those prior results can be interpreted in the context of the updated reference intervals.    Chloride 102 98 - 107 mmol/L    Glucose 100 (H) 70 - 99 mg/dL    Alkaline Phosphatase 129 40 - 150 U/L    AST 21 0 - 45 U/L    ALT 12 0 - 70 U/L    Protein Total 7.9 6.4 - 8.3 g/dL    Albumin 4.5 3.5 - 5.2 g/dL    Bilirubin Total 0.6 <=1.2 mg/dL    Patient Fasting > 8hrs? No    Lipid panel reflex to direct LDL Fasting    Result Value Ref Range    Cholesterol 159 <200 mg/dL    Triglycerides 116 <150 mg/dL    Direct Measure HDL 42 >=40 mg/dL    LDL Cholesterol Calculated 94 <100 mg/dL    Non HDL Cholesterol 117 <130 mg/dL    Patient Fasting > 8hrs? No     Narrative    Cholesterol  Desirable: < 200 mg/dL  Borderline High: 200 - 239 mg/dL  High: >= 240 mg/dL    Triglycerides  Normal: < 150 mg/dL  Borderline High: 150 - 199 mg/dL  High: 200-499 mg/dL  Very High: >= 500 mg/dL    Direct Measure HDL  Female: >= 50 mg/dL   Male: >= 40 mg/dL    LDL Cholesterol  Desirable: < 100 mg/dL  Above Desirable: 100 - 129 mg/dL   Borderline High: 130 - 159 mg/dL   High:  160 - 189 mg/dL   Very High: >= 190 mg/dL    Non HDL Cholesterol  Desirable: < 130 mg/dL  Above Desirable: 130 - 159 mg/dL  Borderline High: 160 - 189 mg/dL  High: 190 - 219 mg/dL  Very High: >= 220 mg/dL       If you have any questions or concerns, please call the clinic at the number listed above.       Sincerely,      Donald Machado MD

## 2024-11-14 DIAGNOSIS — I48.21 PERMANENT ATRIAL FIBRILLATION (H): ICD-10-CM

## 2024-11-14 DIAGNOSIS — I71.019 DISSECTION OF THORACIC AORTA, UNSPECIFIED PART (H): ICD-10-CM

## 2024-11-14 DIAGNOSIS — N18.4 CHRONIC KIDNEY DISEASE, STAGE IV (SEVERE) (H): ICD-10-CM

## 2024-11-14 DIAGNOSIS — Z95.0 CARDIAC PACEMAKER IN SITU: ICD-10-CM

## 2024-11-14 DIAGNOSIS — I71.40 ABDOMINAL AORTIC ANEURYSM (AAA) WITHOUT RUPTURE, UNSPECIFIED PART (H): ICD-10-CM

## 2024-11-14 DIAGNOSIS — I50.32 CHRONIC HEART FAILURE WITH PRESERVED EJECTION FRACTION (H): ICD-10-CM

## 2024-11-14 DIAGNOSIS — D53.9 MACROCYTIC ANEMIA: ICD-10-CM

## 2024-11-14 DIAGNOSIS — I72.3 ANEURYSM OF COMMON ILIAC ARTERY (H): ICD-10-CM

## 2024-11-14 DIAGNOSIS — I45.10 RBBB (RIGHT BUNDLE BRANCH BLOCK): ICD-10-CM

## 2024-11-14 DIAGNOSIS — I71.012 DISSECTION OF DESCENDING THORACIC AORTA (H): Primary | ICD-10-CM

## 2024-11-14 DIAGNOSIS — I71.43 INFRARENAL ABDOMINAL AORTIC ANEURYSM (AAA) WITHOUT RUPTURE (H): ICD-10-CM

## 2024-11-19 ENCOUNTER — ANCILLARY PROCEDURE (OUTPATIENT)
Dept: CARDIOLOGY | Facility: CLINIC | Age: 84
End: 2024-11-19
Attending: INTERNAL MEDICINE
Payer: COMMERCIAL

## 2024-11-19 DIAGNOSIS — Z95.0 PACEMAKER: ICD-10-CM

## 2024-11-19 DIAGNOSIS — I49.5 SICK SINUS SYNDROME (H): ICD-10-CM

## 2024-11-20 ENCOUNTER — OFFICE VISIT (OUTPATIENT)
Dept: CARDIOLOGY | Facility: CLINIC | Age: 84
End: 2024-11-20
Attending: INTERNAL MEDICINE
Payer: COMMERCIAL

## 2024-11-20 VITALS
BODY MASS INDEX: 26.78 KG/M2 | DIASTOLIC BLOOD PRESSURE: 80 MMHG | RESPIRATION RATE: 16 BRPM | WEIGHT: 191.3 LBS | HEIGHT: 71 IN | SYSTOLIC BLOOD PRESSURE: 134 MMHG | HEART RATE: 72 BPM

## 2024-11-20 DIAGNOSIS — I48.21 PERMANENT ATRIAL FIBRILLATION (H): ICD-10-CM

## 2024-11-20 DIAGNOSIS — I50.32 CHRONIC HEART FAILURE WITH PRESERVED EJECTION FRACTION (H): ICD-10-CM

## 2024-11-20 DIAGNOSIS — I10 BENIGN ESSENTIAL HYPERTENSION: ICD-10-CM

## 2024-11-20 DIAGNOSIS — I47.29 NSVT (NONSUSTAINED VENTRICULAR TACHYCARDIA) (H): ICD-10-CM

## 2024-11-20 DIAGNOSIS — I49.5 SICK SINUS SYNDROME (H): Primary | ICD-10-CM

## 2024-11-20 DIAGNOSIS — N18.4 CKD (CHRONIC KIDNEY DISEASE) STAGE 4, GFR 15-29 ML/MIN (H): ICD-10-CM

## 2024-11-20 DIAGNOSIS — Z95.0 CARDIAC PACEMAKER IN SITU: ICD-10-CM

## 2024-11-20 DIAGNOSIS — I71.43 INFRARENAL ABDOMINAL AORTIC ANEURYSM (AAA) WITHOUT RUPTURE (H): ICD-10-CM

## 2024-11-20 LAB
MDC_IDC_EPISODE_DTM: NORMAL
MDC_IDC_EPISODE_DURATION: 2 S
MDC_IDC_EPISODE_ID: 26
MDC_IDC_EPISODE_TYPE: NORMAL
MDC_IDC_EPISODE_TYPE_INDUCED: NO
MDC_IDC_LEAD_CONNECTION_STATUS: NORMAL
MDC_IDC_LEAD_IMPLANT_DT: NORMAL
MDC_IDC_LEAD_LOCATION: NORMAL
MDC_IDC_LEAD_LOCATION_DETAIL_1: NORMAL
MDC_IDC_LEAD_MFG: NORMAL
MDC_IDC_LEAD_MODEL: NORMAL
MDC_IDC_LEAD_POLARITY_TYPE: NORMAL
MDC_IDC_LEAD_SERIAL: NORMAL
MDC_IDC_LEAD_SPECIAL_FUNCTION: NORMAL
MDC_IDC_MSMT_BATTERY_DTM: NORMAL
MDC_IDC_MSMT_BATTERY_REMAINING_LONGEVITY: 114 MO
MDC_IDC_MSMT_BATTERY_RRT_TRIGGER: 2.62
MDC_IDC_MSMT_BATTERY_STATUS: NORMAL
MDC_IDC_MSMT_BATTERY_VOLTAGE: 2.99 V
MDC_IDC_MSMT_LEADCHNL_RV_IMPEDANCE_VALUE: 456 OHM
MDC_IDC_MSMT_LEADCHNL_RV_IMPEDANCE_VALUE: 513 OHM
MDC_IDC_MSMT_LEADCHNL_RV_PACING_THRESHOLD_AMPLITUDE: 0.5 V
MDC_IDC_MSMT_LEADCHNL_RV_PACING_THRESHOLD_PULSEWIDTH: 0.4 MS
MDC_IDC_MSMT_LEADCHNL_RV_SENSING_INTR_AMPL: 20 MV
MDC_IDC_MSMT_LEADCHNL_RV_SENSING_INTR_AMPL: 20 MV
MDC_IDC_PG_IMPLANT_DTM: NORMAL
MDC_IDC_PG_MFG: NORMAL
MDC_IDC_PG_MODEL: NORMAL
MDC_IDC_PG_SERIAL: NORMAL
MDC_IDC_PG_TYPE: NORMAL
MDC_IDC_SESS_CLINIC_NAME: NORMAL
MDC_IDC_SESS_DTM: NORMAL
MDC_IDC_SESS_TYPE: NORMAL
MDC_IDC_SET_BRADY_HYSTRATE: NORMAL
MDC_IDC_SET_BRADY_LOWRATE: 60 {BEATS}/MIN
MDC_IDC_SET_BRADY_MAX_SENSOR_RATE: 130 {BEATS}/MIN
MDC_IDC_SET_BRADY_MODE: NORMAL
MDC_IDC_SET_LEADCHNL_RV_PACING_AMPLITUDE: 1.5 V
MDC_IDC_SET_LEADCHNL_RV_PACING_ANODE_ELECTRODE_1: NORMAL
MDC_IDC_SET_LEADCHNL_RV_PACING_ANODE_LOCATION_1: NORMAL
MDC_IDC_SET_LEADCHNL_RV_PACING_CAPTURE_MODE: NORMAL
MDC_IDC_SET_LEADCHNL_RV_PACING_CATHODE_ELECTRODE_1: NORMAL
MDC_IDC_SET_LEADCHNL_RV_PACING_CATHODE_LOCATION_1: NORMAL
MDC_IDC_SET_LEADCHNL_RV_PACING_POLARITY: NORMAL
MDC_IDC_SET_LEADCHNL_RV_PACING_PULSEWIDTH: 0.4 MS
MDC_IDC_SET_LEADCHNL_RV_SENSING_ANODE_ELECTRODE_1: NORMAL
MDC_IDC_SET_LEADCHNL_RV_SENSING_ANODE_LOCATION_1: NORMAL
MDC_IDC_SET_LEADCHNL_RV_SENSING_CATHODE_ELECTRODE_1: NORMAL
MDC_IDC_SET_LEADCHNL_RV_SENSING_CATHODE_LOCATION_1: NORMAL
MDC_IDC_SET_LEADCHNL_RV_SENSING_POLARITY: NORMAL
MDC_IDC_SET_LEADCHNL_RV_SENSING_SENSITIVITY: 0.9 MV
MDC_IDC_SET_ZONE_DETECTION_INTERVAL: 360 MS
MDC_IDC_SET_ZONE_STATUS: NORMAL
MDC_IDC_SET_ZONE_TYPE: NORMAL
MDC_IDC_SET_ZONE_VENDOR_TYPE: NORMAL
MDC_IDC_STAT_BRADY_DTM_END: NORMAL
MDC_IDC_STAT_BRADY_DTM_START: NORMAL
MDC_IDC_STAT_BRADY_RV_PERCENT_PACED: 98.59 %
MDC_IDC_STAT_EPISODE_RECENT_COUNT: 0
MDC_IDC_STAT_EPISODE_RECENT_COUNT: 0
MDC_IDC_STAT_EPISODE_RECENT_COUNT: 8
MDC_IDC_STAT_EPISODE_RECENT_COUNT_DTM_END: NORMAL
MDC_IDC_STAT_EPISODE_RECENT_COUNT_DTM_START: NORMAL
MDC_IDC_STAT_EPISODE_TOTAL_COUNT: 0
MDC_IDC_STAT_EPISODE_TOTAL_COUNT: 0
MDC_IDC_STAT_EPISODE_TOTAL_COUNT: 26
MDC_IDC_STAT_EPISODE_TOTAL_COUNT_DTM_END: NORMAL
MDC_IDC_STAT_EPISODE_TOTAL_COUNT_DTM_START: NORMAL
MDC_IDC_STAT_EPISODE_TYPE: NORMAL

## 2024-11-20 NOTE — PATIENT INSTRUCTIONS
It was a pleasure to meet with you today.      Below is a summary of your visit.   Continue your medications without changes. I understand why you decreased your aspirin to every other day. This is not ideal, but reasonable. I would not decrease it further.  Continue to be as active as you can tolerate.   Follow up in a year or sooner if needed.      Please do not hesitate to call the "[facility] with any questions or concerns at [phone]. You can also reach my nurse, Lesley, at [phone].    Sincerely,

## 2024-11-20 NOTE — LETTER
11/20/2024    Donald Machado MD  [address]  [facility] [address]    RE: Dereck Elliott       Dear Colleague,     I had the pleasure of seeing Dereck Elliott in the [facility].    [facility]   [address]  [place] [address]   [email or web address]   OFFICE: [phone]     CARDIOLOGY CLINIC NOTE     Thank you, Donald Shaffer, for asking the [facility] team to see Mr. Dereck Elliott to evaluate No chief complaint on file.   .      Assessment/Recommendations   Assessment:    Permanent atrial fibrillation - asymptomatic. He is not on anticoagulation due to fall risk and patient preference. DOREEN closure was previously discussed and declined by the patient.   Sick sinus syndrome s/p placement of a single chamber Medtronic MRI compatible pacemaker. Functioning normally by interrogation yesterday  NSVT -has been noted on device interrogations and has been asymptomatic, brief, and fairly infrequent.  Hypertension - controlled.  Chronic kidney disease, stage IV  AAA - s/p endovascular repair. Stable     Plan:  Continue medications without changes.   Encouraged continued activity as tolerated.  Will defer on further echo imaging unless new symptoms develop per patient preference  Follow up in a year or sooner if needed.         History of Present Illness   Mr. Dereck Elliott is a 84 year old male who presents for follow-up.     Mr. Elliott has a longstanding history of atrial fibrillation and was noted in July 2019 to have severe bradycardia with frequent pauses.  He was referred for urgent pacemaker placement which occurred on 8/1/2019. He has done well from the standpoint of his afib and pacemaker, however he is not on full anticoagulation due to cost of DOACs and inability to maintain therapeutic INR with warfarin.    Lan is feeling reasonably well currently. He decreased his aspirin to every other day dosing due to  extensive bruising. He ambulates in his senior living facility. Denies new cardiorespiratory symptoms or concerns.    Other than noted above, Mr. Elliott denies any chest pain/pressure/tightness, shortness of breath at rest or with exertion, light headedness/dizziness, pre-syncope, syncope, lower extremity swelling, palpitations, paroxysmal nocturnal dyspnea (PND), or orthopnea.     Cardiac Problems and Cardiac Diagnostics     Most Recent Cardiac testing:    Pacemaker interrogation 11/19/24  Device: Medtronic [place] (D) PPM  Pacing %/Programmed: 98.6%  in VVIR 60-130bpm  Lead(s): Stable  Battery longevity: 9.4 years  Presenting rhythm:  60bpm  Underlying rhythm: CHB, no Rs at VVI 30  Heart rates: Adequate variability with rates primarily between 60-100sbpm  Atrial High rates: No atrial lead  Ventricular High rates: 8 VHR; EGMs show 7-23 beats of NSVT; this is consistent with past  Comments: Normal device function. Time zone updated    ECHO (report reviewed):   TTE 1/18/19    Left ventricle ejection fraction is normal. The calculated left ventricular ejection fraction is 65%.    Normal left ventricular size.    Left atrial volume is moderately increased.    Normal right ventricular size and systolic function.    Estimated central venous pressure equal to 13 mmHg.    The aortic valve is sclerotic without reduced excursion.    Mild to moderate mitral regurgitation.    No previous study for comparison.     CT angiogram coronary artery  Moderate burden of coronary atherosclerosis but no severe obstructive lesions are seen.  Moderate biatrial enlargement.  Decreased opacification in the distal left atrial appendage, likely representing incomplete mixing of contrast due to low flow. No obvious thrombus.       Medications  Allergies   Current Outpatient Medications   Medication Sig Dispense Refill     acetaminophen (TYLENOL) 500 MG tablet Take 500-1,000 mg by mouth every 6 hours as needed for pain        amLODIPine  (NORVASC) 5 MG tablet TAKE ONE TABLET BY MOUTH ONE TIME DAILY 90 tablet 3     amoxicillin (AMOXIL) 500 MG capsule Take 4 capsules (2,000 mg) by mouth once as needed (one hour prior to dental work) 4 capsule 3     aspirin 81 MG EC tablet [ASPIRIN 81 MG EC TABLET] Take 81 mg by mouth daily.       bumetanide (BUMEX) 1 MG tablet Take 1 tablet (1 mg) by mouth daily 90 tablet 3     cholecalciferol, vitamin D3, (CHOLECALCIFEROL) 1,000 unit tablet [CHOLECALCIFEROL, VITAMIN D3, (CHOLECALCIFEROL) 1,000 UNIT TABLET] Take 2,000 Units by mouth daily.       cyanocobalamin 100 MCG tablet [CYANOCOBALAMIN 100 MCG TABLET] Take 100 mcg by mouth daily.       metoprolol succinate ER (TOPROL XL) 100 MG 24 hr tablet Take 1 tablet (100 mg) by mouth daily 90 tablet 3     oxymetazoline (AFRIN) 0.05 % nasal spray Spray 2 sprays into both nostrils 2 times daily as needed for congestion 37 mL 3     sodium chloride (OCEAN) 0.65 % nasal spray Spray 1 spray into both nostrils 4 times daily as needed for congestion        No Known Allergies     Physical Examination Review of Systems   Vitals: There were no vitals taken for this visit.  BMI= There is no height or weight on file to calculate BMI.  Wt Readings from Last 3 Encounters:   11/06/24 83.5 kg (184 lb)   05/20/24 78.9 kg (174 lb)   05/02/24 84.8 kg (187 lb)       General: pleasant, frail, elderly male. No acute distress.   Neck: No JVD  Lungs: clear to auscultation  COR: normal rate, regular rhythm, No murmurs, rubs, or gallops  Extrem: No edema        Please refer above for cardiac ROS details.       Past History     Past Medical History  Past Medical History:   Diagnosis Date     Atrial fibrillation (H)      Chronic heart failure with preserved ejection fraction (H) 05/31/2023     Chronic kidney disease      Chronic kidney disease (CKD), stage III (moderate) (H)      Chronic kidney disease, stage IV (severe) (H) 05/31/2023     Congestive heart failure (H)      Essential hypertension       Hard of hearing      History of rheumatic fever 2017     Onychomycosis 10/27/2017     Permanent atrial fibrillation (H) 2017     SSS (sick sinus syndrome) (H) 2019     Unspecified cataract      Vitamin D deficiency disease 10/06/2015       Past Surgical History  Past Surgical History:   Procedure Laterality Date     AS FUSION OF WRIST JOINT Right      CATARACT EXTRACTION Left      COLON SURGERY       EP PACEMAKER INSERT N/A 2019    EP Pacemaker Insertion; Emeka Dean MD; [facility];  Service: Cardiology     IMPLANT PACEMAKER       IR ABDOMINAL ENDOVASCULAR STENT GRAFT  2021     LIGATE ARTERY ETHMOID Right 2023    Procedure: Endoscopic control of nasal hemmorhage.;  Surgeon: Melissa Roberson MD;  Location: [facility] OR     REPAIR ANEURYSM ABDOMINAL AORTA N/A 2021    Procedure: ENDOVASCULAR REPAIR OF ABDOMINAL AORTIC ANEURYSM  WITH ENDOGRAFT;  Surgeon: Melia Granger MD;  Location: [facility]     ROTATOR CUFF REPAIR RT/LT Left      TOTAL KNEE ARTHROPLASTY Left        Family History:   Family History   Problem Relation Age of Onset     Valvular heart disease Mother         care at [place]     Colon Cancer Father      No Known Problems Daughter      No Known Problems Daughter         Social History:   Social History     Socioeconomic History     Marital status:      Spouse name: Not on file     Number of children: 2     Years of education: Not on file     Highest education level: Not on file   Occupational History     Not on file   Tobacco Use     Smoking status: Former     Current packs/day: 0.00     Types: Cigars, Cigarettes     Quit date: 2016     Years since quittin.8     Smokeless tobacco: Never   Substance and Sexual Activity     Alcohol use: Yes     Alcohol/week: 1.0 standard drink of alcohol     Comment: Alcoholic Drinks/day: per week 2     Drug use: No     Sexual activity: Not on file   Other Topics Concern     Parent/sibling w/  CABG, MI or angioplasty before 65F 55M? Not Asked   Social History Narrative     Not on file     Social Drivers of Health     Financial Resource Strain: Low Risk  (11/6/2024)    Financial Resource Strain      Within the past 12 months, have you or your family members you live with been unable to get utilities (heat, electricity) when it was really needed?: No   Food Insecurity: Low Risk  (11/6/2024)    Food Insecurity      Within the past 12 months, did you worry that your food would run out before you got money to buy more?: No      Within the past 12 months, did the food you bought just not last and you didn t have money to get more?: No   Transportation Needs: Low Risk  (11/6/2024)    Transportation Needs      Within the past 12 months, has lack of transportation kept you from medical appointments, getting your medicines, non-medical meetings or appointments, work, or from getting things that you need?: No   Physical Activity: Inactive (11/6/2024)    Exercise Vital Sign      Days of Exercise per Week: 0 days      Minutes of Exercise per Session: 0 min   Stress: No Stress Concern Present (11/6/2024)    [employer] - Occupational Stress Questionnaire      Feeling of Stress : Not at all   Social Connections: Unknown (11/6/2024)    Social Connection and Isolation Panel [NHANES]      Frequency of Communication with Friends and Family: Not on file      Frequency of Social Gatherings with Friends and Family: Once a week      Attends Zoroastrian Services: Not on file      Active Member of Clubs or Organizations: Not on file      Attends Club or Organization Meetings: Not on file      Marital Status: Not on file   Interpersonal Safety: Low Risk  (11/6/2024)    Interpersonal Safety      Do you feel physically and emotionally safe where you currently live?: Yes      Within the past 12 months, have you been hit, slapped, kicked or otherwise physically hurt by someone?: No      Within the past 12  months, have you been humiliated or emotionally abused in other ways by your partner or ex-partner?: No   Housing Stability: Low Risk  (11/6/2024)    Housing Stability      Do you have housing? : Yes      Are you worried about losing your housing?: No            Lab Results    Chemistry/lipid CBC Cardiac Enzymes/BNP/TSH/INR   Lab Results   Component Value Date    CHOL 159 11/06/2024    HDL 42 11/06/2024    TRIG 116 11/06/2024    BUN 44.4 (H) 11/06/2024     11/06/2024    CO2 27 11/06/2024    Lab Results   Component Value Date    WBC 6.9 11/06/2024    HGB 13.0 (L) 11/06/2024    HCT 40.2 11/06/2024     (H) 11/06/2024     (L) 11/06/2024    Lab Results   Component Value Date     (H) 07/16/2019    TSH 2.42 05/17/2023    INR 1.05 02/16/2023                Thank you for allowing me to participate in the care of your patient.      Sincerely,     Johny Traore MD     [facility]  cc:   Giovany Juarez MD  [address]  [place] [address]

## 2024-11-20 NOTE — PROGRESS NOTES
[facility]   [address]  [place] [address]   [email or web address]   OFFICE: [phone]     CARDIOLOGY CLINIC NOTE     Thank you, Donald Shaffer, for asking the [facility] Care team to see Mr. Dereck Elliott to evaluate No chief complaint on file.   .      Assessment/Recommendations   Assessment:    Permanent atrial fibrillation - asymptomatic. He is not on anticoagulation due to fall risk and patient preference. DOREEN closure was previously discussed and declined by the patient.   Sick sinus syndrome s/p placement of a single chamber Medtronic MRI compatible pacemaker. Functioning normally by interrogation yesterday  NSVT -has been noted on device interrogations and has been asymptomatic, brief, and fairly infrequent.  Hypertension - controlled.  Chronic kidney disease, stage IV  AAA - s/p endovascular repair. Stable     Plan:  Continue medications without changes.   Encouraged continued activity as tolerated.  Will defer on further echo imaging unless new symptoms develop per patient preference  Follow up in a year or sooner if needed.         History of Present Illness   Mr. Dereck Elliott is a 84 year old male who presents for follow-up.     Mr. Elliott has a longstanding history of atrial fibrillation and was noted in July 2019 to have severe bradycardia with frequent pauses.  He was referred for urgent pacemaker placement which occurred on 8/1/2019. He has done well from the standpoint of his afib and pacemaker, however he is not on full anticoagulation due to cost of DOACs and inability to maintain therapeutic INR with warfarin.    Lan is feeling reasonably well currently. He decreased his aspirin to every other day dosing due to extensive bruising. He ambulates in his senior living facility. Denies new cardiorespiratory symptoms or concerns.    Other than noted above, Mr. Elliott denies any chest pain/pressure/tightness, shortness of  breath at rest or with exertion, light headedness/dizziness, pre-syncope, syncope, lower extremity swelling, palpitations, paroxysmal nocturnal dyspnea (PND), or orthopnea.     Cardiac Problems and Cardiac Diagnostics     Most Recent Cardiac testing:    Pacemaker interrogation 11/19/24  Device: Medtronic [place] (D) PPM  Pacing %/Programmed: 98.6%  in VVIR 60-130bpm  Lead(s): Stable  Battery longevity: 9.4 years  Presenting rhythm:  60bpm  Underlying rhythm: CHB, no Rs at VVI 30  Heart rates: Adequate variability with rates primarily between 60-100sbpm  Atrial High rates: No atrial lead  Ventricular High rates: 8 VHR; EGMs show 7-23 beats of NSVT; this is consistent with past  Comments: Normal device function. Time zone updated    ECHO (report reviewed):   TTE 1/18/19    Left ventricle ejection fraction is normal. The calculated left ventricular ejection fraction is 65%.    Normal left ventricular size.    Left atrial volume is moderately increased.    Normal right ventricular size and systolic function.    Estimated central venous pressure equal to 13 mmHg.    The aortic valve is sclerotic without reduced excursion.    Mild to moderate mitral regurgitation.    No previous study for comparison.     CT angiogram coronary artery  Moderate burden of coronary atherosclerosis but no severe obstructive lesions are seen.  Moderate biatrial enlargement.  Decreased opacification in the distal left atrial appendage, likely representing incomplete mixing of contrast due to low flow. No obvious thrombus.       Medications  Allergies   Current Outpatient Medications   Medication Sig Dispense Refill    acetaminophen (TYLENOL) 500 MG tablet Take 500-1,000 mg by mouth every 6 hours as needed for pain       amLODIPine (NORVASC) 5 MG tablet TAKE ONE TABLET BY MOUTH ONE TIME DAILY 90 tablet 3    amoxicillin (AMOXIL) 500 MG capsule Take 4 capsules (2,000 mg) by mouth once as needed (one hour prior to dental work) 4 capsule 3     aspirin 81 MG EC tablet [ASPIRIN 81 MG EC TABLET] Take 81 mg by mouth daily.      bumetanide (BUMEX) 1 MG tablet Take 1 tablet (1 mg) by mouth daily 90 tablet 3    cholecalciferol, vitamin D3, (CHOLECALCIFEROL) 1,000 unit tablet [CHOLECALCIFEROL, VITAMIN D3, (CHOLECALCIFEROL) 1,000 UNIT TABLET] Take 2,000 Units by mouth daily.      cyanocobalamin 100 MCG tablet [CYANOCOBALAMIN 100 MCG TABLET] Take 100 mcg by mouth daily.      metoprolol succinate ER (TOPROL XL) 100 MG 24 hr tablet Take 1 tablet (100 mg) by mouth daily 90 tablet 3    oxymetazoline (AFRIN) 0.05 % nasal spray Spray 2 sprays into both nostrils 2 times daily as needed for congestion 37 mL 3    sodium chloride (OCEAN) 0.65 % nasal spray Spray 1 spray into both nostrils 4 times daily as needed for congestion        No Known Allergies     Physical Examination Review of Systems   Vitals: There were no vitals taken for this visit.  BMI= There is no height or weight on file to calculate BMI.  Wt Readings from Last 3 Encounters:   11/06/24 83.5 kg (184 lb)   05/20/24 78.9 kg (174 lb)   05/02/24 84.8 kg (187 lb)       General: pleasant, frail, elderly male. No acute distress.   Neck: No JVD  Lungs: clear to auscultation  COR: normal rate, regular rhythm, No murmurs, rubs, or gallops  Extrem: No edema        Please refer above for cardiac ROS details.       Past History     Past Medical History  Past Medical History:   Diagnosis Date    Atrial fibrillation (H)     Chronic heart failure with preserved ejection fraction (H) 05/31/2023    Chronic kidney disease     Chronic kidney disease (CKD), stage III (moderate) (H)     Chronic kidney disease, stage IV (severe) (H) 05/31/2023    Congestive heart failure (H)     Essential hypertension     Hard of hearing     History of rheumatic fever 04/25/2017    Onychomycosis 10/27/2017    Permanent atrial fibrillation (H) 02/03/2017    SSS (sick sinus syndrome) (H) 07/29/2019    Unspecified cataract     Vitamin D deficiency  disease 10/06/2015       Past Surgical History  Past Surgical History:   Procedure Laterality Date    AS FUSION OF WRIST JOINT Right     CATARACT EXTRACTION Left     COLON SURGERY      EP PACEMAKER INSERT N/A 2019    EP Pacemaker Insertion; Emeka Dean MD; [facility];  Service: Cardiology    IMPLANT PACEMAKER      IR ABDOMINAL ENDOVASCULAR STENT GRAFT  2021    LIGATE ARTERY ETHMOID Right 2023    Procedure: Endoscopic control of nasal hemmorhage.;  Surgeon: Melissa Roberson MD;  Location: [facility] OR    REPAIR ANEURYSM ABDOMINAL AORTA N/A 2021    Procedure: ENDOVASCULAR REPAIR OF ABDOMINAL AORTIC ANEURYSM  WITH ENDOGRAFT;  Surgeon: Melia Granger MD;  Location: [facility]    ROTATOR CUFF REPAIR RT/LT Left     TOTAL KNEE ARTHROPLASTY Left        Family History:   Family History   Problem Relation Age of Onset    Valvular heart disease Mother         care at [place]    Colon Cancer Father     No Known Problems Daughter     No Known Problems Daughter         Social History:   Social History     Socioeconomic History    Marital status:      Spouse name: Not on file    Number of children: 2    Years of education: Not on file    Highest education level: Not on file   Occupational History    Not on file   Tobacco Use    Smoking status: Former     Current packs/day: 0.00     Types: Cigars, Cigarettes     Quit date: 2016     Years since quittin.8    Smokeless tobacco: Never   Substance and Sexual Activity    Alcohol use: Yes     Alcohol/week: 1.0 standard drink of alcohol     Comment: Alcoholic Drinks/day: per week 2    Drug use: No    Sexual activity: Not on file   Other Topics Concern    Parent/sibling w/ CABG, MI or angioplasty before 65F 55M? Not Asked   Social History Narrative    Not on file     Social Drivers of Health     Financial Resource Strain: Low Risk  (2024)    Financial Resource Strain     Within the past 12 months, have you or your family  members you live with been unable to get utilities (heat, electricity) when it was really needed?: No   Food Insecurity: Low Risk  (11/6/2024)    Food Insecurity     Within the past 12 months, did you worry that your food would run out before you got money to buy more?: No     Within the past 12 months, did the food you bought just not last and you didn t have money to get more?: No   Transportation Needs: Low Risk  (11/6/2024)    Transportation Needs     Within the past 12 months, has lack of transportation kept you from medical appointments, getting your medicines, non-medical meetings or appointments, work, or from getting things that you need?: No   Physical Activity: Inactive (11/6/2024)    Exercise Vital Sign     Days of Exercise per Week: 0 days     Minutes of Exercise per Session: 0 min   Stress: No Stress Concern Present (11/6/2024)    [employer] - Occupational Stress Questionnaire     Feeling of Stress : Not at all   Social Connections: Unknown (11/6/2024)    Social Connection and Isolation Panel [NHANES]     Frequency of Communication with Friends and Family: Not on file     Frequency of Social Gatherings with Friends and Family: Once a week     Attends Christianity Services: Not on file     Active Member of Clubs or Organizations: Not on file     Attends Club or Organization Meetings: Not on file     Marital Status: Not on file   Interpersonal Safety: Low Risk  (11/6/2024)    Interpersonal Safety     Do you feel physically and emotionally safe where you currently live?: Yes     Within the past 12 months, have you been hit, slapped, kicked or otherwise physically hurt by someone?: No     Within the past 12 months, have you been humiliated or emotionally abused in other ways by your partner or ex-partner?: No   Housing Stability: Low Risk  (11/6/2024)    Housing Stability     Do you have housing? : Yes     Are you worried about losing your housing?: No            Lab Results     Chemistry/lipid CBC Cardiac Enzymes/BNP/TSH/INR   Lab Results   Component Value Date    CHOL 159 11/06/2024    HDL 42 11/06/2024    TRIG 116 11/06/2024    BUN 44.4 (H) 11/06/2024     11/06/2024    CO2 27 11/06/2024    Lab Results   Component Value Date    WBC 6.9 11/06/2024    HGB 13.0 (L) 11/06/2024    HCT 40.2 11/06/2024     (H) 11/06/2024     (L) 11/06/2024    Lab Results   Component Value Date     (H) 07/16/2019    TSH 2.42 05/17/2023    INR 1.05 02/16/2023

## 2024-12-23 ENCOUNTER — HOSPITAL ENCOUNTER (OUTPATIENT)
Dept: CT IMAGING | Facility: HOSPITAL | Age: 84
Discharge: HOME | End: 2024-12-23
Attending: SURGERY
Payer: COMMERCIAL

## 2024-12-23 ENCOUNTER — INFUSION THERAPY VISIT (OUTPATIENT)
Dept: INFUSION THERAPY | Facility: HOSPITAL | Age: 84
End: 2024-12-23
Attending: SURGERY
Payer: COMMERCIAL

## 2024-12-23 ENCOUNTER — VIRTUAL VISIT (OUTPATIENT)
Dept: VASCULAR SURGERY | Facility: CLINIC | Age: 84
End: 2024-12-23
Attending: SURGERY
Payer: COMMERCIAL

## 2024-12-23 VITALS — WEIGHT: 179 LBS | BODY MASS INDEX: 25.06 KG/M2 | HEIGHT: 71 IN

## 2024-12-23 VITALS
RESPIRATION RATE: 14 BRPM | DIASTOLIC BLOOD PRESSURE: 78 MMHG | OXYGEN SATURATION: 100 % | HEART RATE: 77 BPM | SYSTOLIC BLOOD PRESSURE: 146 MMHG | TEMPERATURE: 97.4 F

## 2024-12-23 DIAGNOSIS — I48.21 PERMANENT ATRIAL FIBRILLATION (H): ICD-10-CM

## 2024-12-23 DIAGNOSIS — I71.43 INFRARENAL ABDOMINAL AORTIC ANEURYSM (AAA) WITHOUT RUPTURE (H): ICD-10-CM

## 2024-12-23 DIAGNOSIS — H90.3 BILATERAL SENSORINEURAL HEARING LOSS: ICD-10-CM

## 2024-12-23 DIAGNOSIS — I50.32 CHRONIC HEART FAILURE WITH PRESERVED EJECTION FRACTION (H): ICD-10-CM

## 2024-12-23 DIAGNOSIS — N18.9 CHRONIC KIDNEY DISEASE, UNSPECIFIED CKD STAGE: ICD-10-CM

## 2024-12-23 DIAGNOSIS — I71.40 ABDOMINAL AORTIC ANEURYSM (AAA) WITHOUT RUPTURE, UNSPECIFIED PART (H): ICD-10-CM

## 2024-12-23 DIAGNOSIS — I10 ESSENTIAL HYPERTENSION: ICD-10-CM

## 2024-12-23 DIAGNOSIS — Z95.0 CARDIAC PACEMAKER IN SITU: Primary | ICD-10-CM

## 2024-12-23 DIAGNOSIS — G47.33 OBSTRUCTIVE SLEEP APNEA: ICD-10-CM

## 2024-12-23 DIAGNOSIS — N18.4 CHRONIC KIDNEY DISEASE, STAGE IV (SEVERE) (H): ICD-10-CM

## 2024-12-23 DIAGNOSIS — I50.9 ACUTE CONGESTIVE HEART FAILURE, UNSPECIFIED HEART FAILURE TYPE (H): ICD-10-CM

## 2024-12-23 DIAGNOSIS — I72.3 ANEURYSM OF COMMON ILIAC ARTERY (H): ICD-10-CM

## 2024-12-23 DIAGNOSIS — I71.012 DISSECTION OF DESCENDING THORACIC AORTA (H): Primary | ICD-10-CM

## 2024-12-23 DIAGNOSIS — I71.019 DISSECTION OF THORACIC AORTA, UNSPECIFIED PART (H): ICD-10-CM

## 2024-12-23 DIAGNOSIS — I45.10 RBBB (RIGHT BUNDLE BRANCH BLOCK): ICD-10-CM

## 2024-12-23 DIAGNOSIS — I71.40 ABDOMINAL AORTIC ANEURYSM (AAA), UNSPECIFIED PART, UNSPECIFIED WHETHER RUPTURED (H): ICD-10-CM

## 2024-12-23 DIAGNOSIS — E66.3 OVERWEIGHT: ICD-10-CM

## 2024-12-23 DIAGNOSIS — S22.31XA CLOSED FRACTURE OF ONE RIB OF RIGHT SIDE, INITIAL ENCOUNTER: ICD-10-CM

## 2024-12-23 DIAGNOSIS — D53.9 MACROCYTIC ANEMIA: ICD-10-CM

## 2024-12-23 LAB
CREAT BLD-MCNC: 3 MG/DL (ref 0.7–1.3)
EGFRCR SERPLBLD CKD-EPI 2021: 20 ML/MIN/1.73M2

## 2024-12-23 PROCEDURE — 250N000011 HC RX IP 250 OP 636: Performed by: SURGERY

## 2024-12-23 PROCEDURE — 96360 HYDRATION IV INFUSION INIT: CPT

## 2024-12-23 PROCEDURE — 74174 CTA ABD&PLVS W/CONTRAST: CPT

## 2024-12-23 PROCEDURE — 82565 ASSAY OF CREATININE: CPT

## 2024-12-23 PROCEDURE — 258N000003 HC RX IP 258 OP 636: Performed by: SURGERY

## 2024-12-23 RX ORDER — HEPARIN SODIUM (PORCINE) LOCK FLUSH IV SOLN 100 UNIT/ML 100 UNIT/ML
5 SOLUTION INTRAVENOUS
OUTPATIENT
Start: 2024-12-23

## 2024-12-23 RX ORDER — IOPAMIDOL 755 MG/ML
75 INJECTION, SOLUTION INTRAVASCULAR ONCE
Status: COMPLETED | OUTPATIENT
Start: 2024-12-23 | End: 2024-12-23

## 2024-12-23 RX ORDER — HEPARIN SODIUM,PORCINE 10 UNIT/ML
5-20 VIAL (ML) INTRAVENOUS DAILY PRN
Status: DISCONTINUED | OUTPATIENT
Start: 2024-12-23 | End: 2024-12-23 | Stop reason: HOSPADM

## 2024-12-23 RX ORDER — HEPARIN SODIUM,PORCINE 10 UNIT/ML
5-20 VIAL (ML) INTRAVENOUS DAILY PRN
Status: CANCELLED | OUTPATIENT
Start: 2024-12-23

## 2024-12-23 RX ADMIN — SODIUM CHLORIDE 500 ML: 9 INJECTION, SOLUTION INTRAVENOUS at 09:47

## 2024-12-23 RX ADMIN — IOPAMIDOL 75 ML: 755 INJECTION, SOLUTION INTRAVENOUS at 12:11

## 2024-12-23 ASSESSMENT — PAIN SCALES - GENERAL: PAINLEVEL_OUTOF10: NO PAIN (0)

## 2024-12-23 NOTE — PATIENT INSTRUCTIONS
This is the plan that was discussed at your appointment.    We will contact you to schedule your 1 year follow-up         I am including additional information on these things and our contact information if you have any questions or concerns.   Please do not hesitate to reach out to us if you felt we did not answer your questions or you are unsure of the treatment plan after your visit today. Our number is [phone].  Thank you for trusting us with your care.         Again thank you for your time.

## 2024-12-23 NOTE — PROGRESS NOTES
Infusion Nursing Note:  Dereck JOHNSON Earl presents today for IV hydration.    Patient seen by provider today: No   present during visit today: Not Applicable.    Note: N/A.      Intravenous Access:  Peripheral IV placed.    Treatment Conditions:  Not Applicable.      Post Infusion Assessment:  Patient tolerated infusion without incident.       Discharge Plan:   Discharge instructions reviewed with: Patient.  Patient and/or family verbalized understanding of discharge instructions and all questions answered.  Patient discharged in stable condition accompanied by: self.  Departure Mode: Wheelchair.      Shari Yeager RN

## 2024-12-23 NOTE — PROGRESS NOTES
Virtual Visit Details:    Type of service: Telephone     Length of call: 15 minutes    Originating Location (Pt. Location): Home     Distant Location (Provider location):  [facility]     Platform used for visit:  Other: Telephone     Received verbal consent for virtual visit.   84-year-old male with a history of endovascular abdominal aneurysm repair and thoracic endograft placement.  Patient underwent CT abdomen pelvis which revealed stable position of the endograft without obvious endoleak.  Aneurysmal sac is stable.  Follow-up in 1 year with repeat CTA chest abdomen pelvis.

## 2024-12-23 NOTE — NURSING NOTE
Current patient location: [address] [place] [id number]    Is the patient currently in the state of [place]? YES    Visit mode:TELEPHONE    If the visit is dropped, the patient can be reconnected by:TELEPHONE VISIT: Phone number: [phone]    Will anyone else be joining the visit? Pts spouse  (If patient encounters technical issues they should call [phone])    Are changes needed to the allergy or medication list? No    Patients spouse denies any changes and states that all information remains accurate since last reviewed/verified.     Are refills needed on medications prescribed by this physician? NO, does not believe so     Rooming Documentation:  Not applicable, check in completed via pts spouse    Reason for visit: RECHECK    Megan Miguel MA VVF

## 2024-12-31 DIAGNOSIS — I50.9 ACUTE CONGESTIVE HEART FAILURE, UNSPECIFIED HEART FAILURE TYPE (H): ICD-10-CM

## 2024-12-31 RX ORDER — BUMETANIDE 1 MG/1
1 TABLET ORAL DAILY
Qty: 90 TABLET | Refills: 1 | Status: SHIPPED | OUTPATIENT
Start: 2024-12-31

## 2025-02-03 ENCOUNTER — TELEPHONE (OUTPATIENT)
Dept: FAMILY MEDICINE | Facility: CLINIC | Age: 85
End: 2025-02-03
Payer: COMMERCIAL

## 2025-02-03 ENCOUNTER — NURSE TRIAGE (OUTPATIENT)
Dept: FAMILY MEDICINE | Facility: CLINIC | Age: 85
End: 2025-02-03
Payer: COMMERCIAL

## 2025-02-03 NOTE — TELEPHONE ENCOUNTER
"Nurse Triage SBAR    Is this a 2nd Level Triage? NO    Situation: Patient's wife, Amaya, calling to report cough for about 2 weeks and is looking for appointment.     Background: Patient has history of hypertension, a fib, congestions heart failure.     Assessment: Reports productive cough with clear sputum for about 2 weeks. Also has \"copious\" amounts of clear nasal drainage. No other symptoms noted - no fevers, SOB, chest pain. Denies any sinus pain or pressure, but does believe mucous is coming from sinuses. Denies any mucous in chest.     Has been running humidifier, air purifier, using saline nasal spray.     Protocol Recommended Disposition:   See PCP Within 3 Days    Recommendation: Appointment scheduled for 1/4. Advised fluids and continue to use humidifier. Advised to call back with worsening symptoms prior to appointment. Amaya verbalized understanding and agrees with the plan.      Does the patient meet one of the following criteria for ADS visit consideration? 16+ years old, with an [facility] PCP     TIP  Providers, please consider if this condition is appropriate for management at one of our Acute and Diagnostic Services sites.     If patient is a good candidate, please use dotphrase <dot>triageresponse and select Refer to ADS to document.    Reason for Disposition   [1] Nasal discharge AND [2] present > 10 days    Additional Information   Negative: [1] MILD difficulty breathing (e.g., minimal/no SOB at rest, SOB with walking, pulse <100) AND [2] still present when not coughing   Negative: [1] Coughed up blood AND [2] > 1 tablespoon (15 ml)   (Exception: Blood-tinged sputum.)   Negative: Fever > 103 F (39.4 C)   Negative: [1] Fever > 101 F (38.3 C) AND [2] age > 60 years   Negative: [1] Fever > 100.0 F (37.8 C) AND [2] bedridden (e.g., CVA, chronic illness, recovering from surgery)   Negative: [1] Fever > 100.0 F (37.8 C) AND [2] diabetes mellitus or weak immune system (e.g., HIV positive, cancer chemo, " "splenectomy, organ transplant, chronic steroids)   Negative: Wheezing is present   Negative: SEVERE coughing spells (e.g., whooping sound after coughing, vomiting after coughing)   Negative: [1] Continuous (nonstop) coughing interferes with work or school AND [2] no improvement using cough treatment per Care Advice   Negative: Coughing up sheron-colored (reddish-brown) sputum   Negative: Fever present > 3 days (72 hours)   Negative: [1] Fever returns after gone for over 24 hours AND [2] symptoms worse or not improved   Negative: [1] Using nasal washes and pain medicine > 24 hours AND [2] sinus pain (around cheekbone or eye) persists   Negative: Earache   Negative: [1] Known COPD or other severe lung disease (i.e., bronchiectasis, cystic fibrosis, lung surgery) AND [2] worsening symptoms (i.e., increased sputum purulence or amount, increased breathing difficulty   Negative: [1] MODERATE difficulty breathing (e.g., speaks in phrases, SOB even at rest, pulse 100-120) AND [2] still present when not coughing   Negative: Chest pain  (Exception: MILD central chest pain, present only when coughing.)   Negative: SEVERE difficulty breathing (e.g., struggling for each breath, speaks in single words)   Negative: Bluish (or gray) lips or face now   Negative: [1] Difficulty breathing AND [2] exposure to flames, smoke, or fumes   Negative: [1] Stridor AND [2] difficulty breathing   Negative: Sounds like a life-threatening emergency to the triager   Negative: [1] Previous asthma attacks AND [2] this feels like asthma attack   Negative: Dry cough (non-productive;  no sputum or minimal clear sputum)   Negative: Patient sounds very sick or weak to the triager   Negative: Cough has been present for > 3 weeks    Answer Assessment - Initial Assessment Questions  1. ONSET: \"When did the cough begin?\"       About 2 weeks ago.   2. SEVERITY: \"How bad is the cough today?\"       Remains consistent  3. SPUTUM: \"Describe the color of your " "sputum\" (none, dry cough; clear, white, yellow, green)      Clear  4. HEMOPTYSIS: \"Are you coughing up any blood?\" If so ask: \"How much?\" (flecks, streaks, tablespoons, etc.)      None  5. DIFFICULTY BREATHING: \"Are you having difficulty breathing?\" If Yes, ask: \"How bad is it?\" (e.g., mild, moderate, severe)     - MILD: No SOB at rest, mild SOB with walking, speaks normally in sentences, can lie down, no retractions, pulse < 100.     - MODERATE: SOB at rest, SOB with minimal exertion and prefers to sit, cannot lie down flat, speaks in phrases, mild retractions, audible wheezing, pulse 100-120.     - SEVERE: Very SOB at rest, speaks in single words, struggling to breathe, sitting hunched forward, retractions, pulse > 120       None  6. FEVER: \"Do you have a fever?\" If Yes, ask: \"What is your temperature, how was it measured, and when did it start?\"      No fever  7. CARDIAC HISTORY: \"Do you have any history of heart disease?\" (e.g., heart attack, congestive heart failure)       Hypertension, a fib, congestive heart failure  8. LUNG HISTORY: \"Do you have any history of lung disease?\"  (e.g., pulmonary embolus, asthma, emphysema)      GUMARO  9. PE RISK FACTORS: \"Do you have a history of blood clots?\" (or: recent major surgery, recent prolonged travel, bedridden)      No  10. OTHER SYMPTOMS: \"Do you have any other symptoms?\" (e.g., runny nose, wheezing, chest pain)        Runny nose, \"copious amounts of mucous\"  11. PREGNANCY: \"Is there any chance you are pregnant?\" \"When was your last menstrual period?\"        NA  12. TRAVEL: \"Have you traveled out of the country in the last month?\" (e.g., travel history, exposures)        No    Protocols used: Cough - Acute [id number]    Mally Delgado RN    "

## 2025-02-04 ENCOUNTER — OFFICE VISIT (OUTPATIENT)
Dept: FAMILY MEDICINE | Facility: CLINIC | Age: 85
End: 2025-02-04
Payer: COMMERCIAL

## 2025-02-04 VITALS
HEIGHT: 71 IN | SYSTOLIC BLOOD PRESSURE: 152 MMHG | OXYGEN SATURATION: 98 % | HEART RATE: 88 BPM | TEMPERATURE: 97.7 F | DIASTOLIC BLOOD PRESSURE: 84 MMHG | WEIGHT: 187.8 LBS | BODY MASS INDEX: 26.29 KG/M2 | RESPIRATION RATE: 18 BRPM

## 2025-02-04 DIAGNOSIS — R05.1 ACUTE COUGH: Primary | ICD-10-CM

## 2025-02-04 PROCEDURE — 99213 OFFICE O/P EST LOW 20 MIN: CPT

## 2025-02-04 ASSESSMENT — PAIN SCALES - GENERAL: PAINLEVEL_OUTOF10: NO PAIN (0)

## 2025-02-04 ASSESSMENT — ENCOUNTER SYMPTOMS: COUGH: 1

## 2025-02-04 NOTE — PROGRESS NOTES
Assessment & Plan     Acute cough  Onset of productive cough 2 weeks ago.  Also reports symptoms of nasal congestion, rhinorrhea, fatigue.  Symptoms have improved and he actually feels pretty good today.  Cardiorespiratory exam unremarkable.  Lung sounds clear, unlabored.  VVS, besides blood pressure elevated at 152/84.  Reassuring that symptoms have improved and patient has been afebrile during illness.  No concern for respiratory infectious etiology at this time.  No concerning symptoms for exacerbated heart failure-denies orthopnea, difficulty breathing, shortness of breath, chest pain, lower leg swelling, weight gain.  Discussed red flag symptoms and to seek medical attention.    Subjective   Dereck is a 84 year old, presenting for the following health issues:  Cough (Productive cough for the past couple of weeks. )      2/4/2025    10:40 AM   Additional Questions   Roomed by Nafisa CABRAL CMA   Accompanied by spouse     Cough    History of Present Illness       Reason for visit:  Cough  Symptom onset:  1-2 weeks ago  Symptoms include:  {roductive cough      Patient is a 84-year old male presenting with wife for concerns of cough.  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  Wife is main historian during visit as patient is very hard of hearing.    Wife reports onset of productive cough 2 weeks ago.  Reports 3-4 days of significant productive cough with coughing fits.  Coughing worse at night.  Also has nasal congestion and runny nose.  Had been more fatigued, was sleeping more during the day.  Gradually symptoms have improved.  Last night slept through the night.  Sore throat has resolved.  Continues to have mild runny nose and slight cough.  Today he feels better overall.  Has not have fever during illness.  Denies difficulty breathing, SOB, chest pain,  orthopnea.  No sick contacts.  History of heart failure- follows low salt diet.  Has not noted any lower leg swelling or weight gain (takes weights daily).      Review of Systems  Review of systems negative otherwise known HPI.  Productive cough with coughing fits for 3-4 days, usually during the night.  Had been more fatigued, was sleeping more.  Last night slept through the night.  Sore throat has resolved.  Continues to have runny nose and slight cough  Today he feels better overall.  Has not have fever during illness.  Denies difficulty breathing, SOB, chest pain, orthopnea.  No sick contacts.  History of heart failure- follows low salt diet.  Has not noted any lower leg swelling or weight gain (takes weights daily).      Past Medical History:   Diagnosis Date    Atrial fibrillation (H)     Chronic heart failure with preserved ejection fraction (H) 05/31/2023    Chronic kidney disease     Chronic kidney disease (CKD), stage III (moderate) (H)     Chronic kidney disease, stage IV (severe) (H) 05/31/2023    Congestive heart failure (H)     Essential hypertension     Hard of hearing     History of rheumatic fever 04/25/2017    Onychomycosis 10/27/2017    Permanent atrial fibrillation (H) 02/03/2017    SSS (sick sinus syndrome) (H) 07/29/2019    Unspecified cataract     Vitamin D deficiency disease 10/06/2015       Past Surgical History:   Procedure Laterality Date    AS FUSION OF WRIST JOINT Right     CATARACT EXTRACTION Left     COLON SURGERY      EP PACEMAKER INSERT N/A 08/01/2019    EP Pacemaker Insertion; Emeka eDan MD; [facility];  Service: Cardiology    IMPLANT PACEMAKER      IR ABDOMINAL ENDOVASCULAR STENT GRAFT  12/29/2021    LIGATE ARTERY ETHMOID Right 02/16/2023    Procedure: Endoscopic control of nasal hemmorhage.;  Surgeon: Melissa Roberson MD;  Location: [facility] OR    REPAIR ANEURYSM ABDOMINAL AORTA N/A 12/29/2021    Procedure: ENDOVASCULAR REPAIR OF ABDOMINAL AORTIC ANEURYSM  WITH  ENDOGRAFT;  Surgeon: Melia Granger MD;  Location: [facility] [place] [place] OR    ROTATOR CUFF REPAIR RT/LT Left     TOTAL KNEE ARTHROPLASTY Left        Family History   Problem Relation Age of Onset    Valvular heart disease Mother         care at [place]    Colon Cancer Father     No Known Problems Daughter     No Known Problems Daughter        Social History     Tobacco Use    Smoking status: Former     Current packs/day: 0.00     Types: Cigars, Cigarettes     Quit date: 2016     Years since quittin.1    Smokeless tobacco: Never   Substance Use Topics    Alcohol use: Yes     Alcohol/week: 1.0 standard drink of alcohol     Comment: Alcoholic Drinks/day: per week 2     Current Outpatient Medications   Medication Sig Dispense Refill    acetaminophen (TYLENOL) 500 MG tablet Take 500-1,000 mg by mouth every 6 hours as needed for pain       amLODIPine (NORVASC) 5 MG tablet TAKE ONE TABLET BY MOUTH ONE TIME DAILY 90 tablet 3    amoxicillin (AMOXIL) 500 MG capsule Take 4 capsules (2,000 mg) by mouth once as needed (one hour prior to dental work) 4 capsule 3    aspirin 81 MG EC tablet Take 81 mg by mouth every other day.      bumetanide (BUMEX) 1 MG tablet Take 1 tablet (1 mg) by mouth daily 90 tablet 1    cholecalciferol, vitamin D3, (CHOLECALCIFEROL) 1,000 unit tablet [CHOLECALCIFEROL, VITAMIN D3, (CHOLECALCIFEROL) 1,000 UNIT TABLET] Take 2,000 Units by mouth daily.      cyanocobalamin 100 MCG tablet [CYANOCOBALAMIN 100 MCG TABLET] Take 100 mcg by mouth daily.      metoprolol succinate ER (TOPROL XL) 100 MG 24 hr tablet Take 1 tablet (100 mg) by mouth daily 90 tablet 3    oxymetazoline (AFRIN) 0.05 % nasal spray Spray 2 sprays into both nostrils 2 times daily as needed for congestion 37 mL 3    sodium chloride (OCEAN) 0.65 % nasal spray Spray 1 spray into both nostrils 4 times daily as needed for congestion       No current facility-administered medications for this visit.       Objective    There were no vitals  taken for this visit.  There is no height or weight on file to calculate BMI.  Physical Exam     General: Alert, oriented, no acute distress.    Head: Normocephalic and atraumatic.   Eyes: Conjunctiva and sclera clear.   Ears: External ear nontender. TMs intact and clear.   Oropharynx: Dentition intact. Oral and posterior pharynx pink and moist.     Neck: Supple, no masses or nodes. No adenopathy.    Respiratory: Lungs clear, unlabored. No rales, rhonchi, or wheezes.    Cardiovascular: Regular rate and rhythm, S1 and S2. No murmurs. No peripheral edema.     Musculoskeletal: Uses cane for ambulation.   Skin: No suspicious lesions, rashes, or abnormalities.    Neurologic: Mentation intact and speech normal.     Psychiatric: Appropriate affect.     Signed Electronically by: GUERRERO Arias CNP

## 2025-02-27 ENCOUNTER — TELEPHONE (OUTPATIENT)
Dept: CARDIOLOGY | Facility: CLINIC | Age: 85
End: 2025-02-27

## 2025-02-27 ENCOUNTER — ANCILLARY PROCEDURE (OUTPATIENT)
Dept: CARDIOLOGY | Facility: CLINIC | Age: 85
End: 2025-02-27
Attending: INTERNAL MEDICINE
Payer: COMMERCIAL

## 2025-02-27 DIAGNOSIS — Z95.0 PACEMAKER: ICD-10-CM

## 2025-02-27 DIAGNOSIS — I49.5 SICK SINUS SYNDROME (H): ICD-10-CM

## 2025-02-27 LAB
MDC_IDC_EPISODE_DTM: NORMAL
MDC_IDC_EPISODE_DURATION: 0 S
MDC_IDC_EPISODE_DURATION: 0 S
MDC_IDC_EPISODE_DURATION: 1 S
MDC_IDC_EPISODE_ID: 27
MDC_IDC_EPISODE_ID: 28
MDC_IDC_EPISODE_ID: 29
MDC_IDC_EPISODE_TYPE: NORMAL
MDC_IDC_LEAD_CONNECTION_STATUS: NORMAL
MDC_IDC_LEAD_IMPLANT_DT: NORMAL
MDC_IDC_LEAD_LOCATION: NORMAL
MDC_IDC_LEAD_LOCATION_DETAIL_1: NORMAL
MDC_IDC_LEAD_MFG: NORMAL
MDC_IDC_LEAD_MODEL: NORMAL
MDC_IDC_LEAD_POLARITY_TYPE: NORMAL
MDC_IDC_LEAD_SERIAL: NORMAL
MDC_IDC_LEAD_SPECIAL_FUNCTION: NORMAL
MDC_IDC_MSMT_BATTERY_DTM: NORMAL
MDC_IDC_MSMT_BATTERY_REMAINING_LONGEVITY: 110 MO
MDC_IDC_MSMT_BATTERY_RRT_TRIGGER: 2.62
MDC_IDC_MSMT_BATTERY_STATUS: NORMAL
MDC_IDC_MSMT_BATTERY_VOLTAGE: 2.99 V
MDC_IDC_MSMT_LEADCHNL_RV_IMPEDANCE_VALUE: 399 OHM
MDC_IDC_MSMT_LEADCHNL_RV_IMPEDANCE_VALUE: 475 OHM
MDC_IDC_MSMT_LEADCHNL_RV_PACING_THRESHOLD_AMPLITUDE: 0.5 V
MDC_IDC_MSMT_LEADCHNL_RV_PACING_THRESHOLD_PULSEWIDTH: 0.4 MS
MDC_IDC_MSMT_LEADCHNL_RV_SENSING_INTR_AMPL: 20 MV
MDC_IDC_MSMT_LEADCHNL_RV_SENSING_INTR_AMPL: 20 MV
MDC_IDC_PG_IMPLANT_DTM: NORMAL
MDC_IDC_PG_MFG: NORMAL
MDC_IDC_PG_MODEL: NORMAL
MDC_IDC_PG_SERIAL: NORMAL
MDC_IDC_PG_TYPE: NORMAL
MDC_IDC_SESS_CLINIC_NAME: NORMAL
MDC_IDC_SESS_DTM: NORMAL
MDC_IDC_SESS_TYPE: NORMAL
MDC_IDC_SET_BRADY_HYSTRATE: NORMAL
MDC_IDC_SET_BRADY_LOWRATE: 60 {BEATS}/MIN
MDC_IDC_SET_BRADY_MAX_SENSOR_RATE: 130 {BEATS}/MIN
MDC_IDC_SET_BRADY_MODE: NORMAL
MDC_IDC_SET_LEADCHNL_RV_PACING_AMPLITUDE: 1.5 V
MDC_IDC_SET_LEADCHNL_RV_PACING_ANODE_ELECTRODE_1: NORMAL
MDC_IDC_SET_LEADCHNL_RV_PACING_ANODE_LOCATION_1: NORMAL
MDC_IDC_SET_LEADCHNL_RV_PACING_CAPTURE_MODE: NORMAL
MDC_IDC_SET_LEADCHNL_RV_PACING_CATHODE_ELECTRODE_1: NORMAL
MDC_IDC_SET_LEADCHNL_RV_PACING_CATHODE_LOCATION_1: NORMAL
MDC_IDC_SET_LEADCHNL_RV_PACING_POLARITY: NORMAL
MDC_IDC_SET_LEADCHNL_RV_PACING_PULSEWIDTH: 0.4 MS
MDC_IDC_SET_LEADCHNL_RV_SENSING_ANODE_ELECTRODE_1: NORMAL
MDC_IDC_SET_LEADCHNL_RV_SENSING_ANODE_LOCATION_1: NORMAL
MDC_IDC_SET_LEADCHNL_RV_SENSING_CATHODE_ELECTRODE_1: NORMAL
MDC_IDC_SET_LEADCHNL_RV_SENSING_CATHODE_LOCATION_1: NORMAL
MDC_IDC_SET_LEADCHNL_RV_SENSING_POLARITY: NORMAL
MDC_IDC_SET_LEADCHNL_RV_SENSING_SENSITIVITY: 0.9 MV
MDC_IDC_SET_ZONE_DETECTION_INTERVAL: 360 MS
MDC_IDC_SET_ZONE_STATUS: NORMAL
MDC_IDC_SET_ZONE_TYPE: NORMAL
MDC_IDC_SET_ZONE_VENDOR_TYPE: NORMAL
MDC_IDC_STAT_BRADY_DTM_END: NORMAL
MDC_IDC_STAT_BRADY_DTM_START: NORMAL
MDC_IDC_STAT_BRADY_RV_PERCENT_PACED: 98.74 %
MDC_IDC_STAT_EPISODE_RECENT_COUNT: 0
MDC_IDC_STAT_EPISODE_RECENT_COUNT: 0
MDC_IDC_STAT_EPISODE_RECENT_COUNT: 3
MDC_IDC_STAT_EPISODE_RECENT_COUNT_DTM_END: NORMAL
MDC_IDC_STAT_EPISODE_RECENT_COUNT_DTM_START: NORMAL
MDC_IDC_STAT_EPISODE_TOTAL_COUNT: 0
MDC_IDC_STAT_EPISODE_TOTAL_COUNT: 0
MDC_IDC_STAT_EPISODE_TOTAL_COUNT: 29
MDC_IDC_STAT_EPISODE_TOTAL_COUNT_DTM_END: NORMAL
MDC_IDC_STAT_EPISODE_TOTAL_COUNT_DTM_START: NORMAL
MDC_IDC_STAT_EPISODE_TYPE: NORMAL

## 2025-02-27 NOTE — TELEPHONE ENCOUNTER
Encounter Type: routine remote pacemaker transmission.   Device: Medtronic Dobbins.   Pacing % /Programmed:  98% at VVIR 60 ppm.   Lead(s): stable.   Battery longevity: 9yrs, 2mo estimated.   Presenting: Ventricular pacing 60 bpm.   Atrial high rates: n/a. RV lead only.   Anticoagulant: None. Takes ASA 81mg   Ventricular High rates: since 11/19/24; three ventricular high rate episodes, all EGMs suggest NSVT up to 17bts 175 bpm. EF 55-60% per echo 5/18/23.  Comments: Normal device function. Routed to device RN for review.   Plan: Next remote check scheduled for 6/10/25. SUHAIL Patterson, Device Specialist  ADD: Transmission reviewed, see EPIC for details. Carrol May RN    2/27/25: Spoke with spouse, Shauna who denies symptoms. She reports that she talked with Dereck who told her that he is feeling good. Known history of NSVT. EF 55-60% per echo 5/18/23. Followed and last seen by Dr. Traore 11/20/24. Will route to Dr. Traore to review NSVT with EF >1 year ago. Carrol May, BENY

## 2025-02-28 PROCEDURE — 93294 REM INTERROG EVL PM/LDLS PM: CPT | Performed by: INTERNAL MEDICINE

## 2025-02-28 PROCEDURE — 93296 REM INTERROG EVL PM/IDS: CPT | Performed by: INTERNAL MEDICINE

## 2025-05-06 ENCOUNTER — OFFICE VISIT (OUTPATIENT)
Dept: FAMILY MEDICINE | Facility: CLINIC | Age: 85
End: 2025-05-06
Payer: COMMERCIAL

## 2025-05-06 VITALS
SYSTOLIC BLOOD PRESSURE: 130 MMHG | TEMPERATURE: 98.3 F | WEIGHT: 190 LBS | HEART RATE: 95 BPM | BODY MASS INDEX: 26.6 KG/M2 | DIASTOLIC BLOOD PRESSURE: 80 MMHG | HEIGHT: 71 IN | RESPIRATION RATE: 15 BRPM | OXYGEN SATURATION: 97 %

## 2025-05-06 DIAGNOSIS — Z95.0 CARDIAC PACEMAKER IN SITU: Chronic | ICD-10-CM

## 2025-05-06 DIAGNOSIS — I50.32 CHRONIC HEART FAILURE WITH PRESERVED EJECTION FRACTION (H): ICD-10-CM

## 2025-05-06 DIAGNOSIS — I48.21 PERMANENT ATRIAL FIBRILLATION (H): Chronic | ICD-10-CM

## 2025-05-06 DIAGNOSIS — N18.4 CHRONIC KIDNEY DISEASE, STAGE IV (SEVERE) (H): ICD-10-CM

## 2025-05-06 DIAGNOSIS — J84.10 PULMONARY FIBROSIS, UNSPECIFIED (H): ICD-10-CM

## 2025-05-06 DIAGNOSIS — Z23 NEED FOR VACCINATION: ICD-10-CM

## 2025-05-06 DIAGNOSIS — I10 ESSENTIAL HYPERTENSION: Primary | ICD-10-CM

## 2025-05-06 DIAGNOSIS — D53.9 MACROCYTIC ANEMIA: ICD-10-CM

## 2025-05-06 DIAGNOSIS — I71.019 DISSECTION OF THORACIC AORTA, UNSPECIFIED PART (H): ICD-10-CM

## 2025-05-06 LAB
ANION GAP SERPL CALCULATED.3IONS-SCNC: 12 MMOL/L (ref 7–15)
BUN SERPL-MCNC: 49.9 MG/DL (ref 8–23)
CALCIUM SERPL-MCNC: 9.9 MG/DL (ref 8.8–10.4)
CHLORIDE SERPL-SCNC: 104 MMOL/L (ref 98–107)
CREAT SERPL-MCNC: 3.15 MG/DL (ref 0.67–1.17)
CREAT UR-MCNC: 101 MG/DL
EGFRCR SERPLBLD CKD-EPI 2021: 18 ML/MIN/1.73M2
ERYTHROCYTE [DISTWIDTH] IN BLOOD BY AUTOMATED COUNT: 13 % (ref 10–15)
GLUCOSE SERPL-MCNC: 101 MG/DL (ref 70–99)
HCO3 SERPL-SCNC: 24 MMOL/L (ref 22–29)
HCT VFR BLD AUTO: 37.5 % (ref 40–53)
HGB BLD-MCNC: 12.2 G/DL (ref 13.3–17.7)
MCH RBC QN AUTO: 33.6 PG (ref 26.5–33)
MCHC RBC AUTO-ENTMCNC: 32.5 G/DL (ref 31.5–36.5)
MCV RBC AUTO: 103 FL (ref 78–100)
MICROALBUMIN UR-MCNC: 233 MG/L
MICROALBUMIN/CREAT UR: 230.69 MG/G CR (ref 0–17)
PLATELET # BLD AUTO: 142 10E3/UL (ref 150–450)
POTASSIUM SERPL-SCNC: 5.2 MMOL/L (ref 3.4–5.3)
RBC # BLD AUTO: 3.63 10E6/UL (ref 4.4–5.9)
SODIUM SERPL-SCNC: 140 MMOL/L (ref 135–145)
WBC # BLD AUTO: 7.2 10E3/UL (ref 4–11)

## 2025-05-06 PROCEDURE — 3075F SYST BP GE 130 - 139MM HG: CPT | Performed by: FAMILY MEDICINE

## 2025-05-06 PROCEDURE — 82570 ASSAY OF URINE CREATININE: CPT | Performed by: FAMILY MEDICINE

## 2025-05-06 PROCEDURE — 91320 SARSCV2 VAC 30MCG TRS-SUC IM: CPT | Performed by: FAMILY MEDICINE

## 2025-05-06 PROCEDURE — 3079F DIAST BP 80-89 MM HG: CPT | Performed by: FAMILY MEDICINE

## 2025-05-06 PROCEDURE — 82043 UR ALBUMIN QUANTITATIVE: CPT | Performed by: FAMILY MEDICINE

## 2025-05-06 PROCEDURE — 99214 OFFICE O/P EST MOD 30 MIN: CPT | Performed by: FAMILY MEDICINE

## 2025-05-06 PROCEDURE — 80048 BASIC METABOLIC PNL TOTAL CA: CPT | Performed by: FAMILY MEDICINE

## 2025-05-06 PROCEDURE — 85027 COMPLETE CBC AUTOMATED: CPT | Performed by: FAMILY MEDICINE

## 2025-05-06 PROCEDURE — 1126F AMNT PAIN NOTED NONE PRSNT: CPT | Performed by: FAMILY MEDICINE

## 2025-05-06 PROCEDURE — G2211 COMPLEX E/M VISIT ADD ON: HCPCS | Performed by: FAMILY MEDICINE

## 2025-05-06 PROCEDURE — 90480 ADMN SARSCOV2 VAC 1/ONLY CMP: CPT | Performed by: FAMILY MEDICINE

## 2025-05-06 PROCEDURE — 36415 COLL VENOUS BLD VENIPUNCTURE: CPT | Performed by: FAMILY MEDICINE

## 2025-05-06 RX ORDER — BUMETANIDE 1 MG/1
1 TABLET ORAL DAILY
Qty: 90 TABLET | Refills: 1 | Status: SHIPPED | OUTPATIENT
Start: 2025-05-06

## 2025-05-06 RX ORDER — AMLODIPINE BESYLATE 5 MG/1
TABLET ORAL
Qty: 90 TABLET | Refills: 3 | Status: SHIPPED | OUTPATIENT
Start: 2025-05-06

## 2025-05-06 RX ORDER — METOPROLOL SUCCINATE 100 MG/1
100 TABLET, EXTENDED RELEASE ORAL DAILY
Qty: 90 TABLET | Refills: 3 | Status: SHIPPED | OUTPATIENT
Start: 2025-05-06

## 2025-05-06 ASSESSMENT — PAIN SCALES - GENERAL: PAINLEVEL_OUTOF10: NO PAIN (0)
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
## 2025-05-07 ENCOUNTER — RESULTS FOLLOW-UP (OUTPATIENT)
Dept: FAMILY MEDICINE | Facility: CLINIC | Age: 85
End: 2025-05-07
Payer: COMMERCIAL

## 2025-05-23 ENCOUNTER — HOSPITAL ENCOUNTER (EMERGENCY)
Facility: HOSPITAL | Age: 85
Discharge: HOME | End: 2025-05-23
Attending: EMERGENCY MEDICINE | Admitting: EMERGENCY MEDICINE
Payer: COMMERCIAL

## 2025-05-23 VITALS
WEIGHT: 180 LBS | DIASTOLIC BLOOD PRESSURE: 70 MMHG | RESPIRATION RATE: 18 BRPM | OXYGEN SATURATION: 98 % | HEIGHT: 71 IN | BODY MASS INDEX: 25.2 KG/M2 | HEART RATE: 71 BPM | TEMPERATURE: 100.2 F | SYSTOLIC BLOOD PRESSURE: 121 MMHG

## 2025-05-23 DIAGNOSIS — M11.20 PSEUDOGOUT: ICD-10-CM

## 2025-05-23 LAB
APPEARANCE FLD: ABNORMAL
COLOR FLD: YELLOW
CRYSTALS SNV MICRO: ABNORMAL
GLUCOSE BODY FLUID SOURCE: NORMAL
GLUCOSE FLD-MCNC: 75 MG/DL
LYMPHOCYTES NFR FLD MANUAL: 1 %
MONOS+MACROS NFR FLD MANUAL: 32 %
NEUTS BAND NFR FLD MANUAL: 67 %
PROT FLD-MCNC: 4.1 G/DL
PROTEIN BODY FLUID SOURCE: NORMAL
RBC # FLD: 10 /UL
SPECIMEN SOURCE FLD: ABNORMAL
WBC # FLD AUTO: ABNORMAL /UL

## 2025-05-23 PROCEDURE — 82945 GLUCOSE OTHER FLUID: CPT | Performed by: EMERGENCY MEDICINE

## 2025-05-23 PROCEDURE — 84157 ASSAY OF PROTEIN OTHER: CPT | Performed by: EMERGENCY MEDICINE

## 2025-05-23 PROCEDURE — 89050 BODY FLUID CELL COUNT: CPT | Performed by: EMERGENCY MEDICINE

## 2025-05-23 PROCEDURE — 89060 EXAM SYNOVIAL FLUID CRYSTALS: CPT | Performed by: EMERGENCY MEDICINE

## 2025-05-23 PROCEDURE — 20610 DRAIN/INJ JOINT/BURSA W/O US: CPT

## 2025-05-23 PROCEDURE — 99284 EMERGENCY DEPT VISIT MOD MDM: CPT | Mod: 25

## 2025-05-23 PROCEDURE — 87070 CULTURE OTHR SPECIMN AEROBIC: CPT | Performed by: EMERGENCY MEDICINE

## 2025-05-23 PROCEDURE — 87205 SMEAR GRAM STAIN: CPT | Performed by: EMERGENCY MEDICINE

## 2025-05-23 RX ORDER — OXYCODONE HYDROCHLORIDE 5 MG/1
5 TABLET ORAL ONCE
Refills: 0 | Status: COMPLETED | OUTPATIENT
Start: 2025-05-23 | End: 2025-05-23

## 2025-05-23 RX ORDER — PREDNISONE 20 MG/1
TABLET ORAL
Qty: 9 TABLET | Refills: 0 | Status: SHIPPED | OUTPATIENT
Start: 2025-05-23 | End: 2025-06-02

## 2025-05-23 ASSESSMENT — COLUMBIA-SUICIDE SEVERITY RATING SCALE - C-SSRS
6. HAVE YOU EVER DONE ANYTHING, STARTED TO DO ANYTHING, OR PREPARED TO DO ANYTHING TO END YOUR LIFE?: NO
2. HAVE YOU ACTUALLY HAD ANY THOUGHTS OF KILLING YOURSELF IN THE PAST MONTH?: NO
1. IN THE PAST MONTH, HAVE YOU WISHED YOU WERE DEAD OR WISHED YOU COULD GO TO SLEEP AND NOT WAKE UP?: NO
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
## 2025-05-23 NOTE — DISCHARGE INSTRUCTIONS
Your synovial fluid analysis showed the presence of pseudogout, so I have sent a prescription for steroids to your pharmacy.  Please take this as directed and follow-up with your primary care provider concerning your new diagnosis of pseudogout.  You can also take Tylenol as needed for pain.  If you note any increasing fever or increasing pain despite 48 hours of steroid use, you can return to the ER for further evaluation and treatment.

## 2025-05-23 NOTE — ED PROVIDER NOTES
I am seeing this patient along with Jailyn Fournier PA-C. I had a face to face encounter with this patient seen by the Advanced Practice Provider (BOB).  I have seen, examined, and discussed the patient with the BOB and agree with their assessment and plan of management. I personally saw the patient and performed a substantive portion of the visit including all aspects of the medical decision making.    HPI: 85-year-old male here with fever and atraumatic right knee pain    Physical Exam: Patient with temperature of 100.2, right knee with significant edema, warmth to the touch, and palpable joint effusion      LABS  Pertinent lab results reviewed in chart.  Labs Ordered and Resulted from Time of ED Arrival to Time of ED Departure - No data to display    EKG      RADIOLOGY  No orders to display       PROCEDURES   I directly supervised arthrocentesis from start to finish, see BOB note for details    ED COURSE & MEDICAL DECISION MAKING    Pertinent Labs and Imagaing reviewed (see chart for details)    85 year old male here for evaluation of atraumatic right knee pain.  He also is borderline febrile with a temperature of 100.2, and his right knee is edematous, warm to the touch, and clearly has a joint effusion that is even palpable from the lateral side.  We have at least a moderate suspicion for a septic knee, versus something like crystal arthropathy.  I have ordered labs including CBC, sed rate CRP, blood cultures which are pending, and I directly supervised the performance of an arthrocentesis.  Joint fluid studies are pending and when the results are back Case will be discussed with orthopedics for further intervention.  Joint fluid studies are pending at time of signout.          FINAL IMPRESSION      No diagnosis found.  Right knee pain  Fever        CRITICAL CARE  0 Minutes    JOSEPH, John Ramirez, am serving as a scribe to document services personally performed by Cat Morgan MD, based on my observations and  the provider's statements to me.  I, Cat Morgan MD, attest that John Ramirez is acting in a scribe capacity, has observed my performance of the services and has documented them in accordance with my direction.    Cat Morgan M.D.  5/23/2025 12:16 PM     Cat Morgan MD  05/23/25 [id number]

## 2025-05-27 ENCOUNTER — PATIENT OUTREACH (OUTPATIENT)
Dept: FAMILY MEDICINE | Facility: CLINIC | Age: 85
End: 2025-05-27
Payer: COMMERCIAL

## 2025-05-27 NOTE — TELEPHONE ENCOUNTER
Writer called patient and left message to return call to clinic.     If patient returns call please route to nurse queue to complete TCM hospital follow up questionnaire, medication reconciliation and assist with scheduling a hospital follow up visit..    VILMA Montgomery, RN  [facility]

## 2025-05-27 NOTE — TELEPHONE ENCOUNTER
Transitions of Care Outreach  No chief complaint on file.      Most Recent Admission Date: 5/23/2025   Most Recent Admission Diagnosis:      Most Recent Discharge Date: 5/23/2025   Most Recent Discharge Diagnosis: Pseudogout - M11.20     Transitions of Care Assessment    Discharge Assessment  How are you doing now that you are home?: good  How are your symptoms? (Red Flag symptoms escalate to triage hotline per guidelines): Improved  Do you know how to contact your clinic care team if you have future questions or changes to your health status? : Yes  Does the patient have their discharge instructions? : Yes  Does the patient have questions regarding their discharge instructions? : No  Were you started on any new medications or were there changes to any of your previous medications? : Yes    Follow up Plan          Future Appointments   Date Time Provider Department Center   6/10/2025 12:00 AM [facility] CHECK FROM HOME [facility]   11/7/2025  7:20 AM Donald Machado MD [facility]       Outpatient Plan as outlined on AVS reviewed with patient.    For any urgent concerns, please contact our 24 hour nurse triage line: [phone] ([phone])       Miladis Aguiar RN

## 2025-05-28 LAB
BACTERIA SNV CULT: NO GROWTH
GRAM STAIN RESULT: NORMAL
GRAM STAIN RESULT: NORMAL

## 2025-06-10 ENCOUNTER — ANCILLARY PROCEDURE (OUTPATIENT)
Dept: CARDIOLOGY | Facility: CLINIC | Age: 85
End: 2025-06-10
Attending: INTERNAL MEDICINE
Payer: COMMERCIAL

## 2025-06-10 DIAGNOSIS — I49.5 SICK SINUS SYNDROME (H): ICD-10-CM

## 2025-06-10 DIAGNOSIS — Z95.0 PACEMAKER: ICD-10-CM

## 2025-06-10 LAB
MDC_IDC_LEAD_CONNECTION_STATUS: NORMAL
MDC_IDC_LEAD_IMPLANT_DT: NORMAL
MDC_IDC_LEAD_LOCATION: NORMAL
MDC_IDC_LEAD_LOCATION_DETAIL_1: NORMAL
MDC_IDC_LEAD_MFG: NORMAL
MDC_IDC_LEAD_MODEL: NORMAL
MDC_IDC_LEAD_POLARITY_TYPE: NORMAL
MDC_IDC_LEAD_SERIAL: NORMAL
MDC_IDC_LEAD_SPECIAL_FUNCTION: NORMAL
MDC_IDC_MSMT_BATTERY_DTM: NORMAL
MDC_IDC_MSMT_BATTERY_REMAINING_LONGEVITY: 109 MO
MDC_IDC_MSMT_BATTERY_RRT_TRIGGER: 2.62
MDC_IDC_MSMT_BATTERY_STATUS: NORMAL
MDC_IDC_MSMT_BATTERY_VOLTAGE: 2.99 V
MDC_IDC_MSMT_LEADCHNL_RV_IMPEDANCE_VALUE: 437 OHM
MDC_IDC_MSMT_LEADCHNL_RV_IMPEDANCE_VALUE: 513 OHM
MDC_IDC_MSMT_LEADCHNL_RV_PACING_THRESHOLD_AMPLITUDE: 0.62 V
MDC_IDC_MSMT_LEADCHNL_RV_PACING_THRESHOLD_PULSEWIDTH: 0.4 MS
MDC_IDC_MSMT_LEADCHNL_RV_SENSING_INTR_AMPL: 20 MV
MDC_IDC_MSMT_LEADCHNL_RV_SENSING_INTR_AMPL: 20 MV
MDC_IDC_PG_IMPLANT_DTM: NORMAL
MDC_IDC_PG_MFG: NORMAL
MDC_IDC_PG_MODEL: NORMAL
MDC_IDC_PG_SERIAL: NORMAL
MDC_IDC_PG_TYPE: NORMAL
MDC_IDC_SESS_CLINIC_NAME: NORMAL
MDC_IDC_SESS_DTM: NORMAL
MDC_IDC_SESS_TYPE: NORMAL
MDC_IDC_SET_BRADY_HYSTRATE: NORMAL
MDC_IDC_SET_BRADY_LOWRATE: 60 {BEATS}/MIN
MDC_IDC_SET_BRADY_MAX_SENSOR_RATE: 130 {BEATS}/MIN
MDC_IDC_SET_BRADY_MODE: NORMAL
MDC_IDC_SET_LEADCHNL_RV_PACING_AMPLITUDE: 1.5 V
MDC_IDC_SET_LEADCHNL_RV_PACING_ANODE_ELECTRODE_1: NORMAL
MDC_IDC_SET_LEADCHNL_RV_PACING_ANODE_LOCATION_1: NORMAL
MDC_IDC_SET_LEADCHNL_RV_PACING_CAPTURE_MODE: NORMAL
MDC_IDC_SET_LEADCHNL_RV_PACING_CATHODE_ELECTRODE_1: NORMAL
MDC_IDC_SET_LEADCHNL_RV_PACING_CATHODE_LOCATION_1: NORMAL
MDC_IDC_SET_LEADCHNL_RV_PACING_POLARITY: NORMAL
MDC_IDC_SET_LEADCHNL_RV_PACING_PULSEWIDTH: 0.4 MS
MDC_IDC_SET_LEADCHNL_RV_SENSING_ANODE_ELECTRODE_1: NORMAL
MDC_IDC_SET_LEADCHNL_RV_SENSING_ANODE_LOCATION_1: NORMAL
MDC_IDC_SET_LEADCHNL_RV_SENSING_CATHODE_ELECTRODE_1: NORMAL
MDC_IDC_SET_LEADCHNL_RV_SENSING_CATHODE_LOCATION_1: NORMAL
MDC_IDC_SET_LEADCHNL_RV_SENSING_POLARITY: NORMAL
MDC_IDC_SET_LEADCHNL_RV_SENSING_SENSITIVITY: 0.9 MV
MDC_IDC_SET_ZONE_DETECTION_INTERVAL: 360 MS
MDC_IDC_SET_ZONE_STATUS: NORMAL
MDC_IDC_SET_ZONE_TYPE: NORMAL
MDC_IDC_SET_ZONE_VENDOR_TYPE: NORMAL
MDC_IDC_STAT_BRADY_DTM_END: NORMAL
MDC_IDC_STAT_BRADY_DTM_START: NORMAL
MDC_IDC_STAT_BRADY_RV_PERCENT_PACED: 98.28 %
MDC_IDC_STAT_EPISODE_RECENT_COUNT: 0
MDC_IDC_STAT_EPISODE_RECENT_COUNT_DTM_END: NORMAL
MDC_IDC_STAT_EPISODE_RECENT_COUNT_DTM_START: NORMAL
MDC_IDC_STAT_EPISODE_TOTAL_COUNT: 0
MDC_IDC_STAT_EPISODE_TOTAL_COUNT: 0
MDC_IDC_STAT_EPISODE_TOTAL_COUNT: 29
MDC_IDC_STAT_EPISODE_TOTAL_COUNT_DTM_END: NORMAL
MDC_IDC_STAT_EPISODE_TOTAL_COUNT_DTM_START: NORMAL
MDC_IDC_STAT_EPISODE_TYPE: NORMAL

## 2025-06-10 PROCEDURE — 93294 REM INTERROG EVL PM/LDLS PM: CPT | Performed by: INTERNAL MEDICINE

## 2025-06-10 PROCEDURE — 93296 REM INTERROG EVL PM/IDS: CPT | Performed by: INTERNAL MEDICINE

## (undated) DEVICE — DRAPE STERI FLUOROSCOPE 35X43"1012 LATEX FREE

## (undated) DEVICE — WIRE GUIDE AMPLATZ SUPER STIFF 0.035"X260CM STR M001465090

## (undated) DEVICE — SOL NACL 0.9% IRRIG 1000ML BOTTLE 2F7124

## (undated) DEVICE — SUCTION MANIFOLD NEPTUNE 2 SYS 4 PORT 0702-020-000

## (undated) DEVICE — SHEATH PINNACLE 8FR 10CM R/O II RSB802

## (undated) DEVICE — SUTURE BOOTS 051003PBX

## (undated) DEVICE — DEVICE MULTI TORQUE TD01

## (undated) DEVICE — CLOSURE DEVICE 6FR VASC PROGLIDE MEDICATED SUTURE 12673-03

## (undated) DEVICE — CLIP ETHICON LIGACLIP SM BLUE LT100

## (undated) DEVICE — Device

## (undated) DEVICE — CUSTOM PACK GEN MAJOR SBA5BGMHEA

## (undated) DEVICE — GLOVE BIOGEL PI SZ 8.0 40880

## (undated) DEVICE — DECANTER VIAL 2006S

## (undated) DEVICE — SPONGE COTTONOID 1/2X3" 20-07S

## (undated) DEVICE — PLATE GROUNDING ADULT W/CORD 9165L

## (undated) DEVICE — SU SILK 4-0 TIE 18' A183H

## (undated) DEVICE — GLIDEWIRE ADVANTAGE 25CM .035 180CM GA3501

## (undated) DEVICE — DRAPE CV INCISE CVARTS 89466

## (undated) DEVICE — LINEN TOWEL PACK X5 5464

## (undated) DEVICE — PACK MINOR SINGLE BASIN SSK3001

## (undated) DEVICE — NEEDLE HYPO 21GA X 1-1/2 SAFETY 305917

## (undated) DEVICE — SET NEURO SYRINGE/FLUID  K10-01039C

## (undated) DEVICE — ANTIFOG SOLUTION W/FOAM PAD 31142527

## (undated) DEVICE — DRAPE U SPLIT 74X120" 29440

## (undated) DEVICE — GLOVE BIOGEL PI ULTRATOUCH G SZ 7.5 42175

## (undated) DEVICE — STOPCOCK 3 WAY 500 PSI M3SNC

## (undated) DEVICE — COVER ULTRASOUND PROBE STRL 9001C0197

## (undated) DEVICE — GEL ULTRASOUND AQUASONIC 20GM 01-01

## (undated) DEVICE — INTRO MICRO MINI STICK 4FR STIFF NITINOL 45-753

## (undated) DEVICE — SU MONOCRYL+ 4-0 18IN PS2 UND MCP496G

## (undated) DEVICE — SOL WATER IRRIG 1000ML BOTTLE 2F7114

## (undated) DEVICE — CATH TRAY FOLEY 16FR W/METER A800365

## (undated) DEVICE — INTRO SHEATH 5FRX10CM PINNACLE MARKER RSB512

## (undated) DEVICE — DECANTER BAG 2002S

## (undated) DEVICE — DRAPE CARM 12IN F/XRAY GE 5774511

## (undated) DEVICE — SURGICEL HEMOSTAT 4X8" 1952

## (undated) DEVICE — DRAPE SHEET MED 44X70" 9355

## (undated) DEVICE — SHEATH INTRODUCER DRYSEAL FLEX W/HYDRO CT 16FRX33CM DSF1633

## (undated) DEVICE — ADH SKIN CLOSURE PREMIERPRO EXOFIN 1.0ML 3470

## (undated) DEVICE — PACK NEURO MINOR UMMC SNE32MNMU4

## (undated) DEVICE — ESU GROUND PAD ADULT W/CORD E7507

## (undated) DEVICE — GOWN SURGICAL SMARTGOWN 2XL 89075

## (undated) DEVICE — SYR 50ML LL W/O NDL 309653

## (undated) DEVICE — CATH OMNIFLUSH 5FRX70CM SIZING 13709702

## (undated) DEVICE — DRSG TEGADERM 4X4 3/4" 1626W

## (undated) DEVICE — ESU ELEC NDL 6" COATED/INSULATED E1465-6

## (undated) DEVICE — PREP CHLORAPREP 26ML TINTED HI-LITE ORANGE 930815

## (undated) DEVICE — SHEATH INTRODUCER DRYSEAL FLEX W/HYDRO CT 12FRX33CM DSF1233

## (undated) DEVICE — GLOVE BIOGEL INDICATOR 7.5 LF 41675

## (undated) DEVICE — CATH BALLOON RELIANT STENT GRAFT 8FR REL46

## (undated) DEVICE — GOWN IMPERVIOUS ZONED XLG 9041

## (undated) RX ORDER — LIDOCAINE HYDROCHLORIDE AND EPINEPHRINE 10; 10 MG/ML; UG/ML
INJECTION, SOLUTION INFILTRATION; PERINEURAL
Status: DISPENSED
Start: 2021-12-29

## (undated) RX ORDER — PROPOFOL 10 MG/ML
INJECTION, EMULSION INTRAVENOUS
Status: DISPENSED
Start: 2023-02-17

## (undated) RX ORDER — FENTANYL CITRATE-0.9 % NACL/PF 10 MCG/ML
PLASTIC BAG, INJECTION (ML) INTRAVENOUS
Status: DISPENSED
Start: 2023-02-17

## (undated) RX ORDER — BACITRACIN ZINC 500 [USP'U]/G
OINTMENT TOPICAL
Status: DISPENSED
Start: 2021-12-29

## (undated) RX ORDER — ALBUTEROL SULFATE 90 UG/1
AEROSOL, METERED RESPIRATORY (INHALATION)
Status: DISPENSED
Start: 2023-02-17

## (undated) RX ORDER — LIDOCAINE HYDROCHLORIDE 20 MG/ML
JELLY TOPICAL
Status: DISPENSED
Start: 2021-12-29

## (undated) RX ORDER — CEFAZOLIN SODIUM 1 G/3ML
INJECTION, POWDER, FOR SOLUTION INTRAMUSCULAR; INTRAVENOUS
Status: DISPENSED
Start: 2023-02-17

## (undated) RX ORDER — HEPARIN SODIUM 1000 [USP'U]/ML
INJECTION, SOLUTION INTRAVENOUS; SUBCUTANEOUS
Status: DISPENSED
Start: 2021-12-29

## (undated) RX ORDER — DEXAMETHASONE SODIUM PHOSPHATE 4 MG/ML
INJECTION, SOLUTION INTRA-ARTICULAR; INTRALESIONAL; INTRAMUSCULAR; INTRAVENOUS; SOFT TISSUE
Status: DISPENSED
Start: 2023-02-17

## (undated) RX ORDER — FENTANYL CITRATE 50 UG/ML
INJECTION, SOLUTION INTRAMUSCULAR; INTRAVENOUS
Status: DISPENSED
Start: 2023-02-16

## (undated) RX ORDER — ONDANSETRON 2 MG/ML
INJECTION INTRAMUSCULAR; INTRAVENOUS
Status: DISPENSED
Start: 2023-02-17